# Patient Record
Sex: MALE | Race: WHITE | NOT HISPANIC OR LATINO | Employment: OTHER | URBAN - NONMETROPOLITAN AREA
[De-identification: names, ages, dates, MRNs, and addresses within clinical notes are randomized per-mention and may not be internally consistent; named-entity substitution may affect disease eponyms.]

---

## 2017-01-17 DIAGNOSIS — I10 BENIGN ESSENTIAL HYPERTENSION: Primary | ICD-10-CM

## 2017-01-17 DIAGNOSIS — E78.5 HYPERLIPIDEMIA LDL GOAL <100: ICD-10-CM

## 2017-01-18 PROBLEM — E11.9 TYPE 2 DIABETES MELLITUS WITHOUT COMPLICATION, WITHOUT LONG-TERM CURRENT USE OF INSULIN (H): Status: ACTIVE | Noted: 2017-01-18

## 2017-01-18 RX ORDER — ATORVASTATIN CALCIUM 20 MG/1
TABLET, FILM COATED ORAL
Qty: 30 TABLET | Refills: 2 | Status: SHIPPED | OUTPATIENT
Start: 2017-01-18 | End: 2017-04-18

## 2017-01-18 RX ORDER — LISINOPRIL 40 MG/1
TABLET ORAL
Qty: 30 TABLET | Refills: 8 | Status: SHIPPED | OUTPATIENT
Start: 2017-01-18 | End: 2017-11-01

## 2017-01-19 ENCOUNTER — OFFICE VISIT (OUTPATIENT)
Dept: FAMILY MEDICINE | Facility: OTHER | Age: 62
End: 2017-01-19
Attending: FAMILY MEDICINE
Payer: COMMERCIAL

## 2017-01-19 VITALS
BODY MASS INDEX: 34.07 KG/M2 | TEMPERATURE: 97.5 F | WEIGHT: 238 LBS | DIASTOLIC BLOOD PRESSURE: 82 MMHG | HEIGHT: 70 IN | SYSTOLIC BLOOD PRESSURE: 136 MMHG | HEART RATE: 71 BPM | OXYGEN SATURATION: 98 % | RESPIRATION RATE: 20 BRPM

## 2017-01-19 DIAGNOSIS — E66.09 OBESITY DUE TO EXCESS CALORIES, UNSPECIFIED OBESITY SEVERITY: ICD-10-CM

## 2017-01-19 DIAGNOSIS — I10 ESSENTIAL HYPERTENSION WITH GOAL BLOOD PRESSURE LESS THAN 140/90: ICD-10-CM

## 2017-01-19 DIAGNOSIS — E11.9 TYPE 2 DIABETES MELLITUS WITHOUT COMPLICATION, WITHOUT LONG-TERM CURRENT USE OF INSULIN (H): Primary | ICD-10-CM

## 2017-01-19 DIAGNOSIS — E78.5 HYPERLIPIDEMIA LDL GOAL <100: ICD-10-CM

## 2017-01-19 DIAGNOSIS — G47.09 OTHER INSOMNIA: ICD-10-CM

## 2017-01-19 DIAGNOSIS — C44.310 BCC (BASAL CELL CARCINOMA), FACE: ICD-10-CM

## 2017-01-19 LAB
EST. AVERAGE GLUCOSE BLD GHB EST-MCNC: 171 MG/DL
HBA1C MFR BLD: 7.6 % (ref 4.3–6)

## 2017-01-19 PROCEDURE — 99214 OFFICE O/P EST MOD 30 MIN: CPT | Performed by: FAMILY MEDICINE

## 2017-01-19 PROCEDURE — 36415 COLL VENOUS BLD VENIPUNCTURE: CPT | Performed by: FAMILY MEDICINE

## 2017-01-19 PROCEDURE — 83036 HEMOGLOBIN GLYCOSYLATED A1C: CPT | Performed by: FAMILY MEDICINE

## 2017-01-19 ASSESSMENT — PAIN SCALES - GENERAL: PAINLEVEL: NO PAIN (0)

## 2017-01-19 NOTE — PROGRESS NOTES
[facility] Website verified for patient immunization history.      SUBJECTIVE:                                                    Edward Hannah is a 61 year old male who presents to clinic today for the following health issues:        Diabetes Follow-up      Patient is checking blood sugars: 4 times a week    Diabetic concerns: None     Symptoms of hypoglycemia (low blood sugar): none     Paresthesias (numbness or burning in feet) or sores: No     Date of last diabetic eye exam: 2-2016     Hyperlipidemia Follow-Up      Rate your low fat/cholesterol diet?: fair    Taking statin?  Yes, no muscle aches from statin    Other lipid medications/supplements?:  none     Hypertension Follow-up      Outpatient blood pressures are not being checked.    Low Salt Diet: low salt         Amount of exercise or physical activity: 6-7 days/week for an average of 45-60 minutes    Problems taking medications regularly: No    Medication side effects: none    Diet: regular (no restrictions)    Problem list and histories reviewed & adjusted, as indicated.  Additional history: as documented    Current Outpatient Prescriptions   Medication Sig Dispense Refill     lisinopril (PRINIVIL/ZESTRIL) 40 MG tablet TAKE 1 TABLET BY MOUTH DAILY 30 tablet 8     atorvastatin (LIPITOR) 20 MG tablet TAKE 1 TABLET BY MOUTH DAILY 30 tablet 2     glipiZIDE (GLUCOTROL XL) 10 MG 24 hr tablet TAKE 1 TABLET BY MOUTH TWICE DAILY 60 tablet 4     metFORMIN (GLUCOPHAGE) 1000 MG tablet TAKE 1 TABLET BY MOUTH 2 TIMES A DAY WITH MEALS 60 tablet 4     acetylcysteine (N-ACETYL CYSTEINE) 500 MG CAPS capsule Take 1 capsule (500 mg) by mouth daily       BYETTA 5 MCG PEN 5 MCG/0.02ML SOPN INJECT 5MCG SUBCUTANEOUS 2 TIMES DAILY 1.2 mL 3     B-D U/F 31G X 8 MM insulin pen needle USE 2 DAILY OR AS DIRECTED 100 each 3     amLODIPine (NORVASC) 2.5 MG tablet TAKE 1 TABLET BY MOUTH DAILY 30 tablet 9     blood glucose monitoring (NO BRAND SPECIFIED) test strip 1 strip by In Vitro route  "daily - Test three-four times per week as directed 100 each 3     Omega-3 Fatty Acids (OMEGA-3 FISH OIL PO) Take 1 g by mouth daily       Multiple Vitamins-Minerals (MULTIVITAMIN PO) Take 1 tablet by mouth daily       ASPIRIN 81 MG OR TABS 1 tab po QD (Once per day) 100 3     Problem list, Medication list, Allergies, and Medical/Social/Surgical histories reviewed in [facility] and updated as appropriate.    ROS:  Constitutional, HEENT, cardiovascular, pulmonary, gi and gu systems are negative, except as otherwise noted.    OBJECTIVE:                                                    /82 mmHg  Pulse 71  Temp(Src) 97.5  F (36.4  C) (Tympanic)  Resp 20  Ht 5' 10\" (1.778 m)  Wt 238 lb (107.956 kg)  BMI 34.15 kg/m2  SpO2 98%  Body mass index is 34.15 kg/(m^2).  GENERAL APPEARANCE: healthy, alert, no distress and over weight  RESP: lungs clear to auscultation - no rales, rhonchi or wheezes  CV: regular rates and rhythm, normal S1 S2, no S3 or S4 and no murmur, click or rub  ABDOMEN: soft, nontender, without hepatosplenomegaly or masses, bowel sounds normal and obese  PSYCH: mentation appears normal and affect normal/bright       ASSESSMENT/PLAN:                                                    1. Type 2 diabetes mellitus without complication, without long-term current use of insulin (H)  F/u 3 mos  Start NAC BID  - Hemoglobin A1c; Future  - Hemoglobin A1c  - Lipid Profile (Chol, Trig, HDL, LDL calc); Future  - Comprehensive metabolic panel; Future  - Hemoglobin A1c; Future  - Albumin Random Urine Quantitative; Future  - Estimated Average Glucose    2. Essential hypertension with goal blood pressure less than 140/90  stable    3. Other insomnia  Take melatonin nightly    4. Obesity due to excess calories, unspecified obesity severity  Work on reducing carbs    5. BCC (basal cell carcinoma), face  Needs annual check  - DERMATOLOGY REFERRAL    6. Hyperlipidemia LDL goal <100  Labs next time    Patient was agreeable " to this plan and had no further questions.  See Patient Instructions    Kelly Yarbrough MD  [facility]

## 2017-01-19 NOTE — NURSING NOTE
"Chief Complaint   Patient presents with     Diabetes     3 month fu       Initial /82 mmHg  Pulse 71  Temp(Src) 97.5  F (36.4  C) (Tympanic)  Resp 20  Ht 5' 10\" (1.778 m)  Wt 238 lb (107.956 kg)  BMI 34.15 kg/m2  SpO2 98% Estimated body mass index is 34.15 kg/(m^2) as calculated from the following:    Height as of this encounter: 5' 10\" (1.778 m).    Weight as of this encounter: 238 lb (107.956 kg).  BP completed using cuff size: X-large  Mikaela Llanos  Due for labs, had eye exam scheduled for February 2017.  Mikaela Llanos      "

## 2017-02-17 ENCOUNTER — TELEPHONE (OUTPATIENT)
Dept: FAMILY MEDICINE | Facility: OTHER | Age: 62
End: 2017-02-17

## 2017-02-17 DIAGNOSIS — E11.9 TYPE 2 DIABETES MELLITUS WITHOUT COMPLICATION, WITHOUT LONG-TERM CURRENT USE OF INSULIN (H): Primary | ICD-10-CM

## 2017-02-20 ENCOUNTER — TELEPHONE (OUTPATIENT)
Dept: FAMILY MEDICINE | Facility: OTHER | Age: 62
End: 2017-02-20

## 2017-02-20 NOTE — TELEPHONE ENCOUNTER
Nahomi pen injector was denied by prior auth.. Victoza was suggested. Do you want to switch this to victoza? Pt notified by phone message that Byetta was denied

## 2017-03-01 ENCOUNTER — HOSPITAL ENCOUNTER (OUTPATIENT)
Dept: EDUCATION SERVICES | Facility: HOSPITAL | Age: 62
Discharge: HOME | End: 2017-03-01
Attending: FAMILY MEDICINE | Admitting: FAMILY MEDICINE
Payer: COMMERCIAL

## 2017-03-01 VITALS
OXYGEN SATURATION: 92 % | SYSTOLIC BLOOD PRESSURE: 128 MMHG | HEART RATE: 92 BPM | HEIGHT: 70 IN | DIASTOLIC BLOOD PRESSURE: 74 MMHG | BODY MASS INDEX: 35.3 KG/M2 | WEIGHT: 246.6 LBS

## 2017-03-01 DIAGNOSIS — E11.65 TYPE 2 DIABETES MELLITUS WITH HYPERGLYCEMIA, WITHOUT LONG-TERM CURRENT USE OF INSULIN (H): Primary | ICD-10-CM

## 2017-03-01 PROCEDURE — G0108 DIAB MANAGE TRN  PER INDIV: HCPCS | Performed by: DIETITIAN, REGISTERED

## 2017-03-01 ASSESSMENT — PAIN SCALES - GENERAL: PAINLEVEL: NO PAIN (0)

## 2017-03-01 NOTE — PROGRESS NOTES
"Pt is here today for possible medication change.  He was dx with diabetes around 8929-8374.  He had some education at that time.      /74 (BP Location: Right arm, Patient Position: Chair, Cuff Size: Adult Large)  Pulse 92  Ht 1.778 m (5' 10\")  Wt 111.9 kg (246 lb 9.6 oz)  SpO2 92%  BMI 35.38 kg/m2  No recent weight change reported.      Current diabetes medications:  Glucotrol XL 10 mg bid, Metformin 1000 mg bid.  Pt quit Byetta about 1 week ago as insurance would no longer pay for it.  He had been on it for about 1 year.      Current glucose levels:  Pt did not bring meter today.  Reports he tests 3-4x/week as has not felt information from meter has been helpful in the past.  Fastings usual 135-145 but higher recently as has been ill.  After supper around 200.  Pt states meter is 7-8 years old.     Lab Results   Component Value Date    A1C 7.6 01/19/2017    A1C 7.4 10/19/2016    A1C 7.3 07/21/2016    A1C 7.3 04/20/2016    A1C 8.2 01/20/2016     Pt did bring some food logs today.  Feels appetite has been somewhat decreased since being ill.  He eats 3 meals/day with some snacks hs.  Drinks alcohol 3-4x/week.  Discussion regarding carbohydrates notes pt does seem to know which foods contain them and he knows those foods will elevate glucose levels.  He does not exercise but states he is always \"busy\" outside - chops wood in the winter.      Pt had foot exam 10/2016.  He has an eye appointment at the end of March.  Pt does not smoke.      Provided him with a new [employer] Contour Next EZ meter and ordered supplies.  Reviewed use of meter and proper testing procedure along with sharps disposal.      PLAN:  Pt needs to test more often and bring meter for download before medication change can be made.  He agrees to test 2-3x/day - fasting, before largest meal and/or 2 hours post meal.  Continue to make efforts to limit carbohydrates.      Follow up in 2 weeks.      Total time spent with pt was 40 minutes.    "

## 2017-03-01 NOTE — IP AVS SNAPSHOT
[facility]    [address] [place] [address]    Phone:  [phone]    Fax:  [phone]                                       After Visit Summary   3/1/2017    Edward Hannah    MRN: [id number]           After Visit Summary Signature Page     I have received my discharge instructions, and my questions have been answered. I have discussed any challenges I see with this plan with the nurse or doctor.    ..........................................................................................................................................  Patient/Patient Representative Signature      ..........................................................................................................................................  Patient Representative Print Name and Relationship to Patient    ..................................................               ................................................  Date                                            Time    ..........................................................................................................................................  Reviewed by Signature/Title    ...................................................              ..............................................  Date                                                            Time

## 2017-03-01 NOTE — DISCHARGE INSTRUCTIONS
-Try to limit carbohydrates in your diet.    -Try to get some exercise on most days of the week - healthy goal is 30 minutes most days of the week.   -Test glucose 2-3x/day -fasting, before largest meal and 2 hours after largest meal.  -Target levels are fasting and before meals , 2 hours after meals - less than 180.   -Keep taking your Metformin and Glucotrol XL at same doses.   -Follow up in 2 weeks - bring your meter.   -Call with any questions - MARIZA Nichols, -[phone]

## 2017-03-01 NOTE — IP AVS SNAPSHOT
MRN:[id number]                      After Visit Summary   3/1/2017    Edward Hannah    MRN: [id number]           Thank you!     Thank you for choosing [facility] for your care. Our goal is always to provide you with excellent care. Hearing back from our patients is one way we can continue to improve our services. Please take a few minutes to complete the written survey that you may receive in the mail after you visit with us. Thank you!        Patient Information     Date Of Birth          1955        About your hospital stay     You were admitted on:  March 1, 2017 You last received care in the:  [facility]    You were discharged on:  March 1, 2017       Who to Call     For medical emergencies, please call 911.  For non-urgent questions about your medical care, please call your primary care provider or clinic, [phone]          Attending Provider     Provider Specialty    Kelly Yarbrough MD Family Practice       Primary Care Provider Office Phone # Fax #    Kelly Yarbrough [phone]       [facility] [place] [address]  [facility] [place] [address]        Your next 10 appointments already scheduled     Apr 19, 2017  8:45 AM CDT   (Arrive by 8:30 AM)   SHORT with Kelly Yarbrough MD   [facility] [place] ([facility] [place] [facility])    [address]  [place] [address]   [phone]            Jun 12, 2017  8:45 AM CDT   (Arrive by 8:30 AM)   New Visit with TRACY Dominguez MD   [facility] [place] ([facility])    [address]  [place] [id number] [phone]              Further instructions from your care team       -Try to limit carbohydrates in your diet.    -Try to get some exercise on most days of the week - healthy goal is 30 minutes most days of the week.   -Test glucose 2-3x/day -fasting, before largest meal and 2 hours after largest meal.  -Target levels are fasting and before meals , 2 hours after meals - less  "than 180.   -Keep taking your Metformin and Glucotrol XL at same doses.   -Follow up in 2 weeks - bring your meter.   -Call with any questions - MARIZA Nichols, -[phone]    Pending Results     No orders found from 2/27/2017 to 3/2/2017.            Admission Information     Date & Time Provider Department Dept. Phone    3/1/2017 Kelly Yarbrough MD [facility] [phone]      Your Vitals Were     Blood Pressure Pulse Height Weight Pulse Oximetry BMI (Body Mass Index)    128/74 (BP Location: Right arm, Patient Position: Chair, Cuff Size: Adult Large) 92 1.778 m (5' 10\") 111.9 kg (246 lb 9.6 oz) 92% 35.38 kg/m2      [employer] [facility] Information     [employer] gives you secure access to your electronic health record. If you see a primary care provider, you can also send messages to your care team and make appointments. If you have questions, please call your primary care clinic.  If you do not have a primary care provider, please call [phone] and they will assist you.        Care EveryWhere ID     This is your Care EveryWhere ID. This could be used by other organizations to access your [facility] medical records  [id number]           Review of your medicines      UNREVIEWED medicines. Ask your doctor about these medicines        Dose / Directions    acetylcysteine 500 MG Caps capsule   Commonly known as:  N-ACETYL CYSTEINE   Used for:  Type 2 diabetes mellitus without complication, without long-term current use of insulin (H)        Dose:  500 mg   Take 1 capsule (500 mg) by mouth daily   Refills:  0       amLODIPine 2.5 MG tablet   Commonly known as:  NORVASC   Used for:  Hypertension goal BP (blood pressure) < 140/90        TAKE 1 TABLET BY MOUTH DAILY   Quantity:  30 tablet   Refills:  9       aspirin 81 MG tablet   Used for:  Type II or unspecified type diabetes mellitus without mention of complication, not stated as uncontrolled        1 tab po QD (Once per day)   Quantity:  100   Refills:  3 "       atorvastatin 20 MG tablet   Commonly known as:  LIPITOR   Used for:  Hyperlipidemia LDL goal <100        TAKE 1 TABLET BY MOUTH DAILY   Quantity:  30 tablet   Refills:  2       glipiZIDE 10 MG 24 hr tablet   Commonly known as:  GLUCOTROL XL   Used for:  Type 2 diabetes mellitus without complication (H)        TAKE 1 TABLET BY MOUTH TWICE DAILY   Quantity:  60 tablet   Refills:  4       lisinopril 40 MG tablet   Commonly known as:  PRINIVIL/ZESTRIL   Used for:  Benign essential hypertension        TAKE 1 TABLET BY MOUTH DAILY   Quantity:  30 tablet   Refills:  8       MELATONIN PO        Takes 5 mg at bedtime daily   Refills:  0       metFORMIN 1000 MG tablet   Commonly known as:  GLUCOPHAGE   Used for:  Type 2 diabetes mellitus without complication (H)        TAKE 1 TABLET BY MOUTH 2 TIMES A DAY WITH MEALS   Quantity:  60 tablet   Refills:  4       MULTIVITAMIN PO        Dose:  1 tablet   Take 1 tablet by mouth daily   Refills:  0       OMEGA-3 FISH OIL PO        Dose:  1 g   Take 1 g by mouth daily   Refills:  0       VITAMIN D (CHOLECALCIFEROL) PO        Dose:  1000 Units   Take 1,000 Units by mouth daily   Refills:  0         CONTINUE these medicines which have NOT CHANGED        Dose / Directions    B-D U/F 31G X 8 MM   Used for:  Type 2 diabetes mellitus without complication (H)   Generic drug:  insulin pen needle        USE 2 DAILY OR AS DIRECTED   Quantity:  100 each   Refills:  3       blood glucose monitoring test strip   Commonly known as:  no brand specified   Used for:  Type 2 diabetes mellitus without complication (H)        Dose:  1 strip   1 strip by In Vitro route daily - Test three-four times per week as directed   Quantity:  100 each   Refills:  3                Protect others around you: Learn how to safely use, store and throw away your medicines at www.disposemymeds.org.             Medication List: This is a list of all your medications and when to take them. Check marks below indicate  your daily home schedule. Keep this list as a reference.      Medications           Morning Afternoon Evening Bedtime As Needed    acetylcysteine 500 MG Caps capsule   Commonly known as:  N-ACETYL CYSTEINE   Take 1 capsule (500 mg) by mouth daily                                amLODIPine 2.5 MG tablet   Commonly known as:  NORVASC   TAKE 1 TABLET BY MOUTH DAILY                                aspirin 81 MG tablet   1 tab po QD (Once per day)                                atorvastatin 20 MG tablet   Commonly known as:  LIPITOR   TAKE 1 TABLET BY MOUTH DAILY                                B-D U/F 31G X 8 MM   USE 2 DAILY OR AS DIRECTED   Generic drug:  insulin pen needle                                blood glucose monitoring test strip   Commonly known as:  no brand specified   1 strip by In Vitro route daily - Test three-four times per week as directed                                glipiZIDE 10 MG 24 hr tablet   Commonly known as:  GLUCOTROL XL   TAKE 1 TABLET BY MOUTH TWICE DAILY                                lisinopril 40 MG tablet   Commonly known as:  PRINIVIL/ZESTRIL   TAKE 1 TABLET BY MOUTH DAILY                                MELATONIN PO   Takes 5 mg at bedtime daily                                metFORMIN 1000 MG tablet   Commonly known as:  GLUCOPHAGE   TAKE 1 TABLET BY MOUTH 2 TIMES A DAY WITH MEALS                                MULTIVITAMIN PO   Take 1 tablet by mouth daily                                OMEGA-3 FISH OIL PO   Take 1 g by mouth daily                                VITAMIN D (CHOLECALCIFEROL) PO   Take 1,000 Units by mouth daily

## 2017-03-15 ENCOUNTER — HOSPITAL ENCOUNTER (OUTPATIENT)
Dept: EDUCATION SERVICES | Facility: HOSPITAL | Age: 62
Discharge: HOME | End: 2017-03-15
Attending: FAMILY MEDICINE | Admitting: FAMILY MEDICINE
Payer: COMMERCIAL

## 2017-03-15 ENCOUNTER — TELEPHONE (OUTPATIENT)
Dept: EDUCATION SERVICES | Facility: HOSPITAL | Age: 62
End: 2017-03-15

## 2017-03-15 VITALS
HEART RATE: 83 BPM | TEMPERATURE: 97.2 F | SYSTOLIC BLOOD PRESSURE: 135 MMHG | OXYGEN SATURATION: 91 % | DIASTOLIC BLOOD PRESSURE: 79 MMHG | HEIGHT: 70 IN | WEIGHT: 246.8 LBS | RESPIRATION RATE: 16 BRPM | BODY MASS INDEX: 35.33 KG/M2

## 2017-03-15 DIAGNOSIS — E11.65 TYPE 2 DIABETES MELLITUS WITH HYPERGLYCEMIA, WITHOUT LONG-TERM CURRENT USE OF INSULIN (H): Primary | ICD-10-CM

## 2017-03-15 PROCEDURE — G0108 DIAB MANAGE TRN  PER INDIV: HCPCS | Performed by: DIETITIAN, REGISTERED

## 2017-03-15 RX ORDER — LIRAGLUTIDE 6 MG/ML
0.6 INJECTION SUBCUTANEOUS DAILY
Qty: 6 ML | Refills: 3 | Status: SHIPPED
Start: 2017-03-15 | End: 2017-06-12

## 2017-03-15 ASSESSMENT — PAIN SCALES - GENERAL: PAINLEVEL: NO PAIN (0)

## 2017-03-15 NOTE — PROGRESS NOTES
"Pt is here today for diabetes follow up - medication adjustment.      /79  Pulse 83  Temp 97.2  F (36.2  C) (Tympanic)  Resp 16  Ht 1.778 m (5' 10\")  Wt 111.9 kg (246 lb 12.8 oz)  SpO2 (!) 91%  BMI 35.41 kg/m2  Weight notes no change from initial visit.      Current diabetes medications:  Glucotrol XL 10 mg bid, Metformin 1000 mg bid.  Pt forgets evening pills about 1x every 2 weeks.      Current glucose levels:   [id number]  Before meal-  Post [id number]  Overall average is 188    Pt expresses frustration as even when he eats a low carbohydrate meal his glucose level often still increases.  Discussed need for additional medication to allow for glucose control.  Pt has been on Byetta in the past but stopped taking as it was no longer covered and was very expensive.  It appears Victoza is covered.  Request sent to provider to begin 0.6 mg Victoza daily and titrate dose as needed to get glucose levels to target.      We reviewed action of Victoza, possible side effects, basics of injection technique (pt will have no problem since he has given Byetta in the past), proper sharps disposal.      PLAN:  Continue current meal planning efforts.  Continue current glucose monitoring schedule.  Continue Glucotrol XL 10 mg bid, Metformin 1000 mg bid.  I will contact pt if okay to begin Victoza.    Follow up: via phone in 2 weeks.      Total time spent with pt was 30 minutes.    "

## 2017-03-15 NOTE — IP AVS SNAPSHOT
[facility]    [address] [place] [address]    Phone:  [phone]    Fax:  [phone]                                       After Visit Summary   3/15/2017    Edward Hannah    MRN: [id number]           After Visit Summary Signature Page     I have received my discharge instructions, and my questions have been answered. I have discussed any challenges I see with this plan with the nurse or doctor.    ..........................................................................................................................................  Patient/Patient Representative Signature      ..........................................................................................................................................  Patient Representative Print Name and Relationship to Patient    ..................................................               ................................................  Date                                            Time    ..........................................................................................................................................  Reviewed by Signature/Title    ...................................................              ..............................................  Date                                                            Time

## 2017-03-15 NOTE — TELEPHONE ENCOUNTER
Pt was here today for diabetes follow up.  Current diabetes medications:  Glucotrol Xl 10 mg bid, Metformin 1000 mg bid.  Current glucose levels:  [id number] - one at 132  Before meal-  Post [id number]  Overall average is 188  Okay to add Victoza 0.6 mg/day and titrate dose to 1.8 mg/day as needed to get glucose levels to target?  Pt has been on Byetta in the past but no longer covered.  It appears Victoza is covered.

## 2017-03-15 NOTE — IP AVS SNAPSHOT
MRN:[id number]                      After Visit Summary   3/15/2017    Edward Hannah    MRN: [id number]           Thank you!     Thank you for choosing [facility] for your care. Our goal is always to provide you with excellent care. Hearing back from our patients is one way we can continue to improve our services. Please take a few minutes to complete the written survey that you may receive in the mail after you visit with us. Thank you!        Patient Information     Date Of Birth          1955        About your hospital stay     You were admitted on:  March 15, 2017 You last received care in the:  [facility]    You were discharged on:  March 15, 2017       Who to Call     For medical emergencies, please call 911.  For non-urgent questions about your medical care, please call your primary care provider or clinic, [phone]          Attending Provider     Provider Specialty    Kelly Yarbrough MD Family Practice       Primary Care Provider Office Phone # Fax #    Kelly Yarbrough [phone]       [facility] [place] [address]  [place] [address]        Your next 10 appointments already scheduled     Apr 19, 2017  8:45 AM CDT   (Arrive by 8:30 AM)   SHORT with Kelly Yarbrough MD   [facility] [place] ([facility]    [address]  [place] [address]   [phone]            Jun 12, 2017  8:45 AM CDT   (Arrive by 8:30 AM)   New Visit with TRACY Dominguez MD   [facility] [place] ([facility]    [address]  [place] [id number] [phone]              Further instructions from your care team       -Keep trying to limit foods high in carbohydrates in your diet.    -Be as active as you can.    -Keep your current testing schedule.   -Continue your Glucotrol XL 10 mg bid and Metformin 1000 mg bid.   -I will let you know when Dr. Yarbrough gives okay for Victoza.    -Begin 0.6 gm Victoza x 1 week and then  "increase to 1.2 mg after first week.   -I will call you in approximately 2 weeks to check on your glucose levels.   -Call me with any concerns - MARIZA Nichols, [phone]    Pending Results     No orders found from 3/13/2017 to 3/16/2017.            Admission Information     Date & Time Provider Department Dept. Phone    3/15/2017 Kelly Yarbrough MD [facility] [phone]      Your Vitals Were     Blood Pressure Pulse Temperature Respirations Height Weight    135/79 83 97.2  F (36.2  C) (Tympanic) 16 1.778 m (5' 10\") 111.9 kg (246 lb 12.8 oz)    Pulse Oximetry BMI (Body Mass Index)                91% 35.41 kg/m2          [employer] Information     [employer] gives you secure access to your electronic health record. If you see a primary care provider, you can also send messages to your care team and make appointments. If you have questions, please call your primary care clinic.  If you do not have a primary care provider, please call [phone] and they will assist you.        Care EveryWhere ID     This is your Care EveryWhere ID. This could be used by other organizations to access your [facility] medical records  [id number]           Review of your medicines      UNREVIEWED medicines. Ask your doctor about these medicines        Dose / Directions    acetylcysteine 500 MG Caps capsule   Commonly known as:  N-ACETYL CYSTEINE   Used for:  Type 2 diabetes mellitus without complication, without long-term current use of insulin (H)        Dose:  500 mg   Take 1 capsule (500 mg) by mouth daily   Refills:  0       amLODIPine 2.5 MG tablet   Commonly known as:  NORVASC   Used for:  Hypertension goal BP (blood pressure) < 140/90        TAKE 1 TABLET BY MOUTH DAILY   Quantity:  30 tablet   Refills:  9       aspirin 81 MG tablet   Used for:  Type II or unspecified type diabetes mellitus without mention of complication, not stated as uncontrolled        1 tab po QD (Once per day)   Quantity:  100 "   Refills:  3       atorvastatin 20 MG tablet   Commonly known as:  LIPITOR   Used for:  Hyperlipidemia LDL goal <100        TAKE 1 TABLET BY MOUTH DAILY   Quantity:  30 tablet   Refills:  2       glipiZIDE 10 MG 24 hr tablet   Commonly known as:  GLUCOTROL XL   Used for:  Type 2 diabetes mellitus without complication (H)        TAKE 1 TABLET BY MOUTH TWICE DAILY   Quantity:  60 tablet   Refills:  4       lisinopril 40 MG tablet   Commonly known as:  PRINIVIL/ZESTRIL   Used for:  Benign essential hypertension        TAKE 1 TABLET BY MOUTH DAILY   Quantity:  30 tablet   Refills:  8       MELATONIN PO        Takes 5 mg at bedtime daily   Refills:  0       metFORMIN 1000 MG tablet   Commonly known as:  GLUCOPHAGE   Used for:  Type 2 diabetes mellitus without complication (H)        TAKE 1 TABLET BY MOUTH 2 TIMES A DAY WITH MEALS   Quantity:  60 tablet   Refills:  4       MULTIVITAMIN PO        Dose:  1 tablet   Take 1 tablet by mouth daily   Refills:  0       OMEGA-3 FISH OIL PO        Dose:  1 g   Take 1 g by mouth daily   Refills:  0       VITAMIN D (CHOLECALCIFEROL) PO        Dose:  1000 Units   Take 1,000 Units by mouth daily   Refills:  0         CONTINUE these medicines which have NOT CHANGED        Dose / Directions    B-D U/F 31G X 8 MM   Used for:  Type 2 diabetes mellitus without complication (H)   Generic drug:  insulin pen needle        USE 2 DAILY OR AS DIRECTED   Quantity:  100 each   Refills:  3       blood glucose monitoring lancets   Used for:  Type 2 diabetes mellitus with hyperglycemia, without long-term current use of insulin (H)        Use to test blood sugar 3 times daily.   Quantity:  1 Box   Refills:  11       blood glucose monitoring test strip   Commonly known as:  KELSEY CONTOUR NEXT   Used for:  Type 2 diabetes mellitus with hyperglycemia, without long-term current use of insulin (H)        Use to test blood sugar 3 times daily.   Quantity:  100 each   Refills:  11                Protect  others around you: Learn how to safely use, store and throw away your medicines at www.disposemymeds.org.             Medication List: This is a list of all your medications and when to take them. Check marks below indicate your daily home schedule. Keep this list as a reference.      Medications           Morning Afternoon Evening Bedtime As Needed    acetylcysteine 500 MG Caps capsule   Commonly known as:  N-ACETYL CYSTEINE   Take 1 capsule (500 mg) by mouth daily                                amLODIPine 2.5 MG tablet   Commonly known as:  NORVASC   TAKE 1 TABLET BY MOUTH DAILY                                aspirin 81 MG tablet   1 tab po QD (Once per day)                                atorvastatin 20 MG tablet   Commonly known as:  LIPITOR   TAKE 1 TABLET BY MOUTH DAILY                                B-D U/F 31G X 8 MM   USE 2 DAILY OR AS DIRECTED   Generic drug:  insulin pen needle                                blood glucose monitoring lancets   Use to test blood sugar 3 times daily.                                blood glucose monitoring test strip   Commonly known as:  [employer] CONTOUR NEXT   Use to test blood sugar 3 times daily.                                glipiZIDE 10 MG 24 hr tablet   Commonly known as:  GLUCOTROL XL   TAKE 1 TABLET BY MOUTH TWICE DAILY                                lisinopril 40 MG tablet   Commonly known as:  PRINIVIL/ZESTRIL   TAKE 1 TABLET BY MOUTH DAILY                                MELATONIN PO   Takes 5 mg at bedtime daily                                metFORMIN 1000 MG tablet   Commonly known as:  GLUCOPHAGE   TAKE 1 TABLET BY MOUTH 2 TIMES A DAY WITH MEALS                                MULTIVITAMIN PO   Take 1 tablet by mouth daily                                OMEGA-3 FISH OIL PO   Take 1 g by mouth daily                                VITAMIN D (CHOLECALCIFEROL) PO   Take 1,000 Units by mouth daily

## 2017-03-29 ENCOUNTER — TELEPHONE (OUTPATIENT)
Dept: EDUCATION SERVICES | Facility: HOSPITAL | Age: 62
End: 2017-03-29

## 2017-03-29 NOTE — TELEPHONE ENCOUNTER
Called pt today regarding glucose levels.  Current diabetes medications:  Glucotrol XL 10 mg bid, Metformin 1000 mg bid, Victoza 1.2 mg daily (pt will have been at this dose for 1 week tomorrow).  Current glucose levels:  Fasting-168, 155, 145, 135, 146, 158, 159, 141  Before supper-138, 100, 138, 86  2 hour after supper-130, 192, 139, 186, 181  Levels have trended down since starting Victoza.  Will continue at current dose for now.  Pt expressed that it was very expensive and he's not sure he can afford it.  I mailed him a savings card and he will see if this will offer him and significant discount.  Will speak with him again in 2 weeks.

## 2017-04-17 NOTE — TELEPHONE ENCOUNTER
Called pt today regarding glucose levels.  Current diabetes medications:  Glucotrol XL 10 mg bid, Metformin 1000 mg bid, Victoza 1.2 mg daily.  Current glucose levels:  Fasting-134, 105, 156, 137, 112, 120, 160, 122  Before supper-124, 79, 95, 155, 81, 93, 95, 103  2 hours post supper-151, 127, 151, 160, 120  Glucose numbers have much improved.  Pt is pleased.  He did receive savings card I sent him for Victoza but has not tried to use it yet.  He will see provider next week for A1c.  Encourage him to call with any concerns.  Will see again in 6 months.

## 2017-04-18 DIAGNOSIS — E78.5 HYPERLIPIDEMIA LDL GOAL <100: ICD-10-CM

## 2017-04-18 DIAGNOSIS — E11.9 TYPE 2 DIABETES MELLITUS WITHOUT COMPLICATION (H): ICD-10-CM

## 2017-04-19 ENCOUNTER — OFFICE VISIT (OUTPATIENT)
Dept: FAMILY MEDICINE | Facility: OTHER | Age: 62
End: 2017-04-19
Attending: FAMILY MEDICINE
Payer: COMMERCIAL

## 2017-04-19 VITALS
HEART RATE: 72 BPM | SYSTOLIC BLOOD PRESSURE: 118 MMHG | HEIGHT: 70 IN | TEMPERATURE: 96.7 F | WEIGHT: 235 LBS | BODY MASS INDEX: 33.64 KG/M2 | DIASTOLIC BLOOD PRESSURE: 80 MMHG

## 2017-04-19 DIAGNOSIS — E11.9 TYPE 2 DIABETES MELLITUS WITHOUT COMPLICATION, WITHOUT LONG-TERM CURRENT USE OF INSULIN (H): ICD-10-CM

## 2017-04-19 DIAGNOSIS — I10 ESSENTIAL HYPERTENSION WITH GOAL BLOOD PRESSURE LESS THAN 140/90: ICD-10-CM

## 2017-04-19 DIAGNOSIS — E78.5 HYPERLIPIDEMIA LDL GOAL <100: Primary | ICD-10-CM

## 2017-04-19 LAB
ALBUMIN SERPL-MCNC: 3.8 G/DL (ref 3.4–5)
ALP SERPL-CCNC: 74 U/L (ref 40–150)
ALT SERPL W P-5'-P-CCNC: 32 U/L (ref 0–70)
ANION GAP SERPL CALCULATED.3IONS-SCNC: 8 MMOL/L (ref 3–14)
AST SERPL W P-5'-P-CCNC: 13 U/L (ref 0–45)
BILIRUB SERPL-MCNC: 0.5 MG/DL (ref 0.2–1.3)
BUN SERPL-MCNC: 16 MG/DL (ref 7–30)
CALCIUM SERPL-MCNC: 9.5 MG/DL (ref 8.5–10.1)
CHLORIDE SERPL-SCNC: 104 MMOL/L (ref 94–109)
CHOLEST SERPL-MCNC: 137 MG/DL
CO2 SERPL-SCNC: 27 MMOL/L (ref 20–32)
CREAT SERPL-MCNC: 0.89 MG/DL (ref 0.66–1.25)
EST. AVERAGE GLUCOSE BLD GHB EST-MCNC: 169 MG/DL
GFR SERPL CREATININE-BSD FRML MDRD: 87 ML/MIN/1.7M2
GLUCOSE SERPL-MCNC: 134 MG/DL (ref 70–99)
HBA1C MFR BLD: 7.5 % (ref 4.3–6)
HDLC SERPL-MCNC: 53 MG/DL
LDLC SERPL CALC-MCNC: 55 MG/DL
NONHDLC SERPL-MCNC: 84 MG/DL
POTASSIUM SERPL-SCNC: 4.3 MMOL/L (ref 3.4–5.3)
PROT SERPL-MCNC: 7.8 G/DL (ref 6.8–8.8)
SODIUM SERPL-SCNC: 139 MMOL/L (ref 133–144)
TRIGL SERPL-MCNC: 147 MG/DL

## 2017-04-19 PROCEDURE — 80061 LIPID PANEL: CPT | Performed by: FAMILY MEDICINE

## 2017-04-19 PROCEDURE — 99214 OFFICE O/P EST MOD 30 MIN: CPT | Performed by: FAMILY MEDICINE

## 2017-04-19 PROCEDURE — 36415 COLL VENOUS BLD VENIPUNCTURE: CPT | Performed by: FAMILY MEDICINE

## 2017-04-19 PROCEDURE — 80053 COMPREHEN METABOLIC PANEL: CPT | Performed by: FAMILY MEDICINE

## 2017-04-19 PROCEDURE — 83036 HEMOGLOBIN GLYCOSYLATED A1C: CPT | Performed by: FAMILY MEDICINE

## 2017-04-19 ASSESSMENT — PAIN SCALES - GENERAL: PAINLEVEL: NO PAIN (0)

## 2017-04-19 ASSESSMENT — ANXIETY QUESTIONNAIRES
GAD7 TOTAL SCORE: 0
7. FEELING AFRAID AS IF SOMETHING AWFUL MIGHT HAPPEN: NOT AT ALL
5. BEING SO RESTLESS THAT IT IS HARD TO SIT STILL: NOT AT ALL
6. BECOMING EASILY ANNOYED OR IRRITABLE: NOT AT ALL
3. WORRYING TOO MUCH ABOUT DIFFERENT THINGS: NOT AT ALL
IF YOU CHECKED OFF ANY PROBLEMS ON THIS QUESTIONNAIRE, HOW DIFFICULT HAVE THESE PROBLEMS MADE IT FOR YOU TO DO YOUR WORK, TAKE CARE OF THINGS AT HOME, OR GET ALONG WITH OTHER PEOPLE: NOT DIFFICULT AT ALL
1. FEELING NERVOUS, ANXIOUS, OR ON EDGE: NOT AT ALL
2. NOT BEING ABLE TO STOP OR CONTROL WORRYING: NOT AT ALL

## 2017-04-19 ASSESSMENT — PATIENT HEALTH QUESTIONNAIRE - PHQ9: 5. POOR APPETITE OR OVEREATING: NOT AT ALL

## 2017-04-19 NOTE — PROGRESS NOTES
SUBJECTIVE:                                                    Edward Hannah is a 61 year old male who presents to clinic today for the following health issues:      Diabetes Follow-up    Patient is checking blood sugars: three times daily.   Blood sugar testing frequency justification: Adjustment of medication(s)  Results are as follows:         am - 110-120         Pm- 140-150         After [id number]    Diabetic concerns: None     Symptoms of hypoglycemia (low blood sugar): none     Paresthesias (numbness or burning in feet) or sores: No     Date of last diabetic eye exam: Appt in June     Hyperlipidemia Follow-Up      Rate your low fat/cholesterol diet?: good    Taking statin?  Yes, no muscle aches from statin    Other lipid medications/supplements?:  Fish oil/Omega 3, dose 1000 mg without side effects     Hypertension Follow-up      Outpatient blood pressures are not being checked.    Low Salt Diet: low salt         Amount of exercise or physical activity: 6-7 days/week for an average of 45-60 minutes    Problems taking medications regularly: No    Medication side effects: none    Diet: regular (no restrictions)    Doing well with new rx, victoza    Problem list and histories reviewed & adjusted, as indicated.  Additional history: as documented    Current Outpatient Prescriptions   Medication Sig Dispense Refill     liraglutide (VICTOZA PEN) 18 MG/3ML soln Inject 0.6 mg Subcutaneous daily 6 mL 3     insulin pen needle 32G X 4 MM Use 1 pen needles daily. 100 each 3     MELATONIN PO Takes 5 mg at bedtime daily       VITAMIN D, CHOLECALCIFEROL, PO Take 1,000 Units by mouth daily       blood glucose monitoring (KELSEY CONTOUR NEXT) test strip Use to test blood sugar 3 times daily. 100 each 11     blood glucose monitoring (KELSEY MICROLET) lancets Use to test blood sugar 3 times daily. 1 Box 11     lisinopril (PRINIVIL/ZESTRIL) 40 MG tablet TAKE 1 TABLET BY MOUTH DAILY 30 tablet 8     atorvastatin (LIPITOR) 20 MG  "tablet TAKE 1 TABLET BY MOUTH DAILY 30 tablet 2     metFORMIN (GLUCOPHAGE) 1000 MG tablet TAKE 1 TABLET BY MOUTH 2 TIMES A DAY WITH MEALS 60 tablet 4     acetylcysteine (N-ACETYL CYSTEINE) 500 MG CAPS capsule Take 1 capsule (500 mg) by mouth daily       B-D U/F 31G X 8 MM insulin pen needle USE 2 DAILY OR AS DIRECTED 100 each 3     amLODIPine (NORVASC) 2.5 MG tablet TAKE 1 TABLET BY MOUTH DAILY 30 tablet 9     Omega-3 Fatty Acids (OMEGA-3 FISH OIL PO) Take 1 g by mouth daily       Multiple Vitamins-Minerals (MULTIVITAMIN PO) Take 1 tablet by mouth daily       ASPIRIN 81 MG OR TABS 1 tab po QD (Once per day) 100 3     Labs reviewed in EPIC    ROS:  Constitutional, HEENT, cardiovascular, pulmonary, gi and gu systems are negative, except as otherwise noted.    OBJECTIVE:                                                    /80  Pulse 72  Temp 96.7  F (35.9  C)  Ht 5' 10\" (1.778 m)  Wt 235 lb (106.6 kg)  BMI 33.72 kg/m2  Body mass index is 33.72 kg/(m^2).  GENERAL APPEARANCE: healthy, alert and no distress  RESP: lungs clear to auscultation - no rales, rhonchi or wheezes  CV: regular rates and rhythm, normal S1 S2, no S3 or S4 and no murmur, click or rub  ABDOMEN: soft, nontender, without hepatosplenomegaly or masses and bowel sounds normal  PSYCH: mentation appears normal and affect normal/bright       ASSESSMENT/PLAN:                                                    1. Type 2 diabetes mellitus without complication, without long-term current use of insulin (H)  F/u 3 mos  - Lipid Profile (Chol, Trig, HDL, LDL calc)  - Comprehensive metabolic panel  - Hemoglobin A1c  - Albumin Random Urine Quantitative  - Estimated Average Glucose    2. Hyperlipidemia LDL goal <100  Better but triglycerides higher, this should improve with better sugar control    3. Essential hypertension with goal blood pressure less than 140/90  Stable, doing well    Patient was agreeable to this plan and had no further questions.  See " Patient Instructions    Kelly Yarbrough MD  [facility]

## 2017-04-19 NOTE — MR AVS SNAPSHOT
After Visit Summary   4/19/2017    Edward Hannah    MRN: [id number]           Patient Information     Date Of Birth          1955        Visit Information        Provider Department      4/19/2017 8:45 AM Kelly Yarbrough MD [facility]        Today's Diagnoses     Type 2 diabetes mellitus without complication, without long-term current use of insulin (H)           Follow-ups after your visit        Your next 10 appointments already scheduled     Jun 06, 2017  8:45 AM CDT   (Arrive by 8:30 AM)   New Visit with H Mo Dominguez MD   [facility] [place] [facility]    [address]  [place] [address]   [phone]            Jul 20, 2017  8:30 AM CDT   (Arrive by 8:15 AM)   SHORT with Kelly Yarbrough MD   [facility] [place] [facility]    [address]  [place] [address]   [phone]              Who to contact     If you have questions or need follow up information about today's clinic visit or your schedule please contact [facility] directly at [phone].  Normal or non-critical lab and imaging results will be communicated to you by MyChart, letter or phone within 4 business days after the clinic has received the results. If you do not hear from us within 7 days, please contact the clinic through [employer] or phone. If you have a critical or abnormal lab result, we will notify you by phone as soon as possible.  Submit refill requests through [employer] or call your pharmacy and they will forward the refill request to us. Please allow 3 business days for your refill to be completed.          Additional Information About Your Visit        MyChart Information     [employer] gives you secure access to your electronic health record. If you see a primary care provider, you can also send messages to your care team and make appointments. If you have questions, please call your primary care clinic.  If you do not have a primary care  "provider, please call [phone] and they will assist you.        Care EveryWhere ID     This is your Care EveryWhere ID. This could be used by other organizations to access your [facility] medical records  [id number]        Your Vitals Were     Pulse Temperature Height BMI (Body Mass Index)          72 96.7  F (35.9  C) 5' 10\" (1.778 m) 33.72 kg/m2         Blood Pressure from Last 3 Encounters:   04/19/17 118/80   03/15/17 135/79   03/01/17 128/74    Weight from Last 3 Encounters:   04/19/17 235 lb (106.6 kg)   03/15/17 246 lb 12.8 oz (111.9 kg)   03/01/17 246 lb 9.6 oz (111.9 kg)              We Performed the Following     Albumin Random Urine Quantitative     Comprehensive metabolic panel     Estimated Average Glucose     Hemoglobin A1c     Lipid Profile (Chol, Trig, HDL, LDL calc)          Today's Medication Changes          These changes are accurate as of: 4/19/17  9:13 AM.  If you have any questions, ask your nurse or doctor.               Stop taking these medicines if you haven't already. Please contact your care team if you have questions.     glipiZIDE 10 MG 24 hr tablet   Commonly known as:  GLUCOTROL XL                    Primary Care Provider Office Phone # Fax #    Kelly Yarbrough [phone]       [facility] [place] [address] [place] [id number]        Thank you!     [facility]  [facility]. Our goal is always to provide you with excellent care. Hearing back from our patients is one way we can continue to improve our services. Please take a few minutes to complete the written survey that you may receive in the mail after your visit with us. Thank you!             Your Updated Medication List - Protect others around you: Learn how to safely use, store and throw away your medicines at www.disposemymeds.org.          This list is accurate as of: 4/19/17  9:13 AM.  Always use your most recent med list.                   Brand Name " Dispense Instructions for use    acetylcysteine 500 MG Caps capsule    N-ACETYL CYSTEINE     Take 1 capsule (500 mg) by mouth daily       amLODIPine 2.5 MG tablet    NORVASC    30 tablet    TAKE 1 TABLET BY MOUTH DAILY       aspirin 81 MG tablet     100    1 tab po QD (Once per day)       atorvastatin 20 MG tablet    LIPITOR    30 tablet    TAKE 1 TABLET BY MOUTH DAILY       * B-D U/F 31G X 8 MM   Generic drug:  insulin pen needle     100 each    USE 2 DAILY OR AS DIRECTED       * insulin pen needle 32G X 4 MM     100 each    Use 1 pen needles daily.       blood glucose monitoring lancets     1 Box    Use to test blood sugar 3 times daily.       blood glucose monitoring test strip    KELSEY CONTOUR NEXT    100 each    Use to test blood sugar 3 times daily.       liraglutide 18 MG/3ML soln    VICTOZA PEN    6 mL    Inject 0.6 mg Subcutaneous daily       lisinopril 40 MG tablet    PRINIVIL/ZESTRIL    30 tablet    TAKE 1 TABLET BY MOUTH DAILY       MELATONIN PO      Takes 5 mg at bedtime daily       metFORMIN 1000 MG tablet    GLUCOPHAGE    60 tablet    TAKE 1 TABLET BY MOUTH 2 TIMES A DAY WITH MEALS       MULTIVITAMIN PO      Take 1 tablet by mouth daily       OMEGA-3 FISH OIL PO      Take 1 g by mouth daily       VITAMIN D (CHOLECALCIFEROL) PO      Take 1,000 Units by mouth daily       * Notice:  This list has 2 medication(s) that are the same as other medications prescribed for you. Read the directions carefully, and ask your doctor or other care provider to review them with you.

## 2017-04-19 NOTE — TELEPHONE ENCOUNTER
Atorvastatin         Last Written Prescription Date: 1/18/17  Last Fill Quantity: 30, # refills: 2    Last Office Visit with [facility], [facility] or [facility] prescribing provider:  4/19/17   Future Office Visit:      BP Readings from Last 3 Encounters:   04/19/17 118/80   03/15/17 135/79   03/01/17 128/74     Lab Results   Component Value Date    ALT 32 04/19/2017     Lab Results   Component Value Date    CHOL 137 04/19/2017     Lab Results   Component Value Date    HDL 53 04/19/2017     Lab Results   Component Value Date    LDL 55 04/19/2017     Lab Results   Component Value Date    TRIG 147 04/19/2017     Lab Results   Component Value Date    CHOLHDLRATIO 3.3 06/30/2015     Glipizide         Last Written Prescription Date: 11/16/16  Last Fill Quantity: 60, # refills: 4  Last Office Visit with [facility], [facility] or [facility] prescribing provider:  4/19/17        BP Readings from Last 3 Encounters:   04/19/17 118/80   03/15/17 135/79   03/01/17 128/74     Lab Results   Component Value Date    MICROL 21 04/20/2016     Lab Results   Component Value Date    UMALCR 8.31 04/20/2016     Creatinine   Date Value Ref Range Status   04/19/2017 0.89 0.66 - 1.25 mg/dL Final   ]  GFR Estimate   Date Value Ref Range Status   04/19/2017 87 >60 mL/min/1.7m2 Final     Comment:     Non  GFR Calc   04/20/2016 83 >60 mL/min/1.7m2 Final     Comment:     Non  GFR Calc   06/30/2015 76 >60 mL/min/1.7m2 Final     Comment:     Non  GFR Calc     GFR Estimate If Black   Date Value Ref Range Status   04/19/2017 >90   GFR Calc   >60 mL/min/1.7m2 Final   04/20/2016 >90   GFR Calc   >60 mL/min/1.7m2 Final   06/30/2015 >90   GFR Calc   >60 mL/min/1.7m2 Final     Lab Results   Component Value Date    CHOL 137 04/19/2017     Lab Results   Component Value Date    HDL 53 04/19/2017     Lab Results   Component Value Date    LDL 55 04/19/2017     Lab Results   Component Value Date     TRIG 147 04/19/2017     Lab Results   Component Value Date    CHOLHDLRATIO 3.3 06/30/2015     Lab Results   Component Value Date    AST 13 04/19/2017     Lab Results   Component Value Date    ALT 32 04/19/2017     Lab Results   Component Value Date    A1C 7.5 04/19/2017    A1C 7.6 01/19/2017    A1C 7.4 10/19/2016    A1C 7.3 07/21/2016    A1C 7.3 04/20/2016     Potassium   Date Value Ref Range Status   04/19/2017 4.3 3.4 - 5.3 mmol/L Final     Metformin         Last Written Prescription Date: 11/16/16  Last Fill Quantity: 60, # refills: 4  Last Office Visit with [facility], [facility] or [facility] prescribing provider:  4/19/17        BP Readings from Last 3 Encounters:   04/19/17 118/80   03/15/17 135/79   03/01/17 128/74     Lab Results   Component Value Date    MICROL 21 04/20/2016     Lab Results   Component Value Date    UMALCR 8.31 04/20/2016     Creatinine   Date Value Ref Range Status   04/19/2017 0.89 0.66 - 1.25 mg/dL Final   ]  GFR Estimate   Date Value Ref Range Status   04/19/2017 87 >60 mL/min/1.7m2 Final     Comment:     Non  GFR Calc   04/20/2016 83 >60 mL/min/1.7m2 Final     Comment:     Non  GFR Calc   06/30/2015 76 >60 mL/min/1.7m2 Final     Comment:     Non  GFR Calc     GFR Estimate If Black   Date Value Ref Range Status   04/19/2017 >90   GFR Calc   >60 mL/min/1.7m2 Final   04/20/2016 >90   GFR Calc   >60 mL/min/1.7m2 Final   06/30/2015 >90   GFR Calc   >60 mL/min/1.7m2 Final     Lab Results   Component Value Date    CHOL 137 04/19/2017     Lab Results   Component Value Date    HDL 53 04/19/2017     Lab Results   Component Value Date    LDL 55 04/19/2017     Lab Results   Component Value Date    TRIG 147 04/19/2017     Lab Results   Component Value Date    CHOLHDLRATIO 3.3 06/30/2015     Lab Results   Component Value Date    AST 13 04/19/2017     Lab Results   Component Value Date    ALT 32 04/19/2017     Lab  Results   Component Value Date    A1C 7.5 04/19/2017    A1C 7.6 01/19/2017    A1C 7.4 10/19/2016    A1C 7.3 07/21/2016    A1C 7.3 04/20/2016     Potassium   Date Value Ref Range Status   04/19/2017 4.3 3.4 - 5.3 mmol/L Final

## 2017-04-19 NOTE — TELEPHONE ENCOUNTER
Lipitor     Last Written Prescription Date: 1/18/17  Last Fill Quantity: 30, # refills: 2  Last Office Visit with [facility] [place] or  Health prescribing provider: 4/19/17       Lab Results   Component Value Date    CHOL 137 04/19/2017     Lab Results   Component Value Date    HDL 53 04/19/2017     Lab Results   Component Value Date    LDL 55 04/19/2017     Lab Results   Component Value Date    TRIG 147 04/19/2017     Lab Results   Component Value Date    CHOLHDLRATIO 3.3 06/30/2015     Metformin         Last Written Prescription Date: 11/16/16  Last Fill Quantity: 60, # refills: 4  Last Office Visit with [facility] [place], [facility] or  Health prescribing provider:  4/19/17        BP Readings from Last 3 Encounters:   04/19/17 118/80   03/15/17 135/79   03/01/17 128/74     Lab Results   Component Value Date    MICROL 21 04/20/2016     Lab Results   Component Value Date    UMALCR 8.31 04/20/2016     Creatinine   Date Value Ref Range Status   04/19/2017 0.89 0.66 - 1.25 mg/dL Final   ]  GFR Estimate   Date Value Ref Range Status   04/19/2017 87 >60 mL/min/1.7m2 Final     Comment:     Non  GFR Calc   04/20/2016 83 >60 mL/min/1.7m2 Final     Comment:     Non  GFR Calc   06/30/2015 76 >60 mL/min/1.7m2 Final     Comment:     Non  GFR Calc     GFR Estimate If Black   Date Value Ref Range Status   04/19/2017 >90   GFR Calc   >60 mL/min/1.7m2 Final   04/20/2016 >90   GFR Calc   >60 mL/min/1.7m2 Final   06/30/2015 >90   GFR Calc   >60 mL/min/1.7m2 Final     Lab Results   Component Value Date    CHOL 137 04/19/2017     Lab Results   Component Value Date    HDL 53 04/19/2017     Lab Results   Component Value Date    LDL 55 04/19/2017     Lab Results   Component Value Date    TRIG 147 04/19/2017     Lab Results   Component Value Date    CHOLHDLRATIO 3.3 06/30/2015     Lab Results   Component Value Date    AST 13 04/19/2017     Lab Results   Component Value  Date    ALT 32 04/19/2017     Lab Results   Component Value Date    A1C 7.5 04/19/2017    A1C 7.6 01/19/2017    A1C 7.4 10/19/2016    A1C 7.3 07/21/2016    A1C 7.3 04/20/2016     Potassium   Date Value Ref Range Status   04/19/2017 4.3 3.4 - 5.3 mmol/L Final     Glipizide         Last Written Prescription Date: 11/16/16  Last Fill Quantity: 60, # refills: 4  [place] [facility] [place] or [facility] prescribing provider:  4/19/17        BP Readings from Last 3 Encounters:   04/19/17 118/80   03/15/17 135/79   03/01/17 128/74     Lab Results   Component Value Date    MICROL 21 04/20/2016     Lab Results   Component Value Date    UMALCR 8.31 04/20/2016     Creatinine   Date Value Ref Range Status   04/19/2017 0.89 0.66 - 1.25 mg/dL Final   ]  GFR Estimate   Date Value Ref Range Status   04/19/2017 87 >60 mL/min/1.7m2 Final     Comment:     Non  GFR Calc   04/20/2016 83 >60 mL/min/1.7m2 Final     Comment:     Non  GFR Calc   06/30/2015 76 >60 mL/min/1.7m2 Final     Comment:     Non  GFR Calc     GFR Estimate If Black   Date Value Ref Range Status   04/19/2017 >90   GFR Calc   >60 mL/min/1.7m2 Final   04/20/2016 >90   GFR Calc   >60 mL/min/1.7m2 Final   06/30/2015 >90   GFR Calc   >60 mL/min/1.7m2 Final     Lab Results   Component Value Date    CHOL 137 04/19/2017     Lab Results   Component Value Date    HDL 53 04/19/2017     Lab Results   Component Value Date    LDL 55 04/19/2017     Lab Results   Component Value Date    TRIG 147 04/19/2017     Lab Results   Component Value Date    CHOLHDLRATIO 3.3 06/30/2015     Lab Results   Component Value Date    AST 13 04/19/2017     Lab Results   Component Value Date    ALT 32 04/19/2017     Lab Results   Component Value Date    A1C 7.5 04/19/2017    A1C 7.6 01/19/2017    A1C 7.4 10/19/2016    A1C 7.3 07/21/2016    A1C 7.3 04/20/2016     Potassium   Date Value Ref Range Status    04/19/2017 4.3 3.4 - 5.3 mmol/L Final

## 2017-04-19 NOTE — NURSING NOTE
"Chief Complaint   Patient presents with     Diabetes       Initial /80  Pulse 72  Temp 96.7  F (35.9  C)  Ht 5' 10\" (1.778 m)  Wt 235 lb (106.6 kg)  BMI 33.72 kg/m2 Estimated body mass index is 33.72 kg/(m^2) as calculated from the following:    Height as of this encounter: 5' 10\" (1.778 m).    Weight as of this encounter: 235 lb (106.6 kg).  Medication Reconciliation: complete   Jadyn Singh    "

## 2017-04-20 RX ORDER — GLIPIZIDE 10 MG/1
TABLET, FILM COATED, EXTENDED RELEASE ORAL
Qty: 60 TABLET | Refills: 5 | Status: SHIPPED | OUTPATIENT
Start: 2017-04-20 | End: 2018-05-03

## 2017-04-20 RX ORDER — ATORVASTATIN CALCIUM 20 MG/1
TABLET, FILM COATED ORAL
Qty: 30 TABLET | Refills: 11 | Status: SHIPPED | OUTPATIENT
Start: 2017-04-20 | End: 2018-05-03

## 2017-04-20 ASSESSMENT — PATIENT HEALTH QUESTIONNAIRE - PHQ9: SUM OF ALL RESPONSES TO PHQ QUESTIONS 1-9: 0

## 2017-04-20 ASSESSMENT — ANXIETY QUESTIONNAIRES: GAD7 TOTAL SCORE: 0

## 2017-05-24 DIAGNOSIS — I10 HYPERTENSION GOAL BP (BLOOD PRESSURE) < 140/90: ICD-10-CM

## 2017-05-25 RX ORDER — AMLODIPINE BESYLATE 2.5 MG/1
TABLET ORAL
Qty: 30 TABLET | Refills: 7 | Status: SHIPPED | OUTPATIENT
Start: 2017-05-25 | End: 2018-03-05

## 2017-06-06 ENCOUNTER — OFFICE VISIT (OUTPATIENT)
Dept: DERMATOLOGY | Facility: OTHER | Age: 62
End: 2017-06-06
Attending: FAMILY MEDICINE
Payer: COMMERCIAL

## 2017-06-06 VITALS
TEMPERATURE: 97.8 F | HEART RATE: 83 BPM | RESPIRATION RATE: 16 BRPM | SYSTOLIC BLOOD PRESSURE: 132 MMHG | BODY MASS INDEX: 34.5 KG/M2 | OXYGEN SATURATION: 93 % | HEIGHT: 70 IN | DIASTOLIC BLOOD PRESSURE: 72 MMHG | WEIGHT: 241 LBS

## 2017-06-06 DIAGNOSIS — L98.9 SKIN LESION: Primary | ICD-10-CM

## 2017-06-06 DIAGNOSIS — C44.519 BCC (BASAL CELL CARCINOMA), TRUNK: ICD-10-CM

## 2017-06-06 PROCEDURE — 17260 DSTRJ MAL LES T/A/L 0.5 CM/<: CPT | Performed by: DERMATOLOGY

## 2017-06-06 PROCEDURE — 88305 TISSUE EXAM BY PATHOLOGIST: CPT | Mod: TC | Performed by: DERMATOLOGY

## 2017-06-06 PROCEDURE — 99202 OFFICE O/P NEW SF 15 MIN: CPT | Mod: 25 | Performed by: DERMATOLOGY

## 2017-06-06 ASSESSMENT — PAIN SCALES - GENERAL: PAINLEVEL: NO PAIN (0)

## 2017-06-06 NOTE — NURSING NOTE
"Chief Complaint   Patient presents with     Derm Problem     New patient. Consult history of BCC on face, skin check.       Initial /72  Pulse 83  Temp 97.8  F (36.6  C) (Tympanic)  Resp 16  Ht 1.778 m (5' 10\")  Wt 109.3 kg (241 lb)  SpO2 93%  BMI 34.58 kg/m2 Estimated body mass index is 34.58 kg/(m^2) as calculated from the following:    Height as of this encounter: 1.778 m (5' 10\").    Weight as of this encounter: 109.3 kg (241 lb).  Medication Reconciliation: complete   Josefina Campuzano      "

## 2017-06-06 NOTE — MR AVS SNAPSHOT
After Visit Summary   6/6/2017    Edward Hannah    MRN: [id number]           Patient Information     Date Of Birth          1955        Visit Information        Provider Department      6/6/2017 8:45 AM [facility]        Today's Diagnoses     Skin lesion    -  1    BCC (basal cell carcinoma), trunk           Follow-ups after your visit        Your next 10 appointments already scheduled     Jul 20, 2017  8:30 AM CDT   (Arrive by 8:15 AM)   SHORT with Kelly Yarbrough MD   [facility]    [address] [place] [address]   [phone]              Who to contact     If you have questions or need follow up information about today's clinic visit or your schedule please contact [facility] directly at [phone].  Normal or non-critical lab and imaging results will be communicated to you by MyChart, letter or phone within 4 business days after the clinic has received the results. If you do not hear from us within 7 days, please contact the clinic through MyChart or phone. If you have a critical or abnormal lab result, we will notify you by phone as soon as possible.  Submit refill requests through [employer] or call your pharmacy and they will forward the refill request to us. Please allow 3 business days for your refill to be completed.          Additional Information About Your Visit        MyChart Information     [employer] gives you secure access to your electronic health record. If you see a primary care provider, you can also send messages to your care team and make appointments. If you have questions, please call your primary care clinic.  If you do not have a primary care provider, please call [phone] and they will assist you.        Care EveryWhere ID     This is your Care EveryWhere ID. This could be used by other organizations to access your [place] medical records  [id number]        Your Vitals Were  "    Pulse Temperature Respirations Height Pulse Oximetry BMI (Body Mass Index)    83 97.8  F (36.6  C) (Tympanic) 16 1.778 m (5' 10\") 93% 34.58 kg/m2       Blood Pressure from Last 3 Encounters:   06/06/17 132/72   04/19/17 118/80   03/15/17 135/79    Weight from Last 3 Encounters:   06/06/17 109.3 kg (241 lb)   04/19/17 106.6 kg (235 lb)   03/15/17 111.9 kg (246 lb 12.8 oz)              We Performed the Following     DESTR MALIG LESION TRUNK/ARM/LEG <=0.5 CM     Surgical pathology exam        Primary Care Provider Office Phone # Fax #    Kelly Yarbrough [phone]       [facility] [place] [address] [address]        Thank you!     [facility] you [facility]  [facility] your [facility]. Our goal is always to provide you with excellent care. Hearing back from our patients is one way we can continue to improve our services. Please take a few minutes to complete the written survey that you may receive in the mail after your visit with us. Thank you!             Your Updated Medication List - Protect others around you: Learn how to safely use, store and throw away your medicines at www.disposemymeds.org.          This list is accurate as of: 6/6/17 11:59 PM.  Always use your most recent med list.                   Brand Name Dispense Instructions for use    acetylcysteine 500 MG Caps capsule    N-ACETYL CYSTEINE     Take 1 capsule (500 mg) by mouth daily       amLODIPine 2.5 MG tablet    NORVASC    30 tablet    TAKE 1 TABLET BY MOUTH DAILY       aspirin 81 MG tablet     100    1 tab po QD (Once per day)       atorvastatin 20 MG tablet    LIPITOR    30 tablet    TAKE 1 TABLET BY MOUTH DAILY       * B-D U/F 31G X 8 MM   Generic drug:  insulin pen needle     100 each    USE 2 DAILY OR AS DIRECTED       * insulin pen needle 32G X 4 MM     100 each    Use 1 pen needles daily.       blood glucose monitoring lancets     1 Box    Use to test blood sugar 3 times daily.       blood " glucose monitoring test strip    KELSEY CONTOUR NEXT    100 each    Use to test blood sugar 3 times daily.       glipiZIDE 10 MG 24 hr tablet    GLUCOTROL XL    60 tablet    TAKE 1 TABLET BY MOUTH TWICE DAILY       liraglutide 18 MG/3ML soln    VICTOZA PEN    6 mL    Inject 0.6 mg Subcutaneous daily       lisinopril 40 MG tablet    PRINIVIL/ZESTRIL    30 tablet    TAKE 1 TABLET BY MOUTH DAILY       MELATONIN PO      Takes 5 mg at bedtime daily       metFORMIN 1000 MG tablet    GLUCOPHAGE    60 tablet    TAKE 1 TABLET BY MOUTH 2 TIMES A DAY WITH MEALS       MULTIVITAMIN PO      Take 1 tablet by mouth daily       OMEGA-3 FISH OIL PO      Take 1 g by mouth daily       VITAMIN D (CHOLECALCIFEROL) PO      Take 1,000 Units by mouth daily       * Notice:  This list has 2 medication(s) that are the same as other medications prescribed for you. Read the directions carefully, and ask your doctor or other care provider to review them with you.

## 2017-06-07 NOTE — CONSULTS
SUBJECTIVE:  Lucas Hannah is a gentleman who had a basal cell on his face some years back.  It was treated at the [facility] .   Since then he has noted no concerning lesions and comes in today mainly for a skin exam.      OBJECTIVE:  Shows a healthy gentleman in no distress.  His face, neck, chest, back, arms and hands:  There were a few scattered nevi and no significant actinic keratoses on his shoulder.  Right upper shoulder, however, was a pink shiny lesion that suggested an early basal cell carcinoma of the superficial type.  It measured roughly 3 x 4 mm.      ASSESSMENT:  Possible basal cell on the back of superficial type not very concerning, but appropriate to biopsy and treat.  Otherwise, very minor sun changes and no worrisome basal cells on the high risk regions of the face.        PLAN:  The lesion was anesthetized, shave removed, sent and will call with report.  have him return probably in 1 year.  Meds and allergies reviewed.         TRACY OLMSTEAD MD             D: 2017 22:34   T: 2017 23:26   MT:       Name:     LUCAS HANNAH   MRN:      [id number]        Account:       [id number]   :      1955           Consult Date:  2017      Document: [id number]       cc: Kelly Yarbrough MD

## 2017-06-08 ENCOUNTER — TRANSFERRED RECORDS (OUTPATIENT)
Dept: HEALTH INFORMATION MANAGEMENT | Facility: HOSPITAL | Age: 62
End: 2017-06-08

## 2017-06-08 LAB — COPATH REPORT: NORMAL

## 2017-06-12 ENCOUNTER — TELEPHONE (OUTPATIENT)
Dept: EDUCATION SERVICES | Facility: HOSPITAL | Age: 62
End: 2017-06-12

## 2017-06-12 DIAGNOSIS — E11.65 TYPE 2 DIABETES MELLITUS WITH HYPERGLYCEMIA, WITHOUT LONG-TERM CURRENT USE OF INSULIN (H): ICD-10-CM

## 2017-06-12 RX ORDER — LIRAGLUTIDE 6 MG/ML
1.2 INJECTION SUBCUTANEOUS DAILY
Qty: 6 ML | Refills: 3 | Status: SHIPPED | OUTPATIENT
Start: 2017-06-12 | End: 2017-10-03

## 2017-06-15 ENCOUNTER — TELEPHONE (OUTPATIENT)
Dept: FAMILY MEDICINE | Facility: OTHER | Age: 62
End: 2017-06-15

## 2017-07-20 ENCOUNTER — OFFICE VISIT (OUTPATIENT)
Dept: FAMILY MEDICINE | Facility: OTHER | Age: 62
End: 2017-07-20
Attending: FAMILY MEDICINE
Payer: COMMERCIAL

## 2017-07-20 VITALS
TEMPERATURE: 98.2 F | HEART RATE: 80 BPM | OXYGEN SATURATION: 94 % | DIASTOLIC BLOOD PRESSURE: 78 MMHG | BODY MASS INDEX: 34.35 KG/M2 | WEIGHT: 239.4 LBS | SYSTOLIC BLOOD PRESSURE: 132 MMHG

## 2017-07-20 DIAGNOSIS — E11.9 TYPE 2 DIABETES MELLITUS WITHOUT COMPLICATION, WITHOUT LONG-TERM CURRENT USE OF INSULIN (H): Primary | ICD-10-CM

## 2017-07-20 DIAGNOSIS — G47.09 OTHER INSOMNIA: ICD-10-CM

## 2017-07-20 DIAGNOSIS — E78.5 HYPERLIPIDEMIA LDL GOAL <100: ICD-10-CM

## 2017-07-20 DIAGNOSIS — I10 ESSENTIAL HYPERTENSION WITH GOAL BLOOD PRESSURE LESS THAN 140/90: ICD-10-CM

## 2017-07-20 LAB
EST. AVERAGE GLUCOSE BLD GHB EST-MCNC: 154 MG/DL
HBA1C MFR BLD: 7 % (ref 4.3–6)

## 2017-07-20 PROCEDURE — 36415 COLL VENOUS BLD VENIPUNCTURE: CPT | Performed by: FAMILY MEDICINE

## 2017-07-20 PROCEDURE — 83036 HEMOGLOBIN GLYCOSYLATED A1C: CPT | Performed by: FAMILY MEDICINE

## 2017-07-20 PROCEDURE — 99214 OFFICE O/P EST MOD 30 MIN: CPT | Performed by: FAMILY MEDICINE

## 2017-07-20 ASSESSMENT — ANXIETY QUESTIONNAIRES
1. FEELING NERVOUS, ANXIOUS, OR ON EDGE: NOT AT ALL
5. BEING SO RESTLESS THAT IT IS HARD TO SIT STILL: SEVERAL DAYS
GAD7 TOTAL SCORE: 1
4. TROUBLE RELAXING: NOT AT ALL
2. NOT BEING ABLE TO STOP OR CONTROL WORRYING: NOT AT ALL
7. FEELING AFRAID AS IF SOMETHING AWFUL MIGHT HAPPEN: NOT AT ALL
IF YOU CHECKED OFF ANY PROBLEMS ON THIS QUESTIONNAIRE, HOW DIFFICULT HAVE THESE PROBLEMS MADE IT FOR YOU TO DO YOUR WORK, TAKE CARE OF THINGS AT HOME, OR GET ALONG WITH OTHER PEOPLE: NOT DIFFICULT AT ALL
3. WORRYING TOO MUCH ABOUT DIFFERENT THINGS: NOT AT ALL
6. BECOMING EASILY ANNOYED OR IRRITABLE: NOT AT ALL

## 2017-07-20 ASSESSMENT — PAIN SCALES - GENERAL: PAINLEVEL: NO PAIN (0)

## 2017-07-20 NOTE — MR AVS SNAPSHOT
After Visit Summary   7/20/2017    Edward Hannah    MRN: [id number]           Patient Information     Date Of Birth          1955        Visit Information        Provider Department      7/20/2017 8:30 AM Kelly Yarbrough MD [facility] [place]        Today's Diagnoses     Type 2 diabetes mellitus without complication, without long-term current use of insulin (H)    -  1    Essential hypertension with goal blood pressure less than 140/90        Hyperlipidemia LDL goal <100        Other insomnia           Follow-ups after your visit        Your next 10 appointments already scheduled     Oct 23, 2017  9:15 AM CDT   (Arrive by 9:00 AM)   SHORT with Kelly Yarbrough MD   [facility]    [address]  [place] [address]   [phone]              Who to contact     If you have questions or need follow up information about today's clinic visit or your schedule please contact [facility] directly at [phone].  Normal or non-critical lab and imaging results will be communicated to you by [employer], letter or phone within 4 business days after the clinic has received the results. If you do not hear from us within 7 days, please contact the clinic through [employer] or phone. If you have a critical or abnormal lab result, we will notify you by phone as soon as possible.  Submit refill requests through [employer] or call your pharmacy and they will forward the refill request to us. Please allow 3 business days for your refill to be completed.          Additional Information About Your Visit        MyChart Information     [employer] gives you secure access to your electronic health record. If you see a primary care provider, you can also send messages to your care team and make appointments. If you have questions, please call your primary care clinic.  If you do not have a primary care provider, please call [phone] and they will assist  you.        Care EveryWhere ID     This is your Care EveryWhere ID. This could be used by other organizations to access your [place] medical records  [id number]        Your Vitals Were     Pulse Temperature Pulse Oximetry BMI (Body Mass Index)          80 98.2  F (36.8  C) (Tympanic) 94% 34.35 kg/m2         Blood Pressure from Last 3 Encounters:   07/20/17 132/78   06/06/17 132/72   04/19/17 118/80    Weight from Last 3 Encounters:   07/20/17 239 lb 6.4 oz (108.6 kg)   06/06/17 241 lb (109.3 kg)   04/19/17 235 lb (106.6 kg)              We Performed the Following     Estimated Average Glucose     Hemoglobin A1c        Primary Care Provider Office Phone # Fax #    Kelly Yarbrough [phone]       [facility] [place] [address]  [place] [address]        Equal Access to Services     [facility] : Hadii aad ku hadasho Soomaali, waaxda luqadaha, qaybta kaalmada adeegyada, waxay juan ramonin hayvirin vikram mcguire . So [facility] [phone].    ATENCIÓN: Si habla español, tiene a haas disposición servicios gratuitos de asistencia lingüística. Llame al [phone].    We comply with applicable federal civil rights laws and [place] laws. We do not discriminate on the basis of race, color, national origin, age, disability sex, sexual orientation or gender identity.            Thank you!     Thank you for choosing [facility] [place]  for your care. Our goal is always to provide you with excellent care. Hearing back from our patients is one way we can continue to improve our services. Please take a few minutes to complete the written survey that you may receive in the mail after your visit with us. Thank you!             Your Updated Medication List - Protect others around you: Learn how to safely use, store and throw away your medicines at www.disposemymeds.org.          This list is accurate as of: 7/20/17  8:44 AM.  Always use your most recent med list.                   Brand Name  Dispense Instructions for use Diagnosis    acetylcysteine 500 MG Caps capsule    N-ACETYL CYSTEINE     Take 1 capsule (500 mg) by mouth daily    Type 2 diabetes mellitus without complication, without long-term current use of insulin (H)       amLODIPine 2.5 MG tablet    NORVASC    30 tablet    TAKE 1 TABLET BY MOUTH DAILY    Hypertension goal BP (blood pressure) < 140/90       aspirin 81 MG tablet     100    1 tab po QD (Once per day)    Type II or unspecified type diabetes mellitus without mention of complication, not stated as uncontrolled       atorvastatin 20 MG tablet    LIPITOR    30 tablet    TAKE 1 TABLET BY MOUTH DAILY    Hyperlipidemia LDL goal <100       * B-D U/F 31G X 8 MM   Generic drug:  insulin pen needle     100 each    USE 2 DAILY OR AS DIRECTED    Type 2 diabetes mellitus without complication (H)       * insulin pen needle 32G X 4 MM     100 each    Use 1 pen needles daily.    Type 2 diabetes mellitus with hyperglycemia, without long-term current use of insulin (H)       blood glucose monitoring lancets     1 Box    Use to test blood sugar 3 times daily.    Type 2 diabetes mellitus with hyperglycemia, without long-term current use of insulin (H)       blood glucose monitoring test strip    KELSEY CONTOUR NEXT    100 each    Use to test blood sugar 3 times daily.    Type 2 diabetes mellitus with hyperglycemia, without long-term current use of insulin (H)       glipiZIDE 10 MG 24 hr tablet    GLUCOTROL XL    60 tablet    TAKE 1 TABLET BY MOUTH TWICE DAILY    Type 2 diabetes mellitus without complication (H)       liraglutide 18 MG/3ML soln    VICTOZA PEN    6 mL    Inject 1.2 mg Subcutaneous daily    Type 2 diabetes mellitus with hyperglycemia, without long-term current use of insulin (H)       lisinopril 40 MG tablet    PRINIVIL/ZESTRIL    30 tablet    TAKE 1 TABLET BY MOUTH DAILY    Benign essential hypertension       MELATONIN PO      Takes 5 mg at bedtime daily        metFORMIN 1000 MG tablet     GLUCOPHAGE    60 tablet    TAKE 1 TABLET BY MOUTH 2 TIMES A DAY WITH MEALS    Type 2 diabetes mellitus without complication (H)       MULTIVITAMIN PO      Take 1 tablet by mouth daily        OMEGA-3 FISH OIL PO      Take 1 g by mouth daily        VITAMIN D (CHOLECALCIFEROL) PO      Take 1,000 Units by mouth daily        * Notice:  This list has 2 medication(s) that are the same as other medications prescribed for you. Read the directions carefully, and ask your doctor or other care provider to review them with you.

## 2017-07-20 NOTE — PROGRESS NOTES
SUBJECTIVE:                                                    Edward Hannah is a 61 year old male who presents to clinic today for the following health issues:      Diabetes Follow-up  Had some troubles with victoza pen and then with sig, he has only been doing 0.6mg daily  Patient is checking blood sugars: twice daily.     Results are as follows:       am - 120-140       evening -     Diabetic concerns: None     Symptoms of hypoglycemia (low blood sugar): none     Paresthesias (numbness or burning in feet) or sores: No     Date of last diabetic eye exam: 6/8/17    Hyperlipidemia Follow-Up      Rate your low fat/cholesterol diet?: fair    Taking statin?  Yes, no muscle aches from statin    Other lipid medications/supplements?:  Fish oil/Omega 3,  without side effects        Hypertension Follow-up      Outpatient blood pressures are not being checked.  Low Salt Diet: not monitoring salt      Amount of exercise or physical activity: 6-7 days/week for an average of greater than 60 minutes    Problems taking medications regularly: No    Medication side effects: none  Diet: regular (no restrictions), is monitoring    sleepign better    Problem list and histories reviewed & adjusted, as indicated.  Additional history: as documented    Current Outpatient Prescriptions   Medication Sig Dispense Refill     liraglutide (VICTOZA PEN) 18 MG/3ML soln Inject 1.2 mg Subcutaneous daily 6 mL 3     amLODIPine (NORVASC) 2.5 MG tablet TAKE 1 TABLET BY MOUTH DAILY 30 tablet 7     atorvastatin (LIPITOR) 20 MG tablet TAKE 1 TABLET BY MOUTH DAILY 30 tablet 11     glipiZIDE (GLUCOTROL XL) 10 MG 24 hr tablet TAKE 1 TABLET BY MOUTH TWICE DAILY 60 tablet 5     metFORMIN (GLUCOPHAGE) 1000 MG tablet TAKE 1 TABLET BY MOUTH 2 TIMES A DAY WITH MEALS 60 tablet 5     insulin pen needle 32G X 4 MM Use 1 pen needles daily. 100 each 3     MELATONIN PO Takes 5 mg at bedtime daily       VITAMIN D, CHOLECALCIFEROL, PO Take 1,000 Units by mouth  daily       blood glucose monitoring (KELSEY CONTOUR NEXT) test strip Use to test blood sugar 3 times daily. 100 each 11     blood glucose monitoring (KELSEY MICROLET) lancets Use to test blood sugar 3 times daily. 1 Box 11     lisinopril (PRINIVIL/ZESTRIL) 40 MG tablet TAKE 1 TABLET BY MOUTH DAILY 30 tablet 8     acetylcysteine (N-ACETYL CYSTEINE) 500 MG CAPS capsule Take 1 capsule (500 mg) by mouth daily       B-D U/F 31G X 8 MM insulin pen needle USE 2 DAILY OR AS DIRECTED 100 each 3     Omega-3 Fatty Acids (OMEGA-3 FISH OIL PO) Take 1 g by mouth daily       Multiple Vitamins-Minerals (MULTIVITAMIN PO) Take 1 tablet by mouth daily       ASPIRIN 81 MG OR TABS 1 tab po QD (Once per day) 100 3     Labs reviewed in EPIC    Reviewed and updated as needed this visit by clinical staffTobacco  Allergies  Meds  Med Hx  Surg Hx  Fam Hx  Soc Hx      Reviewed and updated as needed this visit by Provider         ROS:  Constitutional, HEENT, cardiovascular, pulmonary, gi and gu systems are negative, except as otherwise noted.      OBJECTIVE:                                                    /78 (BP Location: Right arm, Patient Position: Chair, Cuff Size: Adult Regular)  Pulse 80  Temp 98.2  F (36.8  C) (Tympanic)  Wt 239 lb 6.4 oz (108.6 kg)  SpO2 94%  BMI 34.35 kg/m2  Body mass index is 34.35 kg/(m^2).  GENERAL APPEARANCE: healthy, alert and no distress  RESP: lungs clear to auscultation - no rales, rhonchi or wheezes  CV: regular rates and rhythm, normal S1 S2, no S3 or S4 and no murmur, click or rub  ABDOMEN: soft, nontender, without hepatosplenomegaly or masses, bowel sounds normal and obese  PSYCH: mentation appears normal and affect normal/bright       ASSESSMENT/PLAN:                                                    1. Type 2 diabetes mellitus without complication, without long-term current use of insulin (H)  F/u 3 mos  Needs to come in for albumin  - Hemoglobin A1c; Future  - Hemoglobin A1c  -  Estimated Average Glucose    2. Essential hypertension with goal blood pressure less than 140/90  stable    3. Hyperlipidemia LDL goal <100  Fasting next visit    4. Other insomnia  Doing well    Patient was agreeable to this plan and had no further questions.  See Patient Instructions    Kelly Yarbrough MD  [facility]

## 2017-07-21 ASSESSMENT — PATIENT HEALTH QUESTIONNAIRE - PHQ9: SUM OF ALL RESPONSES TO PHQ QUESTIONS 1-9: 1

## 2017-07-21 ASSESSMENT — ANXIETY QUESTIONNAIRES: GAD7 TOTAL SCORE: 1

## 2017-10-03 DIAGNOSIS — E11.65 TYPE 2 DIABETES MELLITUS WITH HYPERGLYCEMIA, WITHOUT LONG-TERM CURRENT USE OF INSULIN (H): ICD-10-CM

## 2017-10-03 RX ORDER — LIRAGLUTIDE 6 MG/ML
INJECTION SUBCUTANEOUS
Qty: 6 ML | Refills: 1 | Status: SHIPPED | OUTPATIENT
Start: 2017-10-03 | End: 2017-12-01

## 2017-10-03 NOTE — TELEPHONE ENCOUNTER
[facility]         Last Written Prescription Date: 6/12/17  Last Fill Quantity: 6 mL, # refills: 3  Last Office Visit with [facility], [facility] or  Health prescribing provider:  7/20/17   Next 5 appointments (look out 90 days)     Oct 23, 2017  9:15 AM CDT   (Arrive by 9:00 AM)   SHORT with Kelly Yarbrough MD   [facility] [place] ([facility] [place] [facility]    [address]  [place] [address]   [phone]                   BP Readings from Last 3 Encounters:   07/20/17 132/78   06/06/17 132/72   04/19/17 118/80     Lab Results   Component Value Date    MICROL 21 04/20/2016     Lab Results   Component Value Date    UMALCR 8.31 04/20/2016     Creatinine   Date Value Ref Range Status   04/19/2017 0.89 0.66 - 1.25 mg/dL Final   ]  GFR Estimate   Date Value Ref Range Status   04/19/2017 87 >60 mL/min/1.7m2 Final     Comment:     Non  GFR Calc   04/20/2016 83 >60 mL/min/1.7m2 Final     Comment:     Non  GFR Calc   06/30/2015 76 >60 mL/min/1.7m2 Final     Comment:     Non  GFR Calc     GFR Estimate If Black   Date Value Ref Range Status   04/19/2017 >90   GFR Calc   >60 mL/min/1.7m2 Final   04/20/2016 >90   GFR Calc   >60 mL/min/1.7m2 Final   06/30/2015 >90   GFR Calc   >60 mL/min/1.7m2 Final     Lab Results   Component Value Date    CHOL 137 04/19/2017     Lab Results   Component Value Date    HDL 53 04/19/2017     Lab Results   Component Value Date    LDL 55 04/19/2017     Lab Results   Component Value Date    TRIG 147 04/19/2017     Lab Results   Component Value Date    CHOLHDLRATIO 3.3 06/30/2015     Lab Results   Component Value Date    AST 13 04/19/2017     Lab Results   Component Value Date    ALT 32 04/19/2017     Lab Results   Component Value Date    A1C 7.0 07/20/2017    A1C 7.5 04/19/2017    A1C 7.6 01/19/2017    A1C 7.4 10/19/2016    A1C 7.3 07/21/2016     Potassium   Date Value Ref Range Status   04/19/2017  4.3 3.4 - 5.3 mmol/L Final

## 2017-10-17 ENCOUNTER — HOSPITAL ENCOUNTER (OUTPATIENT)
Dept: EDUCATION SERVICES | Facility: HOSPITAL | Age: 62
Discharge: HOME | End: 2017-10-17
Attending: FAMILY MEDICINE | Admitting: FAMILY MEDICINE
Payer: COMMERCIAL

## 2017-10-17 VITALS
HEART RATE: 76 BPM | WEIGHT: 245 LBS | DIASTOLIC BLOOD PRESSURE: 79 MMHG | OXYGEN SATURATION: 94 % | SYSTOLIC BLOOD PRESSURE: 144 MMHG | HEIGHT: 70 IN | BODY MASS INDEX: 35.07 KG/M2

## 2017-10-17 DIAGNOSIS — E11.9 TYPE 2 DIABETES MELLITUS WITHOUT COMPLICATION, WITHOUT LONG-TERM CURRENT USE OF INSULIN (H): Primary | ICD-10-CM

## 2017-10-17 LAB — HBA1C MFR BLD: 6.9 % (ref 0–5.7)

## 2017-10-17 PROCEDURE — 36416 COLLJ CAPILLARY BLOOD SPEC: CPT | Performed by: DIETITIAN, REGISTERED

## 2017-10-17 PROCEDURE — 97803 MED NUTRITION INDIV SUBSEQ: CPT | Performed by: DIETITIAN, REGISTERED

## 2017-10-17 PROCEDURE — 83036 HEMOGLOBIN GLYCOSYLATED A1C: CPT | Performed by: DIETITIAN, REGISTERED

## 2017-10-17 ASSESSMENT — PAIN SCALES - GENERAL: PAINLEVEL: NO PAIN (0)

## 2017-10-17 NOTE — IP AVS SNAPSHOT
[facility]    [address] [place] [address]    Phone:  [phone]    Fax:  [phone]                                       After Visit Summary   10/17/2017    Edward Hannah    MRN: [id number]           After Visit Summary Signature Page     I have received my discharge instructions, and my questions have been answered. I have discussed any challenges I see with this plan with the nurse or doctor.    ..........................................................................................................................................  Patient/Patient Representative Signature      ..........................................................................................................................................  Patient Representative Print Name and Relationship to Patient    ..................................................               ................................................  Date                                            Time    ..........................................................................................................................................  Reviewed by Signature/Title    ...................................................              ..............................................  Date                                                            Time

## 2017-10-17 NOTE — IP AVS SNAPSHOT
MRN:[id number]                      After Visit Summary   10/17/2017    Edward Hannah    MRN: [id number]           Thank you!     Thank you for choosing [facility] for your care. Our goal is always to provide you with excellent care. Hearing back from our patients is one way we can continue to improve our services. Please take a few minutes to complete the written survey that you may receive in the mail after you visit with us. Thank you!        Patient Information     Date Of Birth          1955        About your hospital stay     You were admitted on:  October 17, 2017 You last received care in the:  [facility]    You were discharged on:  October 17, 2017       Who to Call     For medical emergencies, please call 911.  For non-urgent questions about your medical care, please call your primary care provider or clinic, [phone]          Attending Provider     Provider Specialty    Kelly Yarbrough MD Family Practice       Primary Care Provider Office Phone # Fax #    Kelly Yarbrough [phone]      Your next 10 appointments already scheduled     Nov 06, 2017  9:45 AM CST   (Arrive by 9:30 AM)   SHORT with Kelly Yarbrough MD   [facility] ([facility] )    [address]  [place] [address]   [phone]              Further instructions from your care team       -Continue to limit carbohydrates in your diet - 60 grams/meal, 15-30 grams/snack.   -Try to get some exercise most days of the week - 30 minutes/day is goal.   -Test your glucose 2x/day - fasting and either before supper or two hours after supper.   -Target levels are fasting and before supper , 2 hours after supper less than 180.   -A1c today was 6.9% which is down from 7.0% in July - great job!  Goal is to keep is less than 7.0%.   -Follow up annually or sooner if needed.   -Call with any questions - MARIZA Nichols, -[phone].     Pending  "Results     No orders found from 10/15/2017 to 10/18/2017.            Admission Information     Date & Time Provider Department Dept. Phone    10/17/2017 Kelly Yarbrough MD [facility] [phone]      Your Vitals Were     Blood Pressure Pulse Height Weight Pulse Oximetry BMI (Body Mass Index)    144/79 76 1.778 m (5' 10\") 111.1 kg (245 lb) 94% 35.15 kg/m2      MyChart Information     [employer] gives you secure access to your electronic health record. If you see a primary care provider, you can also send messages to your care team and make appointments. If you have questions, please call your primary care clinic.  If you do not have a primary care provider, please call [phone] and they will assist you.        Care EveryWhere ID     This is your Care EveryWhere ID. This could be used by other organizations to access your [facility] medical records  [id number]        Equal Access to Services     ERI PATTON : Gia Duarte, abel sandoval, riaz jc, idania mcguire . So [facility] [phone].    ATENCIÓN: Si habla español, tiene a haas disposición servicios gratuitos de asistencia lingüística. Llame al [phone].    We comply with applicable federal civil rights laws and [place] laws. We do not discriminate on the basis of race, color, national origin, age, disability, sex, sexual orientation, or gender identity.               Review of your medicines      UNREVIEWED medicines. Ask your doctor about these medicines        Dose / Directions    acetylcysteine 500 MG Caps capsule   Commonly known as:  N-ACETYL CYSTEINE   Used for:  Type 2 diabetes mellitus without complication, without long-term current use of insulin (H)        Dose:  500 mg   Take 1 capsule (500 mg) by mouth daily   Refills:  0       amLODIPine 2.5 MG tablet   Commonly known as:  NORVASC   Used for:  Hypertension goal BP (blood pressure) < 140/90        TAKE 1 TABLET BY MOUTH " DAILY   Quantity:  30 tablet   Refills:  7       aspirin 81 MG tablet   Used for:  Type II or unspecified type diabetes mellitus without mention of complication, not stated as uncontrolled        1 tab po QD (Once per day)   Quantity:  100   Refills:  3       atorvastatin 20 MG tablet   Commonly known as:  LIPITOR   Used for:  Hyperlipidemia LDL goal <100        TAKE 1 TABLET BY MOUTH DAILY   Quantity:  30 tablet   Refills:  11       glipiZIDE 10 MG 24 hr tablet   Commonly known as:  GLUCOTROL XL   Used for:  Type 2 diabetes mellitus without complication (H)        TAKE 1 TABLET BY MOUTH TWICE DAILY   Quantity:  60 tablet   Refills:  5       lisinopril 40 MG tablet   Commonly known as:  PRINIVIL/ZESTRIL   Used for:  Benign essential hypertension        TAKE 1 TABLET BY MOUTH DAILY   Quantity:  30 tablet   Refills:  8       MELATONIN PO        Takes 5 mg at bedtime daily   Refills:  0       metFORMIN 1000 MG tablet   Commonly known as:  GLUCOPHAGE   Used for:  Type 2 diabetes mellitus without complication (H)        TAKE 1 TABLET BY MOUTH 2 TIMES A DAY WITH MEALS   Quantity:  60 tablet   Refills:  5       MULTIVITAMIN PO        Dose:  1 tablet   Take 1 tablet by mouth daily   Refills:  0       OMEGA-3 FISH OIL PO        Dose:  1 g   Take 1 g by mouth daily   Refills:  0       VICTOZA PEN 18 MG/3ML soln   Used for:  Type 2 diabetes mellitus with hyperglycemia, without long-term current use of insulin (H)   Generic drug:  liraglutide        INJECT 1.2MG SUBCUTANEOUSLY DAILY   Quantity:  6 mL   Refills:  1       VITAMIN D (CHOLECALCIFEROL) PO        Dose:  1000 Units   Take 1,000 Units by mouth daily   Refills:  0         CONTINUE these medicines which have NOT CHANGED        Dose / Directions    * B-D U/F 31G X 8 MM   Used for:  Type 2 diabetes mellitus without complication (H)   Generic drug:  insulin pen needle        USE 2 DAILY OR AS DIRECTED   Quantity:  100 each   Refills:  3       * insulin pen needle 32G X 4 MM    Used for:  Type 2 diabetes mellitus with hyperglycemia, without long-term current use of insulin (H)        Use 1 pen needles daily.   Quantity:  100 each   Refills:  3       blood glucose monitoring lancets   Used for:  Type 2 diabetes mellitus with hyperglycemia, without long-term current use of insulin (H)        Use to test blood sugar 3 times daily.   Quantity:  1 Box   Refills:  11       blood glucose monitoring test strip   Commonly known as:  KELSEY CONTOUR NEXT   Used for:  Type 2 diabetes mellitus with hyperglycemia, without long-term current use of insulin (H)        Use to test blood sugar 3 times daily.   Quantity:  100 each   Refills:  11       * Notice:  This list has 2 medication(s) that are the same as other medications prescribed for you. Read the directions carefully, and ask your doctor or other care provider to review them with you.             Protect others around you: Learn how to safely use, store and throw away your medicines at www.disposemymeds.org.             Medication List: This is a list of all your medications and when to take them. Check marks below indicate your daily home schedule. Keep this list as a reference.      Medications           Morning Afternoon Evening Bedtime As Needed    acetylcysteine 500 MG Caps capsule   Commonly known as:  N-ACETYL CYSTEINE   Take 1 capsule (500 mg) by mouth daily                                amLODIPine 2.5 MG tablet   Commonly known as:  NORVASC   TAKE 1 TABLET BY MOUTH DAILY                                aspirin 81 MG tablet   1 tab po QD (Once per day)                                atorvastatin 20 MG tablet   Commonly known as:  LIPITOR   TAKE 1 TABLET BY MOUTH DAILY                                * B-D U/F 31G X 8 MM   USE 2 DAILY OR AS DIRECTED   Generic drug:  insulin pen needle                                * insulin pen needle 32G X 4 MM   Use 1 pen needles daily.                                blood glucose monitoring lancets    Use to test blood sugar 3 times daily.                                blood glucose monitoring test strip   Commonly known as:  KELSEY CONTOUR NEXT   Use to test blood sugar 3 times daily.                                glipiZIDE 10 MG 24 hr tablet   Commonly known as:  GLUCOTROL XL   TAKE 1 TABLET BY MOUTH TWICE DAILY                                lisinopril 40 MG tablet   Commonly known as:  PRINIVIL/ZESTRIL   TAKE 1 TABLET BY MOUTH DAILY                                MELATONIN PO   Takes 5 mg at bedtime daily                                metFORMIN 1000 MG tablet   Commonly known as:  GLUCOPHAGE   TAKE 1 TABLET BY MOUTH 2 TIMES A DAY WITH MEALS                                MULTIVITAMIN PO   Take 1 tablet by mouth daily                                OMEGA-3 FISH OIL PO   Take 1 g by mouth daily                                VICTOZA PEN 18 MG/3ML soln   INJECT 1.2MG SUBCUTANEOUSLY DAILY   Generic drug:  liraglutide                                VITAMIN D (CHOLECALCIFEROL) PO   Take 1,000 Units by mouth daily                                * Notice:  This list has 2 medication(s) that are the same as other medications prescribed for you. Read the directions carefully, and ask your doctor or other care provider to review them with you.

## 2017-10-17 NOTE — DISCHARGE INSTRUCTIONS
-Continue to limit carbohydrates in your diet - 60 grams/meal, 15-30 grams/snack.   -Try to get some exercise most days of the week - 30 minutes/day is goal.   -Test your glucose 2x/day - fasting and either before supper or two hours after supper.   -Target levels are fasting and before supper , 2 hours after supper less than 180.   -A1c today was 6.9% which is down from 7.0% in July - great job!  Goal is to keep is less than 7.0%.   -Follow up annually or sooner if needed.   -Call with any questions - MARIZA Nichols, -[phone].

## 2017-10-17 NOTE — PROGRESS NOTES
"Pt is here today for diabetes follow up - glucose review.      /79  Pulse 76  Ht 1.778 m (5' 10\")  Wt 111.1 kg (245 lb)  SpO2 94%  BMI 35.15 kg/m2  No significant weight change since last visit in March 2017.     Current diabetes medications:  Metformin 1000 mg bid, Glucotrol XL 10 mg bid, Victoza 1.2 mg am.  Pt states he forgets pm medications about 1x every 2 weeks    Current glucose levels:    [id number].  Before ppsmdx-   Overall average is 130.      Lab Results   Component Value Date    A1C 6.9 10/17/2017    A1C 7.0 07/20/2017    A1C 7.5 04/19/2017    A1C 7.6 01/19/2017    A1C 7.4 10/19/2016     Pt is somewhat vague about food intake.  He does not count carbohydrates.  States sometimes he skips meals if he is busy.  No excess intake of drinks containing sugar or sweets reported.  He does no scheduled exercise but is active with outdoor chores.     Pt is pleased with A1c results.  We discussed possibly increasing Victoza in effort to lower glucose levels further and allow for weight loss but pt declined at this time which is acceptable since A1c is wnl.      PLAN:  Try to limit carbohydrates in diet.  Exercise goal is 30 minutes most days of the week.  Test glucose 1x/day - alternate times.      Follow up: annually or sooner if indicated.     Total time spent with pt was 40 minutes.    "

## 2017-10-17 NOTE — NURSING NOTE
"Chief Complaint   Patient presents with     Diabetes     glucose review       Initial /79  Pulse 76  Ht 1.778 m (5' 10\")  Wt 111.1 kg (245 lb)  SpO2 94%  BMI 35.15 kg/m2 Estimated body mass index is 35.15 kg/(m^2) as calculated from the following:    Height as of this encounter: 1.778 m (5' 10\").    Weight as of this encounter: 111.1 kg (245 lb).  Medication Reconciliation: complete   Mignon Kennedy      "

## 2017-11-01 DIAGNOSIS — I10 BENIGN ESSENTIAL HYPERTENSION: ICD-10-CM

## 2017-11-01 DIAGNOSIS — E11.9 TYPE 2 DIABETES MELLITUS WITHOUT COMPLICATION (H): ICD-10-CM

## 2017-11-01 NOTE — TELEPHONE ENCOUNTER
Lisinopril      Last Written Prescription Date: 1/18/2017  Last Fill Quantity: 30,  # refills: 8    Last Office Visit with [facility], [facility] or  Health prescribing provider: 7/20/2017             Metformin       Last Written Prescription Date: 4/20/2017  Last Fill Quantity: 60,  # refills: 5   Last Office Visit with [facility], [facility] or  Health prescribing provider: 7/20/2017                               Next 5 appointments (look out 90 days)     Nov 06, 2017  9:45 AM CST   (Arrive by 9:30 AM)   SHORT with Kelly Yarbrough MD   [facility] ([facility] )    [address] [place] [address]   [phone]

## 2017-11-06 ENCOUNTER — OFFICE VISIT (OUTPATIENT)
Dept: FAMILY MEDICINE | Facility: OTHER | Age: 62
End: 2017-11-06
Attending: FAMILY MEDICINE
Payer: COMMERCIAL

## 2017-11-06 VITALS
WEIGHT: 244 LBS | SYSTOLIC BLOOD PRESSURE: 130 MMHG | HEIGHT: 70 IN | OXYGEN SATURATION: 94 % | BODY MASS INDEX: 34.93 KG/M2 | TEMPERATURE: 97 F | HEART RATE: 81 BPM | DIASTOLIC BLOOD PRESSURE: 72 MMHG

## 2017-11-06 DIAGNOSIS — M65.30 TRIGGER FINGER, ACQUIRED: ICD-10-CM

## 2017-11-06 DIAGNOSIS — B02.9 HERPES ZOSTER WITHOUT COMPLICATION: ICD-10-CM

## 2017-11-06 DIAGNOSIS — N52.9 ERECTILE DYSFUNCTION, UNSPECIFIED ERECTILE DYSFUNCTION TYPE: ICD-10-CM

## 2017-11-06 DIAGNOSIS — E11.9 TYPE 2 DIABETES MELLITUS WITHOUT COMPLICATION, WITHOUT LONG-TERM CURRENT USE OF INSULIN (H): ICD-10-CM

## 2017-11-06 DIAGNOSIS — E78.5 HYPERLIPIDEMIA LDL GOAL <100: ICD-10-CM

## 2017-11-06 DIAGNOSIS — Z23 NEED FOR PROPHYLACTIC VACCINATION AND INOCULATION AGAINST INFLUENZA: Primary | ICD-10-CM

## 2017-11-06 DIAGNOSIS — I10 ESSENTIAL HYPERTENSION WITH GOAL BLOOD PRESSURE LESS THAN 140/90: ICD-10-CM

## 2017-11-06 PROCEDURE — 90686 IIV4 VACC NO PRSV 0.5 ML IM: CPT | Performed by: FAMILY MEDICINE

## 2017-11-06 PROCEDURE — 90471 IMMUNIZATION ADMIN: CPT | Performed by: FAMILY MEDICINE

## 2017-11-06 PROCEDURE — 99214 OFFICE O/P EST MOD 30 MIN: CPT | Mod: 25 | Performed by: FAMILY MEDICINE

## 2017-11-06 RX ORDER — LISINOPRIL 40 MG/1
TABLET ORAL
Qty: 30 TABLET | Refills: 5 | Status: SHIPPED | OUTPATIENT
Start: 2017-11-06 | End: 2018-06-01

## 2017-11-06 RX ORDER — VALACYCLOVIR HYDROCHLORIDE 1 G/1
1000 TABLET, FILM COATED ORAL 3 TIMES DAILY
Qty: 21 TABLET | Refills: 0 | Status: SHIPPED | OUTPATIENT
Start: 2017-11-06 | End: 2018-02-22

## 2017-11-06 RX ORDER — GABAPENTIN 100 MG/1
100 CAPSULE ORAL 3 TIMES DAILY
Qty: 90 CAPSULE | Refills: 0 | Status: SHIPPED | OUTPATIENT
Start: 2017-11-06 | End: 2018-02-22

## 2017-11-06 ASSESSMENT — ANXIETY QUESTIONNAIRES
5. BEING SO RESTLESS THAT IT IS HARD TO SIT STILL: NOT AT ALL
7. FEELING AFRAID AS IF SOMETHING AWFUL MIGHT HAPPEN: NOT AT ALL
GAD7 TOTAL SCORE: 0
1. FEELING NERVOUS, ANXIOUS, OR ON EDGE: NOT AT ALL
4. TROUBLE RELAXING: NOT AT ALL
6. BECOMING EASILY ANNOYED OR IRRITABLE: NOT AT ALL
3. WORRYING TOO MUCH ABOUT DIFFERENT THINGS: NOT AT ALL
2. NOT BEING ABLE TO STOP OR CONTROL WORRYING: NOT AT ALL

## 2017-11-06 ASSESSMENT — PATIENT HEALTH QUESTIONNAIRE - PHQ9: SUM OF ALL RESPONSES TO PHQ QUESTIONS 1-9: 0

## 2017-11-06 ASSESSMENT — PAIN SCALES - GENERAL: PAINLEVEL: NO PAIN (0)

## 2017-11-06 NOTE — MR AVS SNAPSHOT
After Visit Summary   11/6/2017    Edward Hannah    MRN: [id number]           Patient Information     Date Of Birth          1955        Visit Information        Provider Department      11/6/2017 9:45 AM Kelly Yarbrough MD [place] [facility]        Today's Diagnoses     Need for prophylactic vaccination and inoculation against influenza    -  1    Type 2 diabetes mellitus without complication, without long-term current use of insulin (H)        Herpes zoster without complication        Trigger finger, acquired        Erectile dysfunction, unspecified erectile dysfunction type           Follow-ups after your visit        Additional Services     UROLOGY ADULT REFERRAL       Your provider has referred you to: [facility]    Please be aware that coverage of these services is subject to the terms and limitations of your health insurance plan.  Call member services at your health plan with any benefit or coverage questions.      Please bring the following with you to your appointment:    (1) Any X-Rays, CTs or MRIs which have been performed.  Contact the facility where they were done to arrange for  prior to your scheduled appointment.    (2) List of current medications  (3) This referral request   (4) Any documents/labs given to you for this referral                  Your next 10 appointments already scheduled     Feb 08, 2018  8:45 AM CST   (Arrive by 8:30 AM)   SHORT with Kelly Yarbrough MD   [facility] ([facility] )    [address] [place] [address] [id number] [phone]            Oct 18, 2018  8:30 AM CDT   (Arrive by 8:15 AM)   Return Visit with Karrie Coleman RD   [facility] ([facility] )    [address] [place] [id number] [phone]              Who to contact     If you have questions or need follow up information about today's clinic visit or your schedule please contact [facility] directly at  "[phone].  Normal or non-critical lab and imaging results will be communicated to you by [address] [facility], letter or phone within 4 business days after the clinic has received the results. If you do not hear from us within 7 days, please contact the clinic through [employer] or phone. If you have a critical or abnormal lab result, we will notify you by phone as soon as possible.  Submit refill requests through [employer] or call your pharmacy and they will forward the refill request to us. Please allow 3 business days for your refill to be completed.          Additional Information About Your Visit        [address] [facility] Information     [employer] gives you secure access to your electronic health record. If you see a primary care provider, you can also send messages to your care team and make appointments. If you have questions, please call your primary care clinic.  If you do not have a primary care provider, please call [phone] and they will assist you.        Care EveryWhere ID     This is your Care EveryWhere ID. This could be used by other organizations to access your [facility] medical records  [id number]        Your Vitals Were     Pulse Temperature Height Pulse Oximetry BMI (Body Mass Index)       81 97  F (36.1  C) (Tympanic) 5' 10\" (1.778 m) 94% 35.01 kg/m2        Blood Pressure from Last 3 Encounters:   11/06/17 130/72   10/17/17 144/79   07/20/17 132/78    Weight from Last 3 Encounters:   11/06/17 244 lb (110.7 kg)   10/17/17 245 lb (111.1 kg)   07/20/17 239 lb 6.4 oz (108.6 kg)              We Performed the Following     Albumin Random Urine Quantitative     HC FLU VAC PRESRV FREE QUAD SPLIT VIR 3+YRS IM     UROLOGY ADULT REFERRAL     Vaccine Administration, Initial [72222]          Today's Medication Changes          These changes are accurate as of: 11/6/17 10:28 AM.  If you have any questions, ask your nurse or doctor.               Start taking these medicines.        Dose/Directions    gabapentin 100 MG capsule "   Commonly known as:  NEURONTIN   Used for:  Herpes zoster without complication   Started by:  Kelly Yarbrough MD        Dose:  100 mg   Take 1 capsule (100 mg) by mouth 3 times daily May titrate up to 300mg TID prn pain   Quantity:  90 capsule   Refills:  0       valACYclovir 1000 mg tablet   Commonly known as:  VALTREX   Used for:  Herpes zoster without complication   Started by:  Kelly Yarbrough MD        Dose:  1000 mg   Take 1 tablet (1,000 mg) by mouth 3 times daily   Quantity:  21 tablet   Refills:  0            Where to get your medicines      These medications were sent to [facility] [place] - [address]  [address] [place] [address]     Phone:  [phone]     gabapentin 100 MG capsule    valACYclovir 1000 mg tablet                Primary Care Provider Office Phone # Fax #    Kelly Yarbrough [phone]       [facility] [place] [address]  [place] [address]        Equal Access to Services     [facility]: Hadii radha ku hadasho Soomaali, waaxda luqadaha, qaybta kaalmada adeegyada, waxay juan ramonin hayyahir mcguire . So [facility] [phone].    ATENCIÓN: Si habla español, tiene a haas disposición servicios gratuitos de asistencia lingüística. Ll[facility] [phone].    We comply with applicable federal civil rights laws and [place] laws. We do not discriminate on the basis of race, color, national origin, age, disability, sex, sexual orientation, or gender identity.            Thank you!     Thank you for choosing [facility]  for your care. Our goal is always to provide you with excellent care. Hearing back from our patients is one way we can continue to improve our services. Please take a few minutes to complete the written survey that you may receive in the mail after your visit with us. Thank you!             Your Updated Medication List - Protect others around you: Learn how to safely use, store and throw away your  medicines at www.disposemymeds.org.          This list is accurate as of: 11/6/17 10:28 AM.  Always use your most recent med list.                   Brand Name Dispense Instructions for use Diagnosis    acetylcysteine 500 MG Caps capsule    N-ACETYL CYSTEINE     Take 1 capsule (500 mg) by mouth daily    Type 2 diabetes mellitus without complication, without long-term current use of insulin (H)       amLODIPine 2.5 MG tablet    NORVASC    30 tablet    TAKE 1 TABLET BY MOUTH DAILY    Hypertension goal BP (blood pressure) < 140/90       aspirin 81 MG tablet     100    1 tab po QD (Once per day)    Type II or unspecified type diabetes mellitus without mention of complication, not stated as uncontrolled       atorvastatin 20 MG tablet    LIPITOR    30 tablet    TAKE 1 TABLET BY MOUTH DAILY    Hyperlipidemia LDL goal <100       * B-D U/F 31G X 8 MM   Generic drug:  insulin pen needle     100 each    USE 2 DAILY OR AS DIRECTED    Type 2 diabetes mellitus without complication (H)       * insulin pen needle 32G X 4 MM     100 each    Use 1 pen needles daily.    Type 2 diabetes mellitus with hyperglycemia, without long-term current use of insulin (H)       blood glucose monitoring lancets     1 Box    Use to test blood sugar 3 times daily.    Type 2 diabetes mellitus with hyperglycemia, without long-term current use of insulin (H)       blood glucose monitoring test strip    KELSEY CONTOUR NEXT    100 each    Use to test blood sugar 3 times daily.    Type 2 diabetes mellitus with hyperglycemia, without long-term current use of insulin (H)       gabapentin 100 MG capsule    NEURONTIN    90 capsule    Take 1 capsule (100 mg) by mouth 3 times daily May titrate up to 300mg TID prn pain    Herpes zoster without complication       glipiZIDE 10 MG 24 hr tablet    GLUCOTROL XL    60 tablet    TAKE 1 TABLET BY MOUTH TWICE DAILY    Type 2 diabetes mellitus without complication (H)       lisinopril 40 MG tablet    PRINIVIL/ZESTRIL    30  tablet    TAKE 1 TABLET BY MOUTH DAILY    Benign essential hypertension       MELATONIN PO      Takes 5 mg at bedtime daily        metFORMIN 1000 MG tablet    GLUCOPHAGE    60 tablet    TAKE 1 TABLET BY MOUTH 2 TIMES A DAY WITH MEALS    Type 2 diabetes mellitus without complication (H)       MULTIVITAMIN PO      Take 1 tablet by mouth daily        OMEGA-3 FISH OIL PO      Take 1 g by mouth daily        valACYclovir 1000 mg tablet    VALTREX    21 tablet    Take 1 tablet (1,000 mg) by mouth 3 times daily    Herpes zoster without complication       VICTOZA PEN 18 MG/3ML soln   Generic drug:  liraglutide     6 mL    INJECT 1.2MG SUBCUTANEOUSLY DAILY    Type 2 diabetes mellitus with hyperglycemia, without long-term current use of insulin (H)       VITAMIN D (CHOLECALCIFEROL) PO      Take 1,000 Units by mouth daily        * Notice:  This list has 2 medication(s) that are the same as other medications prescribed for you. Read the directions carefully, and ask your doctor or other care provider to review them with you.

## 2017-11-06 NOTE — NURSING NOTE
"Chief Complaint   Patient presents with     Diabetes       Initial /72 (BP Location: Right arm, Patient Position: Chair, Cuff Size: Adult Large)  Pulse 81  Temp 97  F (36.1  C) (Tympanic)  Ht 5' 10\" (1.778 m)  Wt 244 lb (110.7 kg)  SpO2 94%  BMI 35.01 kg/m2 Estimated body mass index is 35.01 kg/(m^2) as calculated from the following:    Height as of this encounter: 5' 10\" (1.778 m).    Weight as of this encounter: 244 lb (110.7 kg).  Medication Reconciliation: complete     Martita Marie     "

## 2017-11-06 NOTE — PROGRESS NOTES
SUBJECTIVE:   Edward Hannah is a 62 year old male who presents to clinic today for the following health issues:      Diabetes Follow-up    Patient is checking blood sugars: twice daily.    Blood sugar testing frequency justification: Uncontrolled diabetes  Results are as follows:       am -        Evening - , after dinner 140-160        Diabetic concerns: None     Symptoms of hypoglycemia (low blood sugar): none     Paresthesias (numbness or burning in feet) or sores: No     Date of last diabetic eye exam: 6/08/2017    Hyperlipidemia Follow-Up      Rate your low fat/cholesterol diet?: not monitoring fat    Taking statin?  Yes, no muscle aches from statin    Other lipid medications/supplements?:  Fish oil/Omega 3,  without side effects    Hypertension Follow-up      Outpatient blood pressures are not being checked.  Low Salt Diet: no added salt      Amount of exercise or physical activity: 6-7 days/week for an average of greater than 60 minutes    Problems taking medications regularly: No    Medication side effects: none    Diet: regular (no restrictions)    ED and finger problem    Problem list and histories reviewed & adjusted, as indicated.  Additional history: as documented    Current Outpatient Prescriptions   Medication Sig Dispense Refill     lisinopril (PRINIVIL/ZESTRIL) 40 MG tablet TAKE 1 TABLET BY MOUTH DAILY 30 tablet 5     valACYclovir (VALTREX) 1000 mg tablet Take 1 tablet (1,000 mg) by mouth 3 times daily 21 tablet 0     gabapentin (NEURONTIN) 100 MG capsule Take 1 capsule (100 mg) by mouth 3 times daily May titrate up to 300mg TID prn pain 90 capsule 0     VICTOZA PEN 18 MG/3ML soln INJECT 1.2MG SUBCUTANEOUSLY DAILY 6 mL 1     amLODIPine (NORVASC) 2.5 MG tablet TAKE 1 TABLET BY MOUTH DAILY 30 tablet 7     atorvastatin (LIPITOR) 20 MG tablet TAKE 1 TABLET BY MOUTH DAILY 30 tablet 11     glipiZIDE (GLUCOTROL XL) 10 MG 24 hr tablet TAKE 1 TABLET BY MOUTH TWICE DAILY 60 tablet 5     metFORMIN  "(GLUCOPHAGE) 1000 MG tablet TAKE 1 TABLET BY MOUTH 2 TIMES A DAY WITH MEALS 60 tablet 5     insulin pen needle 32G X 4 MM Use 1 pen needles daily. 100 each 3     MELATONIN PO Takes 5 mg at bedtime daily       VITAMIN D, CHOLECALCIFEROL, PO Take 1,000 Units by mouth daily       blood glucose monitoring (KELSEY CONTOUR NEXT) test strip Use to test blood sugar 3 times daily. 100 each 11     blood glucose monitoring (KELSEY MICROLET) lancets Use to test blood sugar 3 times daily. 1 Box 11     acetylcysteine (N-ACETYL CYSTEINE) 500 MG CAPS capsule Take 1 capsule (500 mg) by mouth daily       B-D U/F 31G X 8 MM insulin pen needle USE 2 DAILY OR AS DIRECTED 100 each 3     Omega-3 Fatty Acids (OMEGA-3 FISH OIL PO) Take 1 g by mouth daily       Multiple Vitamins-Minerals (MULTIVITAMIN PO) Take 1 tablet by mouth daily       ASPIRIN 81 MG OR TABS 1 tab po QD (Once per day) 100 3     Labs reviewed in EPIC    Reviewed and updated as needed this [facility]  Meds  Problems       Reviewed and updated as needed this visit by Provider         ROS:  Constitutional, HEENT, cardiovascular, pulmonary, gi and gu systems are negative, except as otherwise noted.      OBJECTIVE:                                                    /72 (BP Location: Right arm, Patient Position: Chair, Cuff Size: Adult Large)  Pulse 81  Temp 97  F (36.1  C) (Tympanic)  Ht 5' 10\" (1.778 m)  Wt 244 lb (110.7 kg)  SpO2 94%  BMI 35.01 kg/m2  Body mass index is 35.01 kg/(m^2).  GENERAL APPEARANCE: healthy, alert and no distress  RESP: lungs clear to auscultation - no rales, rhonchi or wheezes  CV: regular rates and rhythm, normal S1 S2, no S3 or S4 and no murmur, click or rub  ABDOMEN: soft, nontender, without hepatosplenomegaly or masses, bowel sounds normal and obese  MS: extremities normal- no gross deformities noted  SKIN: erythema left chest anterior and posterior left back, vesicular changes over erythematous base  DIABETIC " FOOT EXAM: normal DP and PT pulses, no trophic changes or ulcerative lesions, normal sensory exam and normal monofilament exam  PSYCH: mentation appears normal and affect normal/bright       ASSESSMENT/PLAN:                                                    1. Type 2 diabetes mellitus without complication, without long-term current use of insulin (H)  F/u 3 mos, keep up the good work  - Albumin Random Urine Quantitative with Creat Ratio; Future    2. Need for prophylactic vaccination and inoculation against influenza    - HC FLU VAC PRESRV FREE QUAD SPLIT VIR 3+YRS IM  - Vaccine Administration, Initial [12326]    3. Herpes zoster without complication    - valACYclovir (VALTREX) 1000 mg tablet; Take 1 tablet (1,000 mg) by mouth 3 times daily  Dispense: 21 tablet; Refill: 0  - gabapentin (NEURONTIN) 100 MG capsule; Take 1 capsule (100 mg) by mouth 3 times daily May titrate up to 300mg TID prn pain  Dispense: 90 capsule; Refill: 0    4. Trigger finger, acquired  He wants to wait on this    5. Erectile dysfunction, unspecified erectile dysfunction type    - UROLOGY ADULT REFERRAL    6.  (E78.5) Hyperlipidemia LDL goal <100  Comment:   Plan: fastnting next visit    7.  (I10) Essential hypertension with goal blood pressure less than 140/90  Comment:   Plan: stable      Patient was agreeable to this plan and had no further questions.  See Patient Instructions    Kelly Yarbrough MD  [facility] [place]  Injectable Influenza Immunization Documentation    1.  Is the person to be vaccinated sick today?   No    2. Does the person to be vaccinated have an allergy to a component   of the vaccine?   No  Egg Allergy Algorithm Link    3. Has the person to be vaccinated ever had a serious reaction   to influenza vaccine in the past?   No    4. Has the person to be vaccinated ever had Guillain-Barré syndrome?   No    Form completed by Martita Marie

## 2017-11-06 NOTE — TELEPHONE ENCOUNTER
Metformin  Last office visit: 10/17/17  Last refill: 04/20/17 #60 with 5 refills  Patient due for Microalbumin. Please advise. Medication pended.    Thank you.

## 2017-11-07 ASSESSMENT — ANXIETY QUESTIONNAIRES: GAD7 TOTAL SCORE: 0

## 2017-12-01 DIAGNOSIS — E11.65 TYPE 2 DIABETES MELLITUS WITH HYPERGLYCEMIA, WITHOUT LONG-TERM CURRENT USE OF INSULIN (H): ICD-10-CM

## 2017-12-01 NOTE — TELEPHONE ENCOUNTER
Victoza pen      Last Written Prescription Date: 10/3/17  Last Fill Quantity: 6,  # refills: 1   Last Office Visit with [facility], [facility] or [facility] prescribing provider: 11/6/17

## 2017-12-05 RX ORDER — LIRAGLUTIDE 6 MG/ML
INJECTION SUBCUTANEOUS
Qty: 6 ML | Refills: 2 | Status: SHIPPED | OUTPATIENT
Start: 2017-12-05 | End: 2018-04-05

## 2017-12-05 NOTE — TELEPHONE ENCOUNTER
GLP-1 Agonists Protocol Failed  VICTOZA PEN 18 MG/3ML soln [Pharmacy Med Name: VICTOZA 2-PACK 18 MG/3 ML PEN]       Rerun Protocol (12/1/2017 2:14 PM)        Microalbumin on file in past 12 months           Recent Labs   Lab Test  04/20/16   0848   MICROL  21   UMALCR  8.31                  Blood pressure less than 140/90 in past 6 months           BP Readings from Last 3 Encounters:   11/06/17 130/72   10/17/17 144/79   07/20/17 132/78                      LDL on file in past 12 months           Recent Labs   Lab Test  04/19/17   0825   LDL  55                  HgbA1C in past 3 or 6 months           Recent Labs   Lab Test 10/17/17   A1C  6.9                  Patient is age 18 or older                  A normal serum creatinine on file in past 12 months           Recent Labs   Lab Test  04/19/17   0825   CR  0.89                  Recent visit with authorizing provider's specialty in past 6 months            IV to PO - Antibiotics           None                            Victoza pen      Last Written Prescription Date: 10/3/17  Last Fill Quantity: 6,  # refills: 1   Last Office Visit with [facility], [facility] or [facility] prescribing provider: 11/6/17. Microalbumin due. Routed to PCP to advise. Thank you

## 2017-12-13 ENCOUNTER — OFFICE VISIT (OUTPATIENT)
Dept: CARE COORDINATION | Facility: OTHER | Age: 62
End: 2017-12-13
Attending: FAMILY MEDICINE
Payer: COMMERCIAL

## 2017-12-13 ENCOUNTER — AMBULATORY - GICH (OUTPATIENT)
Dept: SCHEDULING | Facility: OTHER | Age: 62
End: 2017-12-13

## 2017-12-13 VITALS
DIASTOLIC BLOOD PRESSURE: 60 MMHG | TEMPERATURE: 97.4 F | HEIGHT: 70 IN | BODY MASS INDEX: 33.79 KG/M2 | WEIGHT: 236 LBS | HEART RATE: 72 BPM | SYSTOLIC BLOOD PRESSURE: 130 MMHG

## 2017-12-13 DIAGNOSIS — N52.9 ERECTILE DYSFUNCTION, UNSPECIFIED ERECTILE DYSFUNCTION TYPE: ICD-10-CM

## 2017-12-13 PROCEDURE — 99213 OFFICE O/P EST LOW 20 MIN: CPT | Performed by: UROLOGY

## 2017-12-13 RX ORDER — SILDENAFIL CITRATE 20 MG/1
TABLET ORAL
Qty: 30 TABLET | Refills: 11 | Status: SHIPPED | OUTPATIENT
Start: 2017-12-13 | End: 2022-10-31

## 2017-12-13 ASSESSMENT — PAIN SCALES - GENERAL: PAINLEVEL: NO PAIN (0)

## 2017-12-13 NOTE — MR AVS SNAPSHOT
After Visit Summary   12/13/2017    Edward Hannah    MRN: [id number]           Patient Information     Date Of Birth          1955        Visit Information        Provider Department      12/13/2017 1:45 PM Yunior Llanes MD [facility]        Today's Diagnoses     Erectile dysfunction, unspecified erectile dysfunction type           Follow-ups after your visit        Your next 10 appointments already scheduled     Feb 08, 2018  8:45 AM CST   (Arrive by 8:30 AM)   SHORT with Kelly Yarbrough MD   [facility] ([facility] )    [address] [place] [id number] [phone]            Oct 18, 2018  8:30 AM CDT   (Arrive by 8:15 AM)   Return Visit with Karrie Coleman RD   [facility] ([facility] )    [address] [place] [id number] [phone]              Who to contact     If you have questions or need follow up information about today's clinic visit or your schedule please contact [facility] directly at [phone].  Normal or non-critical lab and imaging results will be communicated to you by Xormishart, letter or phone within 4 business days after the clinic has received the results. If you do not hear from us within 7 days, please contact the clinic through Xormishart or phone. If you have a critical or abnormal lab result, we will notify you by phone as soon as possible.  Submit refill requests through [facility] or call your pharmacy and they will forward the refill request to us. Please allow 3 business days for your refill to be completed.          Additional Information About Your Visit        MyChart Information     [facility] gives you secure access to your electronic health record. If you see a primary care provider, you can also send messages to your care team and make appointments. If you have questions, please call your primary care clinic.  If you do not have a primary care provider, please  "call [phone] and they will assist you.        Care EveryWhere ID     This is your Care EveryWhere ID. This could be used by other organizations to access your [facility] medical records  [id number]        Your Vitals Were     Pulse Temperature Height BMI (Body Mass Index)          72 97.4  F (36.3  C) (Tympanic) 5' 10\" (1.778 m) 33.86 kg/m2         Blood Pressure from Last 3 Encounters:   12/13/17 130/60   11/06/17 130/72   10/17/17 144/79    Weight from Last 3 Encounters:   12/13/17 236 lb (107 kg)   11/06/17 244 lb (110.7 kg)   10/17/17 245 lb (111.1 kg)              Today, you had the following     No orders found for display         Today's Medication Changes          These changes are accurate as of: 12/13/17  2:06 PM.  If you have any questions, ask your nurse or doctor.               Start taking these medicines.        Dose/Directions    sildenafil 20 MG tablet   Commonly known as:  REVATIO   Used for:  Erectile dysfunction, unspecified erectile dysfunction type   Started by:  Yunior Llanes MD        Take 3-5 (60-100mg) tablets by mouth 1 hour prior to sexual activity   Quantity:  30 tablet   Refills:  11            Where to get your medicines      These medications were sent to [facility]  [address]  [address] [address]     Phone:  [phone]     sildenafil 20 MG tablet                Primary Care Provider Office Phone # Fax #    Kelly Yarbrough [phone]       [facility] [address] [address]        Equal Access to Services     [facility]: Hadshiva Duarte, cherylda luherber, qaybta kaalidania blanc. So [facility] [phone].    ATENCIÓN: Si habla español, tiene a haas disposición servicios gratuitos de asistencia lingüística. Cara al [phone].    We comply with applicable federal civil rights laws and [place] laws. We do not discriminate on " the basis of race, color, national origin, age, disability, sex, sexual orientation, or gender identity.            Thank you!     [facility] you for [facility]  [facility]. Our goal is always to provide you with excellent care. Hearing back from our patients is one way we can continue to improve our services. Please take a few minutes to complete the written survey that you may receive in the mail after your visit with us. Thank you!             Your Updated Medication List - Protect others around you: Learn how to safely use, store and throw away your medicines at www.disposemymeds.org.          This list is accurate as of: 12/13/17  2:06 PM.  Always use your most recent med list.                   Brand Name Dispense Instructions for use Diagnosis    acetylcysteine 500 MG Caps capsule    N-ACETYL CYSTEINE     Take 1 capsule (500 mg) by mouth daily    Type 2 diabetes mellitus without complication, without long-term current use of insulin (H)       amLODIPine 2.5 MG tablet    NORVASC    30 tablet    TAKE 1 TABLET BY MOUTH DAILY    Hypertension goal BP (blood pressure) < 140/90       aspirin 81 MG tablet     100    1 tab po QD (Once per day)    Type II or unspecified type diabetes mellitus without mention of complication, not stated as uncontrolled       atorvastatin 20 MG tablet    LIPITOR    30 tablet    TAKE 1 TABLET BY MOUTH DAILY    Hyperlipidemia LDL goal <100       * B-D U/F 31G X 8 MM   Generic drug:  insulin pen needle     100 each    USE 2 DAILY OR AS DIRECTED    Type 2 diabetes mellitus without complication (H)       * insulin pen needle 32G X 4 MM     100 each    Use 1 pen needles daily.    Type 2 diabetes mellitus with hyperglycemia, without long-term current use of insulin (H)       blood glucose monitoring lancets     1 Box    Use to test blood sugar 3 times daily.    Type 2 diabetes mellitus with hyperglycemia, without long-term current use of insulin (H)       blood glucose  monitoring test strip    KELSEY CONTOUR NEXT    100 each    Use to test blood sugar 3 times daily.    Type 2 diabetes mellitus with hyperglycemia, without long-term current use of insulin (H)       gabapentin 100 MG capsule    NEURONTIN    90 capsule    Take 1 capsule (100 mg) by mouth 3 times daily May titrate up to 300mg TID prn pain    Herpes zoster without complication       glipiZIDE 10 MG 24 hr tablet    GLUCOTROL XL    60 tablet    TAKE 1 TABLET BY MOUTH TWICE DAILY    Type 2 diabetes mellitus without complication (H)       lisinopril 40 MG tablet    PRINIVIL/ZESTRIL    30 tablet    TAKE 1 TABLET BY MOUTH DAILY    Benign essential hypertension       MELATONIN PO      Takes 5 mg at bedtime daily        metFORMIN 1000 MG tablet    GLUCOPHAGE    60 tablet    TAKE 1 TABLET BY MOUTH 2 TIMES A DAY WITH MEALS    Type 2 diabetes mellitus without complication (H)       MULTIVITAMIN PO      Take 1 tablet by mouth daily        OMEGA-3 FISH OIL PO      Take 1 g by mouth daily        sildenafil 20 MG tablet    REVATIO    30 tablet    Take 3-5 (60-100mg) tablets by mouth 1 hour prior to sexual activity    Erectile dysfunction, unspecified erectile dysfunction type       valACYclovir 1000 mg tablet    VALTREX    21 tablet    Take 1 tablet (1,000 mg) by mouth 3 times daily    Herpes zoster without complication       VICTOZA PEN 18 MG/3ML soln   Generic drug:  liraglutide     6 mL    INJECT 1.2MG SUBCUTANEOUSLY DAILY    Type 2 diabetes mellitus with hyperglycemia, without long-term current use of insulin (H)       VITAMIN D (CHOLECALCIFEROL) PO      Take 1,000 Units by mouth daily        * Notice:  This list has 2 medication(s) that are the same as other medications prescribed for you. Read the directions carefully, and ask your doctor or other care provider to review them with you.

## 2017-12-13 NOTE — PROGRESS NOTES
I was asked to see this patient by Dr Yarbrough and provide my opinion about the following:  Erectile dysfunction    Type of Visit  Consult    Chief Complaint  Erectile dysfunction    HPI  Mr. Hannah is a 62 year old male who presents with erectile dysfunction.    His ED issues started about 4 years ago.  He has tried Viagra in the past.  Currently he is using nothing as he ran out of Viagra.  He rates his erectile rigidity as 9/10 with treatment.  Main issue is maintaining erection.    He reports taking the previous PDE5 inhibitors correctly  He does not take oral nitrates for chest pain.      Past Medical History  He  has a past medical history of Basal cell carcinoma; DIABETES MELLITUS TYPE II-UNCOMPL (1/19/2006); HYPERLIPIDEMIA NEC/NOS (10/18/2006); HYPERTENSION NOS (1/19/2006); Obesity; and Shingles (11/2017).  Patient Active Problem List   Diagnosis     Hyperlipidemia LDL goal <100     Advanced directives, counseling/discussion     BCC (basal cell carcinoma), face     Obesity due to excess calories, unspecified obesity severity     Other insomnia     Essential hypertension with goal blood pressure less than 140/90     ACP (advance care planning)     Type 2 diabetes mellitus without complication, without long-term current use of insulin (H)     Herpes zoster without complication     Trigger finger, acquired       Past Surgical History  He  has a past surgical history that includes colonoscopy (3-3-2014) and wisdom teeth extraction.    Medications  He has a current medication list which includes the following prescription(s): victoza pen, lisinopril, valacyclovir, gabapentin, amlodipine, atorvastatin, glipizide, metformin, insulin pen needle, melatonin, cholecalciferol, blood glucose monitoring, blood glucose monitoring, acetylcysteine, b-d u/f, omega-3 fatty acids, multiple vitamins-minerals, and aspirin.    Allergies  Allergies   Allergen Reactions     Nkda [No Known Drug Allergies]        Social History  He   "reports that he has never smoked. He has never used smokeless tobacco. He reports that he drinks alcohol. He reports that he does not use illicit drugs.  No drug abuse.    Family History  Family History   Problem Relation Age of Onset     Cardiovascular Father      aortic valve surgery     CANCER Father      lung cancer     GASTROINTESTINAL DISEASE Father      benign esophageal tumor removed     Hypertension Father      DIABETES Father 70     Other - See Comments Mother      infection     Family History Negative Sister        Review of Systems  I personally reviewed the ROS with the patient.    Nursing Notes:   Inga Coreas LPN  12/13/2017  1:44 PM  Signed  Chief Complaint   Patient presents with     Consult     Erectile dysfunction.       Initial /60 (BP Location: Left arm, Patient Position: Chair, Cuff Size: Adult Regular)  Pulse 72  Temp 97.4  F (36.3  C) (Tympanic)  Ht 5' 10\" (1.778 m)  Wt 236 lb (107 kg)  BMI 33.86 kg/m2 Estimated body mass index is 33.86 kg/(m^2) as calculated from the following:    Height as of this encounter: 5' 10\" (1.778 m).    Weight as of this encounter: 236 lb (107 kg).  Medication Reconciliation: complete   INGA COREAS    Review of Systems:    Weight loss:    Yes     Recent fever/chills:  No   Night sweats:   No  Current skin rash:  No   Recent hair loss:  No  Heat intolerance:  No   Cold intolerance:  No  Chest pain:   No   Palpitations:   No  Shortness of breath:  No   Wheezing:   No  Constipation:    No   Diarrhea:   No   Nausea:   No   Vomiting:   No   Kidney/side pain:  No   Back pain:   No  Frequent headaches:  No   Dizziness:     No  Leg swelling:   No   Calf pain:    No    Parents, brothers or sisters with history of kidney cancer:   No  Parents, brothers or sisters with history of bladder cancer: No  Father or brother with history of prostate cancer:  No  INGA COREAS            Physical Exam  Vitals:    12/13/17 1341   BP: 130/60   BP Location: Left arm " "  Patient Position: Chair   Cuff Size: Adult Regular   Pulse: 72   Temp: 97.4  F (36.3  C)   TempSrc: Tympanic   Weight: 236 lb (107 kg)   Height: 5' 10\" (1.778 m)     Constitutional: No acute distress.  Alert and cooperative   Head: NCAT  Eyes: Conjunctivae normal  Cardiovascular: Regular rate  Pulmonary/Chest: Respirations are even and non-labored bilaterally, no audible wheezing  Abdominal: Soft. No distension, tenderness, masses or guarding.   Neurological: A + O x 3.  Cranial Nerves II-XII grossly intact.  Extremities: JIMBO x 4, Warm. No clubbing.  No cyanosis.    Skin: Pink, warm and dry.  No visible rashes noted.  Psychiatric:  Normal mood and affect  Back:  No left CVA tenderness.  No right CVA tenderness.  Genitourinary:  Nonpalpable bladder  Normal male phallus without discharge or lesions, normal pubic hair distribution.  Testicles descended bilaterally.     Labs  Results for orders placed or performed during the hospital encounter of 10/17/17   Hemoglobin A1c   Result Value Ref Range    Hemoglobin A1C 6.9 4.3 - 6.0 %     Assessment  Mr. Hannah is a 62 year old male with erectile dysfunction.    We discussed treatment options including oral PDE5- medication, Trimix injections, vacuum erection devices and implantable penile prostheses.    He was informed of the most common side effects of Viagra such as headache, flushing, nausea and visual changes (such as blurred vision).    Plan  Continue sildenafil 60-100mg by mouth 1 hour prior to sexual activity (empty stomach).  Follow up with me as needed    "

## 2017-12-13 NOTE — NURSING NOTE
"Chief Complaint   Patient presents with     Consult     Erectile dysfunction.       Initial /60 (BP Location: Left arm, Patient Position: Chair, Cuff Size: Adult Regular)  Pulse 72  Temp 97.4  F (36.3  C) (Tympanic)  Ht 5' 10\" (1.778 m)  Wt 236 lb (107 kg)  BMI 33.86 kg/m2 Estimated body mass index is 33.86 kg/(m^2) as calculated from the following:    Height as of this encounter: 5' 10\" (1.778 m).    Weight as of this encounter: 236 lb (107 kg).  Medication Reconciliation: complete   INGA COREAS    Review of Systems:    Weight loss:    Yes     Recent fever/chills:  No   Night sweats:   No  Current skin rash:  No   Recent hair loss:  No  Heat intolerance:  No   Cold intolerance:  No  Chest pain:   No   Palpitations:   No  Shortness of breath:  No   Wheezing:   No  Constipation:    No   Diarrhea:   No   Nausea:   No   Vomiting:   No   Kidney/side pain:  No   Back pain:   No  Frequent headaches:  No   Dizziness:     No  Leg swelling:   No   Calf pain:    No    Parents, brothers or sisters with history of kidney cancer:   No  Parents, brothers or sisters with history of bladder cancer: No  Father or brother with history of prostate cancer:  No  INGA COREAS          "

## 2017-12-27 DIAGNOSIS — E11.9 TYPE 2 DIABETES MELLITUS WITHOUT COMPLICATION (H): ICD-10-CM

## 2017-12-27 NOTE — TELEPHONE ENCOUNTER
Metformin      Last Written Prescription Date: 4/20/17  Last Fill Quantity: 60,  # refills: 5   Last Office Visit with G, P or [facility] prescribing provider: 11/6/17                                         Next 5 appointments (look out 90 days)     Feb 09, 2018  9:15 AM CST   (Arrive by 9:00 AM)   SHORT with Kelly Yarbrough MD   [facility] ([facility] )    [address] [place] [address]   [phone]

## 2018-02-19 NOTE — TELEPHONE ENCOUNTER
Perez, Francine, RN   You 36 minutes ago (3:07 PM)                 This medication was ordered by Dr Yarbrough in December 2017 (Routing comment)

## 2018-02-21 DIAGNOSIS — E11.9 DIABETES MELLITUS WITHOUT COMPLICATION (H): Primary | ICD-10-CM

## 2018-02-22 ENCOUNTER — OFFICE VISIT (OUTPATIENT)
Dept: FAMILY MEDICINE | Facility: OTHER | Age: 63
End: 2018-02-22
Attending: FAMILY MEDICINE
Payer: COMMERCIAL

## 2018-02-22 ENCOUNTER — TELEPHONE (OUTPATIENT)
Dept: UROLOGY | Facility: OTHER | Age: 63
End: 2018-02-22

## 2018-02-22 VITALS
HEART RATE: 81 BPM | SYSTOLIC BLOOD PRESSURE: 128 MMHG | OXYGEN SATURATION: 95 % | TEMPERATURE: 96.6 F | BODY MASS INDEX: 34.9 KG/M2 | WEIGHT: 243.8 LBS | RESPIRATION RATE: 18 BRPM | HEIGHT: 70 IN | DIASTOLIC BLOOD PRESSURE: 72 MMHG

## 2018-02-22 DIAGNOSIS — E78.5 HYPERLIPIDEMIA LDL GOAL <100: ICD-10-CM

## 2018-02-22 DIAGNOSIS — E11.9 TYPE 2 DIABETES MELLITUS WITHOUT COMPLICATION, WITHOUT LONG-TERM CURRENT USE OF INSULIN (H): ICD-10-CM

## 2018-02-22 DIAGNOSIS — I10 ESSENTIAL HYPERTENSION WITH GOAL BLOOD PRESSURE LESS THAN 140/90: Primary | ICD-10-CM

## 2018-02-22 DIAGNOSIS — E11.9 DIABETES MELLITUS WITHOUT COMPLICATION (H): ICD-10-CM

## 2018-02-22 LAB
ALBUMIN SERPL-MCNC: 4 G/DL (ref 3.4–5)
ALP SERPL-CCNC: 72 U/L (ref 40–150)
ALT SERPL W P-5'-P-CCNC: 38 U/L (ref 0–70)
ANION GAP SERPL CALCULATED.3IONS-SCNC: 7 MMOL/L (ref 3–14)
AST SERPL W P-5'-P-CCNC: 15 U/L (ref 0–45)
BILIRUB SERPL-MCNC: 0.6 MG/DL (ref 0.2–1.3)
BUN SERPL-MCNC: 14 MG/DL (ref 7–30)
CALCIUM SERPL-MCNC: 9 MG/DL (ref 8.5–10.1)
CHLORIDE SERPL-SCNC: 103 MMOL/L (ref 94–109)
CHOLEST SERPL-MCNC: 158 MG/DL
CO2 SERPL-SCNC: 26 MMOL/L (ref 20–32)
CREAT SERPL-MCNC: 0.89 MG/DL (ref 0.66–1.25)
CREAT UR-MCNC: 173 MG/DL
EST. AVERAGE GLUCOSE BLD GHB EST-MCNC: 151 MG/DL
GFR SERPL CREATININE-BSD FRML MDRD: 86 ML/MIN/1.7M2
GLUCOSE SERPL-MCNC: 164 MG/DL (ref 70–99)
HBA1C MFR BLD: 6.9 % (ref 4.3–6)
HDLC SERPL-MCNC: 54 MG/DL
LDLC SERPL CALC-MCNC: 76 MG/DL
MICROALBUMIN UR-MCNC: 11 MG/L
MICROALBUMIN/CREAT UR: 6.53 MG/G CR (ref 0–17)
NONHDLC SERPL-MCNC: 104 MG/DL
POTASSIUM SERPL-SCNC: 4.3 MMOL/L (ref 3.4–5.3)
PROT SERPL-MCNC: 7.9 G/DL (ref 6.8–8.8)
SODIUM SERPL-SCNC: 136 MMOL/L (ref 133–144)
TRIGL SERPL-MCNC: 140 MG/DL
TSH SERPL DL<=0.005 MIU/L-ACNC: 1.72 MU/L (ref 0.4–4)

## 2018-02-22 PROCEDURE — 84443 ASSAY THYROID STIM HORMONE: CPT | Performed by: FAMILY MEDICINE

## 2018-02-22 PROCEDURE — 83036 HEMOGLOBIN GLYCOSYLATED A1C: CPT | Performed by: FAMILY MEDICINE

## 2018-02-22 PROCEDURE — 80053 COMPREHEN METABOLIC PANEL: CPT | Performed by: FAMILY MEDICINE

## 2018-02-22 PROCEDURE — 36415 COLL VENOUS BLD VENIPUNCTURE: CPT | Performed by: FAMILY MEDICINE

## 2018-02-22 PROCEDURE — 99214 OFFICE O/P EST MOD 30 MIN: CPT | Performed by: FAMILY MEDICINE

## 2018-02-22 PROCEDURE — 82043 UR ALBUMIN QUANTITATIVE: CPT | Performed by: FAMILY MEDICINE

## 2018-02-22 PROCEDURE — 80061 LIPID PANEL: CPT | Performed by: FAMILY MEDICINE

## 2018-02-22 ASSESSMENT — ANXIETY QUESTIONNAIRES

## 2018-02-22 ASSESSMENT — PAIN SCALES - GENERAL: PAINLEVEL: NO PAIN (0)

## 2018-02-22 ASSESSMENT — PATIENT HEALTH QUESTIONNAIRE - PHQ9: 5. POOR APPETITE OR OVEREATING: NOT AT ALL

## 2018-02-22 NOTE — MR AVS SNAPSHOT
After Visit Summary   2/22/2018    Edward Hannah    MRN: [id number]           Patient Information     Date Of Birth          1955        Visit Information        Provider Department      2/22/2018 8:15 AM Kelly Yarbrough MD [facility]        Today's Diagnoses     Essential hypertension with goal blood pressure less than 140/90    -  1    Type 2 diabetes mellitus without complication, without long-term current use of insulin (H)        Hyperlipidemia LDL goal <100        Diabetes mellitus without complication (H)           Follow-ups after your visit        Your next 10 appointments already scheduled     Jun 25, 2018  9:15 AM CDT   (Arrive by 9:00 AM)   SHORT with Kelly Yarbrough MD   [facility] ([facility] )    [address] [place] [address] [id number] [phone]            Oct 18, 2018  8:30 AM CDT   (Arrive by 8:15 AM)   Return Visit with Karrie Coleman RD   [facility] ([facility] )    [address] [place] [id number] [phone]              Future tests that were ordered for you today     Open Standing Orders        Priority Remaining Interval Expires Ordered    Hemoglobin A1c Routine 3/4  2/21/2019 2/21/2018            Who to contact     If you have questions or need follow up information about today's clinic visit or your schedule please contact [facility] directly at [phone].  Normal or non-critical lab and imaging results will be communicated to you by MyChart, letter or phone within 4 business days after the clinic has received the results. If you do not hear from us within 7 days, please contact the clinic through MyChart or phone. If you have a critical or abnormal lab result, we will notify you by phone as soon as possible.  Submit refill requests through Rowl or call your pharmacy and they will forward the refill request to us. Please allow 3 business days for your refill  "to be completed.          Additional Information About Your Visit        Scaling[employer] Information     [employer] gives you secure access to your electronic health record. If you see a primary care provider, you can also send messages to your care team and make appointments. If you have questions, please call your primary care clinic.  If you do not have a primary care provider, please call [phone] and they will assist you.        Care EveryWhere ID     This is your Care EveryWhere ID. This could be used by other organizations to access your [place] medical records  [id number]        Your Vitals Were     Pulse Temperature Respirations Height Pulse Oximetry BMI (Body Mass Index)    81 96.6  F (35.9  C) (Tympanic) 18 5' 10\" (1.778 m) 95% 34.98 kg/m2       Blood Pressure from Last 3 Encounters:   02/22/18 128/72   12/13/17 130/60   11/06/17 130/72    Weight from Last 3 Encounters:   02/22/18 243 lb 12.8 oz (110.6 kg)   12/13/17 236 lb (107 kg)   11/06/17 244 lb (110.7 kg)              We Performed the Following     Albumin Random Urine Quantitative with Creat Ratio     Comprehensive metabolic panel     Hemoglobin A1c     Lipid Profile (Chol, Trig, HDL, LDL calc)     TSH with free T4 reflex        Primary Care Provider Office Phone # Fax #    Kelly Yarbrough [phone]       [facility] [place] [address]  [place] [address]        Equal Access to Services     [facility] Center[facility] AH: Hadii aad ku hadasho Soomaali, waaxda luqadaha, qaybta kaalmada adeegyada, idania frausto hayyahir fuentes la'yahir ah. So [facility] [phone].    ATENCIÓN: Si habla español, tiene a haas disposición servicios gratuitos de asistencia lingüística. Llame al [phone].    We comply with applicable federal civil rights laws and [place] laws. We do not discriminate on the basis of race, color, national origin, age, disability, sex, sexual orientation, or gender identity.            Thank you!     Thank you for choosing [facility]  for your care. Our goal is always to provide you with excellent care. Hearing back from our patients is one way we can continue to improve our services. Please take a few minutes to complete the written survey that you may receive in the mail after your visit with us. Thank you!             Your Updated Medication List - Protect others around you: Learn how to safely use, store and throw away your medicines at www.disposemymeds.org.          This list is accurate as of 2/22/18  8:45 AM.  Always use your most recent med list.                   Brand Name Dispense Instructions for use Diagnosis    acetylcysteine 500 MG Caps capsule    N-ACETYL CYSTEINE     Take 1 capsule (500 mg) by mouth daily    Type 2 diabetes mellitus without complication, without long-term current use of insulin (H)       amLODIPine 2.5 MG tablet    NORVASC    30 tablet    TAKE 1 TABLET BY MOUTH DAILY    Hypertension goal BP (blood pressure) < 140/90       aspirin 81 MG tablet     100    1 tab po QD (Once per day)    Type II or unspecified type diabetes mellitus without mention of complication, not stated as uncontrolled       atorvastatin 20 MG tablet    LIPITOR    30 tablet    TAKE 1 TABLET BY MOUTH DAILY    Hyperlipidemia LDL goal <100       * B-D U/F 31G X 8 MM   Generic drug:  insulin pen needle     100 each    USE 2 DAILY OR AS DIRECTED    Type 2 diabetes mellitus without complication (H)       * insulin pen needle 32G X 4 MM     100 each    Use 1 pen needles daily.    Type 2 diabetes mellitus with hyperglycemia, without long-term current use of insulin (H)       blood glucose monitoring lancets     1 Box    Use to test blood sugar 3 times daily.    Type 2 diabetes mellitus with hyperglycemia, without long-term current use of insulin (H)       blood glucose monitoring test strip    KELSEY CONTOUR NEXT    100 each    Use to test blood sugar 3 times daily.    Type 2 diabetes mellitus with hyperglycemia, without long-term  current use of insulin (H)       glipiZIDE 10 MG 24 hr tablet    GLUCOTROL XL    60 tablet    TAKE 1 TABLET BY MOUTH TWICE DAILY    Type 2 diabetes mellitus without complication (H)       lisinopril 40 MG tablet    PRINIVIL/ZESTRIL    30 tablet    TAKE 1 TABLET BY MOUTH DAILY    Benign essential hypertension       MELATONIN PO      Takes 5 mg at bedtime daily        metFORMIN 1000 MG tablet    GLUCOPHAGE    60 tablet    TAKE 1 TABLET BY MOUTH 2 TIMES A DAY WITH MEALS    Type 2 diabetes mellitus without complication (H)       MULTIVITAMIN PO      Take 1 tablet by mouth daily        OMEGA-3 FISH OIL PO      Take 1 g by mouth daily        sildenafil 20 MG tablet    REVATIO    30 tablet    Take 3-5 (60-100mg) tablets by mouth 1 hour prior to sexual activity    Erectile dysfunction, unspecified erectile dysfunction type       VICTOZA PEN 18 MG/3ML soln   Generic drug:  liraglutide     6 mL    INJECT 1.2MG SUBCUTANEOUSLY DAILY    Type 2 diabetes mellitus with hyperglycemia, without long-term current use of insulin (H)       VITAMIN D (CHOLECALCIFEROL) PO      Take 1,000 Units by mouth daily        * Notice:  This list has 2 medication(s) that are the same as other medications prescribed for you. Read the directions carefully, and ask your doctor or other care provider to review them with you.

## 2018-02-22 NOTE — NURSING NOTE
"Chief Complaint   Patient presents with     Diabetes       Initial /72 (BP Location: Right arm, Patient Position: Sitting, Cuff Size: Adult Regular)  Pulse 81  Temp 96.6  F (35.9  C) (Tympanic)  Resp 18  Ht 5' 10\" (1.778 m)  Wt 243 lb 12.8 oz (110.6 kg)  SpO2 95%  BMI 34.98 kg/m2 Estimated body mass index is 34.98 kg/(m^2) as calculated from the following:    Height as of this encounter: 5' 10\" (1.778 m).    Weight as of this encounter: 243 lb 12.8 oz (110.6 kg).  Medication Reconciliation: complete   Lynsey Mcgregor MA  "

## 2018-02-22 NOTE — LETTER
February 22, 2018      Edward Hannah  [address]    [facility] [address]        To Whom It May Concern:    Edward Hannah was seen in our clinic. He may return to working out at the gym facility with no restrictions.      Sincerely,        Kelly Yarbrough MD

## 2018-02-22 NOTE — PROGRESS NOTES
SUBJECTIVE:                                                    Edward Hannah is a 62 year old male who presents to clinic today for the following health issues:      Diabetes Follow-up    Patient is checking blood sugars: twice daily.    Blood sugar testing frequency justification: Uncontrolled diabetes  Results are as follows:         am - 150-170         suppertime -     Diabetic concerns: None and other - high blood sugars in the morning    Doesn't usually eat after 8 pm     Symptoms of hypoglycemia (low blood sugar): none     Paresthesias (numbness or burning in feet) or sores: No     Date of last diabetic eye exam: 06/17    Hyperlipidemia Follow-Up      Rate your low fat/cholesterol diet?: fair    Taking statin?  Yes, no muscle aches from statin    Other lipid medications/supplements?:  Fish oil/Omega 3, dose 1G without side effects    Hypertension Follow-up      Outpatient blood pressures are not being checked.    Low Salt Diet: low salt    BP Readings from Last 2 Encounters:   12/13/17 130/60   11/06/17 130/72     Hemoglobin A1C (%)   Date Value   10/17/2017 6.9   07/20/2017 7.0 (H)     LDL Cholesterol Calculated (mg/dL)   Date Value   04/19/2017 55   04/20/2016 65       Amount of exercise or physical activity: 2-3 days/week for an average of greater than 60 minutes    Problems taking medications regularly: No    Medication side effects: none    Diet: low salt, low fat/cholesterol and diabetic    Has started working out lately and finding sugars slight higher in the morning    Problem list and histories reviewed & adjusted, as indicated.  Additional history: as documented    Patient Active Problem List   Diagnosis     Hyperlipidemia LDL goal <100     Advanced directives, counseling/discussion     BCC (basal cell carcinoma), face     Obesity due to excess calories, unspecified obesity severity     Other insomnia     Essential hypertension with goal blood pressure less than 140/90     ACP (advance care  planning)     Type 2 diabetes mellitus without complication, without long-term current use of insulin (H)     Herpes zoster without complication     Trigger finger, acquired     Past Surgical History:   Procedure Laterality Date     COLONOSCOPY  3-3-2014    due 2019  4 polyps benign     wisdom teeth extraction         Social History   Substance Use Topics     Smoking status: Never Smoker     Smokeless tobacco: Never Used      Comment: remote cigar history     Alcohol use 0.0 oz/week     0 Standard drinks or equivalent per week      Comment: 1-2/day     Family History   Problem Relation Age of Onset     Cardiovascular Father      aortic valve surgery     CANCER Father      lung cancer     GASTROINTESTINAL DISEASE Father      benign esophageal tumor removed     Hypertension Father      DIABETES Father 70     Other - See Comments Mother      infection     Family History Negative Sister          Current Outpatient Prescriptions   Medication Sig Dispense Refill     metFORMIN (GLUCOPHAGE) 1000 MG tablet TAKE 1 TABLET BY MOUTH 2 TIMES A DAY WITH MEALS 60 tablet 3     sildenafil (REVATIO) 20 MG tablet Take 3-5 (60-100mg) tablets by mouth 1 hour prior to sexual activity 30 tablet 11     VICTOZA PEN 18 MG/3ML soln INJECT 1.2MG SUBCUTANEOUSLY DAILY 6 mL 2     lisinopril (PRINIVIL/ZESTRIL) 40 MG tablet TAKE 1 TABLET BY MOUTH DAILY 30 tablet 5     amLODIPine (NORVASC) 2.5 MG tablet TAKE 1 TABLET BY MOUTH DAILY 30 tablet 7     atorvastatin (LIPITOR) 20 MG tablet TAKE 1 TABLET BY MOUTH DAILY 30 tablet 11     glipiZIDE (GLUCOTROL XL) 10 MG 24 hr tablet TAKE 1 TABLET BY MOUTH TWICE DAILY 60 tablet 5     insulin pen needle 32G X 4 MM Use 1 pen needles daily. 100 each 3     MELATONIN PO Takes 5 mg at bedtime daily       VITAMIN D, CHOLECALCIFEROL, PO Take 1,000 Units by mouth daily       blood glucose monitoring (KELSEY CONTOUR NEXT) test strip Use to test blood sugar 3 times daily. 100 each 11     blood glucose monitoring (KELSEY  "MICROLET) lancets Use to test blood sugar 3 times daily. 1 Box 11     acetylcysteine (N-ACETYL CYSTEINE) 500 MG CAPS capsule Take 1 capsule (500 mg) by mouth daily       B-D U/F 31G X 8 MM insulin pen needle USE 2 DAILY OR AS DIRECTED 100 each 3     Omega-3 Fatty Acids (OMEGA-3 FISH OIL PO) Take 1 g by mouth daily       Multiple Vitamins-Minerals (MULTIVITAMIN PO) Take 1 tablet by mouth daily       ASPIRIN 81 MG OR TABS 1 tab po QD (Once per day) 100 3     Allergies   Allergen Reactions     Nkda [No Known Drug Allergies]      Labs reviewed in EPIC    ROS:  Constitutional, HEENT, cardiovascular, pulmonary, gi and gu systems are negative, except as otherwise noted.    OBJECTIVE:                                                    /72 (BP Location: Right arm, Patient Position: Sitting, Cuff Size: Adult Regular)  Pulse 81  Temp 96.6  F (35.9  C) (Tympanic)  Resp 18  Ht 5' 10\" (1.778 m)  Wt 243 lb 12.8 oz (110.6 kg)  SpO2 95%  BMI 34.98 kg/m2  Body mass index is 34.98 kg/(m^2).  GENERAL APPEARANCE: healthy, alert, no distress and over weight  RESP: lungs clear to auscultation - no rales, rhonchi or wheezes  CV: regular rates and rhythm, normal S1 S2, no S3 or S4 and no murmur, click or rub  ABDOMEN: soft, nontender, without hepatosplenomegaly or masses, bowel sounds normal and obese  PSYCH: mentation appears normal and affect normal/bright       ASSESSMENT/PLAN:                                                    1. Essential hypertension with goal blood pressure less than 140/90  stable  - Estimated Average Glucose    2. Type 2 diabetes mellitus without complication, without long-term current use of insulin (H)  F/u 4 mos, non-fasting  dsicussed stress and sleep in keeping good control of glucose  - Hemoglobin A1c; Standing  - TSH with free T4 reflex; Future  - Hemoglobin A1c  - TSH with free T4 reflex  - Albumin Random Urine Quantitative with Creat Ratio    3. Hyperlipidemia LDL goal <100      4. Diabetes " mellitus without complication (H)    - Lipid Profile (Chol, Trig, HDL, LDL calc)  - Comprehensive metabolic panel    Patient was agreeable to this plan and had no further questions.  See Patient Instructions    Kelly Yarbrough MD  [facility]

## 2018-02-22 NOTE — TELEPHONE ENCOUNTER
May from the [facility] called and said she got a PA request for patient's sildenafil 20mg tablet.  I called the patient and notified him that per Yunior Llanes MD, PharmD he would need to pay cash for this medication and he should check different pharmacies for prices.  He knows that he can have his script transferred if needed.  Tory notified that PA can be cancelled and that patient would pay cash if he gets the medication.  May notified that no further action needed.  Sonam Doty RN......February 22, 2018...9:04 AM

## 2018-02-23 ASSESSMENT — PATIENT HEALTH QUESTIONNAIRE - PHQ9: SUM OF ALL RESPONSES TO PHQ QUESTIONS 1-9: 0

## 2018-02-23 ASSESSMENT — ANXIETY QUESTIONNAIRES: GAD7 TOTAL SCORE: 0

## 2018-03-05 DIAGNOSIS — I10 HYPERTENSION GOAL BP (BLOOD PRESSURE) < 140/90: ICD-10-CM

## 2018-03-05 NOTE — TELEPHONE ENCOUNTER
Norvasc       Last Written Prescription Date:  5/25/2017  Last Fill Quantity: 30,   # refills: 7  Last Office Visit: 2/22/2018  Future Office visit:

## 2018-03-06 RX ORDER — AMLODIPINE BESYLATE 2.5 MG/1
TABLET ORAL
Qty: 30 TABLET | Refills: 11 | Status: SHIPPED | OUTPATIENT
Start: 2018-03-06 | End: 2019-02-25

## 2018-04-05 DIAGNOSIS — E11.65 TYPE 2 DIABETES MELLITUS WITH HYPERGLYCEMIA, WITHOUT LONG-TERM CURRENT USE OF INSULIN (H): ICD-10-CM

## 2018-04-05 RX ORDER — LIRAGLUTIDE 6 MG/ML
INJECTION SUBCUTANEOUS
Qty: 6 ML | Refills: 1 | Status: SHIPPED | OUTPATIENT
Start: 2018-04-05 | End: 2018-06-01

## 2018-06-25 ENCOUNTER — OFFICE VISIT (OUTPATIENT)
Dept: FAMILY MEDICINE | Facility: OTHER | Age: 63
End: 2018-06-25
Attending: FAMILY MEDICINE
Payer: COMMERCIAL

## 2018-06-25 VITALS
SYSTOLIC BLOOD PRESSURE: 124 MMHG | HEART RATE: 70 BPM | HEIGHT: 70 IN | RESPIRATION RATE: 18 BRPM | WEIGHT: 241.4 LBS | DIASTOLIC BLOOD PRESSURE: 70 MMHG | OXYGEN SATURATION: 96 % | TEMPERATURE: 96.6 F | BODY MASS INDEX: 34.56 KG/M2

## 2018-06-25 DIAGNOSIS — E78.5 HYPERLIPIDEMIA LDL GOAL <100: ICD-10-CM

## 2018-06-25 DIAGNOSIS — E11.9 TYPE 2 DIABETES MELLITUS WITHOUT COMPLICATION, WITHOUT LONG-TERM CURRENT USE OF INSULIN (H): ICD-10-CM

## 2018-06-25 DIAGNOSIS — Z11.59 ENCOUNTER FOR SCREENING FOR OTHER VIRAL DISEASES (CODE): Primary | ICD-10-CM

## 2018-06-25 DIAGNOSIS — I10 ESSENTIAL HYPERTENSION WITH GOAL BLOOD PRESSURE LESS THAN 140/90: ICD-10-CM

## 2018-06-25 LAB
EST. AVERAGE GLUCOSE BLD GHB EST-MCNC: 151 MG/DL
HBA1C MFR BLD: 6.9 % (ref 0–5.6)

## 2018-06-25 PROCEDURE — 99214 OFFICE O/P EST MOD 30 MIN: CPT | Performed by: FAMILY MEDICINE

## 2018-06-25 PROCEDURE — 83036 HEMOGLOBIN GLYCOSYLATED A1C: CPT | Performed by: FAMILY MEDICINE

## 2018-06-25 PROCEDURE — 36415 COLL VENOUS BLD VENIPUNCTURE: CPT | Performed by: FAMILY MEDICINE

## 2018-06-25 ASSESSMENT — PATIENT HEALTH QUESTIONNAIRE - PHQ9: 5. POOR APPETITE OR OVEREATING: NOT AT ALL

## 2018-06-25 ASSESSMENT — ANXIETY QUESTIONNAIRES
7. FEELING AFRAID AS IF SOMETHING AWFUL MIGHT HAPPEN: NOT AT ALL
5. BEING SO RESTLESS THAT IT IS HARD TO SIT STILL: NOT AT ALL
GAD7 TOTAL SCORE: 0
3. WORRYING TOO MUCH ABOUT DIFFERENT THINGS: NOT AT ALL
1. FEELING NERVOUS, ANXIOUS, OR ON EDGE: NOT AT ALL
2. NOT BEING ABLE TO STOP OR CONTROL WORRYING: NOT AT ALL
IF YOU CHECKED OFF ANY PROBLEMS ON THIS QUESTIONNAIRE, HOW DIFFICULT HAVE THESE PROBLEMS MADE IT FOR YOU TO DO YOUR WORK, TAKE CARE OF THINGS AT HOME, OR GET ALONG WITH OTHER PEOPLE: NOT DIFFICULT AT ALL
6. BECOMING EASILY ANNOYED OR IRRITABLE: NOT AT ALL

## 2018-06-25 ASSESSMENT — PAIN SCALES - GENERAL: PAINLEVEL: NO PAIN (0)

## 2018-06-25 NOTE — NURSING NOTE
"Chief Complaint   Patient presents with     Diabetes       Initial /70 (BP Location: Right arm, Patient Position: Sitting, Cuff Size: Adult Large)  Pulse 70  Temp 96.6  F (35.9  C) (Tympanic)  Resp 18  Ht 5' 10\" (1.778 m)  Wt 241 lb 6.4 oz (109.5 kg)  SpO2 96%  BMI 34.64 kg/m2 Estimated body mass index is 34.64 kg/(m^2) as calculated from the following:    Height as of this encounter: 5' 10\" (1.778 m).    Weight as of this encounter: 241 lb 6.4 oz (109.5 kg).  Medication Reconciliation: complete    Lynsey Mcgregor MA  "

## 2018-06-25 NOTE — MR AVS SNAPSHOT
After Visit Summary   6/25/2018    Edward Hannah    MRN: [id number]           Patient Information     Date Of Birth          1955        Visit Information        Provider Department      6/25/2018 9:15 AM Kelly Yarbrough MD [facility]        Today's Diagnoses     Encounter for screening for other viral diseases (CODE)    -  1    Type 2 diabetes mellitus without complication, without long-term current use of insulin (H)           Follow-ups after your visit        Your next 10 appointments already scheduled     Oct 18, 2018  8:30 AM CDT   (Arrive by 8:15 AM)   Return Visit with Karrie Coleman RD   [facility] ([facility] )    [address] [facility] [id number] [phone]            Oct 25, 2018  8:45 AM CDT   (Arrive by 8:30 AM)   SHORT with Kelly Yarbrough MD   [facility] ([facility] )    [address] [place] [id number] [phone]              Future tests that were ordered for you today     Open Future Orders        Priority Expected Expires Ordered    HIV Antigen Antibody Combo Routine 9/3/2018 6/25/2019 6/25/2018            Who to contact     If you have questions or need follow up information about today's clinic visit or your schedule please contact [facility] directly at [phone].  Normal or non-critical lab and imaging results will be communicated to you by MyChart, letter or phone within 4 business days after the clinic has received the results. If you do not hear from us within 7 days, please contact the clinic through MyChart or phone. If you have a critical or abnormal lab result, we will notify you by phone as soon as possible.  Submit refill requests through [employer] or call your pharmacy and they will forward the refill request to us. Please allow 3 business days for your refill to be completed.          Additional Information About Your Visit        MyChart Information  "    [employer] gives you secure access to your electronic health record. If you see a primary care provider, you can also send messages to your care team and make appointments. If you have questions, please call your primary care clinic.  If you do not have a primary care provider, please call [phone] and they will assist you.        Care EveryWhere ID     This is your Care EveryWhere ID. This could be used by other organizations to access your [place] medical records  [id number]        Your Vitals Were     Pulse Temperature Respirations Height Pulse Oximetry BMI (Body Mass Index)    70 96.6  F (35.9  C) (Tympanic) 18 5' 10\" (1.778 m) 96% 34.64 kg/m2       Blood Pressure from Last 3 Encounters:   06/25/18 124/70   02/22/18 128/72   12/13/17 130/60    Weight from Last 3 Encounters:   06/25/18 241 lb 6.4 oz (109.5 kg)   02/22/18 243 lb 12.8 oz (110.6 kg)   12/13/17 236 lb (107 kg)              We Performed the Following     Estimated Average Glucose     Hemoglobin A1c        Primary Care Provider Office Phone # Fax #    Kelly Yarbrough [phone]       [facility] [place] [address]  [place] [address]        Equal Access to Services     ERI PATTON AH: Hadii aad ku hadasho Soomaali, waaxda luqadaha, qaybta kaalmada adeegyada, waxay idiin hayaan adesheila kharahaley lajudson . So [facility] [phone].    ATENCIÓN: Si habla español, tiene a haas disposición servicios gratuitos de asistencia lingüística. Llame al [phone].    We comply with applicable federal civil rights laws and [place] laws. We do not discriminate on the basis of race, color, national origin, age, disability, sex, sexual orientation, or gender identity.            Thank you!     [facility] you [facility]. Our goal is always to provide you with excellent care. Hearing back from our patients is one way we can continue to improve our services. Please take a few minutes to complete the written " survey that you may receive in the mail after your visit with us. Thank you!             Your Updated Medication List - Protect others around you: Learn how to safely use, store and throw away your medicines at www.disposemymeds.org.          This list is accurate as of 6/25/18  9:34 AM.  Always use your most recent med list.                   Brand Name Dispense Instructions for use Diagnosis    acetylcysteine 500 MG Caps capsule    N-ACETYL CYSTEINE     Take 1 capsule (500 mg) by mouth daily    Type 2 diabetes mellitus without complication, without long-term current use of insulin (H)       amLODIPine 2.5 MG tablet    NORVASC    30 tablet    TAKE 1 TABLET BY MOUTH EVERY DAY    Hypertension goal BP (blood pressure) < 140/90       aspirin 81 MG tablet     100    1 tab po QD (Once per day)    Type II or unspecified type diabetes mellitus without mention of complication, not stated as uncontrolled       atorvastatin 20 MG tablet    LIPITOR    120 tablet    TAKE 1 TABLET BY MOUTH ONCE DAILY    Hyperlipidemia LDL goal <100       * B-D U/F 31G X 8 MM   Generic drug:  insulin pen needle     100 each    USE 2 DAILY OR AS DIRECTED    Type 2 diabetes mellitus without complication (H)       * insulin pen needle 32G X 4 MM     100 each    Use 1 pen needles daily.    Type 2 diabetes mellitus with hyperglycemia, without long-term current use of insulin (H)       blood glucose monitoring lancets     1 Box    Use to test blood sugar 3 times daily.    Type 2 diabetes mellitus with hyperglycemia, without long-term current use of insulin (H)       CONTOUR NEXT TEST test strip   Generic drug:  blood glucose monitoring     100 strip    USE TO TEST BLOOD SUGAR THREE TIMES DAILY    Type 2 diabetes mellitus with hyperglycemia, without long-term current use of insulin (H)       glipiZIDE 10 MG 24 hr tablet    GLUCOTROL XL    60 tablet    TAKE 1 TABLET BY MOUTH TWICE DAILY    Type 2 diabetes mellitus without complication (H)        lisinopril 40 MG tablet    PRINIVIL/ZESTRIL    30 tablet    TAKE 1 TABLET BY MOUTH EVERY DAY    Benign essential hypertension       MELATONIN PO      Takes 5 mg at bedtime daily        metFORMIN 1000 MG tablet    GLUCOPHAGE    60 tablet    TAKE 1 TABLET BY MOUTH TWICE DAILY WITH MEALS    Type 2 diabetes mellitus without complication (H)       MULTIVITAMIN PO      Take 1 tablet by mouth daily        OMEGA-3 FISH OIL PO      Take 1 g by mouth daily        sildenafil 20 MG tablet    REVATIO    30 tablet    Take 3-5 (60-100mg) tablets by mouth 1 hour prior to sexual activity    Erectile dysfunction, unspecified erectile dysfunction type       VICTOZA PEN 18 MG/3ML soln   Generic drug:  liraglutide     6 mL    INJECT 1.2 MG UNDER THE SKIN EVERY DAY    Type 2 diabetes mellitus with hyperglycemia, without long-term current use of insulin (H)       VITAMIN D (CHOLECALCIFEROL) PO      Take 1,000 Units by mouth daily        * Notice:  This list has 2 medication(s) that are the same as other medications prescribed for you. Read the directions carefully, and ask your doctor or other care provider to review them with you.

## 2018-06-25 NOTE — PROGRESS NOTES
SUBJECTIVE:                                                    Edward Hannah is a 62 year old male who presents to clinic today for the following health issues:      Diabetes Follow-up    Patient is checking blood sugars: twice daily.    Blood sugar testing frequency justification: Uncontrolled diabetes  Results are as follows:         am -          suppertime -     Diabetic concerns: None     Symptoms of hypoglycemia (low blood sugar): none     Paresthesias (numbness or burning in feet) or sores: No     Date of last diabetic eye exam: 06/17    Hyperlipidemia Follow-Up      Rate your low fat/cholesterol diet?: good    Taking statin?  Yes, no muscle aches from statin    Other lipid medications/supplements?:  Fish oil/Omega 3, dose 1g without side effects    Hypertension Follow-up      Outpatient blood pressures are not being checked.    Low Salt Diet: low salt    BP Readings from Last 2 Encounters:   02/22/18 128/72   12/13/17 130/60     Hemoglobin A1C (%)   Date Value   02/22/2018 6.9 (H)   10/17/2017 6.9     LDL Cholesterol Calculated (mg/dL)   Date Value   02/22/2018 76   04/19/2017 55       Amount of exercise or physical activity: 4-5 days/week for an average of greater than 60 minutes    Problems taking medications regularly: No    Medication side effects: none    Diet: low salt, low fat/cholesterol and diabetic      He has been working out daily now and more active in his garden    Problem list and histories reviewed & adjusted, as indicated.  Additional history: as documented    Patient Active Problem List   Diagnosis     Hyperlipidemia LDL goal <100     Advanced directives, counseling/discussion     BCC (basal cell carcinoma), face     Obesity due to excess calories, unspecified obesity severity     Other insomnia     Essential hypertension with goal blood pressure less than 140/90     ACP (advance care planning)     Type 2 diabetes mellitus without complication, without long-term current use of  insulin (H)     Herpes zoster without complication     Trigger finger, acquired     Past Surgical History:   Procedure Laterality Date     COLONOSCOPY  3-3-2014    due 2019  4 polyps benign     wisdom teeth extraction         Social History   Substance Use Topics     Smoking status: Never Smoker     Smokeless tobacco: Never Used      Comment: remote cigar history     Alcohol use 0.0 oz/week     0 Standard drinks or equivalent per week      Comment: 1-2/day     Family History   Problem Relation Age of Onset     Cardiovascular Father      aortic valve surgery     Cancer Father      lung cancer     GASTROINTESTINAL DISEASE Father      benign esophageal tumor removed     Hypertension Father      Diabetes Father 70     Other - See Comments Mother      infection     Family History Negative Sister          Current Outpatient Prescriptions   Medication Sig Dispense Refill     acetylcysteine (N-ACETYL CYSTEINE) 500 MG CAPS capsule Take 1 capsule (500 mg) by mouth daily       amLODIPine (NORVASC) 2.5 MG tablet TAKE 1 TABLET BY MOUTH EVERY DAY 30 tablet 11     ASPIRIN 81 MG OR TABS 1 tab po QD (Once per day) 100 3     atorvastatin (LIPITOR) 20 MG tablet TAKE 1 TABLET BY MOUTH ONCE DAILY 120 tablet 1     B-D U/F 31G X 8 MM insulin pen needle USE 2 DAILY OR AS DIRECTED 100 each 3     blood glucose monitoring (DOZ MICROLET) lancets Use to test blood sugar 3 times daily. 1 Box 11     CONTOUR NEXT TEST test strip USE TO TEST BLOOD SUGAR THREE TIMES DAILY 100 strip 11     glipiZIDE (GLUCOTROL XL) 10 MG 24 hr tablet TAKE 1 TABLET BY MOUTH TWICE DAILY 60 tablet 3     insulin pen needle 32G X 4 MM Use 1 pen needles daily. 100 each 3     lisinopril (PRINIVIL/ZESTRIL) 40 MG tablet TAKE 1 TABLET BY MOUTH EVERY DAY 30 tablet 6     MELATONIN PO Takes 5 mg at bedtime daily       metFORMIN (GLUCOPHAGE) 1000 MG tablet TAKE 1 TABLET BY MOUTH TWICE DAILY WITH MEALS 60 tablet 1     Multiple Vitamins-Minerals (MULTIVITAMIN PO) Take 1 tablet by  "mouth daily       Omega-3 Fatty Acids (OMEGA-3 FISH OIL PO) Take 1 g by mouth daily       sildenafil (REVATIO) 20 MG tablet Take 3-5 (60-100mg) tablets by mouth 1 hour prior to sexual activity 30 tablet 11     VICTOZA PEN 18 MG/3ML soln INJECT 1.2 MG UNDER THE SKIN EVERY DAY 6 mL 0     VITAMIN D, CHOLECALCIFEROL, PO Take 1,000 Units by mouth daily       Allergies   Allergen Reactions     Nkda [No Known Drug Allergies]      Labs reviewed in EPIC    ROS:  Constitutional, HEENT, cardiovascular, pulmonary, gi and gu systems are negative, except as otherwise noted.    OBJECTIVE:                                                    /70 (BP Location: Right arm, Patient Position: Sitting, Cuff Size: Adult Large)  Pulse 70  Temp 96.6  F (35.9  C) (Tympanic)  Resp 18  Ht 5' 10\" (1.778 m)  Wt 241 lb 6.4 oz (109.5 kg)  SpO2 96%  BMI 34.64 kg/m2  Body mass index is 34.64 kg/(m^2).  GENERAL APPEARANCE: healthy, alert and no distress  RESP: lungs clear to auscultation - no rales, rhonchi or wheezes  CV: regular rates and rhythm, normal S1 S2, no S3 or S4 and no murmur, click or rub  ABDOMEN: soft, nontender, without hepatosplenomegaly or masses and bowel sounds normal  PSYCH: mentation appears normal and affect normal/bright       ASSESSMENT/PLAN:                                                    1. Type 2 diabetes mellitus without complication, without long-term current use of insulin (H)  F/u 4 mos  - Hemoglobin A1c  - Estimated Average Glucose    2. Encounter for screening for other viral diseases (CODE)  Next visit  - HIV Antigen Antibody Combo; Future    3. Hyperlipidemia LDL goal <100      4. Essential hypertension with goal blood pressure less than 140/90  stable    Patient was agreeable to this plan and had no further questions.  See Patient Instructions    Kelly Yarbrough MD  [facility] [place]      "

## 2018-06-26 ASSESSMENT — PATIENT HEALTH QUESTIONNAIRE - PHQ9: SUM OF ALL RESPONSES TO PHQ QUESTIONS 1-9: 0

## 2018-06-26 ASSESSMENT — ANXIETY QUESTIONNAIRES: GAD7 TOTAL SCORE: 0

## 2018-06-28 DIAGNOSIS — E11.65 TYPE 2 DIABETES MELLITUS WITH HYPERGLYCEMIA, WITHOUT LONG-TERM CURRENT USE OF INSULIN (H): ICD-10-CM

## 2018-06-28 DIAGNOSIS — E11.9 TYPE 2 DIABETES MELLITUS WITHOUT COMPLICATION (H): ICD-10-CM

## 2018-06-29 RX ORDER — PEN NEEDLE, DIABETIC 32GX 5/32"
NEEDLE, DISPOSABLE MISCELLANEOUS
Qty: 100 EACH | Refills: 0 | Status: SHIPPED | OUTPATIENT
Start: 2018-06-29 | End: 2018-10-28

## 2018-06-29 RX ORDER — LIRAGLUTIDE 6 MG/ML
INJECTION SUBCUTANEOUS
Qty: 6 ML | Refills: 1 | Status: SHIPPED | OUTPATIENT
Start: 2018-06-29 | End: 2018-09-23

## 2018-06-29 NOTE — TELEPHONE ENCOUNTER
Metformin  Last lab: 06/25/18  Last visit: 06/25/18  Last refill: 05/04/18 # 60 refills 1    Insulin pen needle  Last lab: 06/25/18  Last visit: 06/25/18  Last refill: 03/15/17 # 100 refills 3    Victoza  Last lab: 06/25/18  Last visit: 06/25/18  Last refill: 06/04/18 # 6ml refills 0

## 2018-07-19 ENCOUNTER — TRANSFERRED RECORDS (OUTPATIENT)
Dept: HEALTH INFORMATION MANAGEMENT | Facility: CLINIC | Age: 63
End: 2018-07-19

## 2018-07-19 LAB — RETINOPATHY: NEGATIVE

## 2018-07-24 ENCOUNTER — TELEPHONE (OUTPATIENT)
Dept: FAMILY MEDICINE | Facility: OTHER | Age: 63
End: 2018-07-24

## 2018-08-21 DIAGNOSIS — E11.9 TYPE 2 DIABETES MELLITUS WITHOUT COMPLICATION (H): ICD-10-CM

## 2018-08-22 RX ORDER — GLIPIZIDE 10 MG/1
TABLET, FILM COATED, EXTENDED RELEASE ORAL
Qty: 60 TABLET | Refills: 0 | Status: SHIPPED | OUTPATIENT
Start: 2018-08-22 | End: 2018-09-23

## 2018-10-16 ENCOUNTER — TELEPHONE (OUTPATIENT)
Dept: FAMILY MEDICINE | Facility: OTHER | Age: 63
End: 2018-10-16

## 2018-10-16 DIAGNOSIS — E11.9 TYPE 2 DIABETES MELLITUS WITHOUT COMPLICATION, WITHOUT LONG-TERM CURRENT USE OF INSULIN (H): Primary | ICD-10-CM

## 2018-10-17 ENCOUNTER — HOSPITAL ENCOUNTER (OUTPATIENT)
Dept: EDUCATION SERVICES | Facility: HOSPITAL | Age: 63
Discharge: HOME | End: 2018-10-17
Attending: FAMILY MEDICINE | Admitting: FAMILY MEDICINE
Payer: COMMERCIAL

## 2018-10-17 VITALS
HEART RATE: 77 BPM | DIASTOLIC BLOOD PRESSURE: 80 MMHG | RESPIRATION RATE: 16 BRPM | SYSTOLIC BLOOD PRESSURE: 140 MMHG | BODY MASS INDEX: 34.6 KG/M2 | HEIGHT: 70 IN | WEIGHT: 241.7 LBS | OXYGEN SATURATION: 95 %

## 2018-10-17 DIAGNOSIS — E11.9 TYPE 2 DIABETES MELLITUS WITHOUT COMPLICATION, WITHOUT LONG-TERM CURRENT USE OF INSULIN (H): Primary | ICD-10-CM

## 2018-10-17 LAB — HBA1C MFR BLD: 6.5 % (ref 0–5.7)

## 2018-10-17 PROCEDURE — G0108 DIAB MANAGE TRN  PER INDIV: HCPCS | Performed by: DIETITIAN, REGISTERED

## 2018-10-17 PROCEDURE — 36416 COLLJ CAPILLARY BLOOD SPEC: CPT | Performed by: DIETITIAN, REGISTERED

## 2018-10-17 PROCEDURE — 83036 HEMOGLOBIN GLYCOSYLATED A1C: CPT | Performed by: DIETITIAN, REGISTERED

## 2018-10-17 ASSESSMENT — PAIN SCALES - GENERAL: PAINLEVEL: NO PAIN (0)

## 2018-10-17 NOTE — IP AVS SNAPSHOT
MRN:[id number]                      After Visit Summary   10/17/2018    Edward Hannah    MRN: [id number]           Thank you!     Thank you for choosing [facility] for your care. Our goal is always to provide you with excellent care. Hearing back from our patients is one way we can continue to improve our services. Please take a few minutes to complete the written survey that you may receive in the mail after you visit with us. Thank you!        Patient Information     Date Of Birth          1955        About your hospital stay     You were admitted on:  October 17, 2018 You last received care in the:  [facility]    You were discharged on:  October 17, 2018       Who to Call     For medical emergencies, please call 911.  For non-urgent questions about your medical care, please call your primary care provider or clinic, [phone]          Attending Provider     Provider Specialty    Kelly Yarbrough MD Family Practice       Primary Care Provider Office Phone # Fax #    Kelly Yarbrough [phone]      Your next 10 appointments already scheduled     Oct 31, 2018  9:45 AM CDT   (Arrive by 9:30 AM)   SHORT with Kelly Yarbrough MD   [facility] [place] ([facility] [place] )    [address]  [place] [address]   [phone]              Follow-up notes from your care team     Return in about 1 year (around 10/17/2019).      Care Instructions    -Keep limiting foods high in carbohydrates in your diet.  -Begin exercising again - goal is 30 minutes most days of the week.    -Test 1-2x/day - good times are fasting, before supper, 2 hours after supper.   -Target levels are fasting and before supper , 2 hours after supper less than 180.   -A1c today was 6.5% which is down from 6.9% in June - Great job!  Goal is to keep it less than 7.0%.   -Follow up annually or sooner if needed.   -Call with any concerns - MARIZA Nichols,  "[facility] [phone].          Pending Results     No orders found from 10/15/2018 to 10/18/2018.            Admission Information     Date & Time Provider Department Dept. Phone    10/17/2018 Kelly Yarbrough MD [facility] [phone]      Your Vitals Were     Blood Pressure Pulse Respirations Height Weight Pulse Oximetry    147/82 77 16 1.784 m (5' 10.25\") 109.6 kg (241 lb 11.2 oz) 95%    BMI (Body Mass Index)                   34.43 kg/m2           MyChart Information     [facility] gives you secure access to your electronic health record. If you see a primary care provider, you can also send messages to your care team and make appointments. If you have questions, please call your primary care clinic.  If you do not have a primary care provider, please call [phone] and they will assist you.        Care EveryWhere ID     This is your Care EveryWhere ID. This could be used by other organizations to access your [facility] medical records  [id number]        Equal Access to Services     ERI PATTON : Hadii aad ku hadasho Soomaali, waaxda luqadaha, qaybta kaalmada adeegyamarc, idania weinstein. So [facility] [phone].    ATENCIÓN: Si habla español, tiene a haas disposición servicios gratuitos de asistencia lingüística. Llame al [phone].    We comply with applicable federal civil rights laws and [place] laws. We do not discriminate on the basis of race, color, national origin, age, disability, sex, sexual orientation, or gender identity.               Review of your medicines      UNREVIEWED medicines. Ask your doctor about these medicines        Dose / Directions    acetylcysteine 500 MG Caps capsule   Commonly known as:  N-ACETYL CYSTEINE   Used for:  Type 2 diabetes mellitus without complication, without long-term current use of insulin (H)        Dose:  500 mg   Take 1 capsule (500 mg) by mouth daily   Refills:  0       amLODIPine 2.5 MG tablet   Commonly known as:  NORVASC "   Used for:  Hypertension goal BP (blood pressure) < 140/90        TAKE 1 TABLET BY MOUTH EVERY DAY   Quantity:  30 tablet   Refills:  11       aspirin 81 MG tablet   Used for:  Type II or unspecified type diabetes mellitus without mention of complication, not stated as uncontrolled        1 tab po QD (Once per day)   Quantity:  100   Refills:  3       atorvastatin 20 MG tablet   Commonly known as:  LIPITOR   Used for:  Hyperlipidemia LDL goal <100        TAKE 1 TABLET BY MOUTH ONCE DAILY   Quantity:  120 tablet   Refills:  1       glipiZIDE 10 MG 24 hr tablet   Commonly known as:  GLUCOTROL XL   Used for:  Type 2 diabetes mellitus without complication (H)        TAKE 1 TABLET BY MOUTH TWICE DAILY   Quantity:  60 tablet   Refills:  2       lisinopril 40 MG tablet   Commonly known as:  PRINIVIL/ZESTRIL   Used for:  Benign essential hypertension        TAKE 1 TABLET BY MOUTH EVERY DAY   Quantity:  30 tablet   Refills:  6       MELATONIN PO        Takes 5 mg at bedtime daily   Refills:  0       metFORMIN 1000 MG tablet   Commonly known as:  GLUCOPHAGE   Used for:  Type 2 diabetes mellitus without complication (H)        TAKE 1 TABLET BY MOUTH TWICE DAILY WITH MEALS   Quantity:  60 tablet   Refills:  3       MULTIVITAMIN PO        Dose:  1 tablet   Take 1 tablet by mouth daily   Refills:  0       OMEGA-3 FISH OIL PO        Dose:  1 g   Take 1 g by mouth daily   Refills:  0       sildenafil 20 MG tablet   Commonly known as:  REVATIO   Used for:  Erectile dysfunction, unspecified erectile dysfunction type        Take 3-5 (60-100mg) tablets by mouth 1 hour prior to sexual activity   Quantity:  30 tablet   Refills:  11       VICTOZA PEN 18 MG/3ML soln   Used for:  Type 2 diabetes mellitus with hyperglycemia, without long-term current use of insulin (H)   Generic drug:  liraglutide        INJECT 1.2 MG UNDER THE SKIN EVERY DAY   Quantity:  6 mL   Refills:  2       VITAMIN D (CHOLECALCIFEROL) PO        Dose:  1000 Units    Take 1,000 Units by mouth daily   Refills:  0         CONTINUE these medicines which have NOT CHANGED        Dose / Directions    * B-D U/F 31G X 8 MM   Used for:  Type 2 diabetes mellitus without complication (H)   Generic drug:  insulin pen needle        USE 2 DAILY OR AS DIRECTED   Quantity:  100 each   Refills:  3       * BD TYRONE U/F 32G X 4 MM   Used for:  Type 2 diabetes mellitus with hyperglycemia, without long-term current use of insulin (H)   Generic drug:  insulin pen needle        USE ONE NEEDLE DAILY AS DIRECTED   Quantity:  100 each   Refills:  0       blood glucose monitoring lancets   Used for:  Type 2 diabetes mellitus with hyperglycemia, without long-term current use of insulin (H)        Use to test blood sugar 3 times daily.   Quantity:  1 Box   Refills:  11       CONTOUR NEXT TEST test strip   Used for:  Type 2 diabetes mellitus with hyperglycemia, without long-term current use of insulin (H)   Generic drug:  blood glucose monitoring        USE TO TEST BLOOD SUGAR THREE TIMES DAILY   Quantity:  100 strip   Refills:  11       * Notice:  This list has 2 medication(s) that are the same as other medications prescribed for you. Read the directions carefully, and ask your doctor or other care provider to review them with you.             Protect others around you: Learn how to safely use, store and throw away your medicines at www.disposemymeds.org.             Medication List: This is a list of all your medications and when to take them. Check marks below indicate your daily home schedule. Keep this list as a reference.      Medications           Morning Afternoon Evening Bedtime As Needed    acetylcysteine 500 MG Caps capsule   Commonly known as:  N-ACETYL CYSTEINE   Take 1 capsule (500 mg) by mouth daily                                amLODIPine 2.5 MG tablet   Commonly known as:  NORVASC   TAKE 1 TABLET BY MOUTH EVERY DAY                                aspirin 81 MG tablet   1 tab po QD (Once per  day)                                atorvastatin 20 MG tablet   Commonly known as:  LIPITOR   TAKE 1 TABLET BY MOUTH ONCE DAILY                                * B-D U/F 31G X 8 MM   USE 2 DAILY OR AS DIRECTED   Generic drug:  insulin pen needle                                * BD TYRONE U/F 32G X 4 MM   USE ONE NEEDLE DAILY AS DIRECTED   Generic drug:  insulin pen needle                                blood glucose monitoring lancets   Use to test blood sugar 3 times daily.                                CONTOUR NEXT TEST test strip   USE TO TEST BLOOD SUGAR THREE TIMES DAILY   Generic drug:  blood glucose monitoring                                glipiZIDE 10 MG 24 hr tablet   Commonly known as:  GLUCOTROL XL   TAKE 1 TABLET BY MOUTH TWICE DAILY                                lisinopril 40 MG tablet   Commonly known as:  PRINIVIL/ZESTRIL   TAKE 1 TABLET BY MOUTH EVERY DAY                                MELATONIN PO   Takes 5 mg at bedtime daily                                metFORMIN 1000 MG tablet   Commonly known as:  GLUCOPHAGE   TAKE 1 TABLET BY MOUTH TWICE DAILY WITH MEALS                                MULTIVITAMIN PO   Take 1 tablet by mouth daily                                OMEGA-3 FISH OIL PO   Take 1 g by mouth daily                                sildenafil 20 MG tablet   Commonly known as:  REVATIO   Take 3-5 (60-100mg) tablets by mouth 1 hour prior to sexual activity                                VICTOZA PEN 18 MG/3ML soln   INJECT 1.2 MG UNDER THE SKIN EVERY DAY   Generic drug:  liraglutide                                VITAMIN D (CHOLECALCIFEROL) PO   Take 1,000 Units by mouth daily                                * Notice:  This list has 2 medication(s) that are the same as other medications prescribed for you. Read the directions carefully, and ask your doctor or other care provider to review them with you.

## 2018-10-17 NOTE — IP AVS SNAPSHOT
[facility]    [address] [place] [id number]    Phone:  [phone]    Fax:  [phone]                                       After Visit Summary   10/17/2018    Edward Hannah    MRN: [id number]           After Visit Summary Signature Page     I have received my discharge instructions, and my questions have been answered. I have discussed any challenges I see with this plan with the nurse or doctor.    ..........................................................................................................................................  Patient/Patient Representative Signature      ..........................................................................................................................................  Patient Representative Print Name and Relationship to Patient    ..................................................               ................................................  Date                                   Time    ..........................................................................................................................................  Reviewed by Signature/Title    ...................................................              ..............................................  Date                                               Time          22EPIC Rev 08/18

## 2018-10-17 NOTE — PROGRESS NOTES
"Diabetes Self-Management Education & Support    Diabetes Education Self Management & Training    SUBJECTIVE/OBJECTIVE:  Presents for: Follow-up  Accompanied by: Self  Diabetes education in the past 24mo: Yes  Focus of Visit: Monitoring  Diabetes type: Type 2  Date of diagnosis: 2005  Disease course: Stable  How confident are you filling out medical forms by yourself:: Extremely  Diabetes management related comments/concerns: none  Transportation concerns: No  Other concerns:: None  Cultural Influences/Ethnic Background:  American    Diabetes Symptoms & Complications  Blurred vision: No  Fatigue: No  Neuropathy: No  Foot ulcerations: No  Polydipsia: No  Polyphagia: No  Polyuria: No  Visual change: No  Weakness: No  Weight loss: No  Slow healing wounds: No  Recent Infection(s): Yes (issue with tooth - needs to be pulled)  Symptom course: Stable  Weight trend: Stable  Autonomic neuropathy: No  CVA: No  Heart disease: No  Nephropathy: No  Peripheral neuropathy: No  Peripheral Vascular Disease: No  Retinopathy: No    Patient Problem List and Family Medical History reviewed for relevant medical history, current medical status, and diabetes risk factors.    Vitals:  /80  Pulse 77  Resp 16  Ht 1.784 m (5' 10.25\")  Wt 109.6 kg (241 lb 11.2 oz)  SpO2 95%  BMI 34.43 kg/m2  Estimated body mass index is 34.43 kg/(m^2) as calculated from the following:    Height as of this encounter: 1.784 m (5' 10.25\").    Weight as of this encounter: 109.6 kg (241 lb 11.2 oz).   Weight is down 3.3# over the past year.   Last 3 BP:   BP Readings from Last 3 Encounters:   10/17/18 140/80   06/25/18 124/70   02/22/18 128/72       History   Smoking Status     Never Smoker   Smokeless Tobacco     Never Used     Comment: remote cigar history       Labs:  Lab Results   Component Value Date    A1C 6.5 10/17/2018     Lab Results   Component Value Date     02/22/2018     Lab Results   Component Value Date    LDL 76 02/22/2018     HDL " Cholesterol   Date Value Ref Range Status   2018 54 >39 mg/dL Final   ]  GFR Estimate   Date Value Ref Range Status   2018 86 >60 mL/min/1.7m2 Final     Comment:     Non  GFR Calc     GFR Estimate If Black   Date Value Ref Range Status   2018 >90 >60 mL/min/1.7m2 Final     Comment:      GFR Calc     Lab Results   Component Value Date    CR 0.89 2018     No results found for: MICROALBUMIN    Healthy Eating  Healthy Eating Assessed Today: Yes  Cultural/Yazidi diet restrictions?: No  Patient on a regular basis:  (limits carbohydrates)  Meal planning: Avoiding sweets  Meals include: Breakfast  Breakfast: Smoothie or oatmeal  Lunch: leftovers or salad  Dinner: salads or steak with salad/vegis - occasional starch   Snacks: dark chocolate or fruit or popcorn or nuts  Beverages: Water, Milk, Tea, Diet soda  Has patient met with a dietitian in the past?: Yes    Being Active  Being Active Assessed Today: Yes  Exercise:: Currently not exercising  Barrier to exercise: Physical limitation (shoulder problem currently)    Monitoring  Monitoring Assessed Today: Yes  Did patient bring glucose meter to appointment? : Yes  Blood Glucose Meter: [employer]  Home Glucose (Sugar) Monitorin-2 times per day  Blood glucose trend:  (fasting levels increassing over the past two weeks)  Bpfwdqp-  Before sgwlpv-  Post supper 162  Two week average is 128.     Taking Medications  Diabetes Medication(s)     Biguanides Sig    metFORMIN (GLUCOPHAGE) 1000 MG tablet TAKE 1 TABLET BY MOUTH TWICE DAILY WITH MEALS    Sulfonylureas Sig    glipiZIDE (GLUCOTROL XL) 10 MG 24 hr tablet TAKE 1 TABLET BY MOUTH TWICE DAILY    Incretin Mimetic Agents (GLP-1 Receptor Agonists) Sig    VICTOZA PEN 18 MG/3ML soln INJECT 1.2 MG UNDER THE SKIN EVERY DAY        Taking Medication Assessed Today: Yes  Current Treatments: Non-insulin Injectables, Oral Agent (dual therapy) (Glucotrol XL 10 mg bid,  Metformin 1000 mg bid, Victoza 1.2 mg daily, NAC)  Problems taking diabetes medications regularly?: No  Diabetes medication side effects?: No  Treatment Compliance: All of the time    Problem Solving  Problem Solving Assessed Today: Yes  Hypoglycemia Frequency: Rarely  Hypoglycemia Treatment: Candy (Candy and then a snack or meal)  Patient carries a carbohydrate source: Yes  Medical alert: No  Severe weather/disaster plan for diabetes management?: No  DKA prevention plan?: No    Hypoglycemia symptoms  Hunger: Yes  Tremors: Yes     Reducing Risks  Reducing Risks Assessed Today: Yes  Diabetes Risks: Age over 45 years, Family History (Aunts)  CAD Risks: Diabetes Mellitus, Male sex  Has dilated eye exam at least once a year?: Yes  Sees dentist every 6 months?: Yes  Sees podiatrist (foot doctor)?: No    Healthy Coping  Healthy Coping Assessed Today: Yes  Emotional response to diabetes: Ready to learn, Confidence diabetes can be controlled  Stage of change: ACTION (Actively working towards change)  Difficulty affording diabetes management supplies?: No (Victoza expensive)  Patient Activation Measure Survey Score:  EZ Score (Last Two) 6/28/2011   EZ Raw Score 46   Activation Score 75.3   EZ Level 4       ASSESSMENT:  Pt is doing well with glucose control as evidenced by A1c of 6.5%.  He was exercising up until August but then went on a trip and his shoulder is now giving him lavon issues so he has not been exercising - encouraged him to resume and/or see provider regarding shoulder.  He continues to make efforts to eat less carbohydrates.     Patient's most recent   Lab Results   Component Value Date    A1C 6.5 10/17/2018    is meeting goal of <7.0    INTERVENTION:   Diabetes knowledge and skills assessment:     Patient is knowledgeable in diabetes management concepts related to: Healthy Eating, Being Active, Monitoring, Taking Medication, Problem Solving, Reducing Risks and Healthy Coping    Patient needs further  education on the following diabetes management concepts: reinforcement given today at annual visit.     Based on learning assessment above, most appropriate setting for further diabetes education would be: Individual setting.    Education provided today on:  AADE Self-Care Behaviors:  Being Active: relationship to blood glucose and benefits in promoting weight loss.   Monitoring: individual blood glucose targets, frequency of monitoring and use of glucose control solution.  Meter checked for accuracy.  No concerns noted.   Problem Solving: low blood glucose - causes, signs/symptoms, treatment and prevention and carrying a carbohydrate source at all times  Reducing Risks: major complications of diabetes, appropriate dental care, annual eye exam, A1C - goals, relating to blood glucose levels, how often to check and blood pressure and goals    Opportunities for ongoing education and support in diabetes-self management were discussed.    Pt verbalized understanding of concepts discussed and recommendations provided today.       Education Materials Provided:  None    PLAN  Continue efforts to limit foods high in carbohydrates in diet.   Resume exercising with goal of 30 minutes most days of the week.   Test glucose 1-2 x/day - alternate times.   Goal is to keep A1c in target.   AVS printed and provided to patient today.  Follow up annually or sooner if indicated.    Time Spent: 45 minutes  Encounter Type: Individual    Any diabetes medication dose changes were made via the CDE Protocol and Collaborative Practice Agreement with the patient's referring provider. A copy of this encounter was shared with the provider.

## 2018-10-17 NOTE — LETTER
"    10/17/2018        RE: Edward Hannah  [address]   [facility], [place] [address]        Diabetes Self-Management Education & Support    Diabetes Education Self Management & Training    SUBJECTIVE/OBJECTIVE:  Presents for: Follow-up  Accompanied by: Self  Diabetes education in the past 24mo: Yes  Focus of Visit: Monitoring  Diabetes type: Type 2  Date of diagnosis: 2005  Disease course: Stable  How confident are you filling out medical forms by yourself:: Extremely  Diabetes management related comments/concerns: none  Transportation concerns: No  Other concerns:: None  Cultural Influences/Ethnic Background:  American    Diabetes Symptoms & Complications  Blurred vision: No  Fatigue: No  Neuropathy: No  Foot ulcerations: No  Polydipsia: No  Polyphagia: No  Polyuria: No  Visual change: No  Weakness: No  Weight loss: No  Slow healing wounds: No  Recent Infection(s): Yes (issue with tooth - needs to be pulled)  Symptom course: Stable  Weight trend: Stable  Autonomic neuropathy: No  CVA: No  Heart disease: No  Nephropathy: No  Peripheral neuropathy: No  Peripheral Vascular Disease: No  Retinopathy: No    Patient Problem List and Family Medical History reviewed for relevant medical history, current medical status, and diabetes risk factors.    Vitals:  /80  Pulse 77  Resp 16  Ht 1.784 m (5' 10.25\")  Wt 109.6 kg (241 lb 11.2 oz)  SpO2 95%  BMI 34.43 kg/m2  Estimated body mass index is 34.43 kg/(m^2) as calculated from the following:    Height as of this encounter: 1.784 m (5' 10.25\").    Weight as of this encounter: 109.6 kg (241 lb 11.2 oz).   Weight is down 3.3# over the past year.   Last 3 BP:   BP Readings from Last 3 Encounters:   10/17/18 140/80   06/25/18 124/70   02/22/18 128/72       History   Smoking Status     Never Smoker   Smokeless Tobacco     Never Used     Comment: remote cigar history       Labs:  Lab Results   Component Value Date    A1C 6.5 10/17/2018     Lab Results   Component Value Date    GLC " 164 2018     Lab Results   Component Value Date    LDL 76 2018     HDL Cholesterol   Date Value Ref Range Status   2018 54 >39 mg/dL Final   ]  GFR Estimate   Date Value Ref Range Status   2018 86 >60 mL/min/1.7m2 Final     Comment:     Non  GFR Calc     GFR Estimate If Black   Date Value Ref Range Status   2018 >90 >60 mL/min/1.7m2 Final     Comment:      GFR Calc     Lab Results   Component Value Date    CR 0.89 2018     No results found for: MICROALBUMIN    Healthy Eating  Healthy Eating Assessed Today: Yes  Cultural/Confucianist diet restrictions?: No  Patient on a regular basis:  (limits carbohydrates)  Meal planning: Avoiding sweets  Meals include: Breakfast  Breakfast: Smoothie or oatmeal  Lunch: leftovers or salad  Dinner: salads or steak with salad/vegis - occasional starch   Snacks: dark chocolate or fruit or popcorn or nuts  Beverages: Water, Milk, Tea, Diet soda  Has patient met with a dietitian in the past?: Yes    Being Active  Being Active Assessed Today: Yes  Exercise:: Currently not exercising  Barrier to exercise: Physical limitation (shoulder problem currently)    Monitoring  Monitoring Assessed Today: Yes  Did patient bring glucose meter to appointment? : Yes  Blood Glucose Meter: [employer] [place]  Home Glucose (Sugar) Monitorin-2 times per day  Blood glucose trend:  (fasting levels increassing over the past two weeks)  [employer]-  Before vdtwpt-  Post supper 162  Two week average is 128.     Taking Medications  Diabetes Medication(s)     Biguanides Sig    metFORMIN (GLUCOPHAGE) 1000 MG tablet TAKE 1 TABLET BY MOUTH TWICE DAILY WITH MEALS    Sulfonylureas Sig    glipiZIDE (GLUCOTROL XL) 10 MG 24 hr tablet TAKE 1 TABLET BY MOUTH TWICE DAILY    Incretin Mimetic Agents (GLP-1 Receptor Agonists) Sig    VICTOZA PEN 18 MG/3ML soln INJECT 1.2 MG UNDER THE SKIN EVERY DAY        Taking Medication Assessed Today: Yes  Current  Treatments: Non-insulin Injectables, Oral Agent (dual therapy) (Glucotrol XL 10 mg bid, Metformin 1000 mg bid, Victoza 1.2 mg daily, NAC)  Problems taking diabetes medications regularly?: No  Diabetes medication side effects?: No  Treatment Compliance: All of the time    Problem Solving  Problem Solving Assessed Today: Yes  Hypoglycemia Frequency: Rarely  Hypoglycemia Treatment: Candy (Candy and then a snack or meal)  Patient carries a carbohydrate source: Yes  Medical alert: No  Severe weather/disaster plan for diabetes management?: No  DKA prevention plan?: No    Hypoglycemia symptoms  Hunger: Yes  Tremors: Yes     Reducing Risks  Reducing Risks Assessed Today: Yes  Diabetes Risks: Age over 45 years, Family History (Aunts)  CAD Risks: Diabetes Mellitus, Male sex  Has dilated eye exam at least once a year?: Yes  Sees dentist every 6 months?: Yes  Sees podiatrist (foot doctor)?: No    Healthy Coping  Healthy Coping Assessed Today: Yes  Emotional response to diabetes: Ready to learn, Confidence diabetes can be controlled  Stage of change: ACTION (Actively working towards change)  Difficulty affording diabetes management supplies?: No (Victoza expensive)  Patient Activation Measure Survey Score:  EZ Score (Last Two) 6/28/2011   EZ Raw Score 46   Activation Score 75.3   EZ Level 4       ASSESSMENT:  Pt is doing well with glucose control as evidenced by A1c of 6.5%.  He was exercising up until August but then went on a trip and his shoulder is now giving him lavon issues so he has not been exercising - encouraged him to resume and/or see provider regarding shoulder.  He continues to make efforts to eat less carbohydrates.     Patient's most recent   Lab Results   Component Value Date    A1C 6.5 10/17/2018    is meeting goal of <7.0    INTERVENTION:   Diabetes knowledge and skills assessment:     Patient is knowledgeable in diabetes management concepts related to: Healthy Eating, Being Active, Monitoring, Taking  Medication, Problem Solving, Reducing Risks and Healthy Coping    Patient needs further education on the following diabetes management concepts: reinforcement given today at annual visit.     Based on learning assessment above, most appropriate setting for further diabetes education would be: Individual setting.    Education provided today on:  AADE Self-Care Behaviors:  Being Active: relationship to blood glucose and benefits in promoting weight loss.   Monitoring: individual blood glucose targets, frequency of monitoring and use of glucose control solution.  Meter checked for accuracy.  No concerns noted.   Problem Solving: low blood glucose - causes, signs/symptoms, treatment and prevention and carrying a carbohydrate source at all times  Reducing Risks: major complications of diabetes, appropriate dental care, annual eye exam, A1C - goals, relating to blood glucose levels, how often to check and blood pressure and goals    Opportunities for ongoing education and support in diabetes-self management were discussed.    Pt verbalized understanding of concepts discussed and recommendations provided today.       Education Materials Provided:  None    PLAN  Continue efforts to limit foods high in carbohydrates in diet.   Resume exercising with goal of 30 minutes most days of the week.   Test glucose 1-2 x/day - alternate times.   Goal is to keep A1c in target.   AVS printed and provided to patient today.  Follow up annually or sooner if indicated.    Time Spent: 45 minutes  Encounter Type: Individual    Any diabetes medication dose changes were made via the CDE Protocol and Collaborative Practice Agreement with the patient's referring provider. A copy of this encounter was shared with the provider.        Sincerely,        Karrie Coleman RD

## 2018-10-25 NOTE — PROGRESS NOTES
SUBJECTIVE:   Edward Hannah is a 63 year old male who presents to clinic today for the following health issues:    Diabetes Follow-up    Patient is checking blood sugars: twice daily.    Results are as follows:         am - 118-150         bedtime - 160-180    Diabetic concerns: None     Symptoms of hypoglycemia (low blood sugar): shaky     Paresthesias (numbness or burning in feet) or sores: No     Date of last diabetic eye exam: June 2018    Diabetes Management Resources    Hyperlipidemia Follow-Up      Rate your low fat/cholesterol diet?: fair    Taking statin?  Yes, no muscle aches from statin    Other lipid medications/supplements?:  Fish oil/Omega 3, dose 1 g without side effects    Hypertension Follow-up      Outpatient blood pressures are not being checked.    Low Salt Diet: low salt    BP Readings from Last 2 Encounters:   10/17/18 140/80   06/25/18 124/70     Hemoglobin A1C (%)   Date Value   10/17/2018 6.5 (A)   06/25/2018 6.9 (H)     LDL Cholesterol Calculated (mg/dL)   Date Value   02/22/2018 76   04/19/2017 55       Amount of exercise or physical activity: 3-4 days/week for an average of 45-60 minutes    Problems taking medications regularly: No    Medication side effects: none    Diet: regular (no restrictions) and low salt    Congratulated on his efforts for weight loss, controlling his diet and exercise      Problem list and histories reviewed & adjusted, as indicated.  Additional history: as documented    Patient Active Problem List   Diagnosis     Hyperlipidemia LDL goal <100     Advanced directives, counseling/discussion     BCC (basal cell carcinoma), face     Obesity due to excess calories, unspecified obesity severity     Other insomnia     Essential hypertension with goal blood pressure less than 140/90     ACP (advance care planning)     Type 2 diabetes mellitus without complication, without long-term current use of insulin (H)     Herpes zoster without complication     Trigger finger,  acquired     Past Surgical History:   Procedure Laterality Date     COLONOSCOPY  3-3-2014    due 2019  4 polyps benign     wisdom teeth extraction         Social History   Substance Use Topics     Smoking status: Never Smoker     Smokeless tobacco: Never Used      Comment: remote cigar history     Alcohol use 0.0 oz/week     0 Standard drinks or equivalent per week      Comment: 1-2/day     Family History   Problem Relation Age of Onset     Cardiovascular Father      aortic valve surgery     Cancer Father      lung cancer     GASTROINTESTINAL DISEASE Father      benign esophageal tumor removed     Hypertension Father      Diabetes Father 70     Other - See Comments Mother      infection     Family History Negative Sister          Current Outpatient Prescriptions   Medication Sig Dispense Refill     acetylcysteine (N-ACETYL CYSTEINE) 500 MG CAPS capsule Take 1 capsule (500 mg) by mouth daily       amLODIPine (NORVASC) 2.5 MG tablet TAKE 1 TABLET BY MOUTH EVERY DAY 30 tablet 11     ASPIRIN 81 MG OR TABS 1 tab po QD (Once per day) 100 3     atorvastatin (LIPITOR) 20 MG tablet TAKE 1 TABLET BY MOUTH ONCE DAILY 120 tablet 1     B-D U/F 31G X 8 MM insulin pen needle USE 2 DAILY OR AS DIRECTED 100 each 3     BD TYRONE U/F 32G X 4 MM insulin pen needle USE ONE NEEDLE DAILY AS DIRECTED 100 each 0     blood glucose monitoring (KELSEY MICROLET) lancets Use to test blood sugar 3 times daily. 1 Box 11     CONTOUR NEXT TEST test strip USE TO TEST BLOOD SUGAR THREE TIMES DAILY 100 strip 11     glipiZIDE (GLUCOTROL XL) 10 MG 24 hr tablet TAKE 1 TABLET BY MOUTH TWICE DAILY 60 tablet 2     lisinopril (PRINIVIL/ZESTRIL) 40 MG tablet TAKE 1 TABLET BY MOUTH EVERY DAY 30 tablet 6     MELATONIN PO Takes 5 mg at bedtime daily       metFORMIN (GLUCOPHAGE) 1000 MG tablet TAKE 1 TABLET BY MOUTH TWICE DAILY WITH MEALS 60 tablet 0     Multiple Vitamins-Minerals (MULTIVITAMIN PO) Take 1 tablet by mouth daily       Omega-3 Fatty Acids (OMEGA-3 FISH  OIL PO) Take 1 g by mouth daily       sildenafil (REVATIO) 20 MG tablet Take 3-5 (60-100mg) tablets by mouth 1 hour prior to sexual activity 30 tablet 11     VICTOZA PEN 18 MG/3ML soln INJECT 1.2 MG UNDER THE SKIN EVERY DAY 6 mL 2     VITAMIN D, CHOLECALCIFEROL, PO Take 1,000 Units by mouth daily       Allergies   Allergen Reactions     Nkda [No Known Drug Allergies]      Recent Labs   Lab Test 10/17/18  06/25/18   0858  02/22/18   0826   04/19/17   0825   04/20/16   0846   A1C  6.5*  6.9*  6.9*   < >  7.5*   < >  7.3*   LDL   --    --   76   --   55   --   65   HDL   --    --   54   --   53   --   45   TRIG   --    --   140   --   147   --   124   ALT   --    --   38   --   32   --   43   CR   --    --   0.89   --   0.89   --   0.92   GFRESTIMATED   --    --   86   --   87   --   83   GFRESTBLACK   --    --   >90   --   >90   GFR Calc     --   >90   GFR Calc     POTASSIUM   --    --   4.3   --   4.3   --   4.5   TSH   --    --   1.72   --    --    --   1.50    < > = values in this interval not displayed.      BP Readings from Last 3 Encounters:   10/31/18 118/76   10/17/18 140/80   06/25/18 124/70    Wt Readings from Last 3 Encounters:   10/31/18 238 lb 9.6 oz (108.2 kg)   10/17/18 241 lb 11.2 oz (109.6 kg)   06/25/18 241 lb 6.4 oz (109.5 kg)                  Labs reviewed in EPIC    Reviewed and updated as needed this visit by clinical staff       Reviewed and updated as needed this visit by Provider         ROS:  Constitutional, HEENT, cardiovascular, pulmonary, gi and gu systems are negative, except as otherwise noted.    OBJECTIVE:                                                    /76 (BP Location: Left arm, Patient Position: Chair, Cuff Size: Adult Large)  Pulse 75  Temp 97  F (36.1  C) (Tympanic)  Wt 238 lb 9.6 oz (108.2 kg)  SpO2 95%  BMI 33.99 kg/m2  Body mass index is 33.99 kg/(m^2).  GENERAL APPEARANCE: healthy, alert, no distress and over weight  RESP: lungs  clear to auscultation - no rales, rhonchi or wheezes  CV: regular rates and rhythm, normal S1 S2, no S3 or S4 and no murmur, click or rub  ABDOMEN: soft, nontender, without hepatosplenomegaly or masses, bowel sounds normal and obese  SKIN: no suspicious lesions or rashes except wart on lateral aspect of plantar surface left foot  NEURO: Normal strength and tone, mentation intact and speech normal  DIABETIC FOOT EXAM: normal DP and PT pulses, no trophic changes or ulcerative lesions, normal sensory exam and normal monofilament exam  PSYCH: mentation appears normal and affect normal/bright       ASSESSMENT/PLAN:                                                    1. Need for prophylactic vaccination and inoculation against influenza    - C RIV4 (FLUBLOK) VACCINE RECOMBINANT DNA PRSRV ANTIBIO FREE, IM  - Vaccine Administration, Initial [11293]    2. Type 2 diabetes mellitus without complication, without long-term current use of insulin (H)  Follow-up 4 mos, come fasting  Congratulated on his efforts of watching his diet, exercise    3. Hyperlipidemia LDL goal <100  Will need fasting labs next visit    4. Essential hypertension with goal blood pressure less than 140/90  stable    Patient was agreeable to this plan and had no further questions.  See Patient Instructions    Kelly Yarbrough MD  [facility] [place]    Injectable Influenza Immunization Documentation    1.  Is the person to be vaccinated sick today?   No    2. Does the person to be vaccinated have an allergy to a component   of the vaccine?   No  Egg Allergy Algorithm Link    3. Has the person to be vaccinated ever had a serious reaction   to influenza vaccine in the past?   No    4. Has the person to be vaccinated ever had Guillain-Barré syndrome?   No    Form completed by Heydi Chiu LPN    Prior to injection verified patient identity using patient's name and date of birth.    Heydi Chiu LPN

## 2018-10-28 DIAGNOSIS — E11.65 TYPE 2 DIABETES MELLITUS WITH HYPERGLYCEMIA, WITHOUT LONG-TERM CURRENT USE OF INSULIN (H): ICD-10-CM

## 2018-10-28 DIAGNOSIS — E11.9 TYPE 2 DIABETES MELLITUS WITHOUT COMPLICATION (H): ICD-10-CM

## 2018-10-29 DIAGNOSIS — E11.65 TYPE 2 DIABETES MELLITUS WITH HYPERGLYCEMIA, WITHOUT LONG-TERM CURRENT USE OF INSULIN (H): ICD-10-CM

## 2018-10-29 RX ORDER — PEN NEEDLE, DIABETIC 32GX 5/32"
NEEDLE, DISPOSABLE MISCELLANEOUS
Qty: 100 EACH | Refills: 0 | OUTPATIENT
Start: 2018-10-29

## 2018-10-29 RX ORDER — PEN NEEDLE, DIABETIC 32GX 5/32"
NEEDLE, DISPOSABLE MISCELLANEOUS
Qty: 100 EACH | Refills: 0 | Status: SHIPPED | OUTPATIENT
Start: 2018-10-29 | End: 2019-01-27

## 2018-10-29 NOTE — TELEPHONE ENCOUNTER
BD TYRONE U/F 32G X 4 MM insulin pen needle      Last Written Prescription Date:  10/29/18  Last Fill Quantity: 100,   # refills: 0  Last Office Visit: 6/25/18  Future Office visit:    Next 5 appointments (look out 90 days)     Oct 31, 2018  9:45 AM CDT   (Arrive by 9:30 AM)   SHORT with Kelly Yarbrough MD   [facility] [place] [facility]    [address] [place] [address]   [phone]

## 2018-10-31 ENCOUNTER — OFFICE VISIT (OUTPATIENT)
Dept: FAMILY MEDICINE | Facility: OTHER | Age: 63
End: 2018-10-31
Attending: FAMILY MEDICINE
Payer: COMMERCIAL

## 2018-10-31 VITALS
SYSTOLIC BLOOD PRESSURE: 118 MMHG | BODY MASS INDEX: 33.99 KG/M2 | TEMPERATURE: 97 F | DIASTOLIC BLOOD PRESSURE: 76 MMHG | WEIGHT: 238.6 LBS | OXYGEN SATURATION: 95 % | HEART RATE: 75 BPM

## 2018-10-31 DIAGNOSIS — I10 ESSENTIAL HYPERTENSION WITH GOAL BLOOD PRESSURE LESS THAN 140/90: ICD-10-CM

## 2018-10-31 DIAGNOSIS — E11.9 TYPE 2 DIABETES MELLITUS WITHOUT COMPLICATION, WITHOUT LONG-TERM CURRENT USE OF INSULIN (H): ICD-10-CM

## 2018-10-31 DIAGNOSIS — Z23 NEED FOR PROPHYLACTIC VACCINATION AND INOCULATION AGAINST INFLUENZA: Primary | ICD-10-CM

## 2018-10-31 DIAGNOSIS — E78.5 HYPERLIPIDEMIA LDL GOAL <100: ICD-10-CM

## 2018-10-31 PROCEDURE — 90682 RIV4 VACC RECOMBINANT DNA IM: CPT | Performed by: FAMILY MEDICINE

## 2018-10-31 PROCEDURE — 90471 IMMUNIZATION ADMIN: CPT | Performed by: FAMILY MEDICINE

## 2018-10-31 PROCEDURE — 99214 OFFICE O/P EST MOD 30 MIN: CPT | Mod: 25 | Performed by: FAMILY MEDICINE

## 2018-10-31 ASSESSMENT — ANXIETY QUESTIONNAIRES
5. BEING SO RESTLESS THAT IT IS HARD TO SIT STILL: NOT AT ALL
GAD7 TOTAL SCORE: 0
6. BECOMING EASILY ANNOYED OR IRRITABLE: NOT AT ALL
4. TROUBLE RELAXING: NOT AT ALL
2. NOT BEING ABLE TO STOP OR CONTROL WORRYING: NOT AT ALL
3. WORRYING TOO MUCH ABOUT DIFFERENT THINGS: NOT AT ALL
7. FEELING AFRAID AS IF SOMETHING AWFUL MIGHT HAPPEN: NOT AT ALL
1. FEELING NERVOUS, ANXIOUS, OR ON EDGE: NOT AT ALL

## 2018-10-31 ASSESSMENT — PAIN SCALES - GENERAL: PAINLEVEL: NO PAIN (0)

## 2018-10-31 ASSESSMENT — PATIENT HEALTH QUESTIONNAIRE - PHQ9: SUM OF ALL RESPONSES TO PHQ QUESTIONS 1-9: 0

## 2018-10-31 NOTE — MR AVS SNAPSHOT
After Visit Summary   10/31/2018    Edward Hannah    MRN: [id number]           Patient Information     Date Of Birth          1955        Visit Information        Provider Department      10/31/2018 9:45 AM Kelly Yarbrough MD [facility]        Today's Diagnoses     Need for prophylactic vaccination and inoculation against influenza    -  1    Type 2 diabetes mellitus without complication, without long-term current use of insulin (H)        Hyperlipidemia LDL goal <100        Essential hypertension with goal blood pressure less than 140/90          Care Instructions    Turmeric or curcumin -- 1-2 capsule 2x/day          Follow-ups after your visit        Your next 10 appointments already scheduled     Feb 28, 2019  8:30 AM CST   (Arrive by 8:15 AM)   SHORT with Kelly Yarbrough MD   [facility]    [address] [place] [id number] [phone]            Oct 16, 2019  8:30 AM CDT   (Arrive by 8:15 AM)   Diabetes Education with Karrie Coleman RD   [facility] ([facility] )    [address] [place] [id number] [phone]              Who to contact     If you have questions or need follow up information about today's clinic visit or your schedule please contact [facility] directly at [phone].  Normal or non-critical lab and imaging results will be communicated to you by MyChart, letter or phone within 4 business days after the clinic has received the results. If you do not hear from us within 7 days, please contact the clinic through MyChart or phone. If you have a critical or abnormal lab result, we will notify you by phone as soon as possible.  Submit refill requests through [employer] or call your pharmacy and they will forward the refill request to us. Please allow 3 business days for your refill to be completed.          Additional Information  About Your Visit        [employer] Information     [employer] gives you secure access to your electronic health record. If you see a primary care provider, you can also send messages to your care team and make appointments. If you have questions, please call your primary care clinic.  If you do not have a primary care provider, please call [phone] and they will assist you.        Care EveryWhere ID     This is your Care EveryWhere ID. This could be used by other organizations to access your [place] medical records  [id number]        Your Vitals Were     Pulse Temperature Pulse Oximetry BMI (Body Mass Index)          75 97  F (36.1  C) (Tympanic) 95% 33.99 kg/m2         Blood Pressure from Last 3 Encounters:   10/31/18 118/76   10/17/18 140/80   06/25/18 124/70    Weight from Last 3 Encounters:   10/31/18 238 lb 9.6 oz (108.2 kg)   10/17/18 241 lb 11.2 oz (109.6 kg)   06/25/18 241 lb 6.4 oz (109.5 kg)              We Performed the Following     C FOOT EXAM  NO CHARGE     C RIV4 (FLUBLOK) VACCINE RECOMBINANT DNA PRSRV ANTIBIO FREE, IM     Vaccine Administration, Initial [[id number]]        Primary Care Provider Office Phone # Fax #    Kelly THORNTON MD Linnea [phone]       [facility] [place] [address]  [place] [address]        Goals        General    Problem Solving (pt-stated)     Notes - Note created  10/17/2018 10:42 AM by Karrie Coleman RD    My Goal: I will keep A1c less than 7.0%.     I plan to meet my goal by this date: next visit.           Equal Access to Services     [facility] : Hadii aad ku hadashester Sosherri, waaxda luqadaha, qaybta kaalidania blanc. So [facility] [phone].    ATENCIÓN: Si habla español, tiene a haas disposición servicios gratuitos de asistencia lingüística. Llame al [phone].    We comply with applicable federal civil rights laws and [place] laws. We do not discriminate on the basis of race, color, national origin, age,  disability, sex, sexual orientation, or gender identity.            Thank you!     Thank you for choosing [facility]  for your care. Our goal is always to provide you with excellent care. Hearing back from our patients is one way we can continue to improve our services. Please take a few minutes to complete the written survey that you may receive in the mail after your visit with us. Thank you!             Your Updated Medication List - Protect others around you: Learn how to safely use, store and throw away your medicines at www.disposemymeds.org.          This list is accurate as of 10/31/18 10:27 AM.  Always use your most recent med list.                   Brand Name Dispense Instructions for use Diagnosis    acetylcysteine 500 MG Caps capsule    N-ACETYL CYSTEINE     Take 1 capsule (500 mg) by mouth daily    Type 2 diabetes mellitus without complication, without long-term current use of insulin (H)       amLODIPine 2.5 MG tablet    NORVASC    30 tablet    TAKE 1 TABLET BY MOUTH EVERY DAY    Hypertension goal BP (blood pressure) < 140/90       aspirin 81 MG tablet     100    1 tab po QD (Once per day)    Type II or unspecified type diabetes mellitus without mention of complication, not stated as uncontrolled       atorvastatin 20 MG tablet    LIPITOR    120 tablet    TAKE 1 TABLET BY MOUTH ONCE DAILY    Hyperlipidemia LDL goal <100       * B-D U/F 31G X 8 MM   Generic drug:  insulin pen needle     100 each    USE 2 DAILY OR AS DIRECTED    Type 2 diabetes mellitus without complication (H)       * BD TYRONE U/F 32G X 4 MM   Generic drug:  insulin pen needle     100 each    USE ONE NEEDLE DAILY AS DIRECTED    Type 2 diabetes mellitus with hyperglycemia, without long-term current use of insulin (H)       blood glucose monitoring lancets     1 Box    Use to test blood sugar 3 times daily.    Type 2 diabetes mellitus with hyperglycemia, without long-term current use of insulin (H)       CONTOUR  NEXT TEST test strip   Generic drug:  blood glucose monitoring     100 strip    USE TO TEST BLOOD SUGAR THREE TIMES DAILY    Type 2 diabetes mellitus with hyperglycemia, without long-term current use of insulin (H)       glipiZIDE 10 MG 24 hr tablet    GLUCOTROL XL    60 tablet    TAKE 1 TABLET BY MOUTH TWICE DAILY    Type 2 diabetes mellitus without complication (H)       lisinopril 40 MG tablet    PRINIVIL/ZESTRIL    30 tablet    TAKE 1 TABLET BY MOUTH EVERY DAY    Benign essential hypertension       MELATONIN PO      Takes 5 mg at bedtime daily        metFORMIN 1000 MG tablet    GLUCOPHAGE    60 tablet    TAKE 1 TABLET BY MOUTH TWICE DAILY WITH MEALS    Type 2 diabetes mellitus without complication (H)       MULTIVITAMIN PO      Take 1 tablet by mouth daily        OMEGA-3 FISH OIL PO      Take 1 g by mouth daily        sildenafil 20 MG tablet    REVATIO    30 tablet    Take 3-5 (60-100mg) tablets by mouth 1 hour prior to sexual activity    Erectile dysfunction, unspecified erectile dysfunction type       VICTOZA PEN 18 MG/3ML soln   Generic drug:  liraglutide     6 mL    INJECT 1.2 MG UNDER THE SKIN EVERY DAY    Type 2 diabetes mellitus with hyperglycemia, without long-term current use of insulin (H)       VITAMIN D (CHOLECALCIFEROL) PO      Take 1,000 Units by mouth daily        * Notice:  This list has 2 medication(s) that are the same as other medications prescribed for you. Read the directions carefully, and ask your doctor or other care provider to review them with you.

## 2018-10-31 NOTE — NURSING NOTE
"Chief Complaint   Patient presents with     Diabetes     Lipids     Hypertension       Initial /76 (BP Location: Left arm, Patient Position: Chair, Cuff Size: Adult Large)  Pulse 75  Temp 97  F (36.1  C) (Tympanic)  Wt 238 lb 9.6 oz (108.2 kg)  SpO2 95%  BMI 33.99 kg/m2 Estimated body mass index is 33.99 kg/(m^2) as calculated from the following:    Height as of 10/17/18: 5' 10.25\" (1.784 m).    Weight as of this encounter: 238 lb 9.6 oz (108.2 kg).  Medication Reconciliation: complete    Heydi Chiu LPN    "

## 2018-11-01 ASSESSMENT — ANXIETY QUESTIONNAIRES: GAD7 TOTAL SCORE: 0

## 2019-02-19 NOTE — PROGRESS NOTES
SUBJECTIVE:   Edward Hannah is a 63 year old male who presents to clinic today for the following health issues:    Diabetes Follow-up    Patient is checking blood sugars: twice daily.   Blood sugar testing frequency justification: Uncontrolled diabetes  Results are as follows:         am - 120/150         suppertime - 100-130    Diabetic concerns: None     Symptoms of hypoglycemia (low blood sugar): none     Paresthesias (numbness or burning in feet) or sores: No     Date of last diabetic eye exam: June 2018    Diabetes Management Resources    Hyperlipidemia Follow-Up      Rate your low fat/cholesterol diet?: good    Taking statin?  Yes, no muscle aches from statin    Other lipid medications/supplements?:  Fish oil/Omega 3, without side effects    Hypertension Follow-up      Outpatient blood pressures are not being checked.    Low Salt Diet: added salt with cooking, not at the table    BP Readings from Last 2 Encounters:   10/31/18 118/76   10/17/18 140/80     Hemoglobin A1C (%)   Date Value   10/17/2018 6.5 (A)   06/25/2018 6.9 (H)     LDL Cholesterol Calculated (mg/dL)   Date Value   02/22/2018 76   04/19/2017 55       Amount of exercise or physical activity: 6-7 days/week for an average of 15-30 minutes    Problems taking medications regularly: No    Medication side effects: none    Diet: regular (no restrictions)      He reports he has been more tired this winter with not as much energy, sugars have been about the same but less motivation     Problem list and histories reviewed & adjusted, as indicated.  Additional history: as documented    Patient Active Problem List   Diagnosis     Hyperlipidemia LDL goal <100     Advanced directives, counseling/discussion     BCC (basal cell carcinoma), face     Obesity due to excess calories, unspecified obesity severity     Other insomnia     Essential hypertension with goal blood pressure less than 140/90     ACP (advance care planning)     Type 2 diabetes mellitus  without complication, without long-term current use of insulin (H)     Herpes zoster without complication     Trigger finger, acquired     Past Surgical History:   Procedure Laterality Date     COLONOSCOPY  3-3-2014    due 2019  4 polyps benign     wisdom teeth extraction         Social History     Tobacco Use     Smoking status: Never Smoker     Smokeless tobacco: Never Used     Tobacco comment: remote cigar history   Substance Use Topics     Alcohol use: Yes     Alcohol/week: 0.0 oz     Comment: 1-2/day     Family History   Problem Relation Age of Onset     Cardiovascular Father         aortic valve surgery     Cancer Father         lung cancer     Gastrointestinal Disease Father         benign esophageal tumor removed     Hypertension Father      Diabetes Father 70     Other - See Comments Mother         infection     Family History Negative Sister          Current Outpatient Medications   Medication Sig Dispense Refill     acetylcysteine (N-ACETYL CYSTEINE) 500 MG CAPS capsule Take 1 capsule (500 mg) by mouth daily       amLODIPine (NORVASC) 2.5 MG tablet TAKE 1 TABLET BY MOUTH EVERY DAY 30 tablet 0     ASPIRIN 81 MG OR TABS 1 tab po QD (Once per day) 100 3     atorvastatin (LIPITOR) 20 MG tablet TAKE 1 TABLET BY MOUTH ONCE DAILY 120 tablet 0     B-D U/F 31G X 8 MM insulin pen needle USE 2 DAILY OR AS DIRECTED 100 each 3     BD TYRONE U/F 32G X 4 MM insulin pen needle USE ONE NEEDLE DAILY AS DIRECTED 100 each 1     blood glucose monitoring (KELSEY MICROLET) lancets Use to test blood sugar 3 times daily. 1 Box 11     CONTOUR NEXT TEST test strip USE TO TEST BLOOD SUGAR THREE TIMES DAILY 100 strip 11     glipiZIDE (GLUCOTROL XL) 10 MG 24 hr tablet TAKE 1 TABLET BY MOUTH TWICE DAILY 60 tablet 0     lisinopril (PRINIVIL/ZESTRIL) 40 MG tablet TAKE 1 TABLET BY MOUTH EVERY DAY 30 tablet 0     MELATONIN PO Takes 5 mg at bedtime daily       metFORMIN (GLUCOPHAGE) 1000 MG tablet TAKE 1 TABLET BY MOUTH TWICE DAILY WITH MEALS  180 tablet 0     Multiple Vitamins-Minerals (MULTIVITAMIN PO) Take 1 tablet by mouth daily       Omega-3 Fatty Acids (OMEGA-3 FISH OIL PO) Take 1 g by mouth daily       Semaglutide 0.25 or 0.5 MG/DOSE SOPN 0.25mg subcutaneous weekly x 4 weeks and then increase to 0.5mg weekly x 4 weeks 1.5 mL 3     sildenafil (REVATIO) 20 MG tablet Take 3-5 (60-100mg) tablets by mouth 1 hour prior to sexual activity 30 tablet 11     VICTOZA PEN 18 MG/3ML soln INJECT 1.2 MG UNDER THE SKIN EVERY DAY 6 mL 0     VITAMIN D, CHOLECALCIFEROL, PO Take 1,000 Units by mouth daily       Allergies   Allergen Reactions     Nkda [No Known Drug Allergies]      Recent Labs   Lab Test 02/28/19  0821 10/17/18 06/25/18  0858 02/22/18  0826  04/19/17  0825   A1C 7.3* 6.5* 6.9* 6.9*   < > 7.5*   LDL 77  --   --  76  --  55   HDL 50  --   --  54  --  53   TRIG 171*  --   --  140  --  147   ALT 37  --   --  38  --  32   CR 0.92  --   --  0.89  --  0.89   GFRESTIMATED 88  --   --  86  --  87   GFRESTBLACK >90  --   --  >90  --  >90  African American GFR Calc     POTASSIUM 4.1  --   --  4.3  --  4.3   TSH 1.28  --   --  1.72  --   --     < > = values in this interval not displayed.      BP Readings from Last 3 Encounters:   02/28/19 128/80   10/31/18 118/76   10/17/18 140/80    Wt Readings from Last 3 Encounters:   02/28/19 108 kg (238 lb)   10/31/18 108.2 kg (238 lb 9.6 oz)   10/17/18 109.6 kg (241 lb 11.2 oz)                  Labs reviewed in EPIC    Reviewed and updated as needed this visit by clinical staff  Tobacco  Allergies  Meds  Med Hx  Surg Hx  Fam Hx  Soc Hx      Reviewed and updated as needed this visit by Provider         ROS:  Constitutional, HEENT, cardiovascular, pulmonary, gi and gu systems are negative, except as otherwise noted.    OBJECTIVE:                                                    /80 (BP Location: Right arm, Patient Position: Sitting, Cuff Size: Adult Regular)   Pulse 74   Temp 97.1  F (36.2  C) (Tympanic)    "Resp 20   Ht 1.778 m (5' 10\")   Wt 108 kg (238 lb)   SpO2 96%   BMI 34.15 kg/m     Body mass index is 34.15 kg/m .  GENERAL APPEARANCE: healthy, alert, no distress and over weight  HENT: ear canals and TM's normal and nose and mouth without ulcers or lesions  NECK: no adenopathy, no asymmetry, masses, or scars and thyroid normal to palpation  RESP: lungs clear to auscultation - no rales, rhonchi or wheezes  CV: regular rates and rhythm, normal S1 S2, no S3 or S4 and no murmur, click or rub  ABDOMEN: soft, nontender, without hepatosplenomegaly or masses, bowel sounds normal and obese  PSYCH: mentation appears normal and affect normal/bright       ASSESSMENT/PLAN:                                                    1. Type 2 diabetes mellitus without complication, without long-term current use of insulin (H)  Follow-up 3 mos  byetta wasn't covered by insurance, will try ozempic first    - Albumin Random Urine Quantitative with Creat Ratio; Future  - Lipid Profile (Chol, Trig, HDL, LDL calc); Future  - Hemoglobin A1c; Standing  - Comprehensive metabolic panel; Future  - Comprehensive metabolic panel  - Hemoglobin A1c  - Lipid Profile (Chol, Trig, HDL, LDL calc)  - Albumin Random Urine Quantitative with Creat Ratio  - Estimated Average Glucose  - Estimated Average Glucose  - Semaglutide 0.25 or 0.5 MG/DOSE SOPN; 0.25mg subcutaneous weekly x 4 weeks and then increase to 0.5mg weekly x 4 weeks  Dispense: 1.5 mL; Refill: 3    2. Hyperlipidemia LDL goal <100  Fasting next visit    - Lipid Profile (Chol, Trig, HDL, LDL calc); Future  - Lipid Profile (Chol, Trig, HDL, LDL calc)    3. Essential hypertension with goal blood pressure less than 140/90  stable  - Comprehensive metabolic panel; Future  - Comprehensive metabolic panel    4. BCC (basal cell carcinoma), face  stable    5. Fatigue, unspecified type  Labs pending  Check depression next visit?  - TSH with free T4 reflex  - Vitamin B12    Patient was agreeable to this " plan and had no further questions.  See Patient Instructions    Kelly Yarbrough MD  [facility] [place]

## 2019-02-28 ENCOUNTER — TELEPHONE (OUTPATIENT)
Dept: FAMILY MEDICINE | Facility: OTHER | Age: 64
End: 2019-02-28

## 2019-02-28 ENCOUNTER — OFFICE VISIT (OUTPATIENT)
Dept: FAMILY MEDICINE | Facility: OTHER | Age: 64
End: 2019-02-28
Attending: FAMILY MEDICINE
Payer: COMMERCIAL

## 2019-02-28 VITALS
SYSTOLIC BLOOD PRESSURE: 128 MMHG | TEMPERATURE: 97.1 F | RESPIRATION RATE: 20 BRPM | DIASTOLIC BLOOD PRESSURE: 80 MMHG | HEIGHT: 70 IN | HEART RATE: 74 BPM | WEIGHT: 238 LBS | BODY MASS INDEX: 34.07 KG/M2 | OXYGEN SATURATION: 96 %

## 2019-02-28 DIAGNOSIS — C44.310 BCC (BASAL CELL CARCINOMA), FACE: ICD-10-CM

## 2019-02-28 DIAGNOSIS — R53.83 FATIGUE, UNSPECIFIED TYPE: ICD-10-CM

## 2019-02-28 DIAGNOSIS — E78.5 HYPERLIPIDEMIA LDL GOAL <100: ICD-10-CM

## 2019-02-28 DIAGNOSIS — E11.9 TYPE 2 DIABETES MELLITUS (H): Primary | ICD-10-CM

## 2019-02-28 DIAGNOSIS — I10 ESSENTIAL HYPERTENSION WITH GOAL BLOOD PRESSURE LESS THAN 140/90: ICD-10-CM

## 2019-02-28 DIAGNOSIS — E11.9 TYPE 2 DIABETES MELLITUS WITHOUT COMPLICATION, WITHOUT LONG-TERM CURRENT USE OF INSULIN (H): Primary | ICD-10-CM

## 2019-02-28 LAB
ALBUMIN SERPL-MCNC: 4.2 G/DL (ref 3.4–5)
ALP SERPL-CCNC: 77 U/L (ref 40–150)
ALT SERPL W P-5'-P-CCNC: 37 U/L (ref 0–70)
ANION GAP SERPL CALCULATED.3IONS-SCNC: 6 MMOL/L (ref 3–14)
AST SERPL W P-5'-P-CCNC: 14 U/L (ref 0–45)
BILIRUB SERPL-MCNC: 0.5 MG/DL (ref 0.2–1.3)
BUN SERPL-MCNC: 18 MG/DL (ref 7–30)
CALCIUM SERPL-MCNC: 9.2 MG/DL (ref 8.5–10.1)
CHLORIDE SERPL-SCNC: 106 MMOL/L (ref 94–109)
CHOLEST SERPL-MCNC: 161 MG/DL
CO2 SERPL-SCNC: 28 MMOL/L (ref 20–32)
CREAT SERPL-MCNC: 0.92 MG/DL (ref 0.66–1.25)
CREAT UR-MCNC: NORMAL MG/DL
EST. AVERAGE GLUCOSE BLD GHB EST-MCNC: 163 MG/DL
GFR SERPL CREATININE-BSD FRML MDRD: 88 ML/MIN/{1.73_M2}
GLUCOSE SERPL-MCNC: 136 MG/DL (ref 70–99)
HBA1C MFR BLD: 7.3 % (ref 0–5.6)
HDLC SERPL-MCNC: 50 MG/DL
LDLC SERPL CALC-MCNC: 77 MG/DL
MICROALBUMIN UR-MCNC: NORMAL MG/L
MICROALBUMIN/CREAT UR: NORMAL MG/G CR (ref 0–17)
NONHDLC SERPL-MCNC: 111 MG/DL
POTASSIUM SERPL-SCNC: 4.1 MMOL/L (ref 3.4–5.3)
PROT SERPL-MCNC: 7.8 G/DL (ref 6.8–8.8)
SODIUM SERPL-SCNC: 140 MMOL/L (ref 133–144)
TRIGL SERPL-MCNC: 171 MG/DL
TSH SERPL DL<=0.005 MIU/L-ACNC: 1.28 MU/L (ref 0.4–4)
VIT B12 SERPL-MCNC: 448 PG/ML (ref 193–986)

## 2019-02-28 PROCEDURE — 36415 COLL VENOUS BLD VENIPUNCTURE: CPT | Performed by: FAMILY MEDICINE

## 2019-02-28 PROCEDURE — 83036 HEMOGLOBIN GLYCOSYLATED A1C: CPT | Performed by: FAMILY MEDICINE

## 2019-02-28 PROCEDURE — 84443 ASSAY THYROID STIM HORMONE: CPT | Performed by: FAMILY MEDICINE

## 2019-02-28 PROCEDURE — 80061 LIPID PANEL: CPT | Performed by: FAMILY MEDICINE

## 2019-02-28 PROCEDURE — 80053 COMPREHEN METABOLIC PANEL: CPT | Performed by: FAMILY MEDICINE

## 2019-02-28 PROCEDURE — 82607 VITAMIN B-12: CPT | Mod: 90 | Performed by: FAMILY MEDICINE

## 2019-02-28 PROCEDURE — 99000 SPECIMEN HANDLING OFFICE-LAB: CPT | Performed by: FAMILY MEDICINE

## 2019-02-28 PROCEDURE — 99214 OFFICE O/P EST MOD 30 MIN: CPT | Performed by: FAMILY MEDICINE

## 2019-02-28 ASSESSMENT — PAIN SCALES - GENERAL: PAINLEVEL: NO PAIN (0)

## 2019-02-28 ASSESSMENT — MIFFLIN-ST. JEOR: SCORE: 1880.81

## 2019-02-28 NOTE — NURSING NOTE
"Chief Complaint   Patient presents with     Diabetes       Initial /80 (BP Location: Right arm, Patient Position: Sitting, Cuff Size: Adult Regular)   Pulse 74   Temp 97.1  F (36.2  C) (Tympanic)   Resp 20   Ht 1.778 m (5' 10\")   Wt 108 kg (238 lb)   SpO2 96%   BMI 34.15 kg/m   Estimated body mass index is 34.15 kg/m  as calculated from the following:    Height as of this encounter: 1.778 m (5' 10\").    Weight as of this encounter: 108 kg (238 lb).  Medication Reconciliation: complete    Ines López LPN  "

## 2019-02-28 NOTE — TELEPHONE ENCOUNTER
2/28/19 Received PA request from [facility] for Byetta. Submitted as urgent request through CM. Waiting for response.

## 2019-02-28 NOTE — TELEPHONE ENCOUNTER
"DENIAL 2/28/19 Received denial from [facility] for Byetta. Denial reason: \"Patient has not tried and failed two additional formulary options for the treatment of their condition. Some other formulary alternatives are: Trulicity and Ozempic.\" Pharmacy advised. Forms scanned to [employer].  "

## 2019-03-18 ENCOUNTER — MYC MEDICAL ADVICE (OUTPATIENT)
Dept: FAMILY MEDICINE | Facility: OTHER | Age: 64
End: 2019-03-18

## 2019-03-18 DIAGNOSIS — E11.65 TYPE 2 DIABETES MELLITUS WITH HYPERGLYCEMIA, WITHOUT LONG-TERM CURRENT USE OF INSULIN (H): ICD-10-CM

## 2019-03-18 RX ORDER — LIRAGLUTIDE 6 MG/ML
INJECTION SUBCUTANEOUS
Qty: 6 ML | Refills: 0 | Status: SHIPPED | OUTPATIENT
Start: 2019-03-18 | End: 2019-04-30

## 2019-03-27 DIAGNOSIS — E11.65 TYPE 2 DIABETES MELLITUS WITH HYPERGLYCEMIA, WITHOUT LONG-TERM CURRENT USE OF INSULIN (H): ICD-10-CM

## 2019-03-27 DIAGNOSIS — I10 HYPERTENSION GOAL BP (BLOOD PRESSURE) < 140/90: ICD-10-CM

## 2019-03-27 DIAGNOSIS — I10 BENIGN ESSENTIAL HYPERTENSION: ICD-10-CM

## 2019-03-27 RX ORDER — GLIPIZIDE 10 MG/1
TABLET, FILM COATED, EXTENDED RELEASE ORAL
Qty: 60 TABLET | Refills: 3 | Status: SHIPPED | OUTPATIENT
Start: 2019-03-27 | End: 2019-07-27

## 2019-03-27 RX ORDER — AMLODIPINE BESYLATE 2.5 MG/1
TABLET ORAL
Qty: 90 TABLET | Refills: 1 | Status: SHIPPED | OUTPATIENT
Start: 2019-03-27 | End: 2019-08-28

## 2019-03-27 RX ORDER — LISINOPRIL 40 MG/1
TABLET ORAL
Qty: 90 TABLET | Refills: 1 | Status: SHIPPED | OUTPATIENT
Start: 2019-03-27 | End: 2019-08-28

## 2019-04-30 DIAGNOSIS — E11.65 TYPE 2 DIABETES MELLITUS WITH HYPERGLYCEMIA, WITHOUT LONG-TERM CURRENT USE OF INSULIN (H): ICD-10-CM

## 2019-04-30 RX ORDER — LIRAGLUTIDE 6 MG/ML
INJECTION SUBCUTANEOUS
Qty: 6 ML | Refills: 0 | Status: SHIPPED | OUTPATIENT
Start: 2019-04-30 | End: 2019-06-27

## 2019-05-06 NOTE — PROGRESS NOTES
SUBJECTIVE:   Edward Hannah is a 63 year old male who presents to clinic today for the following   health issues:      Diabetes Follow-up    Patient is checking blood sugars: once daily and then a few times a week at bedtime  Results are as follows:         am -          bedtime - 160-180    Diabetic concerns: None     Symptoms of hypoglycemia (low blood sugar): shaky, sweaty     Paresthesias (numbness or burning in feet) or sores: No     Date of last diabetic eye exam: last demarcus    Diabetes Management Resources    Hyperlipidemia Follow-Up      Rate your low fat/cholesterol diet?: good    Taking statin?  Yes, possible muscle aches from statin    Other lipid medications/supplements?:  Fish oil/Omega 3, dose 1 g without side effects    Hypertension Follow-up      Outpatient blood pressures are not being checked.    Low Salt Diet: low salt    BP Readings from Last 2 Encounters:   05/31/19 120/64   02/28/19 128/80     Hemoglobin A1C (%)   Date Value   02/28/2019 7.3 (H)   10/17/2018 6.5 (A)     LDL Cholesterol Calculated (mg/dL)   Date Value   02/28/2019 77   02/22/2018 76       Amount of exercise or physical activity: 3-4 times per week for about 1 hour    Problems taking medications regularly: No    Medication side effects: none    Diet: regular (no restrictions)    Additional history: as documented    Reviewed  and updated as needed this visit by clinical staff  Tobacco  Allergies  Meds  Med Hx  Surg Hx  Fam Hx  Soc Hx        Reviewed and updated as needed this visit by Provider         Patient Active Problem List   Diagnosis     Hyperlipidemia LDL goal <100     Advanced directives, counseling/discussion     BCC (basal cell carcinoma), face     Obesity due to excess calories, unspecified obesity severity     Other insomnia     Essential hypertension with goal blood pressure less than 140/90     ACP (advance care planning)     Type 2 diabetes mellitus without complication, without long-term current use of  insulin (H)     Herpes zoster without complication     Trigger finger, acquired     Past Surgical History:   Procedure Laterality Date     COLONOSCOPY  3-3-2014    due 2019  4 polyps benign     wisdom teeth extraction         Social History     Tobacco Use     Smoking status: Never Smoker     Smokeless tobacco: Never Used     Tobacco comment: remote cigar history   Substance Use Topics     Alcohol use: Yes     Alcohol/week: 0.0 oz     Comment: 1-2/day     Family History   Problem Relation Age of Onset     Cardiovascular Father         aortic valve surgery     Cancer Father         lung cancer     Gastrointestinal Disease Father         benign esophageal tumor removed     Hypertension Father      Diabetes Father 70     Other - See Comments Mother         infection     Family History Negative Sister          Current Outpatient Medications   Medication Sig Dispense Refill     acetylcysteine (N-ACETYL CYSTEINE) 500 MG CAPS capsule Take 1 capsule (500 mg) by mouth daily       amLODIPine (NORVASC) 2.5 MG tablet TAKE 1 TABLET BY MOUTH EVERY DAY 90 tablet 1     ASPIRIN 81 MG OR TABS 1 tab po QD (Once per day) 100 3     atorvastatin (LIPITOR) 20 MG tablet TAKE 1 TABLET BY MOUTH ONCE DAILY 120 tablet 0     B-D U/F 31G X 8 MM insulin pen needle USE 2 DAILY OR AS DIRECTED 100 each 3     blood glucose monitoring (Sand[employer] MICROLET) lancets Use to test blood sugar 3 times daily. 1 Box 11     CONTOUR NEXT TEST test strip USE TO TEST BLOOD SUGAR THREE TIMES DAILY 100 strip 11     glipiZIDE (GLUCOTROL XL) 10 MG 24 hr tablet TAKE 1 TABLET BY MOUTH TWICE DAILY 60 tablet 3     liraglutide (VICTOZA PEN) 18 MG/3ML solution ADMINISTER 1.2 MG UNDER THE SKIN EVERY DAY 6 mL 0     lisinopril (PRINIVIL/ZESTRIL) 40 MG tablet TAKE 1 TABLET BY MOUTH EVERY DAY 90 tablet 1     MELATONIN PO Takes 5 mg at bedtime daily       metFORMIN (GLUCOPHAGE) 1000 MG tablet TAKE 1 TABLET BY MOUTH TWICE DAILY WITH MEALS 180 tablet 0     Multiple Vitamins-Minerals  (MULTIVITAMIN PO) Take 1 tablet by mouth daily       Omega-3 Fatty Acids (OMEGA-3 FISH OIL PO) Take 1 g by mouth daily       sildenafil (REVATIO) 20 MG tablet Take 3-5 (60-100mg) tablets by mouth 1 hour prior to sexual activity 30 tablet 11     VITAMIN D, CHOLECALCIFEROL, PO Take 1,000 Units by mouth daily       Allergies   Allergen Reactions     Nkda [No Known Drug Allergies]      BP Readings from Last 3 Encounters:   05/31/19 120/64   02/28/19 128/80   10/31/18 118/76    Wt Readings from Last 3 Encounters:   05/31/19 110.7 kg (244 lb)   02/28/19 108 kg (238 lb)   10/31/18 108.2 kg (238 lb 9.6 oz)                    ROS:  Constitutional, HEENT, cardiovascular, pulmonary, gi and gu systems are negative, except as otherwise noted.    OBJECTIVE:                                                    /64 (BP Location: Left arm, Patient Position: Chair, Cuff Size: Adult Large)   Pulse 84   Temp 97.7  F (36.5  C) (Tympanic)   Resp 20   Wt 110.7 kg (244 lb)   PF 94 L/min   BMI 35.01 kg/m    Body mass index is 35.01 kg/m .  GENERAL APPEARANCE: healthy, alert and no distress  RESP: lungs clear to auscultation - no rales, rhonchi or wheezes  CV: regular rates and rhythm, normal S1 S2, no S3 or S4 and no murmur, click or rub  ABDOMEN: soft, nontender, without hepatosplenomegaly or masses and bowel sounds normal  PSYCH: mentation appears normal and affect normal/bright       ASSESSMENT/PLAN:                                                    1. Type 2 diabetes mellitus without complication, without long-term current use of insulin (H)  Follow-up 3 mos  He notes he might have fasting of 150-165 for a few days and then back to  -- he's not finding a trend with why that is    2. Hyperlipidemia LDL goal <100  Fasting labs good    3. Essential hypertension with goal blood pressure less than 140/90  stable    4. Other insomnia  Can't sleep more than 6-6.5 hrs even with melatonin just felt sleepy when he awoke    Check  PSA next visit  Also need to get microalbumin next visit    Patient was agreeable to this plan and had no further questions.  See Patient Instructions    Kelly Yarbrough MD  [facility] [place]

## 2019-05-28 DIAGNOSIS — E11.65 TYPE 2 DIABETES MELLITUS WITH HYPERGLYCEMIA, WITHOUT LONG-TERM CURRENT USE OF INSULIN (H): ICD-10-CM

## 2019-05-29 NOTE — TELEPHONE ENCOUNTER
metFORMIN  Last Written Prescription Date: 2/27/19  Last Fill Quantity: 180 # of Refills: 0  Last Office Visit: 2/28/19

## 2019-05-31 ENCOUNTER — OFFICE VISIT (OUTPATIENT)
Dept: FAMILY MEDICINE | Facility: OTHER | Age: 64
End: 2019-05-31
Attending: FAMILY MEDICINE
Payer: COMMERCIAL

## 2019-05-31 VITALS
DIASTOLIC BLOOD PRESSURE: 64 MMHG | BODY MASS INDEX: 35.01 KG/M2 | TEMPERATURE: 97.7 F | SYSTOLIC BLOOD PRESSURE: 120 MMHG | RESPIRATION RATE: 20 BRPM | HEART RATE: 84 BPM | WEIGHT: 244 LBS

## 2019-05-31 DIAGNOSIS — E11.9 TYPE 2 DIABETES MELLITUS WITHOUT COMPLICATION, WITHOUT LONG-TERM CURRENT USE OF INSULIN (H): Primary | ICD-10-CM

## 2019-05-31 DIAGNOSIS — E11.9 TYPE 2 DIABETES MELLITUS WITHOUT COMPLICATION, WITHOUT LONG-TERM CURRENT USE OF INSULIN (H): ICD-10-CM

## 2019-05-31 DIAGNOSIS — E78.5 HYPERLIPIDEMIA LDL GOAL <100: ICD-10-CM

## 2019-05-31 DIAGNOSIS — Z12.5 SCREENING FOR PROSTATE CANCER: ICD-10-CM

## 2019-05-31 DIAGNOSIS — I10 ESSENTIAL HYPERTENSION WITH GOAL BLOOD PRESSURE LESS THAN 140/90: ICD-10-CM

## 2019-05-31 DIAGNOSIS — G47.09 OTHER INSOMNIA: ICD-10-CM

## 2019-05-31 DIAGNOSIS — E11.9 TYPE 2 DIABETES MELLITUS (H): ICD-10-CM

## 2019-05-31 DIAGNOSIS — Z11.59 ENCOUNTER FOR SCREENING FOR OTHER VIRAL DISEASES: ICD-10-CM

## 2019-05-31 LAB
EST. AVERAGE GLUCOSE BLD GHB EST-MCNC: 143 MG/DL
HBA1C MFR BLD: 6.6 % (ref 0–5.6)

## 2019-05-31 PROCEDURE — 99000 SPECIMEN HANDLING OFFICE-LAB: CPT | Performed by: FAMILY MEDICINE

## 2019-05-31 PROCEDURE — 83036 HEMOGLOBIN GLYCOSYLATED A1C: CPT | Performed by: FAMILY MEDICINE

## 2019-05-31 PROCEDURE — 87389 HIV-1 AG W/HIV-1&-2 AB AG IA: CPT | Mod: 90 | Performed by: FAMILY MEDICINE

## 2019-05-31 PROCEDURE — 99214 OFFICE O/P EST MOD 30 MIN: CPT | Performed by: FAMILY MEDICINE

## 2019-05-31 PROCEDURE — 36415 COLL VENOUS BLD VENIPUNCTURE: CPT | Performed by: FAMILY MEDICINE

## 2019-05-31 ASSESSMENT — ANXIETY QUESTIONNAIRES
5. BEING SO RESTLESS THAT IT IS HARD TO SIT STILL: NOT AT ALL
GAD7 TOTAL SCORE: 0
2. NOT BEING ABLE TO STOP OR CONTROL WORRYING: NOT AT ALL
1. FEELING NERVOUS, ANXIOUS, OR ON EDGE: NOT AT ALL
6. BECOMING EASILY ANNOYED OR IRRITABLE: NOT AT ALL
3. WORRYING TOO MUCH ABOUT DIFFERENT THINGS: NOT AT ALL
7. FEELING AFRAID AS IF SOMETHING AWFUL MIGHT HAPPEN: NOT AT ALL

## 2019-05-31 ASSESSMENT — PAIN SCALES - GENERAL: PAINLEVEL: NO PAIN (0)

## 2019-05-31 ASSESSMENT — PATIENT HEALTH QUESTIONNAIRE - PHQ9
SUM OF ALL RESPONSES TO PHQ QUESTIONS 1-9: 0
5. POOR APPETITE OR OVEREATING: NOT AT ALL

## 2019-05-31 NOTE — NURSING NOTE
"Chief Complaint   Patient presents with     Diabetes       Initial /64 (BP Location: Left arm, Patient Position: Chair, Cuff Size: Adult Large)   Pulse 84   Temp 97.7  F (36.5  C) (Tympanic)   Resp 20   Wt 110.7 kg (244 lb)   PF 94 L/min   BMI 35.01 kg/m   Estimated body mass index is 35.01 kg/m  as calculated from the following:    Height as of 2/28/19: 1.778 m (5' 10\").    Weight as of this encounter: 110.7 kg (244 lb).  Medication Reconciliation: complete    Miriam Diop LPN    "

## 2019-06-01 ASSESSMENT — ANXIETY QUESTIONNAIRES: GAD7 TOTAL SCORE: 0

## 2019-06-03 LAB — HIV 1+2 AB+HIV1 P24 AG SERPL QL IA: NONREACTIVE

## 2019-06-27 DIAGNOSIS — E11.65 TYPE 2 DIABETES MELLITUS WITH HYPERGLYCEMIA, WITHOUT LONG-TERM CURRENT USE OF INSULIN (H): ICD-10-CM

## 2019-06-28 RX ORDER — LIRAGLUTIDE 6 MG/ML
INJECTION SUBCUTANEOUS
Qty: 6 ML | Refills: 2 | Status: SHIPPED | OUTPATIENT
Start: 2019-06-28 | End: 2019-09-28

## 2019-07-27 DIAGNOSIS — E11.65 TYPE 2 DIABETES MELLITUS WITH HYPERGLYCEMIA, WITHOUT LONG-TERM CURRENT USE OF INSULIN (H): ICD-10-CM

## 2019-07-29 RX ORDER — GLIPIZIDE 10 MG/1
TABLET, FILM COATED, EXTENDED RELEASE ORAL
Qty: 60 TABLET | Refills: 1 | Status: SHIPPED | OUTPATIENT
Start: 2019-07-29 | End: 2019-10-05

## 2019-08-15 NOTE — PROGRESS NOTES
Subjective     Edward Hannah is a 63 year old male who presents to clinic today for the following health issues:    HPI   Diabetes Follow-up      How often are you checking your blood sugar? One time daily    What time of day are you checking your blood sugars (select all that apply)?  Before meals    Have you had any blood sugars above 200?  No    Have you had any blood sugars below 70?  No    What symptoms do you notice when your blood sugar is low?  Shaky    What concerns do you have today about your diabetes? None     Do you have any of these symptoms? (Select all that apply)  No numbness or tingling in feet.  No redness, sores or blisters on feet.  No complaints of excessive thirst.  No reports of blurry vision.  No significant changes to weight.     Have you had a diabetic eye exam in the last 12 months? No    Diabetes Management Resources    Hyperlipidemia Follow-Up      Are you having any of the following symptoms? (Select all that apply)  No complaints of shortness of breath, chest pain or pressure.  No increased sweating or nausea with activity.  No left-sided neck or arm pain.  No complaints of pain in calves when walking 1-2 blocks.    Are you regularly taking any medication or supplement to lower your cholesterol?   Yes- Lipitor    Are you having muscle aches or other side effects that you think could be caused by your cholesterol lowering medication?  Yes- possibly    Hypertension Follow-up      Do you check your blood pressure regularly outside of the clinic? No     Are you following a low salt diet? Yes    Are your blood pressures ever more than 140 on the top number (systolic) OR more   than 90 on the bottom number (diastolic), for example 140/90? No    BP Readings from Last 2 Encounters:   08/28/19 130/76   05/31/19 120/64     Hemoglobin A1C (%)   Date Value   05/31/2019 6.6 (H)   02/28/2019 7.3 (H)     LDL Cholesterol Calculated (mg/dL)   Date Value   02/28/2019 77   02/22/2018 76         How many  servings of fruits and vegetables do you eat daily?  2-3    On average, how many sweetened beverages do you drink each day (soda, juice, sweet tea, etc)?   1  How many days per week do you miss taking your medication? 1    What makes it hard for you to take your medications?  remembering to take- at night          Patient Active Problem List   Diagnosis     Hyperlipidemia LDL goal <100     Advanced directives, counseling/discussion     BCC (basal cell carcinoma), face     Obesity due to excess calories, unspecified obesity severity     Other insomnia     Essential hypertension with goal blood pressure less than 140/90     ACP (advance care planning)     Type 2 diabetes mellitus without complication, without long-term current use of insulin (H)     Herpes zoster without complication     Trigger finger, acquired     Hypovitaminosis D     Elevated prostate specific antigen (PSA)     Past Surgical History:   Procedure Laterality Date     COLONOSCOPY  3-3-2014    due 2019  4 polyps benign     wisdom teeth extraction         Social History     Tobacco Use     Smoking status: Never Smoker     Smokeless tobacco: Never Used     Tobacco comment: remote cigar history   Substance Use Topics     Alcohol use: Yes     Alcohol/week: 0.0 oz     Comment: 1-2/day     Family History   Problem Relation Age of Onset     Cardiovascular Father         aortic valve surgery     Cancer Father         lung cancer     Gastrointestinal Disease Father         benign esophageal tumor removed     Hypertension Father      Diabetes Father 70     Other - See Comments Mother         infection     Family History Negative Sister          Current Outpatient Medications   Medication Sig Dispense Refill     acetylcysteine (N-ACETYL CYSTEINE) 500 MG CAPS capsule Take 1 capsule (500 mg) by mouth daily       amLODIPine (NORVASC) 2.5 MG tablet TAKE 1 TABLET BY MOUTH EVERY DAY 90 tablet 1     ASPIRIN 81 MG OR TABS 1 tab po QD (Once per day) 100 3      atorvastatin (LIPITOR) 20 MG tablet TAKE 1 TABLET BY MOUTH ONCE DAILY 120 tablet 0     B-D U/F 31G X 8 MM insulin pen needle USE 2 DAILY OR AS DIRECTED 100 each 3     blood glucose monitoring (BrandContET) lancets Use to test blood sugar 3 times daily. 1 Box 11     CONTOUR NEXT TEST test strip USE TO TEST BLOOD SUGAR THREE TIMES DAILY 100 strip 3     glipiZIDE (GLUCOTROL XL) 10 MG 24 hr tablet TAKE 1 TABLET BY MOUTH TWICE DAILY 60 tablet 1     liraglutide (VICTOZA PEN) 18 MG/3ML solution ADMINISTER 1.2 MG UNDER THE SKIN EVERY DAY 6 mL 2     lisinopril (PRINIVIL/ZESTRIL) 40 MG tablet TAKE 1 TABLET BY MOUTH EVERY DAY 90 tablet 1     MELATONIN PO Takes 5 mg at bedtime daily       metFORMIN (GLUCOPHAGE) 1000 MG tablet TAKE 1 TABLET BY MOUTH TWICE DAILY WITH MEALS 180 tablet 0     Multiple Vitamins-Minerals (MULTIVITAMIN PO) Take 1 tablet by mouth daily       Omega-3 Fatty Acids (OMEGA-3 FISH OIL PO) Take 1 g by mouth daily       sildenafil (REVATIO) 20 MG tablet Take 3-5 (60-100mg) tablets by mouth 1 hour prior to sexual activity 30 tablet 11     Turmeric 500 MG TABS Take 1 tablet by mouth 2 times daily       vitamin B complex with vitamin C (VITAMIN  B COMPLEX) tablet Take 1 tablet by mouth daily       VITAMIN D, CHOLECALCIFEROL, PO Take 1,000 Units by mouth daily       Allergies   Allergen Reactions     Nkda [No Known Drug Allergies]      BP Readings from Last 3 Encounters:   08/28/19 130/76   05/31/19 120/64   02/28/19 128/80    Wt Readings from Last 3 Encounters:   08/28/19 109.3 kg (241 lb)   05/31/19 110.7 kg (244 lb)   02/28/19 108 kg (238 lb)        Reviewed and updated as needed this visit by Provider         Review of Systems   ROS COMP: Constitutional, HEENT, cardiovascular, pulmonary, gi and gu systems are negative, except as otherwise noted.      Objective    /76 (BP Location: Right arm, Patient Position: Chair, Cuff Size: Adult Large)   Pulse 80   Temp 97.3  F (36.3  C) (Tympanic)   Resp 22    "Wt 109.3 kg (241 lb)   SpO2 95%   BMI 34.58 kg/m    Body mass index is 34.58 kg/m .  Physical Exam   GENERAL: healthy, alert and no distress  NECK: no adenopathy, no asymmetry, masses, or scars and thyroid normal to palpation  RESP: lungs clear to auscultation - no rales, rhonchi or wheezes  CV: regular rate and rhythm, normal S1 S2, no S3 or S4, no murmur, click or rub, no peripheral edema and peripheral pulses strong  ABDOMEN: soft, nontender, no hepatosplenomegaly, no masses and bowel sounds normal  MS: no gross musculoskeletal defects noted, no edema  PSYCH: mentation appears normal, affect normal/bright    Diagnostic Test Results:  Labs reviewed in Epic  Results for orders placed or performed in visit on 05/31/19   Hemoglobin A1c   Result Value Ref Range    Hemoglobin A1C 6.6 (H) 0 - 5.6 %   HIV Antigen Antibody Combo   Result Value Ref Range    HIV Antigen Antibody Combo Nonreactive NR^Nonreactive       Estimated Average Glucose   Result Value Ref Range    Estimated Average Glucose 143 mg/dL           Assessment & Plan     (R97.20) Elevated prostate specific antigen (PSA)  (primary encounter diagnosis)  Comment:   Plan: UROLOGY ADULT REFERRAL        May have some BPH -- has incomplete emptying, nocturia at times    (E11.9) Type 2 diabetes mellitus without complication, without long-term current use of insulin (H)  Comment:   Plan: doing well    (E78.5) Hyperlipidemia LDL goal <100  Comment:   Plan: fasting next visit    (I10) Essential hypertension with goal blood pressure less than 140/90  Comment:   Plan: stable    (C44.310) BCC (basal cell carcinoma), face  Comment:   Plan: stable    (E55.9) Hypovitaminosis D  Comment:   Plan: Vitamin D Deficiency             BMI:   Estimated body mass index is 34.58 kg/m  as calculated from the following:    Height as of 2/28/19: 1.778 m (5' 10\").    Weight as of this encounter: 109.3 kg (241 lb).   Weight management plan: Discussed healthy diet and exercise " guidelines      Patient was agreeable to this plan and had no further questions.  There are no Patient Instructions on file for this visit.    No follow-ups on file.    Kelly Yarbrough MD  [facility]

## 2019-08-28 ENCOUNTER — OFFICE VISIT (OUTPATIENT)
Dept: FAMILY MEDICINE | Facility: OTHER | Age: 64
End: 2019-08-28
Attending: FAMILY MEDICINE
Payer: COMMERCIAL

## 2019-08-28 VITALS
SYSTOLIC BLOOD PRESSURE: 130 MMHG | BODY MASS INDEX: 34.58 KG/M2 | RESPIRATION RATE: 22 BRPM | DIASTOLIC BLOOD PRESSURE: 76 MMHG | TEMPERATURE: 97.3 F | HEART RATE: 80 BPM | WEIGHT: 241 LBS | OXYGEN SATURATION: 95 %

## 2019-08-28 DIAGNOSIS — R97.20 ELEVATED PROSTATE SPECIFIC ANTIGEN (PSA): Primary | ICD-10-CM

## 2019-08-28 DIAGNOSIS — I10 ESSENTIAL HYPERTENSION WITH GOAL BLOOD PRESSURE LESS THAN 140/90: ICD-10-CM

## 2019-08-28 DIAGNOSIS — E55.9 HYPOVITAMINOSIS D: ICD-10-CM

## 2019-08-28 DIAGNOSIS — E11.9 TYPE 2 DIABETES MELLITUS WITHOUT COMPLICATION, WITHOUT LONG-TERM CURRENT USE OF INSULIN (H): ICD-10-CM

## 2019-08-28 DIAGNOSIS — E78.5 HYPERLIPIDEMIA LDL GOAL <100: ICD-10-CM

## 2019-08-28 DIAGNOSIS — Z12.5 SCREENING FOR PROSTATE CANCER: ICD-10-CM

## 2019-08-28 DIAGNOSIS — C44.310 BCC (BASAL CELL CARCINOMA), FACE: ICD-10-CM

## 2019-08-28 DIAGNOSIS — E11.9 TYPE 2 DIABETES MELLITUS (H): ICD-10-CM

## 2019-08-28 LAB
EST. AVERAGE GLUCOSE BLD GHB EST-MCNC: 151 MG/DL
HBA1C MFR BLD: 6.9 % (ref 0–5.6)
PSA SERPL-ACNC: 5.04 UG/L (ref 0–4)

## 2019-08-28 PROCEDURE — 40000788 ZZHCL STATISTIC ESTIMATED AVERAGE GLUCOSE: Performed by: FAMILY MEDICINE

## 2019-08-28 PROCEDURE — 83036 HEMOGLOBIN GLYCOSYLATED A1C: CPT | Performed by: FAMILY MEDICINE

## 2019-08-28 PROCEDURE — 36415 COLL VENOUS BLD VENIPUNCTURE: CPT | Performed by: FAMILY MEDICINE

## 2019-08-28 PROCEDURE — G0103 PSA SCREENING: HCPCS | Performed by: FAMILY MEDICINE

## 2019-08-28 PROCEDURE — 99214 OFFICE O/P EST MOD 30 MIN: CPT | Performed by: FAMILY MEDICINE

## 2019-08-28 RX ORDER — TURMERIC ROOT EXTRACT 500 MG
1 TABLET ORAL 2 TIMES DAILY
COMMUNITY

## 2019-08-28 ASSESSMENT — PAIN SCALES - GENERAL: PAINLEVEL: NO PAIN (0)

## 2019-08-28 NOTE — NURSING NOTE
"Chief Complaint   Patient presents with     Diabetes       Initial /76 (BP Location: Right arm, Patient Position: Chair, Cuff Size: Adult Large)   Pulse 80   Temp 97.3  F (36.3  C) (Tympanic)   Resp 22   Wt 109.3 kg (241 lb)   SpO2 95%   BMI 34.58 kg/m   Estimated body mass index is 34.58 kg/m  as calculated from the following:    Height as of 2/28/19: 1.778 m (5' 10\").    Weight as of this encounter: 109.3 kg (241 lb).  Medication Reconciliation: complete   Miriam Diop LPN      "

## 2019-09-11 ENCOUNTER — OFFICE VISIT (OUTPATIENT)
Dept: UROLOGY | Facility: OTHER | Age: 64
End: 2019-09-11
Attending: UROLOGY
Payer: COMMERCIAL

## 2019-09-11 VITALS
HEART RATE: 79 BPM | HEIGHT: 70 IN | WEIGHT: 238 LBS | DIASTOLIC BLOOD PRESSURE: 70 MMHG | TEMPERATURE: 97.7 F | OXYGEN SATURATION: 94 % | SYSTOLIC BLOOD PRESSURE: 140 MMHG | BODY MASS INDEX: 34.07 KG/M2

## 2019-09-11 DIAGNOSIS — R97.20 ELEVATED PROSTATE SPECIFIC ANTIGEN (PSA): ICD-10-CM

## 2019-09-11 LAB — PSA SERPL-MCNC: 5.33 UG/L (ref 0–4)

## 2019-09-11 PROCEDURE — 99204 OFFICE O/P NEW MOD 45 MIN: CPT | Performed by: UROLOGY

## 2019-09-11 PROCEDURE — 36415 COLL VENOUS BLD VENIPUNCTURE: CPT | Performed by: UROLOGY

## 2019-09-11 PROCEDURE — 84153 ASSAY OF PSA TOTAL: CPT | Performed by: UROLOGY

## 2019-09-11 RX ORDER — CIPROFLOXACIN 500 MG/1
TABLET, FILM COATED ORAL
Qty: 6 TABLET | Refills: 0 | Status: SHIPPED | OUTPATIENT
Start: 2019-09-11 | End: 2019-10-16

## 2019-09-11 ASSESSMENT — MIFFLIN-ST. JEOR: SCORE: 1880.81

## 2019-09-11 ASSESSMENT — PAIN SCALES - GENERAL: PAINLEVEL: NO PAIN (0)

## 2019-09-11 NOTE — PROGRESS NOTES
Type of Visit  NPV    Chief Complaint  Elevated PSA    HPI  Mr. Hannah is a 63 year old male who presents with an elevated PSA.  He does not have a family history of prostate cancer.  The patient has not previously undergone prostate biopsy.  No associated worsening LUTS, dysuria or prostatitis at the time of the PSA.  The most recent PSA was collected 2 weeks ago.      Past Medical History  He  has a past medical history of Basal cell carcinoma, DIABETES MELLITUS TYPE II-UNCOMPL (1/19/2006), HYPERLIPIDEMIA NEC/NOS (10/18/2006), HYPERTENSION NOS (1/19/2006), Obesity, and Shingles (11/2017).  Patient Active Problem List   Diagnosis     Hyperlipidemia LDL goal <100     Advanced directives, counseling/discussion     BCC (basal cell carcinoma), face     Obesity due to excess calories, unspecified obesity severity     Other insomnia     Essential hypertension with goal blood pressure less than 140/90     ACP (advance care planning)     Type 2 diabetes mellitus without complication, without long-term current use of insulin (H)     Herpes zoster without complication     Trigger finger, acquired     Hypovitaminosis D     Elevated prostate specific antigen (PSA)       Past Surgical History  He  has a past surgical history that includes colonoscopy (3-3-2014) and wisdom teeth extraction.    Medications  He has a current medication list which includes the following prescription(s): acetylcysteine, amlodipine, aspirin, atorvastatin, b-d u/f, blood glucose monitoring, contour next test, glipizide, liraglutide, lisinopril, melatonin, metformin, multiple vitamins-minerals, omega-3 fatty acids, sildenafil, turmeric, vitamin b complex with vitamin c, and cholecalciferol.    Allergies  Allergies   Allergen Reactions     Nkda [No Known Drug Allergies]        Social History  He  reports that he has never smoked. He has never used smokeless tobacco. He reports that he drinks alcohol. He reports that he does not use drugs.  No drug  "abuse.    Family History  Family History   Problem Relation Age of Onset     Cardiovascular Father         aortic valve surgery     Cancer Father         lung cancer     Gastrointestinal Disease Father         benign esophageal tumor removed     Hypertension Father      Diabetes Father 70     Other - See Comments Mother         infection     Family History Negative Sister        Review of Systems  I personally reviewed the ROS with the patient.    Nursing Notes:   Inga Lindsay LPN  9/11/2019  9:59 AM  Signed  Chief Complaint   Patient presents with     Consult For     Elevated PSA, Dr. Yarbrough referring.       Initial BP (!) 140/70 (BP Location: Right arm, Patient Position: Chair, Cuff Size: Adult Regular)   Pulse 79   Temp 97.7  F (36.5  C) (Tympanic)   Ht 1.778 m (5' 10\")   Wt 108 kg (238 lb)   SpO2 94%   BMI 34.15 kg/m    Estimated body mass index is 34.15 kg/m  as calculated from the following:    Height as of this encounter: 1.778 m (5' 10\").    Weight as of this encounter: 108 kg (238 lb).  Medication Reconciliation: complete   INGA LINDSAY LPN    Review of Systems:    Weight loss:    No     Recent fever/chills:  No   Night sweats:   No  Current skin rash:  No   Recent hair loss:  No  Heat intolerance:  No   Cold intolerance:  No  Chest pain:   No   Palpitations:   No  Shortness of breath:  No   Wheezing:   No  Constipation:    No   Diarrhea:   No   Nausea:   No   Vomiting:   No   Kidney/side pain:  No   Back pain:   No  Frequent headaches:  No   Dizziness:     No  Leg swelling:   No   Calf pain:    No    Parents, brothers or sisters with history of kidney cancer:   No  Parents, brothers or sisters with history of bladder cancer: No  INGA LINDSAY LPN        Physical Exam  Vitals:    09/11/19 0938   BP: (!) 140/70   BP Location: Right arm   Patient Position: Chair   Cuff Size: Adult Regular   Pulse: 79   Temp: 97.7  F (36.5  C)   TempSrc: Tympanic   SpO2: 94%   Weight: 108 kg (238 lb)   Height: " "1.778 m (5' 10\")     Constitutional: No acute distress.  Alert and cooperative   Head: NCAT  Eyes: Conjunctivae normal  Cardiovascular: Regular rate.  Pulmonary/Chest: Respirations are even and non-labored bilaterally, no audible wheezing  Abdominal: Soft. No distension, tenderness, masses or guarding.   Neurological: A + O x 3.  Cranial Nerves II-XII grossly intact.  Extremities: JIMBO x 4, Warm. No clubbing.  No cyanosis.    Skin: Pink, warm and dry.  No visible rashes noted.  Psychiatric:  Normal mood and affect  Back:  No left CVA tenderness.  No right CVA tenderness.  Genitourinary:  Nonpalpable bladder    Labs  Results for LUCAS HANNAH (MRN [id number]) as of 9/11/2019 15:03   8/28/2019 08:48 9/11/2019 10:30   PSA 5.04 (H) 5.33 (H)     Results for LUCAS HANNAH (MRN [id number]) as of 9/11/2019 09:59   2/2/2007 08:37   PSA 1.10     Assessment  Mr. Hannah is a 63 year old male who presents with elevated PSA.    Discussed the risks of biopsy leading to overdiagnosis and overtreatment.    We discussed options regarding the elevated PSA including continuing to check serial levels, Select MDx urine testing to further stratify his personal risk level, MRI of the prostate, prostate ultrasound.    We discussed the risks of biopsy possibly leading to over diagnosis and over treatment.    I explained to the patient that an elevated PSA is a marker of risk of prostate cancer and a prostate biopsy would be the next step in diagnosis.  I explained that sampling error can occur with any biopsy and there is a risk of potentially missing a cancer that may be present.    I discussed the risks, benefits, and alternatives to prostate biopsy, including hematuria, hematochezia, and hematospermia.  I also discussed the risk of diagnosing a clinically-insignificant prostate cancer.  I discussed the risks of sepsis, which can be minimized by prophylactic antibiotics.     Plan  PSA today  Recommended TRUS biopsy of prostate in the OR " with MAC sedation   Gentamicin 120mg IM prior to the biopsy.   Ciprofloxacin 500mg po bid for 3 days to start the day prior to biopsy.   I informed him to hold aspirin for 10-14 days prior to biopsy.   1/2 tablet of glipizide when taking cipro

## 2019-09-11 NOTE — NURSING NOTE
"Chief Complaint   Patient presents with     Consult For     Elevated PSA, Dr. Yarbrough referring.       Initial BP (!) 140/70 (BP Location: Right arm, Patient Position: Chair, Cuff Size: Adult Regular)   Pulse 79   Temp 97.7  F (36.5  C) (Tympanic)   Ht 1.778 m (5' 10\")   Wt 108 kg (238 lb)   SpO2 94%   BMI 34.15 kg/m   Estimated body mass index is 34.15 kg/m  as calculated from the following:    Height as of this encounter: 1.778 m (5' 10\").    Weight as of this encounter: 108 kg (238 lb).  Medication Reconciliation: complete   INGA LINDSAY LPN    Review of Systems:    Weight loss:    No     Recent fever/chills:  No   Night sweats:   No  Current skin rash:  No   Recent hair loss:  No  Heat intolerance:  No   Cold intolerance:  No  Chest pain:   No   Palpitations:   No  Shortness of breath:  No   Wheezing:   No  Constipation:    No   Diarrhea:   No   Nausea:   No   Vomiting:   No   Kidney/side pain:  No   Back pain:   No  Frequent headaches:  No   Dizziness:     No  Leg swelling:   No   Calf pain:    No    Parents, brothers or sisters with history of kidney cancer:   No  Parents, brothers or sisters with history of bladder cancer: No  INGA LINDSAY LPN      "

## 2019-09-18 ENCOUNTER — TELEPHONE (OUTPATIENT)
Dept: UROLOGY | Facility: OTHER | Age: 64
End: 2019-09-18

## 2019-09-26 DIAGNOSIS — E11.65 TYPE 2 DIABETES MELLITUS WITH HYPERGLYCEMIA, WITHOUT LONG-TERM CURRENT USE OF INSULIN (H): ICD-10-CM

## 2019-09-28 DIAGNOSIS — E11.65 TYPE 2 DIABETES MELLITUS WITH HYPERGLYCEMIA, WITHOUT LONG-TERM CURRENT USE OF INSULIN (H): ICD-10-CM

## 2019-09-30 ENCOUNTER — TELEPHONE (OUTPATIENT)
Dept: EDUCATION SERVICES | Facility: HOSPITAL | Age: 64
End: 2019-09-30

## 2019-09-30 DIAGNOSIS — E11.9 TYPE 2 DIABETES MELLITUS WITHOUT COMPLICATION, WITHOUT LONG-TERM CURRENT USE OF INSULIN (H): Primary | ICD-10-CM

## 2019-09-30 RX ORDER — PEN NEEDLE, DIABETIC 32GX 5/32"
NEEDLE, DISPOSABLE MISCELLANEOUS
Qty: 100 EACH | Refills: 1 | Status: SHIPPED | OUTPATIENT
Start: 2019-09-30 | End: 2020-06-02

## 2019-10-02 RX ORDER — LIRAGLUTIDE 6 MG/ML
INJECTION SUBCUTANEOUS
Qty: 6 ML | Refills: 1 | Status: SHIPPED | OUTPATIENT
Start: 2019-10-02 | End: 2019-11-21

## 2019-10-05 DIAGNOSIS — E11.65 TYPE 2 DIABETES MELLITUS WITH HYPERGLYCEMIA, WITHOUT LONG-TERM CURRENT USE OF INSULIN (H): ICD-10-CM

## 2019-10-08 RX ORDER — GLIPIZIDE 10 MG/1
TABLET, FILM COATED, EXTENDED RELEASE ORAL
Qty: 60 TABLET | Refills: 2 | Status: SHIPPED | OUTPATIENT
Start: 2019-10-08 | End: 2020-01-29

## 2019-10-10 NOTE — PROGRESS NOTES
[facility] - [place]  [address]  [place] [address]  [phone]  Dept: [phone]    PRE-OP EVALUATION:  Today's date: 10/17/2019    Edward Hannah (: 1955) presents for pre-operative evaluation assessment as requested by Dr. DOLL.  He requires evaluation and anesthesia risk assessment prior to undergoing surgery/procedure for treatment of ELEVATED PSA .    Proposed Surgery/ Procedure: Transrectal Ultrasound guided biopsy of prostate  Date of Surgery/ Procedure: 10-  Time of Surgery/ Procedure: [facility]  Hospital/Surgical Facility: [facility]  Fax number for surgical facility: n/a  Primary Physician: Kelly Yarbrough  Type of Anesthesia Anticipated: to be determined    Patient has a Health Care Directive or Living Will:  YES     1. NO - Do you have a history of heart attack, stroke, stent, bypass or surgery on an artery in the head, neck, heart or legs?  2. NO - Do you ever have any pain or discomfort in your chest?  3. NO - Do you have a history of  Heart Failure?  4. NO - Are you troubled by shortness of breath when: walking on the level, up a slight hill or at night?  5. NO - Do you currently have a cold, bronchitis or other respiratory infection?  6. NO - Do you have a cough, shortness of breath or wheezing?  7. NO - Do you sometimes get pains in the calves of your legs when you walk?  8. NO - Do you or anyone in your family have previous history of blood clots?  9. NO - Do you or does anyone in your family have a serious bleeding problem such as prolonged bleeding following surgeries or cuts?  10. NO - Have you ever had problems with anemia or been told to take iron pills?  11. NO - Have you had any abnormal blood loss such as black, tarry or bloody stools, or abnormal vaginal bleeding?  12. NO - Have you ever had a blood transfusion?  13. NO - Have you or any of your relatives ever had problems with anesthesia?  14. NO - Do you have sleep apnea, excessive snoring or  daytime drowsiness?  15. NO - Do you have any prosthetic heart valves?  16. NO - Do you have prosthetic joints?  17. NO - Is there any chance that you may be pregnant?      HPI:     HPI related to upcoming procedure: elevated PSA      See problem list for active medical problems.  Problems all longstanding and stable, except as noted/documented.  See ROS for pertinent symptoms related to these conditions.      MEDICAL HISTORY:     Patient Active Problem List    Diagnosis Date Noted     Hypovitaminosis D 08/28/2019     Priority: Medium     Elevated prostate specific antigen (PSA) 08/28/2019     Priority: Medium     Herpes zoster without complication 11/06/2017     Priority: Medium     Trigger finger, acquired 11/06/2017     Priority: Medium     Type 2 diabetes mellitus without complication, without long-term current use of insulin (H) 01/18/2017     Priority: Medium     ACP (advance care planning) 07/21/2016     Priority: Medium     Advance Care Planning 7/21/2016: ACP Review of Chart / Resources Provided:  Reviewed chart for advance care plan.  Edward Hannah has no plan or code status on file however states presence of ACP document. Copy requested. Confirmed code status reflects current choices pending receipt of document/advance care plan review.  Confirmed/documented legally designated decision makers.  Added by Jadyn Singh             Essential hypertension with goal blood pressure less than 140/90 07/20/2016     Priority: Medium     Other insomnia 04/20/2016     Priority: Medium     Obesity due to excess calories, unspecified obesity severity 01/20/2016     Priority: Medium     BCC (basal cell carcinoma), face 09/07/2012     Priority: Medium     Advanced directives, counseling/discussion 06/28/2011     Priority: Medium     Advance Directive Problem List Overview:   Name Relationship Phone    Primary Health Care Agent            Alternative Health Care Agent          Discussed advance care planning with  patient; however, patient declined at this time. 6/28/2011          Hyperlipidemia LDL goal <100 05/09/2010     Priority: Medium      Past Medical History:   Diagnosis Date     Basal cell carcinoma      DIABETES MELLITUS TYPE II-UNCOMPL 1/19/2006     HYPERLIPIDEMIA NEC/NOS 10/18/2006     HYPERTENSION NOS 1/19/2006     Obesity      Shingles 11/2017     Past Surgical History:   Procedure Laterality Date     COLONOSCOPY  3-3-2014    due 2019  4 polyps benign     wisdom teeth extraction       Current Outpatient Medications   Medication Sig Dispense Refill     acetylcysteine (N-ACETYL CYSTEINE) 500 MG CAPS capsule Take 1 capsule (500 mg) by mouth daily       amLODIPine (NORVASC) 2.5 MG tablet TAKE 1 TABLET BY MOUTH EVERY DAY 90 tablet 0     ASPIRIN 81 MG OR TABS 1 tab po QD (Once per day) 100 3     atorvastatin (LIPITOR) 20 MG tablet TAKE 1 TABLET BY MOUTH ONCE DAILY 120 tablet 0     B-D U/F 31G X 8 MM insulin pen needle USE 2 DAILY OR AS DIRECTED 100 each 3     BD TYRONE U/F 32G X 4 MM insulin pen needle USE ONE NEEDLE DAILY AS DIRECTED 100 each 1     blood glucose monitoring ([employer] MICROLET) lancets Use to test blood sugar 3 times daily. 1 Box 11     ciprofloxacin (CIPRO) 500 MG tablet Take one tablet twice daily, start the day before the biopsy and continue x 3 days 6 tablet 0     CONTOUR NEXT TEST test strip USE TO TEST BLOOD SUGAR THREE TIMES DAILY 100 strip 3     glipiZIDE (GLUCOTROL XL) 10 MG 24 hr tablet TAKE 1 TABLET BY MOUTH TWICE DAILY 60 tablet 2     liraglutide (VICTOZA PEN) 18 MG/3ML solution ADMINISTER 1.2 MG UNDER THE SKIN EVERY DAY 6 mL 1     lisinopril (PRINIVIL/ZESTRIL) 40 MG tablet TAKE 1 TABLET BY MOUTH EVERY DAY 90 tablet 0     MELATONIN PO Takes 5 mg at bedtime daily       metFORMIN (GLUCOPHAGE) 1000 MG tablet TAKE 1 TABLET BY MOUTH TWICE DAILY WITH MEALS 180 tablet 0     Multiple Vitamins-Minerals (MULTIVITAMIN PO) Take 1 tablet by mouth daily       Omega-3 Fatty Acids (OMEGA-3 FISH OIL PO) Take 1 g  "by mouth daily       sildenafil (REVATIO) 20 MG tablet Take 3-5 (60-100mg) tablets by mouth 1 hour prior to sexual activity 30 tablet 11     Turmeric 500 MG TABS Take 1 tablet by mouth 2 times daily       vitamin B complex with vitamin C (VITAMIN  B COMPLEX) tablet Take 1 tablet by mouth daily       VITAMIN D, CHOLECALCIFEROL, PO Take 1,000 Units by mouth daily       OTC products: None, except as noted above    Allergies   Allergen Reactions     Nkda [No Known Drug Allergies]       Latex Allergy: NO    Social History     Tobacco Use     Smoking status: Never Smoker     Smokeless tobacco: Never Used     Tobacco comment: remote cigar history   Substance Use Topics     Alcohol use: Yes     Alcohol/week: 0.0 standard drinks     Comment: 1-2/day     History   Drug Use No     Comment: remote THC       REVIEW OF SYSTEMS:   CONSTITUTIONAL: NEGATIVE for fever, chills, change in weight  INTEGUMENTARY/SKIN: NEGATIVE for worrisome rashes, moles or lesions  EYES: NEGATIVE for vision changes or irritation  ENT/MOUTH: NEGATIVE for ear, mouth and throat problems  RESP: NEGATIVE for significant cough or SOB  BREAST: NEGATIVE for masses, tenderness or discharge  CV: NEGATIVE for chest pain, palpitations or peripheral edema  GI: NEGATIVE for nausea, abdominal pain, heartburn, or change in bowel habits  : NEGATIVE for frequency, dysuria, or hematuria  MUSCULOSKELETAL: NEGATIVE for significant arthralgias or myalgia  NEURO: NEGATIVE for weakness, dizziness or paresthesias  ENDOCRINE: NEGATIVE for temperature intolerance, skin/hair changes  HEME: NEGATIVE for bleeding problems  PSYCHIATRIC: NEGATIVE for changes in mood or affect    EXAM:   /72 (BP Location: Left arm, Patient Position: Chair, Cuff Size: Adult Large)   Pulse 102   Temp 98.3  F (36.8  C) (Tympanic)   Ht 1.778 m (5' 10\")   Wt 112 kg (247 lb)   SpO2 93%   BMI 35.44 kg/m      GENERAL APPEARANCE: healthy, alert and no distress     EYES: EOMI,  PERRL     HENT: ear " canals and TM's normal and nose and mouth without ulcers or lesions     NECK: no adenopathy, no asymmetry, masses, or scars and thyroid normal to palpation     RESP: lungs clear to auscultation - no rales, rhonchi or wheezes     CV: regular rates and rhythm, normal S1 S2, no S3 or S4 and no murmur, click or rub     ABDOMEN:  soft, nontender, no HSM or masses and bowel sounds normal     MS: extremities normal- no gross deformities noted, no evidence of inflammation in joints, FROM in all extremities.     SKIN: no suspicious lesions or rashes     NEURO: Normal strength and tone, sensory exam grossly normal, mentation intact and speech normal     PSYCH: mentation appears normal. and affect normal/bright     LYMPHATICS: No cervical adenopathy    DIAGNOSTICS:     EKG: appears normal, NSR, normal axis, normal intervals, no acute ST/T changes c/w ischemia, no LVH by voltage criteria, unchanged from previous tracings  Labs Drawn and in Process:   Unresulted Labs Ordered in the Past 30 Days of this Admission     Date and Time Order Name Status Description    10/17/2019 1407 ESTIMATED AVERAGE GLUCOSE In process     10/17/2019 1403 VITAMIN D DEFICIENCY SCREENING In process           Recent Labs   Lab Test 08/28/19  0848 05/31/19  0948 02/28/19  0821  02/22/18  0826   NA  --   --  140  --  136   POTASSIUM  --   --  4.1  --  4.3   CR  --   --  0.92  --  0.89   A1C 6.9* 6.6* 7.3*   < > 6.9*    < > = values in this interval not displayed.        IMPRESSION:   Reason for surgery/procedure: elevated PSA, r/o prostate ca  Diagnosis/reason for consult: cardiopulmonary clearance    The proposed surgical procedure is considered LOW risk.    REVISED CARDIAC RISK INDEX  The patient has the following serious cardiovascular risks for perioperative complications such as (MI, PE, VFib and 3  AV Block):  No serious cardiac risks  INTERPRETATION: 0 risks: Class I (very low risk - 0.4% complication rate)    The patient has the following  additional risks for perioperative complications:  No identified additional risks  The 10-year ASCVD risk score (Caitlinzoraida PATEL Jr., et al., 2013) is: 23.6%    Values used to calculate the score:      Age: 64 years      Sex: Male      Is Non- : No      Diabetic: Yes      Tobacco smoker: No      Systolic Blood Pressure: 136 mmHg      Is BP treated: Yes      HDL Cholesterol: 50 mg/dL      Total Cholesterol: 161 mg/dL    No diagnosis found.    RECOMMENDATIONS:     --Patient is to take all scheduled medications on the day of surgery EXCEPT for modifications listed below.    APPROVAL GIVEN to proceed with proposed procedure, without further diagnostic evaluation       Signed Electronically by: Kelly Yarbrough MD    Copy of this evaluation report is provided to requesting physician.    [place] Preop Guidelines    Revised Cardiac Risk Index

## 2019-10-10 NOTE — H&P (VIEW-ONLY)
[facility] [place]  [address]  [place] [address]  [phone]  Dept: [phone]    PRE-OP EVALUATION:  Today's date: 10/17/2019    Edward Hannah (: 1955) presents for pre-operative evaluation assessment as requested by Dr. DOLL.  He requires evaluation and anesthesia risk assessment prior to undergoing surgery/procedure for treatment of ELEVATED PSA .    Proposed Surgery/ Procedure: Transrectal Ultrasound guided biopsy of prostate  Date of Surgery/ Procedure: 10-  Time of Surgery/ Procedure: [facility]  Hospital/Surgical Facility: [facility]  Fax number for surgical facility: n/a  Primary Physician: Kelly Yarbrough  Type of Anesthesia Anticipated: to be determined    Patient has a Health Care Directive or Living Will:  YES     1. NO - Do you have a history of heart attack, stroke, stent, bypass or surgery on an artery in the head, neck, heart or legs?  2. NO - Do you ever have any pain or discomfort in your chest?  3. NO - Do you have a history of  Heart Failure?  4. NO - Are you troubled by shortness of breath when: walking on the level, up a slight hill or at night?  5. NO - Do you currently have a cold, bronchitis or other respiratory infection?  6. NO - Do you have a cough, shortness of breath or wheezing?  7. NO - Do you sometimes get pains in the calves of your legs when you walk?  8. NO - Do you or anyone in your family have previous history of blood clots?  9. NO - Do you or does anyone in your family have a serious bleeding problem such as prolonged bleeding following surgeries or cuts?  10. NO - Have you ever had problems with anemia or been told to take iron pills?  11. NO - Have you had any abnormal blood loss such as black, tarry or bloody stools, or abnormal vaginal bleeding?  12. NO - Have you ever had a blood transfusion?  13. NO - Have you or any of your relatives ever had problems with anesthesia?  14. NO - Do you have sleep apnea, excessive snoring or  daytime drowsiness?  15. NO - Do you have any prosthetic heart valves?  16. NO - Do you have prosthetic joints?  17. NO - Is there any chance that you may be pregnant?      HPI:     HPI related to upcoming procedure: elevated PSA      See problem list for active medical problems.  Problems all longstanding and stable, except as noted/documented.  See ROS for pertinent symptoms related to these conditions.      MEDICAL HISTORY:     Patient Active Problem List    Diagnosis Date Noted     Hypovitaminosis D 08/28/2019     Priority: Medium     Elevated prostate specific antigen (PSA) 08/28/2019     Priority: Medium     Herpes zoster without complication 11/06/2017     Priority: Medium     Trigger finger, acquired 11/06/2017     Priority: Medium     Type 2 diabetes mellitus without complication, without long-term current use of insulin (H) 01/18/2017     Priority: Medium     ACP (advance care planning) 07/21/2016     Priority: Medium     Advance Care Planning 7/21/2016: ACP Review of Chart / Resources Provided:  Reviewed chart for advance care plan.  Edward Hannah has no plan or code status on file however states presence of ACP document. Copy requested. Confirmed code status reflects current choices pending receipt of document/advance care plan review.  Confirmed/documented legally designated decision makers.  Added by Jadyn Singh             Essential hypertension with goal blood pressure less than 140/90 07/20/2016     Priority: Medium     Other insomnia 04/20/2016     Priority: Medium     Obesity due to excess calories, unspecified obesity severity 01/20/2016     Priority: Medium     BCC (basal cell carcinoma), face 09/07/2012     Priority: Medium     Advanced directives, counseling/discussion 06/28/2011     Priority: Medium     Advance Directive Problem List Overview:   Name Relationship Phone    Primary Health Care Agent            Alternative Health Care Agent          Discussed advance care planning with  patient; however, patient declined at this time. 6/28/2011          Hyperlipidemia LDL goal <100 05/09/2010     Priority: Medium      Past Medical History:   Diagnosis Date     Basal cell carcinoma      DIABETES MELLITUS TYPE II-UNCOMPL 1/19/2006     HYPERLIPIDEMIA NEC/NOS 10/18/2006     HYPERTENSION NOS 1/19/2006     Obesity      Shingles 11/2017     Past Surgical History:   Procedure Laterality Date     COLONOSCOPY  3-3-2014    due 2019  4 polyps benign     wisdom teeth extraction       Current Outpatient Medications   Medication Sig Dispense Refill     acetylcysteine (N-ACETYL CYSTEINE) 500 MG CAPS capsule Take 1 capsule (500 mg) by mouth daily       amLODIPine (NORVASC) 2.5 MG tablet TAKE 1 TABLET BY MOUTH EVERY DAY 90 tablet 0     ASPIRIN 81 MG OR TABS 1 tab po QD (Once per day) 100 3     atorvastatin (LIPITOR) 20 MG tablet TAKE 1 TABLET BY MOUTH ONCE DAILY 120 tablet 0     B-D U/F 31G X 8 MM insulin pen needle USE 2 DAILY OR AS DIRECTED 100 each 3     BD TYRONE U/F 32G X 4 MM insulin pen needle USE ONE NEEDLE DAILY AS DIRECTED 100 each 1     blood glucose monitoring ([employer] MICROLET) lancets Use to test blood sugar 3 times daily. 1 Box 11     ciprofloxacin (CIPRO) 500 MG tablet Take one tablet twice daily, start the day before the biopsy and continue x 3 days 6 tablet 0     CONTOUR NEXT TEST test strip USE TO TEST BLOOD SUGAR THREE TIMES DAILY 100 strip 3     glipiZIDE (GLUCOTROL XL) 10 MG 24 hr tablet TAKE 1 TABLET BY MOUTH TWICE DAILY 60 tablet 2     liraglutide (VICTOZA PEN) 18 MG/3ML solution ADMINISTER 1.2 MG UNDER THE SKIN EVERY DAY 6 mL 1     lisinopril (PRINIVIL/ZESTRIL) 40 MG tablet TAKE 1 TABLET BY MOUTH EVERY DAY 90 tablet 0     MELATONIN PO Takes 5 mg at bedtime daily       metFORMIN (GLUCOPHAGE) 1000 MG tablet TAKE 1 TABLET BY MOUTH TWICE DAILY WITH MEALS 180 tablet 0     Multiple Vitamins-Minerals (MULTIVITAMIN PO) Take 1 tablet by mouth daily       Omega-3 Fatty Acids (OMEGA-3 FISH OIL PO) Take 1 g  "by mouth daily       sildenafil (REVATIO) 20 MG tablet Take 3-5 (60-100mg) tablets by mouth 1 hour prior to sexual activity 30 tablet 11     Turmeric 500 MG TABS Take 1 tablet by mouth 2 times daily       vitamin B complex with vitamin C (VITAMIN  B COMPLEX) tablet Take 1 tablet by mouth daily       VITAMIN D, CHOLECALCIFEROL, PO Take 1,000 Units by mouth daily       OTC products: None, except as noted above    Allergies   Allergen Reactions     Nkda [No Known Drug Allergies]       Latex Allergy: NO    Social History     Tobacco Use     Smoking status: Never Smoker     Smokeless tobacco: Never Used     Tobacco comment: remote cigar history   Substance Use Topics     Alcohol use: Yes     Alcohol/week: 0.0 standard drinks     Comment: 1-2/day     History   Drug Use No     Comment: remote THC       REVIEW OF SYSTEMS:   CONSTITUTIONAL: NEGATIVE for fever, chills, change in weight  INTEGUMENTARY/SKIN: NEGATIVE for worrisome rashes, moles or lesions  EYES: NEGATIVE for vision changes or irritation  ENT/MOUTH: NEGATIVE for ear, mouth and throat problems  RESP: NEGATIVE for significant cough or SOB  BREAST: NEGATIVE for masses, tenderness or discharge  CV: NEGATIVE for chest pain, palpitations or peripheral edema  GI: NEGATIVE for nausea, abdominal pain, heartburn, or change in bowel habits  : NEGATIVE for frequency, dysuria, or hematuria  MUSCULOSKELETAL: NEGATIVE for significant arthralgias or myalgia  NEURO: NEGATIVE for weakness, dizziness or paresthesias  ENDOCRINE: NEGATIVE for temperature intolerance, skin/hair changes  HEME: NEGATIVE for bleeding problems  PSYCHIATRIC: NEGATIVE for changes in mood or affect    EXAM:   /72 (BP Location: Left arm, Patient Position: Chair, Cuff Size: Adult Large)   Pulse 102   Temp 98.3  F (36.8  C) (Tympanic)   Ht 1.778 m (5' 10\")   Wt 112 kg (247 lb)   SpO2 93%   BMI 35.44 kg/m      GENERAL APPEARANCE: healthy, alert and no distress     EYES: EOMI,  PERRL     HENT: ear " canals and TM's normal and nose and mouth without ulcers or lesions     NECK: no adenopathy, no asymmetry, masses, or scars and thyroid normal to palpation     RESP: lungs clear to auscultation - no rales, rhonchi or wheezes     CV: regular rates and rhythm, normal S1 S2, no S3 or S4 and no murmur, click or rub     ABDOMEN:  soft, nontender, no HSM or masses and bowel sounds normal     MS: extremities normal- no gross deformities noted, no evidence of inflammation in joints, FROM in all extremities.     SKIN: no suspicious lesions or rashes     NEURO: Normal strength and tone, sensory exam grossly normal, mentation intact and speech normal     PSYCH: mentation appears normal. and affect normal/bright     LYMPHATICS: No cervical adenopathy    DIAGNOSTICS:     EKG: appears normal, NSR, normal axis, normal intervals, no acute ST/T changes c/w ischemia, no LVH by voltage criteria, unchanged from previous tracings  Labs Drawn and in Process:   Unresulted Labs Ordered in the Past 30 Days of this Admission     Date and Time Order Name Status Description    10/17/2019 1407 ESTIMATED AVERAGE GLUCOSE In process     10/17/2019 1403 VITAMIN D DEFICIENCY SCREENING In process           Recent Labs   Lab Test 08/28/19  0848 05/31/19  0948 02/28/19  0821  02/22/18  0826   NA  --   --  140  --  136   POTASSIUM  --   --  4.1  --  4.3   CR  --   --  0.92  --  0.89   A1C 6.9* 6.6* 7.3*   < > 6.9*    < > = values in this interval not displayed.        IMPRESSION:   Reason for surgery/procedure: elevated PSA, r/o prostate ca  Diagnosis/reason for consult: cardiopulmonary clearance    The proposed surgical procedure is considered LOW risk.    REVISED CARDIAC RISK INDEX  The patient has the following serious cardiovascular risks for perioperative complications such as (MI, PE, VFib and 3  AV Block):  No serious cardiac risks  INTERPRETATION: 0 risks: Class I (very low risk - 0.4% complication rate)    The patient has the following  additional risks for perioperative complications:  No identified additional risks  The 10-year ASCVD risk score (Caitlinzoraida PATEL Jr., et al., 2013) is: 23.6%    Values used to calculate the score:      Age: 64 years      Sex: Male      Is Non- : No      Diabetic: Yes      Tobacco smoker: No      Systolic Blood Pressure: 136 mmHg      Is BP treated: Yes      HDL Cholesterol: 50 mg/dL      Total Cholesterol: 161 mg/dL    No diagnosis found.    RECOMMENDATIONS:     --Patient is to take all scheduled medications on the day of surgery EXCEPT for modifications listed below.    APPROVAL GIVEN to proceed with proposed procedure, without further diagnostic evaluation       Signed Electronically by: Kelly Yarbrough MD    Copy of this evaluation report is provided to requesting physician.    [facility] Preop Guidelines    Revised Cardiac Risk Index

## 2019-10-16 ENCOUNTER — HOSPITAL ENCOUNTER (OUTPATIENT)
Dept: EDUCATION SERVICES | Facility: HOSPITAL | Age: 64
Discharge: HOME | End: 2019-10-16
Attending: FAMILY MEDICINE | Admitting: FAMILY MEDICINE
Payer: COMMERCIAL

## 2019-10-16 VITALS
RESPIRATION RATE: 16 BRPM | DIASTOLIC BLOOD PRESSURE: 84 MMHG | HEART RATE: 83 BPM | BODY MASS INDEX: 34.22 KG/M2 | HEIGHT: 71 IN | WEIGHT: 244.4 LBS | OXYGEN SATURATION: 95 % | SYSTOLIC BLOOD PRESSURE: 130 MMHG

## 2019-10-16 DIAGNOSIS — E11.9 TYPE 2 DIABETES MELLITUS WITHOUT COMPLICATION, WITHOUT LONG-TERM CURRENT USE OF INSULIN (H): Primary | ICD-10-CM

## 2019-10-16 PROCEDURE — G0108 DIAB MANAGE TRN  PER INDIV: HCPCS | Performed by: DIETITIAN, REGISTERED

## 2019-10-16 ASSESSMENT — MIFFLIN-ST. JEOR: SCORE: 1916.75

## 2019-10-16 ASSESSMENT — PAIN SCALES - GENERAL: PAINLEVEL: NO PAIN (0)

## 2019-10-16 NOTE — PROGRESS NOTES
"Diabetes Self-Management Education & Support    Diabetes Education Self Management & Training    SUBJECTIVE/OBJECTIVE:  Presents for: Follow-up  Accompanied by: Self  Diabetes education in the past 24mo: Yes  Focus of Visit: Monitoring, Assistance w/ making life changes  Diabetes type: Type 2  Date of diagnosis: 2005  Disease course: Stable  How confident are you filling out medical forms by yourself:: Extremely  Diabetes management related comments/concerns: none  Transportation concerns: No  Other concerns:: None  Cultural Influences/Ethnic Background:  American    Diabetes Symptoms & Complications  Blurred vision: No  Fatigue: No  Neuropathy: No  Foot ulcerations: No  Polydipsia: No  Polyphagia: No  Polyuria: No  Visual change: No  Weakness: No  Weight loss: No  Slow healing wounds: No  Recent Infection(s): No  Symptom course: Stable  Weight trend: Stable  Autonomic neuropathy: No  CVA: No  Heart disease: No  Nephropathy: No  Peripheral neuropathy: No  Peripheral Vascular Disease: No  Retinopathy: No    Patient Problem List and Family Medical History reviewed for relevant medical history, current medical status, and diabetes risk factors.    Vitals:  /84   Pulse 83   Resp 16   Ht 1.797 m (5' 10.75\")   Wt 110.9 kg (244 lb 6.4 oz)   SpO2 95%   BMI 34.33 kg/m    Estimated body mass index is 34.33 kg/m  as calculated from the following:    Height as of this encounter: 1.797 m (5' 10.75\").    Weight as of this encounter: 110.9 kg (244 lb 6.4 oz).   Last 3 BP:   BP Readings from Last 3 Encounters:   10/16/19 130/84   09/11/19 (!) 140/70   08/28/19 130/76       History   Smoking Status     Never Smoker   Smokeless Tobacco     Never Used     Comment: remote cigar history       Labs:  Lab Results   Component Value Date    A1C 6.9 08/28/2019     Lab Results   Component Value Date     02/28/2019     Lab Results   Component Value Date    LDL 77 02/28/2019     HDL Cholesterol   Date Value Ref Range Status "   2019 50 >39 mg/dL Final   ]  GFR Estimate   Date Value Ref Range Status   2019 88 >60 mL/min/[1.73_m2] Final     Comment:     Non  GFR Calc  Starting 2018, serum creatinine based estimated GFR (eGFR) will be   calculated using the Chronic Kidney Disease Epidemiology Collaboration   (CKD-EPI) equation.       GFR Estimate If Black   Date Value Ref Range Status   2019 >90 >60 mL/min/[1.73_m2] Final     Comment:      GFR Calc  Starting 2018, serum creatinine based estimated GFR (eGFR) will be   calculated using the Chronic Kidney Disease Epidemiology Collaboration   (CKD-EPI) equation.       Lab Results   Component Value Date    CR 0.92 2019     No results found for: MICROALBUMIN    Healthy Eating  Healthy Eating Assessed Today: Yes  Cultural/[employer] diet restrictions?: No  Meal planning: Avoiding sweets  Meals include: Breakfast  Breakfast: oatmeal or leftovers or energy bar - water or milk or sometimes protein drink   Lunch: may skip and just eat snack   Dinner: salads or steak with salad/vegis or soup  - occasional starch   Snacks: fruit or sandwich or brat or salad or dark chocolate  Beverages: Water, Milk, Tea, Diet soda  Has patient met with a dietitian in the past?: Yes    Being Active  Being Active Assessed Today: Yes  Exercise:: Yes(cardio and weights at fitness center)  Days per week of moderate to strenuous exercise (like a brisk walk): 4  On average, minutes per day of exercise at this level: 60  How intense was your typical exercise? : Moderate (like brisk walking)  Exercise Minutes per Week: 240  Barrier to exercise: Physical limitation, None    Monitoring  Monitoring Assessed Today: Yes  Did patient bring glucose meter to appointment? : Yes  Blood Glucose Meter: [employer]  Home Glucose (Sugar) Monitorin-2 times per day    Taking Medications  Diabetes Medication(s)     Biguanides       metFORMIN (GLUCOPHAGE) 1000 MG tablet     TAKE 1 TABLET BY MOUTH TWICE DAILY WITH MEALS    Sulfonylureas       glipiZIDE (GLUCOTROL XL) 10 MG 24 hr tablet    TAKE 1 TABLET BY MOUTH TWICE DAILY    Incretin Mimetic Agents (GLP-1 Receptor Agonists)       liraglutide (VICTOZA PEN) 18 MG/3ML solution    ADMINISTER 1.2 MG UNDER THE SKIN EVERY DAY        Taking Medication Assessed Today: Yes  Current Treatments: Non-insulin Injectables, Oral Agent (dual therapy)(Glucotrol XL 10 mg bid, Metformin 1000 mg bid, Victoza 1.2 mg daily, NAC)  Problems taking diabetes medications regularly?: No  Diabetes medication side effects?: No  Treatment Compliance: Most of the time    Problem Solving  Problem Solving Assessed Today: Yes  Hypoglycemia Frequency: Rarely  Hypoglycemia Treatment: Candy(Candy and then a snack or meal)  Patient carries a carbohydrate source: Yes  Medical alert: No  Severe weather/disaster plan for diabetes management?: No  DKA prevention plan?: No    Hypoglycemia symptoms  Hunger: Yes  Tremors: Yes    Reducing Risks  Reducing Risks Assessed Today: No  Diabetes Risks: Age over 45 years, Family History(Aunts)  CAD Risks: Diabetes Mellitus, Male sex  Has dilated eye exam at least once a year?: Yes(Due )  Sees dentist every 6 months?: No  Sees podiatrist (foot doctor)?: No    Healthy Coping  Healthy Coping Assessed Today: Yes  Emotional response to diabetes: Ready to learn, Confidence diabetes can be controlled, Acceptance  Stage of change: ACTION (Actively working towards change)  Difficulty affording diabetes management supplies?: No(Victoza expensive)  Support resources: None  Patient Activation Measure Survey Score:  EZ Score (Last Two) 6/28/2011 10/31/2018   EZ Raw Score 46 38   Activation Score 75.3 83.7   EZ Level 4 4     ASSESSMENT:  Glucose levels remain control as evidenced by recent A1c of 6.9%.  Eye exam and foot exam are due - discussed with pt.  He continues to make efforts to follow a healthy diet and get some routine exercise.     Goals  Addressed as of 10/16/2019                 Today       Patient Stated      Problem Solving (pt-stated)   On track    Added 10/17/18 by Karrie Coleman RD     My Goal: I will keep A1c less than 7.0%.     I plan to meet my goal by this date: next visit.             Patient's most recent   Lab Results   Component Value Date    A1C 6.9 08/28/2019    is meeting goal of <7.0    INTERVENTION:   Diabetes knowledge and skills assessment:     Patient is knowledgeable in diabetes management concepts related to: Healthy Eating, Being Active, Monitoring, Taking Medication, Problem Solving, Reducing Risks and Healthy Coping    Patient needs further education on the following diabetes management concepts: No concerns - pt is doing well.     Based on learning assessment above, most appropriate setting for further diabetes education would be: Individual setting.    Education provided today on:  AADE Self-Care Behaviors:  Healthy Eating: consistency in amount, composition, and timing of food intake and portion control  Being Active: relationship to blood glucose  Monitoring: individual blood glucose targets, frequency of monitoring.  Meter checked for accuracy.  No concerns.   Reducing Risks: major complications of diabetes, annual eye exam, A1C - goals, relating to blood glucose levels, how often to check and annual foot exam.     Opportunities for ongoing education and support in diabetes-self management were discussed.    Pt verbalized understanding of concepts discussed and recommendations provided today.       Education Materials Provided:  No new materials today.     PLAN:  Continue efforts to eat a healthy, low carbohydrate diet.   Try to get some daily exercise.  Test glucose 1x/day - alternate times.  A1c every 3-6 months.   Schedule eye exam.  Foot exam at next provider visit.   See Patient Instructions for co-developed, patient-stated behavior change goals.  AVS printed and provided to patient today. .  Follow up annually  or sooner if needed.     Time Spent: 30 minutes  Encounter Type: Individual    Any diabetes medication dose changes were made via the CDE Protocol and Collaborative Practice Agreement with the patient's referring provider. A copy of this encounter was shared with the provider.

## 2019-10-16 NOTE — PATIENT INSTRUCTIONS
-Keep limiting foods high in carbohydrates in your diet.   -Try to get some exercise most days of the week.  Exercise helps lower glucose levels.  Goal is 30 minutes daily.  -Test glucose 1x/day - try to alternate times - fasting and 2 hours after a meal are good choices.   -Target levels are fasting  and 2 hours after a meal less than 180.  -Last A1c was 6.9% in August.  Goal is to keep A1c less than 7.0%.   -Schedule eye exam.  -Foot exam is also due.  -Follow up annually or sooner if indicated.  -Call with concerns - MARIZA Nichols, [phone].

## 2019-10-16 NOTE — LETTER
"    10/16/2019        RE: Edward Hannah  [address]   [facility] [address]        Diabetes Self-Management Education & Support    Diabetes Education Self Management & Training    SUBJECTIVE/OBJECTIVE:  Presents for: Follow-up  Accompanied by: Self  Diabetes education in the past 24mo: Yes  Focus of Visit: Monitoring, Assistance w/ making life changes  Diabetes type: Type 2  Date of diagnosis: 2005  Disease course: Stable  How confident are you filling out medical forms by yourself:: Extremely  Diabetes management related comments/concerns: none  Transportation concerns: No  Other concerns:: None  Cultural Influences/Ethnic Background:  American    Diabetes Symptoms & Complications  Blurred vision: No  Fatigue: No  Neuropathy: No  Foot ulcerations: No  Polydipsia: No  Polyphagia: No  Polyuria: No  Visual change: No  Weakness: No  Weight loss: No  Slow healing wounds: No  Recent Infection(s): No  Symptom course: Stable  Weight trend: Stable  Autonomic neuropathy: No  CVA: No  Heart disease: No  Nephropathy: No  Peripheral neuropathy: No  Peripheral Vascular Disease: No  Retinopathy: No    Patient Problem List and Family Medical History reviewed for relevant medical history, current medical status, and diabetes risk factors.    Vitals:  /84   Pulse 83   Resp 16   Ht 1.797 m (5' 10.75\")   Wt 110.9 kg (244 lb 6.4 oz)   SpO2 95%   BMI 34.33 kg/m     Estimated body mass index is 34.33 kg/m  as calculated from the following:    Height as of this encounter: 1.797 m (5' 10.75\").    Weight as of this encounter: 110.9 kg (244 lb 6.4 oz).   Last 3 BP:   BP Readings from Last 3 Encounters:   10/16/19 130/84   09/11/19 (!) 140/70   08/28/19 130/76       History   Smoking Status     Never Smoker   Smokeless Tobacco     Never Used     Comment: remote cigar history       Labs:  Lab Results   Component Value Date    A1C 6.9 08/28/2019     Lab Results   Component Value Date     02/28/2019     Lab Results   Component " Value Date    LDL 77 2019     HDL Cholesterol   Date Value Ref Range Status   2019 50 >39 mg/dL Final   ]  GFR Estimate   Date Value Ref Range Status   2019 88 >60 mL/min/[1.73_m2] Final     Comment:     Non  GFR Calc  Starting 2018, serum creatinine based estimated GFR (eGFR) will be   calculated using the Chronic Kidney Disease Epidemiology Collaboration   (CKD-EPI) equation.       GFR Estimate If Black   Date Value Ref Range Status   2019 >90 >60 mL/min/[1.73_m2] Final     Comment:      GFR Calc  Starting 2018, serum creatinine based estimated GFR (eGFR) will be   calculated using the Chronic Kidney Disease Epidemiology Collaboration   (CKD-EPI) equation.       Lab Results   Component Value Date    CR 0.92 2019     No results found for: MICROALBUMIN    Healthy Eating  Healthy Eating Assessed Today: Yes  Cultural/[employer] diet restrictions?: No  Meal planning: Avoiding sweets  Meals include: Breakfast  Breakfast: oatmeal or leftovers or energy bar - water or milk or sometimes protein drink   Lunch: may skip and just eat snack   Dinner: salads or steak with salad/vegis or soup  - occasional starch   Snacks: fruit or sandwich or brat or salad or dark chocolate  Beverages: Water, Milk, Tea, Diet soda  Has patient met with a dietitian in the past?: Yes    Being Active  Being Active Assessed Today: Yes  Exercise:: Yes(cardio and weights at fitness center)  Days per week of moderate to strenuous exercise (like a brisk walk): 4  On average, minutes per day of exercise at this level: 60  How intense was your typical exercise? : Moderate (like brisk walking)  Exercise Minutes per Week: 240  Barrier to exercise: Physical limitation, None    Monitoring  Monitoring Assessed Today: Yes  Did patient bring glucose meter to appointment? : Yes  Blood Glucose Meter: [employer]  Home Glucose (Sugar) Monitorin-2 times per day    Taking  Medications  Diabetes Medication(s)     Biguanides       metFORMIN (GLUCOPHAGE) 1000 MG tablet    TAKE 1 TABLET BY MOUTH TWICE DAILY WITH MEALS    Sulfonylureas       glipiZIDE (GLUCOTROL XL) 10 MG 24 hr tablet    TAKE 1 TABLET BY MOUTH TWICE DAILY    Incretin Mimetic Agents (GLP-1 Receptor Agonists)       liraglutide (VICTOZA PEN) 18 MG/3ML solution    ADMINISTER 1.2 MG UNDER THE SKIN EVERY DAY        Taking Medication Assessed Today: Yes  Current Treatments: Non-insulin Injectables, Oral Agent (dual therapy)(Glucotrol XL 10 mg bid, Metformin 1000 mg bid, Victoza 1.2 mg daily, NAC)  Problems taking diabetes medications regularly?: No  Diabetes medication side effects?: No  Treatment Compliance: Most of the time    Problem Solving  Problem Solving Assessed Today: Yes  Hypoglycemia Frequency: Rarely  Hypoglycemia Treatment: Candy(Candy and then a snack or meal)  Patient carries a carbohydrate source: Yes  Medical alert: No  Severe weather/disaster plan for diabetes management?: No  DKA prevention plan?: No    Hypoglycemia symptoms  Hunger: Yes  Tremors: Yes    Reducing Risks  Reducing Risks Assessed Today: No  Diabetes Risks: Age over 45 years, Family History(Aunts)  CAD Risks: Diabetes Mellitus, Male sex  Has dilated eye exam at least once a year?: Yes(Due )  Sees dentist every 6 months?: No  Sees podiatrist (foot doctor)?: No    Healthy Coping  Healthy Coping Assessed Today: Yes  Emotional response to diabetes: Ready to learn, Confidence diabetes can be controlled, Acceptance  Stage of change: ACTION (Actively working towards change)  Difficulty affording diabetes management supplies?: No(Victoza expensive)  Support resources: None  Patient Activation Measure Survey Score:  EZ Score (Last Two) 6/28/2011 10/31/2018   EZ Raw Score 46 38   Activation Score 75.3 83.7   EZ Level 4 4     ASSESSMENT:  Glucose levels remain control as evidenced by recent A1c of 6.9%.  Eye exam and foot exam are due - discussed with pt.   He continues to make efforts to follow a healthy diet and get some routine exercise.     Goals Addressed as of 10/16/2019                 Today       Patient Stated      Problem Solving (pt-stated)   On track    Added 10/17/18 by Karrie Coleman RD     My Goal: I will keep A1c less than 7.0%.     I plan to meet my goal by this date: next visit.             Patient's most recent   Lab Results   Component Value Date    A1C 6.9 08/28/2019    is meeting goal of <7.0    INTERVENTION:   Diabetes knowledge and skills assessment:     Patient is knowledgeable in diabetes management concepts related to: Healthy Eating, Being Active, Monitoring, Taking Medication, Problem Solving, Reducing Risks and Healthy Coping    Patient needs further education on the following diabetes management concepts: No concerns - pt is doing well.     Based on learning assessment above, most appropriate setting for further diabetes education would be: Individual setting.    Education provided today on:  AADE Self-Care Behaviors:  Healthy Eating: consistency in amount, composition, and timing of food intake and portion control  Being Active: relationship to blood glucose  Monitoring: individual blood glucose targets, frequency of monitoring.  Meter checked for accuracy.  No concerns.   Reducing Risks: major complications of diabetes, annual eye exam, A1C - goals, relating to blood glucose levels, how often to check and annual foot exam.     Opportunities for ongoing education and support in diabetes-self management were discussed.    Pt verbalized understanding of concepts discussed and recommendations provided today.       Education Materials Provided:  No new materials today.     PLAN:  Continue efforts to eat a healthy, low carbohydrate diet.   Try to get some daily exercise.  Test glucose 1x/day - alternate times.  A1c every 3-6 months.   Schedule eye exam.  Foot exam at next provider visit.   See Patient Instructions for co-developed,  patient-stated behavior change goals.  AVS printed and provided to patient today. .  Follow up annually or sooner if needed.     Time Spent: 30 minutes  Encounter Type: Individual    Any diabetes medication dose changes were made via the CDE Protocol and Collaborative Practice Agreement with the patient's referring provider. A copy of this encounter was shared with the provider.        Sincerely,        Karrie Coleman RD

## 2019-10-17 ENCOUNTER — OFFICE VISIT (OUTPATIENT)
Dept: FAMILY MEDICINE | Facility: OTHER | Age: 64
End: 2019-10-17
Attending: FAMILY MEDICINE
Payer: COMMERCIAL

## 2019-10-17 VITALS
TEMPERATURE: 98.3 F | BODY MASS INDEX: 35.36 KG/M2 | SYSTOLIC BLOOD PRESSURE: 136 MMHG | DIASTOLIC BLOOD PRESSURE: 72 MMHG | WEIGHT: 247 LBS | HEIGHT: 70 IN | HEART RATE: 102 BPM | OXYGEN SATURATION: 93 %

## 2019-10-17 DIAGNOSIS — E55.9 HYPOVITAMINOSIS D: ICD-10-CM

## 2019-10-17 DIAGNOSIS — Z01.818 PREOP GENERAL PHYSICAL EXAM: ICD-10-CM

## 2019-10-17 DIAGNOSIS — Z23 NEED FOR PROPHYLACTIC VACCINATION AND INOCULATION AGAINST INFLUENZA: ICD-10-CM

## 2019-10-17 DIAGNOSIS — Z01.818 PREOP GENERAL PHYSICAL EXAM: Primary | ICD-10-CM

## 2019-10-17 DIAGNOSIS — E11.9 TYPE 2 DIABETES MELLITUS WITHOUT COMPLICATION, WITHOUT LONG-TERM CURRENT USE OF INSULIN (H): ICD-10-CM

## 2019-10-17 DIAGNOSIS — Z12.11 SPECIAL SCREENING FOR MALIGNANT NEOPLASMS, COLON: ICD-10-CM

## 2019-10-17 LAB
ALBUMIN SERPL-MCNC: 4.1 G/DL (ref 3.4–5)
ALP SERPL-CCNC: 83 U/L (ref 40–150)
ALT SERPL W P-5'-P-CCNC: 38 U/L (ref 0–70)
ANION GAP SERPL CALCULATED.3IONS-SCNC: 4 MMOL/L (ref 3–14)
AST SERPL W P-5'-P-CCNC: 14 U/L (ref 0–45)
BASOPHILS # BLD AUTO: 0 10E9/L (ref 0–0.2)
BASOPHILS NFR BLD AUTO: 0.3 %
BILIRUB SERPL-MCNC: 0.4 MG/DL (ref 0.2–1.3)
BUN SERPL-MCNC: 17 MG/DL (ref 7–30)
CALCIUM SERPL-MCNC: 9.5 MG/DL (ref 8.5–10.1)
CHLORIDE SERPL-SCNC: 107 MMOL/L (ref 94–109)
CO2 SERPL-SCNC: 27 MMOL/L (ref 20–32)
CREAT SERPL-MCNC: 0.89 MG/DL (ref 0.66–1.25)
DIFFERENTIAL METHOD BLD: NORMAL
EOSINOPHIL # BLD AUTO: 0.5 10E9/L (ref 0–0.7)
EOSINOPHIL NFR BLD AUTO: 5.5 %
ERYTHROCYTE [DISTWIDTH] IN BLOOD BY AUTOMATED COUNT: 12 % (ref 10–15)
EST. AVERAGE GLUCOSE BLD GHB EST-MCNC: 151 MG/DL
GFR SERPL CREATININE-BSD FRML MDRD: >90 ML/MIN/{1.73_M2}
GLUCOSE SERPL-MCNC: 188 MG/DL (ref 70–99)
HBA1C MFR BLD: 6.9 % (ref 0–5.6)
HCT VFR BLD AUTO: 47.1 % (ref 40–53)
HGB BLD-MCNC: 16.3 G/DL (ref 13.3–17.7)
IMM GRANULOCYTES # BLD: 0 10E9/L (ref 0–0.4)
IMM GRANULOCYTES NFR BLD: 0.2 %
LYMPHOCYTES # BLD AUTO: 2.4 10E9/L (ref 0.8–5.3)
LYMPHOCYTES NFR BLD AUTO: 24 %
MCH RBC QN AUTO: 29.6 PG (ref 26.5–33)
MCHC RBC AUTO-ENTMCNC: 34.6 G/DL (ref 31.5–36.5)
MCV RBC AUTO: 86 FL (ref 78–100)
MONOCYTES # BLD AUTO: 0.8 10E9/L (ref 0–1.3)
MONOCYTES NFR BLD AUTO: 7.8 %
NEUTROPHILS # BLD AUTO: 6.1 10E9/L (ref 1.6–8.3)
NEUTROPHILS NFR BLD AUTO: 62.2 %
NRBC # BLD AUTO: 0 10*3/UL
NRBC BLD AUTO-RTO: 0 /100
PLATELET # BLD AUTO: 281 10E9/L (ref 150–450)
POTASSIUM SERPL-SCNC: 4.1 MMOL/L (ref 3.4–5.3)
PROT SERPL-MCNC: 7.6 G/DL (ref 6.8–8.8)
RBC # BLD AUTO: 5.5 10E12/L (ref 4.4–5.9)
SODIUM SERPL-SCNC: 138 MMOL/L (ref 133–144)
WBC # BLD AUTO: 9.8 10E9/L (ref 4–11)

## 2019-10-17 PROCEDURE — 36415 COLL VENOUS BLD VENIPUNCTURE: CPT | Performed by: FAMILY MEDICINE

## 2019-10-17 PROCEDURE — 80053 COMPREHEN METABOLIC PANEL: CPT | Performed by: FAMILY MEDICINE

## 2019-10-17 PROCEDURE — 40000788 ZZHCL STATISTIC ESTIMATED AVERAGE GLUCOSE: Performed by: FAMILY MEDICINE

## 2019-10-17 PROCEDURE — 90471 IMMUNIZATION ADMIN: CPT | Performed by: FAMILY MEDICINE

## 2019-10-17 PROCEDURE — 99214 OFFICE O/P EST MOD 30 MIN: CPT | Mod: 25 | Performed by: FAMILY MEDICINE

## 2019-10-17 PROCEDURE — 85025 COMPLETE CBC W/AUTO DIFF WBC: CPT | Performed by: FAMILY MEDICINE

## 2019-10-17 PROCEDURE — 83036 HEMOGLOBIN GLYCOSYLATED A1C: CPT | Performed by: FAMILY MEDICINE

## 2019-10-17 PROCEDURE — 93000 ELECTROCARDIOGRAM COMPLETE: CPT | Performed by: INTERNAL MEDICINE

## 2019-10-17 PROCEDURE — 90682 RIV4 VACC RECOMBINANT DNA IM: CPT | Performed by: FAMILY MEDICINE

## 2019-10-17 PROCEDURE — 82306 VITAMIN D 25 HYDROXY: CPT | Performed by: FAMILY MEDICINE

## 2019-10-17 SDOH — HEALTH STABILITY: PHYSICAL HEALTH: ON AVERAGE, HOW MANY MINUTES DO YOU ENGAGE IN EXERCISE AT THIS LEVEL?: 30 MIN

## 2019-10-17 SDOH — HEALTH STABILITY: MENTAL HEALTH: HOW OFTEN DO YOU HAVE A DRINK CONTAINING ALCOHOL?: 4 OR MORE TIMES A WEEK

## 2019-10-17 SDOH — SOCIAL STABILITY: SOCIAL INSECURITY: WITHIN THE LAST YEAR, HAVE YOU BEEN HUMILIATED OR EMOTIONALLY ABUSED IN OTHER WAYS BY YOUR PARTNER OR EX-PARTNER?: NO

## 2019-10-17 SDOH — SOCIAL STABILITY: SOCIAL INSECURITY
WITHIN THE LAST YEAR, HAVE TO BEEN RAPED OR FORCED TO HAVE ANY KIND OF SEXUAL ACTIVITY BY YOUR PARTNER OR EX-PARTNER?: NO

## 2019-10-17 SDOH — HEALTH STABILITY: PHYSICAL HEALTH: ON AVERAGE, HOW MANY DAYS PER WEEK DO YOU ENGAGE IN MODERATE TO STRENUOUS EXERCISE (LIKE A BRISK WALK)?: 3 DAYS

## 2019-10-17 SDOH — SOCIAL STABILITY: SOCIAL INSECURITY
WITHIN THE LAST YEAR, HAVE YOU BEEN KICKED, HIT, SLAPPED, OR OTHERWISE PHYSICALLY HURT BY YOUR PARTNER OR EX-PARTNER?: NO

## 2019-10-17 SDOH — HEALTH STABILITY: MENTAL HEALTH: HOW MANY STANDARD DRINKS CONTAINING ALCOHOL DO YOU HAVE ON A TYPICAL DAY?: 1 OR 2

## 2019-10-17 SDOH — SOCIAL STABILITY: SOCIAL INSECURITY: WITHIN THE LAST YEAR, HAVE YOU BEEN AFRAID OF YOUR PARTNER OR EX-PARTNER?: NO

## 2019-10-17 ASSESSMENT — MIFFLIN-ST. JEOR: SCORE: 1916.63

## 2019-10-17 ASSESSMENT — PAIN SCALES - GENERAL: PAINLEVEL: NO PAIN (0)

## 2019-10-17 NOTE — NURSING NOTE
"Chief Complaint   Patient presents with     Pre-Op Exam      , Transrectal ultrasound guided biopsy of the prostate        Initial /72 (BP Location: Left arm, Patient Position: Chair, Cuff Size: Adult Large)   Pulse 102   Temp 98.3  F (36.8  C) (Tympanic)   Ht 1.778 m (5' 10\")   Wt 112 kg (247 lb)   SpO2 93%   BMI 35.44 kg/m   Estimated body mass index is 35.44 kg/m  as calculated from the following:    Height as of this encounter: 1.778 m (5' 10\").    Weight as of this encounter: 112 kg (247 lb).  Medication Reconciliation: incomplete  Gris Lees LPN  "

## 2019-10-17 NOTE — NURSING NOTE
"Chief Complaint   Patient presents with     Pre-Op Exam      , Transrectal ultrasound guided biopsy of the prostate        Initial There were no vitals taken for this visit. Estimated body mass index is 34.33 kg/m  as calculated from the following:    Height as of 10/16/19: 1.797 m (5' 10.75\").    Weight as of 10/16/19: 110.9 kg (244 lb 6.4 oz).  Medication Reconciliation: complete  Gris Lees LPN  "

## 2019-10-21 ENCOUNTER — ANESTHESIA EVENT (OUTPATIENT)
Dept: SURGERY | Facility: OTHER | Age: 64
End: 2019-10-21
Payer: COMMERCIAL

## 2019-10-21 LAB — DEPRECATED CALCIDIOL+CALCIFEROL SERPL-MC: 38 UG/L (ref 20–75)

## 2019-10-22 ENCOUNTER — ANESTHESIA (OUTPATIENT)
Dept: SURGERY | Facility: OTHER | Age: 64
End: 2019-10-22
Payer: COMMERCIAL

## 2019-10-22 ENCOUNTER — HOSPITAL ENCOUNTER (OUTPATIENT)
Facility: OTHER | Age: 64
Discharge: HOME | End: 2019-10-22
Attending: UROLOGY | Admitting: UROLOGY
Payer: COMMERCIAL

## 2019-10-22 VITALS
DIASTOLIC BLOOD PRESSURE: 84 MMHG | SYSTOLIC BLOOD PRESSURE: 133 MMHG | BODY MASS INDEX: 35.44 KG/M2 | TEMPERATURE: 97.3 F | RESPIRATION RATE: 14 BRPM | WEIGHT: 247 LBS | HEART RATE: 63 BPM | OXYGEN SATURATION: 93 %

## 2019-10-22 PROCEDURE — 55700 AS BIOPSY PROSTATE NEEDLE/PUNCH: CPT | Performed by: NURSE ANESTHETIST, CERTIFIED REGISTERED

## 2019-10-22 PROCEDURE — 25000125 ZZHC RX 250: Performed by: NURSE ANESTHETIST, CERTIFIED REGISTERED

## 2019-10-22 PROCEDURE — 40000306 ZZH STATISTIC PRE PROC ASSESS II: Performed by: UROLOGY

## 2019-10-22 PROCEDURE — 25800030 ZZH RX IP 258 OP 636: Performed by: NURSE ANESTHETIST, CERTIFIED REGISTERED

## 2019-10-22 PROCEDURE — 88344 IMHCHEM/IMCYTCHM EA MLT ANTB: CPT

## 2019-10-22 PROCEDURE — 76499 UNLISTED DX RADIOGRAPHIC PX: CPT

## 2019-10-22 PROCEDURE — 71000027 ZZH RECOVERY PHASE 2 EACH 15 MINS: Performed by: UROLOGY

## 2019-10-22 PROCEDURE — 25000125 ZZHC RX 250: Performed by: UROLOGY

## 2019-10-22 PROCEDURE — 25000128 H RX IP 250 OP 636: Performed by: NURSE ANESTHETIST, CERTIFIED REGISTERED

## 2019-10-22 PROCEDURE — 25000128 H RX IP 250 OP 636: Performed by: UROLOGY

## 2019-10-22 PROCEDURE — 37000008 ZZH ANESTHESIA TECHNICAL FEE, 1ST 30 MIN: Performed by: UROLOGY

## 2019-10-22 PROCEDURE — 76942 ECHO GUIDE FOR BIOPSY: CPT | Mod: 26 | Performed by: UROLOGY

## 2019-10-22 PROCEDURE — 36000050 ZZH SURGERY LEVEL 2 1ST 30 MIN: Performed by: UROLOGY

## 2019-10-22 PROCEDURE — 76942 ECHO GUIDE FOR BIOPSY: CPT

## 2019-10-22 PROCEDURE — 55700 ZZHC BIOPSY PROSTATE NEEDLE/PUNCH: CPT | Performed by: UROLOGY

## 2019-10-22 PROCEDURE — 88305 TISSUE EXAM BY PATHOLOGIST: CPT

## 2019-10-22 PROCEDURE — 27210794 ZZH OR GENERAL SUPPLY STERILE: Performed by: UROLOGY

## 2019-10-22 RX ORDER — PROPOFOL 10 MG/ML
INJECTION, EMULSION INTRAVENOUS CONTINUOUS PRN
Status: DISCONTINUED | OUTPATIENT
Start: 2019-10-22 | End: 2019-10-22

## 2019-10-22 RX ORDER — SODIUM CHLORIDE 9 MG/ML
INJECTION, SOLUTION INTRAVENOUS CONTINUOUS
Status: DISCONTINUED | OUTPATIENT
Start: 2019-10-22 | End: 2019-10-22 | Stop reason: HOSPADM

## 2019-10-22 RX ORDER — LIDOCAINE HYDROCHLORIDE 20 MG/ML
INJECTION, SOLUTION INFILTRATION; PERINEURAL PRN
Status: DISCONTINUED | OUTPATIENT
Start: 2019-10-22 | End: 2019-10-22

## 2019-10-22 RX ORDER — LIDOCAINE 40 MG/G
CREAM TOPICAL
Status: DISCONTINUED | OUTPATIENT
Start: 2019-10-22 | End: 2019-10-22 | Stop reason: HOSPADM

## 2019-10-22 RX ORDER — FENTANYL CITRATE 50 UG/ML
25-50 INJECTION, SOLUTION INTRAMUSCULAR; INTRAVENOUS EVERY 5 MIN PRN
Status: DISCONTINUED | OUTPATIENT
Start: 2019-10-22 | End: 2019-10-22 | Stop reason: HOSPADM

## 2019-10-22 RX ORDER — GENTAMICIN SULFATE 60 MG/50ML
120 INJECTION, SOLUTION INTRAVENOUS
Status: COMPLETED | OUTPATIENT
Start: 2019-10-22 | End: 2019-10-22

## 2019-10-22 RX ORDER — MEPERIDINE HYDROCHLORIDE 50 MG/ML
12.5 INJECTION INTRAMUSCULAR; INTRAVENOUS; SUBCUTANEOUS
Status: DISCONTINUED | OUTPATIENT
Start: 2019-10-22 | End: 2019-10-22 | Stop reason: HOSPADM

## 2019-10-22 RX ORDER — LIDOCAINE HYDROCHLORIDE 20 MG/ML
JELLY TOPICAL PRN
Status: DISCONTINUED | OUTPATIENT
Start: 2019-10-22 | End: 2019-10-22 | Stop reason: HOSPADM

## 2019-10-22 RX ORDER — ONDANSETRON 2 MG/ML
4 INJECTION INTRAMUSCULAR; INTRAVENOUS EVERY 30 MIN PRN
Status: DISCONTINUED | OUTPATIENT
Start: 2019-10-22 | End: 2019-10-22 | Stop reason: HOSPADM

## 2019-10-22 RX ORDER — ONDANSETRON 4 MG/1
4 TABLET, ORALLY DISINTEGRATING ORAL EVERY 30 MIN PRN
Status: DISCONTINUED | OUTPATIENT
Start: 2019-10-22 | End: 2019-10-22 | Stop reason: HOSPADM

## 2019-10-22 RX ORDER — HYDROMORPHONE HYDROCHLORIDE 1 MG/ML
.3-.5 INJECTION, SOLUTION INTRAMUSCULAR; INTRAVENOUS; SUBCUTANEOUS EVERY 10 MIN PRN
Status: DISCONTINUED | OUTPATIENT
Start: 2019-10-22 | End: 2019-10-22 | Stop reason: HOSPADM

## 2019-10-22 RX ORDER — PROPOFOL 10 MG/ML
INJECTION, EMULSION INTRAVENOUS PRN
Status: DISCONTINUED | OUTPATIENT
Start: 2019-10-22 | End: 2019-10-22

## 2019-10-22 RX ORDER — NALOXONE HYDROCHLORIDE 0.4 MG/ML
.1-.4 INJECTION, SOLUTION INTRAMUSCULAR; INTRAVENOUS; SUBCUTANEOUS
Status: DISCONTINUED | OUTPATIENT
Start: 2019-10-22 | End: 2019-10-22 | Stop reason: HOSPADM

## 2019-10-22 RX ADMIN — PROPOFOL 50 MG: 10 INJECTION, EMULSION INTRAVENOUS at 10:05

## 2019-10-22 RX ADMIN — SODIUM CHLORIDE: 9 INJECTION, SOLUTION INTRAVENOUS at 08:51

## 2019-10-22 RX ADMIN — GENTAMICIN SULFATE 120 MG: 60 INJECTION, SOLUTION INTRAVENOUS at 10:00

## 2019-10-22 RX ADMIN — LIDOCAINE HYDROCHLORIDE 40 MG: 20 INJECTION, SOLUTION INFILTRATION; PERINEURAL at 10:02

## 2019-10-22 RX ADMIN — PROPOFOL 100 MG: 10 INJECTION, EMULSION INTRAVENOUS at 10:02

## 2019-10-22 RX ADMIN — PROPOFOL 140 MCG/KG/MIN: 10 INJECTION, EMULSION INTRAVENOUS at 10:02

## 2019-10-22 NOTE — ANESTHESIA POSTPROCEDURE EVALUATION
Patient: Edward Hannah    Procedure(s):  Transrectal Ultrasoun guided biopsy of prostate    Diagnosis:elevated PSA  Diagnosis Additional Information: No value filed.    Anesthesia Type:  MAC    Note:  Anesthesia Post Evaluation    Patient location during evaluation: PACU  Patient participation: Able to fully participate in evaluation  Level of consciousness: awake and alert  Pain management: adequate  Airway patency: patent  Cardiovascular status: acceptable  Respiratory status: acceptable  Hydration status: euvolemic  PONV: none             Last vitals:  Vitals:    10/22/19 1017 10/22/19 1030 10/22/19 1045   BP: 120/83 124/83 133/84   Pulse: 72 67 63   Resp: 14     Temp: 97.3  F (36.3  C)     SpO2: 91% 93% 93%         Electronically Signed By: David Kellerman, APRN CRNA  October 22, 2019  11:33 AM

## 2019-10-22 NOTE — OP NOTE
"Preoperative diagnosis  Elevated PSA    Postoperative diagnosis  Elevated PSA    Procedure  Prostate biopsy  Transrectal ultrasound of the prostate  Transrectal ultrasound guidance of needle biopsy    Surgeon  Yunior Llanes MD    Surgeon(s)/Proceduralist(s) and Assistants (if any)  Surgeon(s):  Yunior Llanes MD  Circulator: Hannah Yarbrough RN  Relief Circulator: Jennifer Narayan RN  Scrub Person: Gavi Jay  2nd Scrub: Fan Johnston    (EBL) Estimated blood loss (ml)  5    Anesthesia  MAC  2% lidocaine solution, periprostatic injection, 10mL    Complications  None    Specimen  Prostate biopsy x 12 cores    Indications  Mr. Hannah is a 64 year old year old male with an elevated PSA.  After discussing his options, the patient decided to proceed with prostate biopsy.  Informed consent was obtained. Possible complications were discussed with the patient during his last visit including, but not limited to, hematuria, hematochezia, prostatitis, urinary tract infection, sepsis, and urinary retention.    Exam  Prostate:   40 grams, symmetric, no nodules or induration    Procedure  The patient was positioned and prepped in a left lateral position with lower extremities flexed.  Lidocaine jelly, 2%, was injected per rectum and gentamicin 120mg was injected intramuscularly. GRETA was performed.  The rectal ultrasound probe was slowly introduced into the rectum.  A 22 gauge, 8\" needle was used to perform a periprostatic injection of lidocaine 1%, 10mL through the ultrasound probe.  The prostate and seminal vesicles were inspected systematically using cross and sagittal views with the ultrasound.  There were not  hypoechoic areas within the prostate.  The dimensions of the prostate were measured to be 45mm X 51mm X 40mm, for a calculated volume of 49g.  Using a true cut 14 Fr biopsy needle, 12 prostate cores were collected. The specific locations on the left were the following: lateral base, lateral mid, lateral apex, " medial base, medial mid, and medial apex.  The right side was sampled in a similar manner.  The ultrasound probe was removed.  The patient tolerated the procedure well.    Plan  Complete course of antibiotics and follow up in about 1 week for path report.

## 2019-10-22 NOTE — OR NURSING
Patient has been discharged to home at 1100 via ambulatory accompanied by friend    Written discharge instructions were provided to patient.  Prescriptions were None. Patient denies any pain, nausea, or dizziness upon discharge.      Patient and adult caring for them verbalize understanding of discharge instructions including no driving until tomorrow and no longer taking narcotic pain medications - no operating mechanical equipment and no making any important decisions.They understand reason for discharge, and necessary follow-up appointments.      Elly Castillo RN

## 2019-10-22 NOTE — DISCHARGE INSTRUCTIONS
[facility]  Adult Discharge Orders & Instructions      For 24 hours after surgery:  1. Get plenty of rest.  A responsible adult must stay with you for at least 24 hours after you leave the hospital.   2. You may feel lightheaded.  IF so, sit for a few minutes before standing.  Have someone help you get up.   3. You may have a slight fever. Call the doctor if your fever is over 101 F (38.3 C) (taken under the tongue) or lasts longer than 24 hours.  4. You may have a dry mouth, a sore throat, muscle aches or trouble sleeping.  These should go away after 24 hours.  5. Do not make important or legal decisions.  6.   Do not drive or use heavy equipment.  If you have weakness or tingling, don't drive or use heavy equipment until this feeling goes away.                                                                                                                                                                         To contact a doctor, call    [phone]______________

## 2019-10-22 NOTE — ANESTHESIA CARE TRANSFER NOTE
Patient: Edward Hannah    Procedure(s):  Transrectal Ultrasoun guided biopsy of prostate    Diagnosis: elevated PSA  Diagnosis Additional Information: No value filed.    Anesthesia Type:   MAC     Note:  Airway :Room Air  Patient transferred to:Phase II  Handoff Report: Identifed the Patient, Identified the Reponsible Provider, Reviewed the pertinent medical history, Discussed the surgical course, Reviewed Intra-OP anesthesia mangement and issues during anesthesia, Set expectations for post-procedure period and Allowed opportunity for questions and acknowledgement of understanding      Vitals: (Last set prior to Anesthesia Care Transfer)    CRNA VITALS  10/22/2019 0942 - 10/22/2019 1013      10/22/2019             Resp Rate (set):  10                Electronically Signed By: KYRA THORPE CRNA  October 22, 2019  10:13 AM

## 2019-10-22 NOTE — ANESTHESIA PREPROCEDURE EVALUATION
Anesthesia Pre-Procedure Evaluation    Patient: Edward Hannah   MRN: [id number] : 1955          Preoperative Diagnosis: elevated PSA    Procedure(s):  Transrectal Ultrasoun guided biopsy of prostate    Past Medical History:   Diagnosis Date     Basal cell carcinoma      DIABETES MELLITUS TYPE II-UNCOMPL 2006     Elevated PSA      HYPERLIPIDEMIA NEC/NOS 10/18/2006     HYPERTENSION NOS 2006     Obesity      Shingles 2017     Past Surgical History:   Procedure Laterality Date     COLONOSCOPY  3-3-    due 2019  4 polyps benign     wisdom teeth extraction         Anesthesia Evaluation     . Pt has had prior anesthetic.            ROS/MED HX    ENT/Pulmonary:  - neg pulmonary ROS     Neurologic:  - neg neurologic ROS     Cardiovascular:     (+) hypertension----. : . . . :. .       METS/Exercise Tolerance:  4 - Raking leaves, gardening   Hematologic:         Musculoskeletal:  - neg musculoskeletal ROS       GI/Hepatic:  - neg GI/hepatic ROS       Renal/Genitourinary:  - ROS Renal section negative       Endo:     (+) type I DM, Obesity, .      Psychiatric:  - neg psychiatric ROS       Infectious Disease:  - neg infectious disease ROS       Malignancy:   (+)   Basal cell carcinoma on the face         Other:    - neg other ROS                      Physical Exam  Normal systems: cardiovascular, pulmonary and dental    Airway   Mallampati: II  TM distance: >3 FB  Neck ROM: full    Dental   (+) caps    Cardiovascular   Rhythm and rate: regular and normal      Pulmonary    breath sounds clear to auscultation            Lab Results   Component Value Date    WBC 9.8 10/17/2019    HGB 16.3 10/17/2019    HCT 47.1 10/17/2019     10/17/2019     10/17/2019    POTASSIUM 4.1 10/17/2019    CHLORIDE 107 10/17/2019    CO2 27 10/17/2019    BUN 17 10/17/2019    CR 0.89 10/17/2019     (H) 10/17/2019    JEREMIE 9.5 10/17/2019    ALBUMIN 4.1 10/17/2019    PROTTOTAL 7.6 10/17/2019    ALT 38 10/17/2019     "AST 14 10/17/2019    ALKPHOS 83 10/17/2019    BILITOTAL 0.4 10/17/2019    TSH 1.28 02/28/2019       Preop Vitals  BP Readings from Last 3 Encounters:   10/17/19 136/72   10/16/19 130/84   09/11/19 (!) 140/70    Pulse Readings from Last 3 Encounters:   10/17/19 102   10/16/19 83   09/11/19 79      Resp Readings from Last 3 Encounters:   10/16/19 16   08/28/19 22   05/31/19 20    SpO2 Readings from Last 3 Encounters:   10/17/19 93%   10/16/19 95%   09/11/19 94%      Temp Readings from Last 1 Encounters:   10/17/19 98.3  F (36.8  C) (Tympanic)    Ht Readings from Last 1 Encounters:   10/17/19 1.778 m (5' 10\")      Wt Readings from Last 1 Encounters:   10/17/19 112 kg (247 lb)    Estimated body mass index is 35.44 kg/m  as calculated from the following:    Height as of 10/17/19: 1.778 m (5' 10\").    Weight as of 10/17/19: 112 kg (247 lb).       Anesthesia Plan      History & Physical Review      ASA Status:  2 .    NPO Status:  > 8 hours    Plan for MAC with Propofol and Intravenous induction. Maintenance will be Balanced.           Postoperative Care      Consents  Anesthetic plan, risks, benefits and alternatives discussed with:  Patient..                 David Kellerman, APRN CRNA  "

## 2019-11-05 ENCOUNTER — HEALTH MAINTENANCE LETTER (OUTPATIENT)
Age: 64
End: 2019-11-05

## 2019-11-21 DIAGNOSIS — E11.65 TYPE 2 DIABETES MELLITUS WITH HYPERGLYCEMIA, WITHOUT LONG-TERM CURRENT USE OF INSULIN (H): ICD-10-CM

## 2019-11-21 NOTE — TELEPHONE ENCOUNTER
liraglutide (VICTOZA PEN) 18 MG/3ML solution  Last visit date with prescribing provider: 10-  Last refill date: 10-2-2019  Quantity: 6 ml, Refills: 1    Arina Doty LPN      Next 5 appointments (look out 90 days)    Julio Cesar 10, 2020  9:30 AM CST  (Arrive by 9:15 AM)  SHORT with Kelly Yarbrough MD  [facility] [place] ([facility] [place] ) [address]  [place] [address]  [phone]

## 2019-11-22 RX ORDER — LIRAGLUTIDE 6 MG/ML
INJECTION SUBCUTANEOUS
Qty: 6 ML | Refills: 1 | Status: SHIPPED | OUTPATIENT
Start: 2019-11-22 | End: 2020-01-30

## 2020-01-03 NOTE — PROGRESS NOTES
Subjective     Edward Hannah is a 64 year old male who presents to clinic today for the following health issues:    HPI   Diabetes Follow-up    How often are you checking your blood sugar? One time daily  What time of day are you checking your blood sugars (select all that apply)?  Before meals  Have you had any blood sugars above 200?  No  Have you had any blood sugars below 70?  No    What symptoms do you notice when your blood sugar is low?  Shaky    What concerns do you have today about your diabetes? None     Do you have any of these symptoms? (Select all that apply)  No numbness or tingling in feet.  No redness, sores or blisters on feet.  No complaints of excessive thirst.  No reports of blurry vision.  No significant changes to weight.     Have you had a diabetic eye exam in the last 12 months? No    Diabetes Management Resources    Hyperlipidemia Follow-Up      Are you regularly taking any medication or supplement to lower your cholesterol?   Yes- Lipitor    Are you having muscle aches or other side effects that you think could be caused by your cholesterol lowering medication?  Yes- upper body, legs sometimes    Hypertension Follow-up      Do you check your blood pressure regularly outside of the clinic? No     Are you following a low salt diet? Yes    Are your blood pressures ever more than 140 on the top number (systolic) OR more   than 90 on the bottom number (diastolic), for example 140/90? No    BP Readings from Last 2 Encounters:   01/10/20 130/66   10/22/19 133/84     Hemoglobin A1C (%)   Date Value   10/17/2019 6.9 (H)   08/28/2019 6.9 (H)     LDL Cholesterol Calculated (mg/dL)   Date Value   02/28/2019 77   02/22/2018 76         How many servings of fruits and vegetables do you eat daily?  3-4    On average, how many sweetened beverages do you drink each day (Examples: soda, juice, sweet tea, etc.  Do NOT count diet or artificially sweetened beverages)?   0  How many days per week do you miss  taking your medication? Once every couple weeks forgets night med    What makes it hard for you to take your medications?  remembering to take    RESPIRATORY SYMPTOMS      Duration: 2.5 months    Description  cough, headache and fatigue/malaise    Accompanying signs and symptoms: None    History (predisposing factors):  none    Precipitating or alleviating factors: None    Therapies tried and outcome:  rest and fluids Nyquil. States it is getting better now but it comes and goes      Patient Active Problem List   Diagnosis     Hyperlipidemia LDL goal <100     Advanced directives, counseling/discussion     BCC (basal cell carcinoma), face     Obesity due to excess calories, unspecified obesity severity     Other insomnia     Essential hypertension with goal blood pressure less than 140/90     ACP (advance care planning)     Type 2 diabetes mellitus without complication, without long-term current use of insulin (H)     Herpes zoster without complication     Trigger finger, acquired     Hypovitaminosis D     Elevated prostate specific antigen (PSA)     Type 2 diabetes mellitus with hyperglycemia, without long-term current use of insulin (H)     Plantar warts     Past Surgical History:   Procedure Laterality Date     BIOPSY PROSTATE TRANSRECTAL N/A 10/22/2019    Procedure: Transrectal Ultrasoun guided biopsy of prostate;  Surgeon: Yunior Llanes MD;  Location:  OR     COLONOSCOPY  3-3-2014    due 2019  4 polyps benign     wisdom teeth extraction         Social History     Tobacco Use     Smoking status: Never Smoker     Smokeless tobacco: Never Used     Tobacco comment: remote cigar history   Substance Use Topics     Alcohol use: Yes     Alcohol/week: 0.0 standard drinks     Frequency: 4 or more times a week     Drinks per session: 1 or 2     Comment: 1-2/day     Family History   Problem Relation Age of Onset     Cardiovascular Father         aortic valve surgery     Cancer Father         lung cancer      Gastrointestinal Disease Father         benign esophageal tumor removed     Hypertension Father      Diabetes Father 70     Other - See Comments Mother         infection     Cerebrovascular Disease Maternal Grandmother      Other - See Comments Maternal Grandfather         hunting accident     Other - See Comments Paternal Grandmother         old age     Parkinsonism Paternal Grandfather      Cerebrovascular Disease Paternal Grandfather         tobacco negative     Family History Negative Sister          Current Outpatient Medications   Medication Sig Dispense Refill     acetylcysteine (N-ACETYL CYSTEINE) 500 MG CAPS capsule Take 1 capsule (500 mg) by mouth daily       amLODIPine (NORVASC) 2.5 MG tablet TAKE 1 TABLET BY MOUTH EVERY DAY 90 tablet 0     ASPIRIN 81 MG OR TABS 1 tab po QD (Once per day) 100 3     atorvastatin (LIPITOR) 20 MG tablet TAKE 1 TABLET BY MOUTH ONCE DAILY 120 tablet 0     BD TYRONE U/F 32G X 4 MM insulin pen needle USE ONE NEEDLE DAILY AS DIRECTED 100 each 1     blood glucose monitoring (KELSEY MICROLET) lancets Use to test blood sugar 3 times daily. 1 Box 11     CONTOUR NEXT TEST test strip USE TO TEST BLOOD SUGAR THREE TIMES DAILY 100 strip 3     glipiZIDE (GLUCOTROL XL) 10 MG 24 hr tablet TAKE 1 TABLET BY MOUTH TWICE DAILY 60 tablet 2     liraglutide (VICTOZA PEN) 18 MG/3ML solution ADMINISTER 1.2 MG UNDER THE SKIN EVERY DAY 6 mL 1     lisinopril (PRINIVIL/ZESTRIL) 40 MG tablet TAKE 1 TABLET BY MOUTH EVERY DAY 90 tablet 0     metFORMIN (GLUCOPHAGE) 1000 MG tablet TAKE 1 TABLET BY MOUTH TWICE DAILY WITH MEALS 180 tablet 0     Multiple Vitamins-Minerals (MULTIVITAMIN PO) Take 1 tablet by mouth daily       Omega-3 Fatty Acids (OMEGA-3 FISH OIL PO) Take 1 g by mouth daily       sildenafil (REVATIO) 20 MG tablet Take 3-5 (60-100mg) tablets by mouth 1 hour prior to sexual activity 30 tablet 11     Turmeric 500 MG TABS Take 1 tablet by mouth 2 times daily       vitamin B complex with vitamin C  (VITAMIN  B COMPLEX) tablet Take 1 tablet by mouth daily       VITAMIN D, CHOLECALCIFEROL, PO Take 1,000 Units by mouth daily       Allergies   Allergen Reactions     Nkda [No Known Drug Allergies]      BP Readings from Last 3 Encounters:   01/10/20 130/66   10/22/19 133/84   10/17/19 136/72    Wt Readings from Last 3 Encounters:   01/10/20 108.5 kg (239 lb 3.2 oz)   10/22/19 112 kg (247 lb)   10/17/19 112 kg (247 lb)        Reviewed and updated as needed this visit by Provider         Review of Systems   ROS COMP: Constitutional, HEENT, cardiovascular, pulmonary, gi and gu systems are negative, except as otherwise noted.      Objective    /66 (BP Location: Left arm, Patient Position: Chair, Cuff Size: Adult Large)   Pulse 76   Temp 97.2  F (36.2  C) (Tympanic)   Resp 20   Wt 108.5 kg (239 lb 3.2 oz)   SpO2 94%   BMI 34.32 kg/m    Body mass index is 34.32 kg/m .  Physical Exam   GENERAL: healthy, alert and no distress  NECK: no adenopathy, no asymmetry, masses, or scars and thyroid normal to palpation  RESP: lungs clear to auscultation - no rales, rhonchi or wheezes  CV: regular rate and rhythm, normal S1 S2, no S3 or S4, no murmur, click or rub, no peripheral edema and peripheral pulses strong  ABDOMEN: soft, nontender, no hepatosplenomegaly, no masses and bowel sounds normal  MS: no gross musculoskeletal defects noted, no edema  SKIN: warts  PSYCH: mentation appears normal, affect normal/bright    Diagnostic Test Results:  Labs reviewed in Epic  Results for orders placed or performed in visit on 01/10/20   Hemoglobin A1c     Status: Abnormal   Result Value Ref Range    Hemoglobin A1C 7.1 (H) 0 - 5.6 %   Lipid Profile (Chol, Trig, HDL, LDL calc)     Status: Abnormal   Result Value Ref Range    Cholesterol 158 <200 mg/dL    Triglycerides 164 (H) <150 mg/dL    HDL Cholesterol 46 >39 mg/dL    LDL Cholesterol Calculated 79 <100 mg/dL    Non HDL Cholesterol 112 <130 mg/dL   Comprehensive metabolic panel      Status: Abnormal   Result Value Ref Range    Sodium 137 133 - 144 mmol/L    Potassium 4.1 3.4 - 5.3 mmol/L    Chloride 105 94 - 109 mmol/L    Carbon Dioxide 28 20 - 32 mmol/L    Anion Gap 4 3 - 14 mmol/L    Glucose 158 (H) 70 - 99 mg/dL    Urea Nitrogen 20 7 - 30 mg/dL    Creatinine 0.89 0.66 - 1.25 mg/dL    GFR Estimate >90 >60 mL/min/[1.73_m2]    GFR Estimate If Black >90 >60 mL/min/[1.73_m2]    Calcium 9.1 8.5 - 10.1 mg/dL    Bilirubin Total 0.6 0.2 - 1.3 mg/dL    Albumin 4.1 3.4 - 5.0 g/dL    Protein Total 7.6 6.8 - 8.8 g/dL    Alkaline Phosphatase 82 40 - 150 U/L    ALT 34 0 - 70 U/L    AST 12 0 - 45 U/L           Assessment & Plan     (E78.5) Hyperlipidemia LDL goal <100  (primary encounter diagnosis)  Comment:   Plan: Comprehensive metabolic panel, Lipid Profile         (Chol, Trig, HDL, LDL calc)        Fasting next visit    (E11.65) Type 2 diabetes mellitus with hyperglycemia, without long-term current use of insulin (H)  Comment:   Plan: Hemoglobin A1c, C FOOT EXAM  NO CHARGE        Follow-up 3 mos, work on sleep consistantly at night    (I10) Essential hypertension with goal blood pressure less than 140/90  Comment:   Plan: Comprehensive metabolic panel          (R97.20) Elevated prostate specific antigen (PSA)  Comment:   Plan: has seen urology    (B07.0) Plantar warts  Comment:   Plan: Paring/Shaving of a Single Lesion [21920],         Treat Benign Wart or [place] Contagiosum or         Milia up to 14 lesions (No quantity required),         DESTRUC BENIGN/PREMAL,1ST LESION [49686]        See note     Patient was agreeable to this plan and had no further questions.  There are no Patient Instructions on file for this visit.    No follow-ups on file.    Kelly Yarbrough MD  [facility] [place]

## 2020-01-10 ENCOUNTER — OFFICE VISIT (OUTPATIENT)
Dept: FAMILY MEDICINE | Facility: OTHER | Age: 65
End: 2020-01-10
Attending: FAMILY MEDICINE
Payer: COMMERCIAL

## 2020-01-10 VITALS
WEIGHT: 239.2 LBS | HEART RATE: 76 BPM | BODY MASS INDEX: 34.32 KG/M2 | TEMPERATURE: 97.2 F | SYSTOLIC BLOOD PRESSURE: 130 MMHG | RESPIRATION RATE: 20 BRPM | DIASTOLIC BLOOD PRESSURE: 66 MMHG | OXYGEN SATURATION: 94 %

## 2020-01-10 DIAGNOSIS — E78.5 HYPERLIPIDEMIA LDL GOAL <100: ICD-10-CM

## 2020-01-10 DIAGNOSIS — R97.20 ELEVATED PROSTATE SPECIFIC ANTIGEN (PSA): ICD-10-CM

## 2020-01-10 DIAGNOSIS — I10 ESSENTIAL HYPERTENSION WITH GOAL BLOOD PRESSURE LESS THAN 140/90: ICD-10-CM

## 2020-01-10 DIAGNOSIS — E78.5 HYPERLIPIDEMIA LDL GOAL <100: Primary | ICD-10-CM

## 2020-01-10 DIAGNOSIS — E11.65 TYPE 2 DIABETES MELLITUS WITH HYPERGLYCEMIA, WITHOUT LONG-TERM CURRENT USE OF INSULIN (H): ICD-10-CM

## 2020-01-10 DIAGNOSIS — E11.9 TYPE 2 DIABETES MELLITUS WITHOUT COMPLICATION, WITHOUT LONG-TERM CURRENT USE OF INSULIN (H): ICD-10-CM

## 2020-01-10 DIAGNOSIS — B07.0 PLANTAR WARTS: ICD-10-CM

## 2020-01-10 LAB
ALBUMIN SERPL-MCNC: 4.1 G/DL (ref 3.4–5)
ALP SERPL-CCNC: 82 U/L (ref 40–150)
ALT SERPL W P-5'-P-CCNC: 34 U/L (ref 0–70)
ANION GAP SERPL CALCULATED.3IONS-SCNC: 4 MMOL/L (ref 3–14)
AST SERPL W P-5'-P-CCNC: 12 U/L (ref 0–45)
BILIRUB SERPL-MCNC: 0.6 MG/DL (ref 0.2–1.3)
BUN SERPL-MCNC: 20 MG/DL (ref 7–30)
CALCIUM SERPL-MCNC: 9.1 MG/DL (ref 8.5–10.1)
CHLORIDE SERPL-SCNC: 105 MMOL/L (ref 94–109)
CHOLEST SERPL-MCNC: 158 MG/DL
CO2 SERPL-SCNC: 28 MMOL/L (ref 20–32)
CREAT SERPL-MCNC: 0.89 MG/DL (ref 0.66–1.25)
EST. AVERAGE GLUCOSE BLD GHB EST-MCNC: 157 MG/DL
GFR SERPL CREATININE-BSD FRML MDRD: >90 ML/MIN/{1.73_M2}
GLUCOSE SERPL-MCNC: 158 MG/DL (ref 70–99)
HBA1C MFR BLD: 7.1 % (ref 0–5.6)
HDLC SERPL-MCNC: 46 MG/DL
LDLC SERPL CALC-MCNC: 79 MG/DL
NONHDLC SERPL-MCNC: 112 MG/DL
POTASSIUM SERPL-SCNC: 4.1 MMOL/L (ref 3.4–5.3)
PROT SERPL-MCNC: 7.6 G/DL (ref 6.8–8.8)
SODIUM SERPL-SCNC: 137 MMOL/L (ref 133–144)
TRIGL SERPL-MCNC: 164 MG/DL

## 2020-01-10 PROCEDURE — 17110 DESTRUCTION B9 LES UP TO 14: CPT | Performed by: FAMILY MEDICINE

## 2020-01-10 PROCEDURE — 83036 HEMOGLOBIN GLYCOSYLATED A1C: CPT | Performed by: FAMILY MEDICINE

## 2020-01-10 PROCEDURE — 80053 COMPREHEN METABOLIC PANEL: CPT | Performed by: FAMILY MEDICINE

## 2020-01-10 PROCEDURE — 99214 OFFICE O/P EST MOD 30 MIN: CPT | Mod: 25 | Performed by: FAMILY MEDICINE

## 2020-01-10 PROCEDURE — 40000788 ZZHCL STATISTIC ESTIMATED AVERAGE GLUCOSE: Performed by: FAMILY MEDICINE

## 2020-01-10 PROCEDURE — 36415 COLL VENOUS BLD VENIPUNCTURE: CPT | Performed by: FAMILY MEDICINE

## 2020-01-10 PROCEDURE — 80061 LIPID PANEL: CPT | Performed by: FAMILY MEDICINE

## 2020-01-10 ASSESSMENT — ANXIETY QUESTIONNAIRES
2. NOT BEING ABLE TO STOP OR CONTROL WORRYING: NOT AT ALL
6. BECOMING EASILY ANNOYED OR IRRITABLE: NOT AT ALL
1. FEELING NERVOUS, ANXIOUS, OR ON EDGE: NOT AT ALL
5. BEING SO RESTLESS THAT IT IS HARD TO SIT STILL: NOT AT ALL
GAD7 TOTAL SCORE: 0
3. WORRYING TOO MUCH ABOUT DIFFERENT THINGS: NOT AT ALL
7. FEELING AFRAID AS IF SOMETHING AWFUL MIGHT HAPPEN: NOT AT ALL

## 2020-01-10 ASSESSMENT — PAIN SCALES - GENERAL: PAINLEVEL: NO PAIN (0)

## 2020-01-10 ASSESSMENT — PATIENT HEALTH QUESTIONNAIRE - PHQ9
SUM OF ALL RESPONSES TO PHQ QUESTIONS 1-9: 0
5. POOR APPETITE OR OVEREATING: NOT AT ALL

## 2020-01-10 NOTE — NURSING NOTE
"Chief Complaint   Patient presents with     Diabetes       Initial /66 (BP Location: Left arm, Patient Position: Chair, Cuff Size: Adult Large)   Pulse 76   Temp 97.2  F (36.2  C) (Tympanic)   Resp 20   Wt 108.5 kg (239 lb 3.2 oz)   SpO2 94%   BMI 34.32 kg/m   Estimated body mass index is 34.32 kg/m  as calculated from the following:    Height as of 10/17/19: 1.778 m (5' 10\").    Weight as of this encounter: 108.5 kg (239 lb 3.2 oz).  Medication Reconciliation: complete  Miriam Diop LPN    "

## 2020-01-10 NOTE — PROGRESS NOTES
Procedure: shave excision and cryotherapy  Diagnosis: plantar wart  Location: left foot  Specimen number: 1  Lesion size: 1cm   Total size: 1cm  Discussion of treatment options, all of patient's questions answered. After informed consent, patient elects to proceed with procedure. Area prepped with EtOH. Shave of area removed with #10 blade. Silver nitrate for hemostasis. Cryotherapy with LN2 with 2 mm margins and 3 thaw cycles. Wound dressed with bacitracin ointment and bandage. Wound care instructions given. Follow up with any wound problems, poor healing, or signs of infection.

## 2020-01-11 ASSESSMENT — ANXIETY QUESTIONNAIRES: GAD7 TOTAL SCORE: 0

## 2020-01-24 DIAGNOSIS — E11.65 TYPE 2 DIABETES MELLITUS WITH HYPERGLYCEMIA, WITHOUT LONG-TERM CURRENT USE OF INSULIN (H): ICD-10-CM

## 2020-01-26 DIAGNOSIS — E11.65 TYPE 2 DIABETES MELLITUS WITH HYPERGLYCEMIA, WITHOUT LONG-TERM CURRENT USE OF INSULIN (H): ICD-10-CM

## 2020-01-28 NOTE — TELEPHONE ENCOUNTER
liraglutide (VICTOZA PEN) 18 MG/3ML solution  Last visit date with prescribing provider: 1-  Last refill date: 11-  Quantity: 6 ml, Refills: 1    Arina Doty LPN      Next 5 appointments (look out 90 days)    Apr 13, 2020  9:40 AM CDT  (Arrive by 9:25 AM)  SHORT with Kelly Yarbrough MD  [facility] [place] ([facility] [place] ) [address]  [place] [address]  [phone]

## 2020-01-29 RX ORDER — GLIPIZIDE 10 MG/1
TABLET, FILM COATED, EXTENDED RELEASE ORAL
Qty: 60 TABLET | Refills: 2 | Status: SHIPPED | OUTPATIENT
Start: 2020-01-29 | End: 2020-04-15

## 2020-01-30 RX ORDER — LIRAGLUTIDE 6 MG/ML
INJECTION SUBCUTANEOUS
Qty: 6 ML | Refills: 5 | Status: SHIPPED | OUTPATIENT
Start: 2020-01-30 | End: 2020-04-15

## 2020-02-23 DIAGNOSIS — E11.65 TYPE 2 DIABETES MELLITUS WITH HYPERGLYCEMIA, WITHOUT LONG-TERM CURRENT USE OF INSULIN (H): ICD-10-CM

## 2020-02-23 DIAGNOSIS — I10 HYPERTENSION GOAL BP (BLOOD PRESSURE) < 140/90: ICD-10-CM

## 2020-02-23 DIAGNOSIS — I10 BENIGN ESSENTIAL HYPERTENSION: ICD-10-CM

## 2020-02-25 RX ORDER — LISINOPRIL 40 MG/1
TABLET ORAL
Qty: 90 TABLET | Refills: 0 | Status: SHIPPED | OUTPATIENT
Start: 2020-02-25 | End: 2020-04-15

## 2020-02-25 RX ORDER — AMLODIPINE BESYLATE 2.5 MG/1
TABLET ORAL
Qty: 90 TABLET | Refills: 0 | Status: SHIPPED | OUTPATIENT
Start: 2020-02-25 | End: 2020-04-15

## 2020-02-25 NOTE — TELEPHONE ENCOUNTER
amLODIPine (NORVASC) 2.5 MG tablet  Last visit date with prescribing provider: 1-  Last refill date: 11-  Quantity: 90, Refills: 0    lisinopril (PRINIVIL/ZESTRIL) 40 MG tablet  Last visit date with prescribing provider: 1-  Last refill date: 11-  Quantity: 90, Refills: 0    metFORMIN (GLUCOPHAGE) 1000 MG tablet  Last visit date with prescribing provider: 1-  Last refill date: 11-  Quantity: 180, Refills: 0    Arina Doty LPN      Next 5 appointments (look out 90 days)    Apr 13, 2020  9:40 AM CDT  (Arrive by 9:25 AM)  SHORT with Kelly Yarbrough MD  [facility] [place] ([facility] [place] ) [address]  [place] [address]  [phone]            Next 5 appointments (look out 90 days)    Apr 13, 2020  9:40 AM CDT  (Arrive by 9:25 AM)  SHORT with Kelly Yarbrough MD  [facility] [place] ([facility] [place] ) [address]  [place] [address]  [phone]            Next 5 appointments (look out 90 days)    Apr 13, 2020  9:40 AM CDT  (Arrive by 9:25 AM)  SHORT with Kelly Yarbrough MD  [facility] [place] ([facility] [place] ) [address]  [place] [address]  [phone]

## 2020-04-15 ENCOUNTER — VIRTUAL VISIT (OUTPATIENT)
Dept: FAMILY MEDICINE | Facility: OTHER | Age: 65
End: 2020-04-15
Attending: FAMILY MEDICINE
Payer: COMMERCIAL

## 2020-04-15 DIAGNOSIS — E11.65 TYPE 2 DIABETES MELLITUS WITH HYPERGLYCEMIA, WITHOUT LONG-TERM CURRENT USE OF INSULIN (H): Primary | ICD-10-CM

## 2020-04-15 DIAGNOSIS — I10 BENIGN ESSENTIAL HYPERTENSION: ICD-10-CM

## 2020-04-15 DIAGNOSIS — E78.5 HYPERLIPIDEMIA LDL GOAL <100: ICD-10-CM

## 2020-04-15 PROCEDURE — 99213 OFFICE O/P EST LOW 20 MIN: CPT | Mod: 95 | Performed by: FAMILY MEDICINE

## 2020-04-15 RX ORDER — LIRAGLUTIDE 6 MG/ML
INJECTION SUBCUTANEOUS
Qty: 6 ML | Refills: 5 | Status: SHIPPED | OUTPATIENT
Start: 2020-04-15 | End: 2021-01-06

## 2020-04-15 RX ORDER — GLIPIZIDE 10 MG/1
10 TABLET, FILM COATED, EXTENDED RELEASE ORAL 2 TIMES DAILY
Qty: 180 TABLET | Refills: 1 | Status: SHIPPED | OUTPATIENT
Start: 2020-04-15 | End: 2020-10-09

## 2020-04-15 RX ORDER — ATORVASTATIN CALCIUM 20 MG/1
20 TABLET, FILM COATED ORAL DAILY
Qty: 90 TABLET | Refills: 1 | Status: SHIPPED | OUTPATIENT
Start: 2020-04-15 | End: 2020-10-09

## 2020-04-15 RX ORDER — AMLODIPINE BESYLATE 2.5 MG/1
2.5 TABLET ORAL DAILY
Qty: 90 TABLET | Refills: 1 | Status: SHIPPED | OUTPATIENT
Start: 2020-04-15 | End: 2020-10-09

## 2020-04-15 RX ORDER — LISINOPRIL 40 MG/1
40 TABLET ORAL DAILY
Qty: 90 TABLET | Refills: 1 | Status: SHIPPED | OUTPATIENT
Start: 2020-04-15 | End: 2020-10-09

## 2020-04-15 NOTE — PROGRESS NOTES
"Edward Hannah is a 64 year old male who is being evaluated via a billable telephone visit.      The patient has been notified of following:     \"This telephone visit will be conducted via a call between you and your physician/provider. We have found that certain health care needs can be provided without the need for a physical exam.  This service lets us provide the care you need with a short phone conversation.  If a prescription is necessary we can send it directly to your pharmacy.  If lab work is needed we can place an order for that and you can then stop by our lab to have the test done at a later time.    Telephone visits are billed at different rates depending on your insurance coverage. During this emergency period, for some insurers they may be billed the same as an in-person visit.  Please reach out to your insurance provider with any questions.    If during the course of the call the physician/provider feels a telephone visit is not appropriate, you will not be charged for this service.\"    Patient has given verbal consent for Telephone visit?  Yes    How would you like to obtain your AVS? Diego Kenyon     Edward Hannah is a 64 year old male who presents to clinic today for the following health issues:    Diabetes Follow-up    How often are you checking your blood sugar? A few times a week  What time of day are you checking your blood sugars (select all that apply)?  Before meals and At bedtime  Have you had any blood sugars above 200?  No  Have you had any blood sugars below 70?  No    What symptoms do you notice when your blood sugar is low?  Shaky and can feel it in his stomach    What concerns do you have today about your diabetes? None     Do you have any of these symptoms? (Select all that apply)  No numbness or tingling in feet.  No redness, sores or blisters on feet.  No complaints of excessive thirst.  No reports of blurry vision.  No significant changes to weight.    Have you had a " diabetic eye exam in the last 12 months? No        BP Readings from Last 2 Encounters:   01/10/20 130/66   10/22/19 133/84     Hemoglobin A1C (%)   Date Value   01/10/2020 7.1 (H)   10/17/2019 6.9 (H)     LDL Cholesterol Calculated (mg/dL)   Date Value   01/10/2020 79   02/28/2019 77       {Reference  Diabetes Management Resources :104881}           Hyperlipidemia Follow-Up      Are you regularly taking any medication or supplement to lower your cholesterol?   Yes- .    Are you having muscle aches or other side effects that you think could be caused by your cholesterol lowering medication?  No    Hypertension Follow-up      Do you check your blood pressure regularly outside of the clinic? No     Are you following a low salt diet? Yes    Are your blood pressures ever more than 140 on the top number (systolic) OR more   than 90 on the bottom number (diastolic), for example 140/90? No      Patient Active Problem List   Diagnosis     Hyperlipidemia LDL goal <100     Advanced directives, counseling/discussion     BCC (basal cell carcinoma), face     Obesity due to excess calories, unspecified obesity severity     Other insomnia     Benign essential hypertension     ACP (advance care planning)     Herpes zoster without complication     Trigger finger, acquired     Hypovitaminosis D     Elevated prostate specific antigen (PSA)     Type 2 diabetes mellitus with hyperglycemia, without long-term current use of insulin (H)     Plantar warts     Past Surgical History:   Procedure Laterality Date     BIOPSY PROSTATE TRANSRECTAL N/A 10/22/2019    Procedure: Transrectal Ultrasoun guided biopsy of prostate;  Surgeon: Yunior Llanes MD;  Location:  OR     COLONOSCOPY  3-3-2014    due 2019  4 polyps benign     wisdom teeth extraction         Social History     Tobacco Use     Smoking status: Never Smoker     Smokeless tobacco: Never Used     Tobacco comment: remote cigar history   Substance Use Topics     Alcohol use: Yes      Alcohol/week: 0.0 standard drinks     Frequency: 4 or more times a week     Drinks per session: 1 or 2     Comment: 1-2/day     Family History   Problem Relation Age of Onset     Cardiovascular Father         aortic valve surgery     Cancer Father         lung cancer     Gastrointestinal Disease Father         benign esophageal tumor removed     Hypertension Father      Diabetes Father 70     Other - See Comments Mother         infection     Cerebrovascular Disease Maternal Grandmother      Other - See Comments Maternal Grandfather         hunting accident     Other - See Comments Paternal Grandmother         old age     Parkinsonism Paternal Grandfather      Cerebrovascular Disease Paternal Grandfather         tobacco negative     Family History Negative Sister          Current Outpatient Medications   Medication Sig Dispense Refill     acetylcysteine (N-ACETYL CYSTEINE) 500 MG CAPS capsule Take 1 capsule (500 mg) by mouth daily       amLODIPine (NORVASC) 2.5 MG tablet Take 1 tablet (2.5 mg) by mouth daily 90 tablet 1     ASPIRIN 81 MG OR TABS 1 tab po QD (Once per day) 100 3     atorvastatin (LIPITOR) 20 MG tablet Take 1 tablet (20 mg) by mouth daily 90 tablet 1     BD TYRONE U/F 32G X 4 MM insulin pen needle USE ONE NEEDLE DAILY AS DIRECTED 100 each 1     blood glucose monitoring (AuctionPayET) lancets Use to test blood sugar 3 times daily. 1 Box 11     CONTOUR NEXT TEST test strip USE TO TEST BLOOD SUGAR THREE TIMES DAILY 100 strip 3     glipiZIDE (GLUCOTROL XL) 10 MG 24 hr tablet Take 1 tablet (10 mg) by mouth 2 times daily 180 tablet 1     liraglutide (VICTOZA PEN) 18 MG/3ML solution ADMINISTER 1.2 MG UNDER THE SKIN EVERY DAY 6 mL 5     lisinopril (ZESTRIL) 40 MG tablet Take 1 tablet (40 mg) by mouth daily 90 tablet 1     metFORMIN (GLUCOPHAGE) 1000 MG tablet TAKE 1 TABLET BY MOUTH TWICE DAILY WITH MEALS 180 tablet 1     Multiple Vitamins-Minerals (MULTIVITAMIN PO) Take 1 tablet by mouth daily       Omega-3  Fatty Acids (OMEGA-3 FISH OIL PO) Take 1 g by mouth daily       sildenafil (REVATIO) 20 MG tablet Take 3-5 (60-100mg) tablets by mouth 1 hour prior to sexual activity 30 tablet 11     Turmeric 500 MG TABS Take 1 tablet by mouth 2 times daily       vitamin B complex with vitamin C (VITAMIN  B COMPLEX) tablet Take 1 tablet by mouth daily       VITAMIN D, CHOLECALCIFEROL, PO Take 1,000 Units by mouth daily       Allergies   Allergen Reactions     Nkda [No Known Drug Allergies]      BP Readings from Last 3 Encounters:   01/10/20 130/66   10/22/19 133/84   10/17/19 136/72    Wt Readings from Last 3 Encounters:   01/10/20 108.5 kg (239 lb 3.2 oz)   10/22/19 112 kg (247 lb)   10/17/19 112 kg (247 lb)                    Reviewed and updated as needed this visit by Provider         Review of Systems   ROS COMP: Constitutional, HEENT, cardiovascular, pulmonary, gi and gu systems are negative, except as otherwise noted.       Objective   Reported vitals:  There were no vitals taken for this visit.     Diagnostic Test Results:  Labs reviewed in Epic  none         Assessment/Plan:  1. Type 2 diabetes mellitus with hyperglycemia, without long-term current use of insulin (H)  Refills given follow-up 3 mos fasting  - metFORMIN (GLUCOPHAGE) 1000 MG tablet; TAKE 1 TABLET BY MOUTH TWICE DAILY WITH MEALS  Dispense: 180 tablet; Refill: 1  - liraglutide (VICTOZA PEN) 18 MG/3ML solution; ADMINISTER 1.2 MG UNDER THE SKIN EVERY DAY  Dispense: 6 mL; Refill: 5  - glipiZIDE (GLUCOTROL XL) 10 MG 24 hr tablet; Take 1 tablet (10 mg) by mouth 2 times daily  Dispense: 180 tablet; Refill: 1    2. Benign essential hypertension  Follow-up 3 mos refills done  - lisinopril (ZESTRIL) 40 MG tablet; Take 1 tablet (40 mg) by mouth daily  Dispense: 90 tablet; Refill: 1  - amLODIPine (NORVASC) 2.5 MG tablet; Take 1 tablet (2.5 mg) by mouth daily  Dispense: 90 tablet; Refill: 1    3. Hyperlipidemia LDL goal <100  Refills given  - atorvastatin (LIPITOR) 20 MG  tablet; Take 1 tablet (20 mg) by mouth daily  Dispense: 90 tablet; Refill: 1    No follow-ups on file.      Phone call duration:  10 minutes    Kelly Yarbrough MD

## 2020-08-18 DIAGNOSIS — R97.20 ELEVATED PROSTATE SPECIFIC ANTIGEN (PSA): Primary | ICD-10-CM

## 2020-09-09 ENCOUNTER — OFFICE VISIT (OUTPATIENT)
Dept: UROLOGY | Facility: OTHER | Age: 65
End: 2020-09-09
Attending: UROLOGY
Payer: COMMERCIAL

## 2020-09-09 VITALS
HEART RATE: 81 BPM | DIASTOLIC BLOOD PRESSURE: 82 MMHG | BODY MASS INDEX: 35.1 KG/M2 | WEIGHT: 244.6 LBS | SYSTOLIC BLOOD PRESSURE: 142 MMHG | RESPIRATION RATE: 18 BRPM

## 2020-09-09 DIAGNOSIS — R97.20 ELEVATED PROSTATE SPECIFIC ANTIGEN (PSA): ICD-10-CM

## 2020-09-09 DIAGNOSIS — R97.20 ELEVATED PROSTATE SPECIFIC ANTIGEN (PSA): Primary | ICD-10-CM

## 2020-09-09 LAB — PSA SERPL-MCNC: 3.93 NG/ML

## 2020-09-09 PROCEDURE — 84153 ASSAY OF PSA TOTAL: CPT | Mod: ZL | Performed by: UROLOGY

## 2020-09-09 PROCEDURE — G0463 HOSPITAL OUTPT CLINIC VISIT: HCPCS

## 2020-09-09 PROCEDURE — 36415 COLL VENOUS BLD VENIPUNCTURE: CPT | Mod: ZL | Performed by: UROLOGY

## 2020-09-09 PROCEDURE — 99213 OFFICE O/P EST LOW 20 MIN: CPT | Performed by: UROLOGY

## 2020-09-09 ASSESSMENT — PAIN SCALES - GENERAL: PAINLEVEL: NO PAIN (0)

## 2020-09-09 NOTE — NURSING NOTE
Pt presents to clinic for a one year elevated PSA follow up    Review of Systems:    Weight loss:    No     Recent fever/chills:  No   Night sweats:   No  Current skin rash:  No   Recent hair loss:  No  Heat intolerance:  No   Cold intolerance:  No  Chest pain:   No   Palpitations:   No  Shortness of breath:  No   Wheezing:   No  Constipation:    No   Diarrhea:   No   Nausea:   No   Vomiting:   No   Kidney/side pain:  No   Back pain:   No  Frequent headaches:  No   Dizziness:     No  Leg swelling:   No   Calf pain:    No

## 2020-09-09 NOTE — PROGRESS NOTES
Type of Visit  EST    Chief Complaint  Elevated PSA    HPI  Mr. Hannah is a 64 year old male with a history of negative TRUS 2019 who follows up with an elevated PSA.  He does not have a family history of prostate cancer.  The patient has not previously undergone prostate biopsy.  No associated worsening LUTS, dysuria or prostatitis at the time of the PSA.  The most recent PSA was collected earlier today.  He denies any significant issues with urinary complaints or ED.      Family History  Family History   Problem Relation Age of Onset     Cardiovascular Father         aortic valve surgery     Cancer Father         lung cancer     Gastrointestinal Disease Father         benign esophageal tumor removed     Hypertension Father      Diabetes Father 70     Other - See Comments Mother         infection     Cerebrovascular Disease Maternal Grandmother      Other - See Comments Maternal Grandfather         hunting accident     Other - See Comments Paternal Grandmother         old age     Parkinsonism Paternal Grandfather      Cerebrovascular Disease Paternal Grandfather         tobacco negative     Family History Negative Sister        Review of Systems  I personally reviewed the ROS with the patient.    Nursing Notes:   Salma Garcia LPN  9/9/2020 11:35 AM  Signed  Pt presents to clinic for a one year elevated PSA follow up    Review of Systems:    Weight loss:    No     Recent fever/chills:  No   Night sweats:   No  Current skin rash:  No   Recent hair loss:  No  Heat intolerance:  No   Cold intolerance:  No  Chest pain:   No   Palpitations:   No  Shortness of breath:  No   Wheezing:   No  Constipation:    No   Diarrhea:   No   Nausea:   No   Vomiting:   No   Kidney/side pain:  No   Back pain:   No  Frequent headaches:  No   Dizziness:     No  Leg swelling:   No   Calf pain:    No            Physical Exam  Vitals:    09/09/20 1132   BP: (!) 142/82   BP Location: Left arm   Patient Position: Sitting   Cuff Size:  Adult Large   Pulse: 81   Resp: 18   Weight: 110.9 kg (244 lb 9.6 oz)     Constitutional: No acute distress.  Alert and cooperative   Head: NCAT  Eyes: Conjunctivae normal  Cardiovascular: Regular rate.  Pulmonary/Chest: Respirations are even and non-labored bilaterally, no audible wheezing  Abdominal: Soft. No distension, tenderness, masses or guarding.   Neurological: A + O x 3.  Cranial Nerves II-XII grossly intact.  Extremities: JIMBO x 4, Warm. No clubbing.  No cyanosis.    Skin: Pink, warm and dry.  No visible rashes noted.  Psychiatric:  Normal mood and affect  Back:  No left CVA tenderness.  No right CVA tenderness.  Genitourinary:  Nonpalpable bladder  Prostate:          40 grams, symmetric, no nodules or induration    Labs  Results for LUCAS HANNAH (MRN [id number]) as of 9/9/2020 11:34   9/9/2020 09:33   PSA 3.926 (H)     Results for LUCAS HANNAH (MRN [id number]) as of 9/11/2019 15:03   8/28/2019 08:48 9/11/2019 10:30   PSA 5.04 (H) 5.33 (H)     Results for LUCAS HANNAH (MRN [id number]) as of 9/11/2019 09:59   2/2/2007 08:37   PSA 1.10     Pathology  TRUS  10/22/2019  12 cores negative  49 gram prostate    Assessment  Mr. Hannah is a 64 year old male with a history of negative TRUS 2019 who follows up with an elevated PSA.    Reviewed his past PSAs.  Most recent PSA is lower than historic numbers.  We discussed that is a reassuring finding in addition to the fact that he did have a negative biopsy last year.    Plan  PSA annually

## 2020-10-06 NOTE — PROGRESS NOTES
Subjective     Edward Hannah is a 65 year old male who presents to clinic today for the following health issues:    HPI         Diabetes Follow-up    How often are you checking your blood sugar? One time daily  What time of day are you checking your blood sugars (select all that apply)?  Before meals  Have you had any blood sugars above 200?  No  Have you had any blood sugars below 70?  No    What symptoms do you notice when your blood sugar is low?  Can tell when its around 100 by the empty feeling in stomach    What concerns do you have today about your diabetes? None     Do you have any of these symptoms? (Select all that apply)  No numbness or tingling in feet.  No redness, sores or blisters on feet.  No complaints of excessive thirst.  No reports of blurry vision.  No significant changes to weight.    Have you had a diabetic eye exam in the last 12 months? Yes- Date of last eye exam: Will be 10-26-20,  Location: [facility].                 Hyperlipidemia Follow-Up      Are you regularly taking any medication or supplement to lower your cholesterol?   Yes- lipitor    Are you having muscle aches or other side effects that you think could be caused by your cholesterol lowering medication?  No    Hypertension Follow-up      Do you check your blood pressure regularly outside of the clinic? No     Are you following a low salt diet? No    Are your blood pressures ever more than 140 on the top number (systolic) OR more   than 90 on the bottom number (diastolic), for example 140/90? No    BP Readings from Last 2 Encounters:   10/09/20 130/70   09/09/20 (!) 142/82     Hemoglobin A1C (%)   Date Value   01/10/2020 7.1 (H)   10/17/2019 6.9 (H)     LDL Cholesterol Calculated (mg/dL)   Date Value   01/10/2020 79   02/28/2019 77         How many servings of fruits and vegetables do you eat daily?  2-3    On average, how many sweetened beverages do you drink each day (Examples: soda, juice, sweet tea, etc.  Do NOT  count diet or artificially sweetened beverages)?   0    How many days per week do you exercise enough to make your heart beat faster? 3 or less    How many minutes a day do you exercise enough to make your heart beat faster? 9 or less    How many days per week do you miss taking your medication? 0        Review of Systems   Constitutional, HEENT, cardiovascular, pulmonary, gi and gu systems are negative, except as otherwise noted.      Objective    /70 (BP Location: Left arm, Patient Position: Chair, Cuff Size: Adult Large)   Pulse 90   Temp 97.7  F (36.5  C) (Tympanic)   Resp 20   Wt 110.2 kg (243 lb)   SpO2 95%   BMI 34.87 kg/m    Body mass index is 34.87 kg/m .  Physical Exam   GENERAL: healthy, alert and no distress  NECK: no adenopathy, no asymmetry, masses, or scars and thyroid normal to palpation  RESP: lungs clear to auscultation - no rales, rhonchi or wheezes  CV: regular rate and rhythm, normal S1 S2, no S3 or S4, no murmur, click or rub, no peripheral edema and peripheral pulses strong  ABDOMEN: soft, nontender, no hepatosplenomegaly, no masses and bowel sounds normal  MS: no gross musculoskeletal defects noted, no edema  PSYCH: mentation appears normal, affect normal/bright    Results for orders placed or performed in visit on 10/09/20   Hemoglobin A1c     Status: Abnormal   Result Value Ref Range    Hemoglobin A1C 7.4 (H) 0 - 5.6 %           Assessment & Plan     Type 2 diabetes mellitus with hyperglycemia, without long-term current use of insulin (H)  Follow-up 3-4 mos  Need microalbumin -- hasn't been done in 3 yrs  - Hemoglobin A1c; Future    Hypovitaminosis D  stable    Hyperlipidemia LDL goal <100  Will come fasting today    Benign essential hypertension  Doing well, meds refilled    Need for prophylactic vaccination and inoculation against influenza    - FLUZONE HIGH DOSE 65+  [97038]  - ADMIN INFLUENZA (For MEDICARE Patients ONLY) []    Screening for AAA (abdominal aortic  "aneurysm)    - US Aorta Medicare AAA Screening; Future    (M75.82) Rotator cuff tendonitis, left  Comment:   Plan: XR SHOULDER LEFT G/E 2 VIEWS (Clinic         Performed), PHYSICAL THERAPY REFERRAL        Start PT in [place]     BMI:   Estimated body mass index is 34.87 kg/m  as calculated from the following:    Height as of 10/17/19: 1.778 m (5' 10\").    Weight as of this encounter: 110.2 kg (243 lb).   Weight management plan: Discussed healthy diet and exercise guidelines       Patient was agreeable to this plan and had no further questions.  There are no Patient Instructions on file for this visit.    No follow-ups on file.    Kelly Yarbrough MD  [facility] - [place]      "

## 2020-10-09 ENCOUNTER — OFFICE VISIT (OUTPATIENT)
Dept: FAMILY MEDICINE | Facility: OTHER | Age: 65
End: 2020-10-09
Attending: FAMILY MEDICINE
Payer: COMMERCIAL

## 2020-10-09 ENCOUNTER — ANCILLARY PROCEDURE (OUTPATIENT)
Dept: GENERAL RADIOLOGY | Facility: OTHER | Age: 65
End: 2020-10-09
Attending: FAMILY MEDICINE
Payer: COMMERCIAL

## 2020-10-09 VITALS
RESPIRATION RATE: 20 BRPM | TEMPERATURE: 97.7 F | WEIGHT: 243 LBS | DIASTOLIC BLOOD PRESSURE: 70 MMHG | HEART RATE: 90 BPM | SYSTOLIC BLOOD PRESSURE: 130 MMHG | OXYGEN SATURATION: 95 % | BODY MASS INDEX: 34.87 KG/M2

## 2020-10-09 DIAGNOSIS — E11.9 TYPE 2 DIABETES MELLITUS WITHOUT COMPLICATION, WITHOUT LONG-TERM CURRENT USE OF INSULIN (H): ICD-10-CM

## 2020-10-09 DIAGNOSIS — I10 BENIGN ESSENTIAL HYPERTENSION: ICD-10-CM

## 2020-10-09 DIAGNOSIS — E78.5 HYPERLIPIDEMIA LDL GOAL <100: ICD-10-CM

## 2020-10-09 DIAGNOSIS — E11.65 TYPE 2 DIABETES MELLITUS WITH HYPERGLYCEMIA, WITHOUT LONG-TERM CURRENT USE OF INSULIN (H): ICD-10-CM

## 2020-10-09 DIAGNOSIS — Z13.6 SCREENING FOR AAA (ABDOMINAL AORTIC ANEURYSM): ICD-10-CM

## 2020-10-09 DIAGNOSIS — E11.65 TYPE 2 DIABETES MELLITUS WITH HYPERGLYCEMIA, WITHOUT LONG-TERM CURRENT USE OF INSULIN (H): Primary | ICD-10-CM

## 2020-10-09 DIAGNOSIS — E55.9 HYPOVITAMINOSIS D: ICD-10-CM

## 2020-10-09 DIAGNOSIS — Z23 NEED FOR PROPHYLACTIC VACCINATION AND INOCULATION AGAINST INFLUENZA: ICD-10-CM

## 2020-10-09 DIAGNOSIS — M75.82 ROTATOR CUFF TENDONITIS, LEFT: ICD-10-CM

## 2020-10-09 LAB
EST. AVERAGE GLUCOSE BLD GHB EST-MCNC: 166 MG/DL
HBA1C MFR BLD: 7.4 % (ref 0–5.6)

## 2020-10-09 PROCEDURE — 99214 OFFICE O/P EST MOD 30 MIN: CPT | Performed by: FAMILY MEDICINE

## 2020-10-09 PROCEDURE — G0463 HOSPITAL OUTPT CLINIC VISIT: HCPCS | Mod: 25

## 2020-10-09 PROCEDURE — 73030 X-RAY EXAM OF SHOULDER: CPT | Mod: TC,LT

## 2020-10-09 PROCEDURE — 36415 COLL VENOUS BLD VENIPUNCTURE: CPT | Mod: ZL | Performed by: FAMILY MEDICINE

## 2020-10-09 PROCEDURE — 999N001182 HC STATISTIC ESTIMATED AVERAGE GLUCOSE: Mod: ZL | Performed by: FAMILY MEDICINE

## 2020-10-09 PROCEDURE — G0008 ADMIN INFLUENZA VIRUS VAC: HCPCS

## 2020-10-09 PROCEDURE — 83036 HEMOGLOBIN GLYCOSYLATED A1C: CPT | Mod: ZL | Performed by: FAMILY MEDICINE

## 2020-10-09 RX ORDER — LISINOPRIL 40 MG/1
40 TABLET ORAL DAILY
Qty: 90 TABLET | Refills: 1 | Status: SHIPPED | OUTPATIENT
Start: 2020-10-09 | End: 2021-03-31

## 2020-10-09 RX ORDER — ATORVASTATIN CALCIUM 20 MG/1
20 TABLET, FILM COATED ORAL DAILY
Qty: 90 TABLET | Refills: 1 | Status: SHIPPED | OUTPATIENT
Start: 2020-10-09 | End: 2021-03-31

## 2020-10-09 RX ORDER — AMLODIPINE BESYLATE 2.5 MG/1
2.5 TABLET ORAL DAILY
Qty: 90 TABLET | Refills: 1 | Status: SHIPPED | OUTPATIENT
Start: 2020-10-09 | End: 2021-03-31

## 2020-10-09 RX ORDER — GLIPIZIDE 10 MG/1
10 TABLET, FILM COATED, EXTENDED RELEASE ORAL 2 TIMES DAILY
Qty: 180 TABLET | Refills: 1 | Status: SHIPPED | OUTPATIENT
Start: 2020-10-09 | End: 2021-03-31

## 2020-10-09 ASSESSMENT — ANXIETY QUESTIONNAIRES
3. WORRYING TOO MUCH ABOUT DIFFERENT THINGS: NOT AT ALL
7. FEELING AFRAID AS IF SOMETHING AWFUL MIGHT HAPPEN: NOT AT ALL
2. NOT BEING ABLE TO STOP OR CONTROL WORRYING: NOT AT ALL
5. BEING SO RESTLESS THAT IT IS HARD TO SIT STILL: NOT AT ALL
GAD7 TOTAL SCORE: 0
6. BECOMING EASILY ANNOYED OR IRRITABLE: NOT AT ALL
1. FEELING NERVOUS, ANXIOUS, OR ON EDGE: NOT AT ALL

## 2020-10-09 ASSESSMENT — PATIENT HEALTH QUESTIONNAIRE - PHQ9
SUM OF ALL RESPONSES TO PHQ QUESTIONS 1-9: 0
5. POOR APPETITE OR OVEREATING: NOT AT ALL

## 2020-10-09 ASSESSMENT — PAIN SCALES - GENERAL: PAINLEVEL: NO PAIN (0)

## 2020-10-09 NOTE — NURSING NOTE
"Chief Complaint   Patient presents with     Diabetes       Initial /70 (BP Location: Left arm, Patient Position: Chair, Cuff Size: Adult Large)   Pulse 90   Temp 97.7  F (36.5  C) (Tympanic)   Resp 20   Wt 110.2 kg (243 lb)   SpO2 95%   BMI 34.87 kg/m   Estimated body mass index is 34.87 kg/m  as calculated from the following:    Height as of 10/17/19: 1.778 m (5' 10\").    Weight as of this encounter: 110.2 kg (243 lb).  Medication Reconciliation: complete  Miriam Diop LPN    "

## 2020-10-10 ASSESSMENT — ANXIETY QUESTIONNAIRES: GAD7 TOTAL SCORE: 0

## 2020-10-14 ENCOUNTER — TELEPHONE (OUTPATIENT)
Dept: EDUCATION SERVICES | Facility: OTHER | Age: 65
End: 2020-10-14

## 2020-10-14 NOTE — TELEPHONE ENCOUNTER
Fax received from [employer] that the pt's BG meter is not covered by his insurance company. Rx was sent to the pharmacy for an Accu-chek Guide meter. I called the pt and left a message. Pt has an appt next week and can discuss more at that time if he has questions per Karrie Coleman.

## 2020-10-16 ENCOUNTER — HOSPITAL ENCOUNTER (OUTPATIENT)
Dept: ULTRASOUND IMAGING | Facility: HOSPITAL | Age: 65
Discharge: HOME | End: 2020-10-16
Attending: FAMILY MEDICINE | Admitting: FAMILY MEDICINE
Payer: COMMERCIAL

## 2020-10-16 DIAGNOSIS — Z13.6 SCREENING FOR AAA (ABDOMINAL AORTIC ANEURYSM): ICD-10-CM

## 2020-10-16 PROCEDURE — 76706 US ABDL AORTA SCREEN AAA: CPT

## 2020-10-20 ENCOUNTER — HOSPITAL ENCOUNTER (OUTPATIENT)
Dept: EDUCATION SERVICES | Facility: HOSPITAL | Age: 65
Discharge: HOME | End: 2020-10-20
Attending: FAMILY MEDICINE | Admitting: FAMILY MEDICINE
Payer: COMMERCIAL

## 2020-10-20 VITALS
DIASTOLIC BLOOD PRESSURE: 81 MMHG | WEIGHT: 242.4 LBS | BODY MASS INDEX: 34.78 KG/M2 | RESPIRATION RATE: 16 BRPM | OXYGEN SATURATION: 95 % | HEART RATE: 81 BPM | SYSTOLIC BLOOD PRESSURE: 158 MMHG

## 2020-10-20 DIAGNOSIS — E11.9 TYPE 2 DIABETES MELLITUS WITHOUT COMPLICATION, WITHOUT LONG-TERM CURRENT USE OF INSULIN (H): Primary | ICD-10-CM

## 2020-10-20 PROCEDURE — G0108 DIAB MANAGE TRN  PER INDIV: HCPCS | Performed by: DIETITIAN, REGISTERED

## 2020-10-20 ASSESSMENT — PAIN SCALES - GENERAL: PAINLEVEL: NO PAIN (0)

## 2020-10-20 NOTE — PROGRESS NOTES
"Diabetes Self-Management Education & Support    Presents for: Individual review(Annual)    SUBJECTIVE/OBJECTIVE:  Presents for: Individual review(Annual)  Accompanied by: Self  Diabetes education in the past 24mo: Yes  Focus of Visit: Monitoring, Assistance w/ making life changes  Diabetes type: Type 2  Date of diagnosis: 2005  Disease course: Getting harder to manage  How confident are you filling out medical forms by yourself:: Extremely  Diabetes management related comments/concerns: Glucose levels increasing.  Transportation concerns: No  Difficulty affording diabetes medication?: No  Difficulty affording diabetes testing supplies?: No  Other concerns:: None  Cultural Influences/Ethnic Background:  American    Diabetes Symptoms & Complications:  Fatigue: No  Neuropathy: No  Polydipsia: No  Polyphagia: No  Polyuria: No  Visual change: No  Slow healing wounds: No  Symptom course: Stable  Weight trend: Stable  Complications assessed today?: Yes  Autonomic neuropathy: No  CVA: No  Heart disease: No  Nephropathy: No  Peripheral neuropathy: No  Foot ulcerations: No  Peripheral Vascular Disease: No  Retinopathy: No    Patient Problem List and Family Medical History reviewed for relevant medical history, current medical status, and diabetes risk factors.    Vitals:  /81   Pulse 81   Resp 16   Wt 110 kg (242 lb 6.4 oz)   SpO2 95%   BMI 34.78 kg/m    Estimated body mass index is 34.78 kg/m  as calculated from the following:    Height as of 10/17/19: 1.778 m (5' 10\").    Weight as of this encounter: 110 kg (242 lb 6.4 oz).   Last 3 BP:   BP Readings from Last 3 Encounters:   10/20/20 158/81   10/09/20 130/70   09/09/20 (!) 142/82       History   Smoking Status     Never Smoker   Smokeless Tobacco     Never Used     Comment: remote cigar history       Labs:  Lab Results   Component Value Date    A1C 7.4 10/09/2020     Lab Results   Component Value Date     01/10/2020     Lab Results   Component Value Date "    LDL 79 01/10/2020     HDL Cholesterol   Date Value Ref Range Status   01/10/2020 46 >39 mg/dL Final   ]  GFR Estimate   Date Value Ref Range Status   01/10/2020 >90 >60 mL/min/[1.73_m2] Final     Comment:     Non  GFR Calc  Starting 12/18/2018, serum creatinine based estimated GFR (eGFR) will be   calculated using the Chronic Kidney Disease Epidemiology Collaboration   (CKD-EPI) equation.       GFR Estimate If Black   Date Value Ref Range Status   01/10/2020 >90 >60 mL/min/[1.73_m2] Final     Comment:      GFR Calc  Starting 12/18/2018, serum creatinine based estimated GFR (eGFR) will be   calculated using the Chronic Kidney Disease Epidemiology Collaboration   (CKD-EPI) equation.       Lab Results   Component Value Date    CR 0.89 01/10/2020     No results found for: MICROALBUMIN    Healthy Eating:  Healthy Eating Assessed Today: Yes  Cultural/Pentecostalism diet restrictions?: No  Meal planning/habits: Avoiding sweets  Meals include: Breakfast  Breakfast: cold cereal or oatmeal or leftover pizza - milk or water  Lunch: sandwich or soup - water, milk  Dinner: meat, veggies, sometimes starch - water  Snacks: baked goods, fruit, nuts  Other: Dines out 2x/month  Beverages: Water, Milk, Tea, Diet soda  Has patient met with a dietitian in the past?: Yes    Being Active:  Being Active Assessed Today: Yes  Exercise:: Yes(Gym is closed.  Has some weights at home but does not use consistently.  Yard work, household chores.)  Days per week of moderate to strenuous exercise (like a brisk walk): 4  On average, minutes per day of exercise at this level: 60  How intense was your typical exercise? : Moderate (like brisk walking)  Exercise Minutes per Week: 240  Barrier to exercise: None    Monitoring:  Monitoring Assessed Today: Yes  Did patient bring glucose meter to appointment? : Yes  Blood Glucose Meter: Accu-chek  Times checking blood sugar at home (number): 1  Times checking blood sugar at home  (per): Day  Blood glucose trend: Increasing  Fasting-121, 141, 164, 124, 146, 154, 154, 142, 114  2 hours post meal-175, 150, 192  Two week average is 147.     Taking Medications:  Diabetes Medication(s)     Biguanides       metFORMIN (GLUCOPHAGE) 1000 MG tablet    TAKE 1 TABLET BY MOUTH TWICE DAILY WITH MEALS    Sulfonylureas       glipiZIDE (GLUCOTROL XL) 10 MG 24 hr tablet    Take 1 tablet (10 mg) by mouth 2 times daily    Incretin Mimetic Agents (GLP-1 Receptor Agonists)       liraglutide (VICTOZA PEN) 18 MG/3ML solution    ADMINISTER 1.2 MG UNDER THE SKIN EVERY DAY        Taking Medication Assessed Today: Yes  Current Treatments: Oral Medication (taken by mouth)(Metformin 1000 bid, Glipizide 10 mg bid, Victoza 1.2 mg daily)  Problems taking diabetes medications regularly?: No  Diabetes medication side effects?: No    Problem Solving:  Problem Solving Assessed Today: Yes  Is the patient at risk for hypoglycemia?: Yes  Hypoglycemia Frequency: Rarely  Hypoglycemia Treatment: Candy(Candy and then a snack or meal)  Patient carries a carbohydrate source: Yes  Medical ID: No  Does patient have DKA prevention plan?: No  Does patient have severe weather/disaster plan for diabetes management?: No    Hypoglycemia symptoms  Hunger: Yes  Feeling shaky: Yes    Reducing Risks:  Reducing Risks Assessed Today: No  Diabetes Risks: Age over 45 years, Family History(Aunts)  CAD Risks: Diabetes Mellitus, Male sex  Has dilated eye exam at least once a year?: Yes(Schedule for next Monday)  Sees dentist every 6 months?: No  Feet checked by healthcare provider in the last year?: Yes    Healthy Coping:  Healthy Coping Assessed Today: Yes  Emotional response to diabetes: Ready to learn, Acceptance  Stage of change: ACTION (Actively working towards change)  Support resources: None  Patient Activation Measure Survey Score:  EZ Score (Last Two) 6/28/2011 10/31/2018   EZ Raw Score 46 38   Activation Score 75.3 83.7   EZ Level 4 4      Diabetes knowledge and skills assessment:   Patient is knowledgeable in diabetes management concepts related to: Healthy Eating, Being Active, Monitoring, Taking Medication, Problem Solving, Reducing Risks and Healthy Coping    Patient needs further education on the following diabetes management concepts: Being Active, Monitoring, Taking Medication, Reducing Risks.    Based on learning assessment above, most appropriate setting for further diabetes education would be: Individual setting.    INTERVENTIONS:    Education provided today on:  AADE Self-Care Behaviors:  Healthy Eating: Benefits of limiting foods high in carbohydrates.    Being Active: relationship to blood glucose  Monitoring: individual blood glucose targets and frequency of monitoring  Taking Medication: Options of adjusting medications r/t elevated A1c. Favor increase in Victoza vs Glipizide.   Reducing Risks: major complications of diabetes and A1C - goals, relating to blood glucose levels, how often to check    Opportunities for ongoing education and support in diabetes-self management were discussed.    Pt verbalized understanding of concepts discussed and recommendations provided today.       Education Materials Provided:  No new materials today.     ASSESSMENT:  Recent A1c above target at 7.4%.  Pt feels it is related to him not being able to go to the gym as it has been closed.  Discussed importance/benefits of exercise.  Pt plans to snow shoe in the winter months for exercise.  He is not interested in adjusting medications at this time as would like to try to make changes to his diet and exercise routine first.  We did discuss that if A1c is still elevated in January then we need to consider medication adjustment at that time.       Patient's most recent   Lab Results   Component Value Date    A1C 7.4 10/09/2020    is not meeting goal of <7.0    PLAN  Make efforts to limit foods high in carbohydrates and limit portion sizes at meals.   Reviewed foods high in carbohydrates with pt.   Try to get some daily exercise.    Test glucose 1x/day - alternate times.  Follow up A1c scheduled with provider in January.  Call if A1c is above target.   See Patient Instructions for co-developed, patient-stated behavior change goals.  AVS printed and provided to patient today.   Follow up in six months.     Time Spent: 30 minutes  Encounter Type: Individual    Any diabetes medication dose changes were made via the CDE Protocol and Collaborative Practice Agreement with the patient's referring provider. A copy of this encounter was shared with the provider.

## 2020-10-20 NOTE — PATIENT INSTRUCTIONS
-Continue to limit foods high in carbohydrates.   -Test glucose 1x/day.  Good times are fasting, before meals, 2 hours after a meal.  -Target levels are fasting and before meals , 2 hours after a meal less than 180.  -Keep taking your same diabetes medications for now.   -Recent A1c was 7.4%.  Goal is less than 7.0%.   -Recheck A1c in January.  If level is increased please call.  -Follow up in six months.   -Call with any concerns - MARIZA Nichols, -[phone].

## 2020-10-20 NOTE — LETTER
"    10/20/2020        RE: Edward Hannah  [address]   [facility] [address]        Diabetes Self-Management Education & Support    Presents for: Individual review(Annual)    SUBJECTIVE/OBJECTIVE:  Presents for: Individual review(Annual)  Accompanied by: Self  Diabetes education in the past 24mo: Yes  Focus of Visit: Monitoring, Assistance w/ making life changes  Diabetes type: Type 2  Date of diagnosis: 2005  Disease course: Getting harder to manage  How confident are you filling out medical forms by yourself:: Extremely  Diabetes management related comments/concerns: Glucose levels increasing.  Transportation concerns: No  Difficulty affording diabetes medication?: No  Difficulty affording diabetes testing supplies?: No  Other concerns:: None  Cultural Influences/Ethnic Background:  American    Diabetes Symptoms & Complications:  Fatigue: No  Neuropathy: No  Polydipsia: No  Polyphagia: No  Polyuria: No  Visual change: No  Slow healing wounds: No  Symptom course: Stable  Weight trend: Stable  Complications assessed today?: Yes  Autonomic neuropathy: No  CVA: No  Heart disease: No  Nephropathy: No  Peripheral neuropathy: No  Foot ulcerations: No  Peripheral Vascular Disease: No  Retinopathy: No    Patient Problem List and Family Medical History reviewed for relevant medical history, current medical status, and diabetes risk factors.    Vitals:  /81   Pulse 81   Resp 16   Wt 110 kg (242 lb 6.4 oz)   SpO2 95%   BMI 34.78 kg/m    Estimated body mass index is 34.78 kg/m  as calculated from the following:    Height as of 10/17/19: 1.778 m (5' 10\").    Weight as of this encounter: 110 kg (242 lb 6.4 oz).   Last 3 BP:   BP Readings from Last 3 Encounters:   10/20/20 158/81   10/09/20 130/70   09/09/20 (!) 142/82       History   Smoking Status     Never Smoker   Smokeless Tobacco     Never Used     Comment: remote cigar history       Labs:  Lab Results   Component Value Date    A1C 7.4 10/09/2020     Lab Results "   Component Value Date     01/10/2020     Lab Results   Component Value Date    LDL 79 01/10/2020     HDL Cholesterol   Date Value Ref Range Status   01/10/2020 46 >39 mg/dL Final   ]  GFR Estimate   Date Value Ref Range Status   01/10/2020 >90 >60 mL/min/[1.73_m2] Final     Comment:     Non  GFR Calc  Starting 12/18/2018, serum creatinine based estimated GFR (eGFR) will be   calculated using the Chronic Kidney Disease Epidemiology Collaboration   (CKD-EPI) equation.       GFR Estimate If Black   Date Value Ref Range Status   01/10/2020 >90 >60 mL/min/[1.73_m2] Final     Comment:      GFR Calc  Starting 12/18/2018, serum creatinine based estimated GFR (eGFR) will be   calculated using the Chronic Kidney Disease Epidemiology Collaboration   (CKD-EPI) equation.       Lab Results   Component Value Date    CR 0.89 01/10/2020     No results found for: MICROALBUMIN    Healthy Eating:  Healthy Eating Assessed Today: Yes  Cultural/Congregation diet restrictions?: No  Meal planning/habits: Avoiding sweets  Meals include: Breakfast  Breakfast: cold cereal or oatmeal or leftover pizza - milk or water  Lunch: sandwich or soup - water, milk  Dinner: meat, veggies, sometimes starch - water  Snacks: baked goods, fruit, nuts  Other: Dines out 2x/month  Beverages: Water, Milk, Tea, Diet soda  Has patient met with a dietitian in the past?: Yes    Being Active:  Being Active Assessed Today: Yes  Exercise:: Yes(Gym is closed.  Has some weights at home but does not use consistently.  Yard work, household chores.)  Days per week of moderate to strenuous exercise (like a brisk walk): 4  On average, minutes per day of exercise at this level: 60  How intense was your typical exercise? : Moderate (like brisk walking)  Exercise Minutes per Week: 240  Barrier to exercise: None    Monitoring:  Monitoring Assessed Today: Yes  Did patient bring glucose meter to appointment? : Yes  Blood Glucose Meter:  Accu-chek  Times checking blood sugar at home (number): 1  Times checking blood sugar at home (per): Day  Blood glucose trend: Increasing  Fasting-121, 141, 164, 124, 146, 154, 154, 142, 114  2 hours post meal-175, 150, 192  Two week average is 147.     Taking Medications:  Diabetes Medication(s)     Biguanides       metFORMIN (GLUCOPHAGE) 1000 MG tablet    TAKE 1 TABLET BY MOUTH TWICE DAILY WITH MEALS    Sulfonylureas       glipiZIDE (GLUCOTROL XL) 10 MG 24 hr tablet    Take 1 tablet (10 mg) by mouth 2 times daily    Incretin Mimetic Agents (GLP-1 Receptor Agonists)       liraglutide (VICTOZA PEN) 18 MG/3ML solution    ADMINISTER 1.2 MG UNDER THE SKIN EVERY DAY        Taking Medication Assessed Today: Yes  Current Treatments: Oral Medication (taken by mouth)(Metformin 1000 bid, Glipizide 10 mg bid, Victoza 1.2 mg daily)  Problems taking diabetes medications regularly?: No  Diabetes medication side effects?: No    Problem Solving:  Problem Solving Assessed Today: Yes  Is the patient at risk for hypoglycemia?: Yes  Hypoglycemia Frequency: Rarely  Hypoglycemia Treatment: Candy(Candy and then a snack or meal)  Patient carries a carbohydrate source: Yes  Medical ID: No  Does patient have DKA prevention plan?: No  Does patient have severe weather/disaster plan for diabetes management?: No    Hypoglycemia symptoms  Hunger: Yes  Feeling shaky: Yes    Reducing Risks:  Reducing Risks Assessed Today: No  Diabetes Risks: Age over 45 years, Family History(Aunts)  CAD Risks: Diabetes Mellitus, Male sex  Has dilated eye exam at least once a year?: Yes(Schedule for next Monday)  Sees dentist every 6 months?: No  Feet checked by healthcare provider in the last year?: Yes    Healthy Coping:  Healthy Coping Assessed Today: Yes  Emotional response to diabetes: Ready to learn, Acceptance  Stage of change: ACTION (Actively working towards change)  Support resources: None  Patient Activation Measure Survey Score:  EZ Score (Last Two)  6/28/2011 10/31/2018   EZ Raw Score 46 38   Activation Score 75.3 83.7   EZ Level 4 4     Diabetes knowledge and skills assessment:   Patient is knowledgeable in diabetes management concepts related to: Healthy Eating, Being Active, Monitoring, Taking Medication, Problem Solving, Reducing Risks and Healthy Coping    Patient needs further education on the following diabetes management concepts: Being Active, Monitoring, Taking Medication, Reducing Risks.    Based on learning assessment above, most appropriate setting for further diabetes education would be: Individual setting.    INTERVENTIONS:    Education provided today on:  AADE Self-Care Behaviors:  Healthy Eating: Benefits of limiting foods high in carbohydrates.    Being Active: relationship to blood glucose  Monitoring: individual blood glucose targets and frequency of monitoring  Taking Medication: Options of adjusting medications r/t elevated A1c. Favor increase in Victoza vs Glipizide.   Reducing Risks: major complications of diabetes and A1C - goals, relating to blood glucose levels, how often to check    Opportunities for ongoing education and support in diabetes-self management were discussed.    Pt verbalized understanding of concepts discussed and recommendations provided today.       Education Materials Provided:  No new materials today.     ASSESSMENT:  Recent A1c above target at 7.4%.  Pt feels it is related to him not being able to go to the gym as it has been closed.  Discussed importance/benefits of exercise.  Pt plans to snow shoe in the winter months for exercise.  He is not interested in adjusting medications at this time as would like to try to make changes to his diet and exercise routine first.  We did discuss that if A1c is still elevated in January then we need to consider medication adjustment at that time.       Patient's most recent   Lab Results   Component Value Date    A1C 7.4 10/09/2020    is not meeting goal of  <7.0    PLAN  Make efforts to limit foods high in carbohydrates and limit portion sizes at meals.  Reviewed foods high in carbohydrates with pt.   Try to get some daily exercise.    Test glucose 1x/day - alternate times.  Follow up A1c scheduled with provider in January.  Call if A1c is above target.   See Patient Instructions for co-developed, patient-stated behavior change goals.  AVS printed and provided to patient today.   Follow up in six months.     Time Spent: 30 minutes  Encounter Type: Individual    Any diabetes medication dose changes were made via the CDE Protocol and Collaborative Practice Agreement with the patient's referring provider. A copy of this encounter was shared with the provider.          Sincerely,        Karrie Coleman RD

## 2020-10-22 ENCOUNTER — TRANSFERRED RECORDS (OUTPATIENT)
Dept: HEALTH INFORMATION MANAGEMENT | Facility: CLINIC | Age: 65
End: 2020-10-22

## 2020-10-26 ENCOUNTER — TRANSFERRED RECORDS (OUTPATIENT)
Dept: HEALTH INFORMATION MANAGEMENT | Facility: CLINIC | Age: 65
End: 2020-10-26

## 2020-10-26 LAB
RETINOPATHY: NEGATIVE
RETINOPATHY: NEGATIVE

## 2020-11-22 ENCOUNTER — HEALTH MAINTENANCE LETTER (OUTPATIENT)
Age: 65
End: 2020-11-22

## 2020-11-23 ENCOUNTER — TELEPHONE (OUTPATIENT)
Dept: FAMILY MEDICINE | Facility: OTHER | Age: 65
End: 2020-11-23

## 2020-11-23 NOTE — TELEPHONE ENCOUNTER
Faxed 10/09/2020 A1C to [facility] fax [phone] Attn: Debby

## 2021-01-05 DIAGNOSIS — E11.65 TYPE 2 DIABETES MELLITUS WITH HYPERGLYCEMIA, WITHOUT LONG-TERM CURRENT USE OF INSULIN (H): ICD-10-CM

## 2021-01-06 RX ORDER — LIRAGLUTIDE 6 MG/ML
INJECTION SUBCUTANEOUS
Qty: 6 ML | Refills: 1 | Status: SHIPPED | OUTPATIENT
Start: 2021-01-06 | End: 2021-03-25

## 2021-01-11 NOTE — PROGRESS NOTES
Assessment & Plan     Type 2 diabetes mellitus with hyperglycemia, without long-term current use of insulin (H)  Follow-up 4 mos  Keep up the good work  - Lipid Profile (Chol, Trig, HDL, LDL calc)  - Hemoglobin A1c  - Comprehensive metabolic panel  - Albumin Random Urine Quantitative with Creat Ratio; Future  - Estimated Average Glucose  - ZZC FOOT EXAM  NO CHARGE    Benign essential hypertension    - Comprehensive metabolic panel  - Blood Pressure Monitor KIT; 1 Application 2 times daily    Hyperlipidemia LDL goal <100    - Lipid Profile (Chol, Trig, HDL, LDL calc)    Other insomnia  Is doing ok    Morbid obesity (H)  Hasn't been able to get to the gym yet    Plantar warts    - cimetidine (TAGAMET) 800 MG tablet; Take 1 tablet (800 mg) by mouth At Bedtime           Patient was agreeable to this plan and had no further questions.  There are no Patient Instructions on file for this visit.    No follow-ups on file.    Kelly Yarbrough MD  [facility] - JUAN chowdhury is a 65 year old who presents to clinic today for the following health issues     HPI       Diabetes Follow-up    How often are you checking your blood sugar? A few times a week  What time of day are you checking your blood sugars (select all that apply)?  Before and after meals  Have you had any blood sugars above 200?  Yes 1-2  Have you had any blood sugars below 70?  No    What symptoms do you notice when your blood sugar is low?  Shaky and Dizzy    What concerns do you have today about your diabetes? None     Do you have any of these symptoms? (Select all that apply)  No numbness or tingling in feet.  No redness, sores or blisters on feet.  No complaints of excessive thirst.  No reports of blurry vision.  No significant changes to weight.              Hyperlipidemia Follow-Up      Are you regularly taking any medication or supplement to lower your cholesterol?   Yes- Lipitor    Are you having muscle aches or other  "side effects that you think could be caused by your cholesterol lowering medication?  No    Hypertension Follow-up      Do you check your blood pressure regularly outside of the clinic? No     Are you following a low salt diet? Yes    Are your blood pressures ever more than 140 on the top number (systolic) OR more   than 90 on the bottom number (diastolic), for example 140/90? No    BP Readings from Last 2 Encounters:   01/15/21 134/82   10/20/20 158/81     Hemoglobin A1C (%)   Date Value   10/09/2020 7.4 (H)   01/10/2020 7.1 (H)     LDL Cholesterol Calculated (mg/dL)   Date Value   01/10/2020 79   02/28/2019 77       How many servings of fruits and vegetables do you eat daily?  2-3    On average, how many sweetened beverages do you drink each day (Examples: soda, juice, sweet tea, etc.  Do NOT count diet or artificially sweetened beverages)?   0    How many days per week do you exercise enough to make your heart beat faster? 3 or less    How many minutes a day do you exercise enough to make your heart beat faster? 20 - 29    How many days per week do you miss taking your medication? 0    1. foot deformity   No  2. Current or previous foot ulceration     No  3. Current or previous pre-ulcerative calluses     No  4. previous partial amputation of one or both feet or complete amputation of one foot     No  5. peripheral neuropathy with evidence of callus formation     No  6. poor circulation     No    Review of Systems   Constitutional, HEENT, cardiovascular, pulmonary, gi and gu systems are negative, except as otherwise noted.      Objective    /82   Pulse 74   Temp 96.7  F (35.9  C) (Tympanic)   Ht 1.778 m (5' 10\")   Wt 112.8 kg (248 lb 9.6 oz)   SpO2 96%   BMI 35.67 kg/m    Body mass index is 35.67 kg/m .  Physical Exam   GENERAL: healthy, alert and no distress  NECK: no adenopathy, no asymmetry, masses, or scars and thyroid normal to palpation  RESP: lungs clear to auscultation - no rales, rhonchi or " wheezes  CV: regular rate and rhythm, normal S1 S2, no S3 or S4, no murmur, click or rub, no peripheral edema and peripheral pulses strong  ABDOMEN: soft, nontender, no hepatosplenomegaly, no masses and bowel sounds normal  MS: no gross musculoskeletal defects noted, no edema  Diabetic foot exam: normal DP and PT pulses, normal sensory exam, normal monofilament exam, dry cracking heels, onychomycosis and dry feet  PSYCH: mentation appears normal, affect normal/bright    Results for orders placed or performed in visit on 01/15/21   Lipid Profile (Chol, Trig, HDL, LDL calc)     Status: None   Result Value Ref Range    Cholesterol 136 <200 mg/dL    Triglycerides 127 <150 mg/dL    HDL Cholesterol 45 >39 mg/dL    LDL Cholesterol Calculated 66 <100 mg/dL    Non HDL Cholesterol 91 <130 mg/dL   Hemoglobin A1c     Status: Abnormal   Result Value Ref Range    Hemoglobin A1C 6.8 (H) 0 - 5.6 %   Comprehensive metabolic panel     Status: Abnormal   Result Value Ref Range    Sodium 137 133 - 144 mmol/L    Potassium 4.0 3.4 - 5.3 mmol/L    Chloride 104 94 - 109 mmol/L    Carbon Dioxide 26 20 - 32 mmol/L    Anion Gap 7 3 - 14 mmol/L    Glucose 168 (H) 70 - 99 mg/dL    Urea Nitrogen 21 7 - 30 mg/dL    Creatinine 0.93 0.66 - 1.25 mg/dL    GFR Estimate 86 >60 mL/min/[1.73_m2]    GFR Estimate If Black >90 >60 mL/min/[1.73_m2]    Calcium 9.1 8.5 - 10.1 mg/dL    Bilirubin Total 0.5 0.2 - 1.3 mg/dL    Albumin 4.0 3.4 - 5.0 g/dL    Protein Total 7.9 6.8 - 8.8 g/dL    Alkaline Phosphatase 92 40 - 150 U/L    ALT 36 0 - 70 U/L    AST 10 0 - 45 U/L   Estimated Average Glucose     Status: None   Result Value Ref Range    Estimated Average Glucose 148 mg/dL

## 2021-01-15 ENCOUNTER — TELEPHONE (OUTPATIENT)
Dept: FAMILY MEDICINE | Facility: OTHER | Age: 66
End: 2021-01-15

## 2021-01-15 ENCOUNTER — APPOINTMENT (OUTPATIENT)
Dept: LAB | Facility: OTHER | Age: 66
End: 2021-01-15
Attending: FAMILY MEDICINE
Payer: COMMERCIAL

## 2021-01-15 ENCOUNTER — OFFICE VISIT (OUTPATIENT)
Dept: FAMILY MEDICINE | Facility: OTHER | Age: 66
End: 2021-01-15
Attending: FAMILY MEDICINE
Payer: COMMERCIAL

## 2021-01-15 VITALS
HEART RATE: 74 BPM | BODY MASS INDEX: 35.59 KG/M2 | SYSTOLIC BLOOD PRESSURE: 134 MMHG | DIASTOLIC BLOOD PRESSURE: 82 MMHG | OXYGEN SATURATION: 96 % | WEIGHT: 248.6 LBS | HEIGHT: 70 IN | TEMPERATURE: 96.7 F

## 2021-01-15 DIAGNOSIS — E78.5 HYPERLIPIDEMIA LDL GOAL <100: ICD-10-CM

## 2021-01-15 DIAGNOSIS — E66.01 MORBID OBESITY (H): ICD-10-CM

## 2021-01-15 DIAGNOSIS — B07.0 PLANTAR WARTS: ICD-10-CM

## 2021-01-15 DIAGNOSIS — E11.65 TYPE 2 DIABETES MELLITUS WITH HYPERGLYCEMIA, WITHOUT LONG-TERM CURRENT USE OF INSULIN (H): Primary | ICD-10-CM

## 2021-01-15 DIAGNOSIS — I10 BENIGN ESSENTIAL HYPERTENSION: ICD-10-CM

## 2021-01-15 DIAGNOSIS — G47.09 OTHER INSOMNIA: ICD-10-CM

## 2021-01-15 LAB
ALBUMIN SERPL-MCNC: 4 G/DL (ref 3.4–5)
ALP SERPL-CCNC: 92 U/L (ref 40–150)
ALT SERPL W P-5'-P-CCNC: 36 U/L (ref 0–70)
ANION GAP SERPL CALCULATED.3IONS-SCNC: 7 MMOL/L (ref 3–14)
AST SERPL W P-5'-P-CCNC: 10 U/L (ref 0–45)
BILIRUB SERPL-MCNC: 0.5 MG/DL (ref 0.2–1.3)
BUN SERPL-MCNC: 21 MG/DL (ref 7–30)
CALCIUM SERPL-MCNC: 9.1 MG/DL (ref 8.5–10.1)
CHLORIDE SERPL-SCNC: 104 MMOL/L (ref 94–109)
CHOLEST SERPL-MCNC: 136 MG/DL
CO2 SERPL-SCNC: 26 MMOL/L (ref 20–32)
CREAT SERPL-MCNC: 0.93 MG/DL (ref 0.66–1.25)
EST. AVERAGE GLUCOSE BLD GHB EST-MCNC: 148 MG/DL
GFR SERPL CREATININE-BSD FRML MDRD: 86 ML/MIN/{1.73_M2}
GLUCOSE SERPL-MCNC: 168 MG/DL (ref 70–99)
HBA1C MFR BLD: 6.8 % (ref 0–5.6)
HDLC SERPL-MCNC: 45 MG/DL
LDLC SERPL CALC-MCNC: 66 MG/DL
NONHDLC SERPL-MCNC: 91 MG/DL
POTASSIUM SERPL-SCNC: 4 MMOL/L (ref 3.4–5.3)
PROT SERPL-MCNC: 7.9 G/DL (ref 6.8–8.8)
SODIUM SERPL-SCNC: 137 MMOL/L (ref 133–144)
TRIGL SERPL-MCNC: 127 MG/DL

## 2021-01-15 PROCEDURE — 80061 LIPID PANEL: CPT | Mod: ZL | Performed by: FAMILY MEDICINE

## 2021-01-15 PROCEDURE — 99214 OFFICE O/P EST MOD 30 MIN: CPT | Performed by: FAMILY MEDICINE

## 2021-01-15 PROCEDURE — 999N001182 HC STATISTIC ESTIMATED AVERAGE GLUCOSE: Mod: ZL | Performed by: FAMILY MEDICINE

## 2021-01-15 PROCEDURE — 36415 COLL VENOUS BLD VENIPUNCTURE: CPT | Mod: ZL | Performed by: FAMILY MEDICINE

## 2021-01-15 PROCEDURE — 83036 HEMOGLOBIN GLYCOSYLATED A1C: CPT | Mod: ZL | Performed by: FAMILY MEDICINE

## 2021-01-15 PROCEDURE — 80053 COMPREHEN METABOLIC PANEL: CPT | Mod: ZL | Performed by: FAMILY MEDICINE

## 2021-01-15 PROCEDURE — G0463 HOSPITAL OUTPT CLINIC VISIT: HCPCS

## 2021-01-15 RX ORDER — CIMETIDINE 800 MG
800 TABLET ORAL AT BEDTIME
Qty: 30 TABLET | Refills: 1 | Status: SHIPPED | OUTPATIENT
Start: 2021-01-15 | End: 2021-06-02

## 2021-01-15 ASSESSMENT — PAIN SCALES - GENERAL: PAINLEVEL: NO PAIN (0)

## 2021-01-15 ASSESSMENT — MIFFLIN-ST. JEOR: SCORE: 1918.89

## 2021-01-15 NOTE — NURSING NOTE
"Chief Complaint   Patient presents with     Diabetes     Hypertension     Lipids     RECHECK       Initial /82   Pulse 74   Temp 96.7  F (35.9  C) (Tympanic)   Ht 1.778 m (5' 10\")   Wt 112.8 kg (248 lb 9.6 oz)   SpO2 96%   BMI 35.67 kg/m   Estimated body mass index is 35.67 kg/m  as calculated from the following:    Height as of this encounter: 1.778 m (5' 10\").    Weight as of this encounter: 112.8 kg (248 lb 9.6 oz).  Medication Reconciliation: diallo Linares  "

## 2021-01-15 NOTE — TELEPHONE ENCOUNTER
Call received from [facility] pharmacy stating BP cuff is not covered through pts insurance. I called [facility] and [facility] and both stated they can not bill BP cuff d/t pts insurance.   Pt contacted and updated to the inability of pts insurance to cover BP cuff. Pt stated he will purchase his own

## 2021-01-15 NOTE — TELEPHONE ENCOUNTER
Telephone call received from Pharmacy - clarified that pts medical insurance does not cover BP cuffs. Pt contacted and updated - stated he will  his own when he has a chance

## 2021-01-25 ENCOUNTER — TELEPHONE (OUTPATIENT)
Dept: FAMILY MEDICINE | Facility: OTHER | Age: 66
End: 2021-01-25

## 2021-01-25 NOTE — TELEPHONE ENCOUNTER
Prior Authorization Retail Medication Request  [facility] KEY# [id number]    Medication/Dose: VICTOZA 18MG/3ML PEN-INJECTORS  ICD code (if different than what is on RX):    Previously Tried and Failed:    Rationale:      Insurance Name:    Insurance ID:        Pharmacy Information (if different than what is on RX)  Name:    Phone:

## 2021-01-27 NOTE — TELEPHONE ENCOUNTER
Prior Authorization Not Needed per Insurance    Medication: liraglutide (VICTOZA PEN) 18 MG/3ML solution--NO PA NEEDED  Insurance Company: [employer] - Phone [phone] Fax [phone]  Expected CoPay:      Pharmacy Filling the Rx: [facility] [address]  Pharmacy Notified: Yes  Patient Notified: Yes **Instructed pharmacy to notify patient when script is ready to /ship.**

## 2021-02-14 ENCOUNTER — ALLIED HEALTH/NURSE VISIT (OUTPATIENT)
Dept: FAMILY MEDICINE | Facility: OTHER | Age: 66
End: 2021-02-14
Attending: FAMILY MEDICINE
Payer: COMMERCIAL

## 2021-02-14 DIAGNOSIS — R09.81 NASAL CONGESTION: ICD-10-CM

## 2021-02-14 DIAGNOSIS — R50.9 FEVER AND CHILLS: ICD-10-CM

## 2021-02-14 DIAGNOSIS — R05.9 COUGH: Primary | ICD-10-CM

## 2021-02-14 PROCEDURE — C9803 HOPD COVID-19 SPEC COLLECT: HCPCS

## 2021-02-14 PROCEDURE — U0005 INFEC AGEN DETEC AMPLI PROBE: HCPCS | Mod: ZL | Performed by: FAMILY MEDICINE

## 2021-02-14 PROCEDURE — U0003 INFECTIOUS AGENT DETECTION BY NUCLEIC ACID (DNA OR RNA); SEVERE ACUTE RESPIRATORY SYNDROME CORONAVIRUS 2 (SARS-COV-2) (CORONAVIRUS DISEASE [COVID-19]), AMPLIFIED PROBE TECHNIQUE, MAKING USE OF HIGH THROUGHPUT TECHNOLOGIES AS DESCRIBED BY CMS-2020-01-R: HCPCS | Mod: ZL | Performed by: FAMILY MEDICINE

## 2021-02-14 NOTE — NURSING NOTE
Patient swabbed for COVID-19 testing with symptoms    Radha Harris LPN on 2/14/2021 at 12:13 PM

## 2021-02-15 LAB
LABORATORY COMMENT REPORT: NORMAL
SARS-COV-2 RNA RESP QL NAA+PROBE: NEGATIVE
SARS-COV-2 RNA RESP QL NAA+PROBE: NORMAL
SPECIMEN SOURCE: NORMAL
SPECIMEN SOURCE: NORMAL

## 2021-03-24 DIAGNOSIS — E11.65 TYPE 2 DIABETES MELLITUS WITH HYPERGLYCEMIA, WITHOUT LONG-TERM CURRENT USE OF INSULIN (H): ICD-10-CM

## 2021-03-25 RX ORDER — LIRAGLUTIDE 6 MG/ML
INJECTION SUBCUTANEOUS
Qty: 6 ML | Refills: 2 | Status: SHIPPED | OUTPATIENT
Start: 2021-03-25 | End: 2021-06-30

## 2021-03-29 ENCOUNTER — TELEPHONE (OUTPATIENT)
Dept: FAMILY MEDICINE | Facility: OTHER | Age: 66
End: 2021-03-29

## 2021-03-29 DIAGNOSIS — E11.65 TYPE 2 DIABETES MELLITUS WITH HYPERGLYCEMIA, WITHOUT LONG-TERM CURRENT USE OF INSULIN (H): Primary | ICD-10-CM

## 2021-03-30 DIAGNOSIS — I10 BENIGN ESSENTIAL HYPERTENSION: ICD-10-CM

## 2021-03-30 DIAGNOSIS — E78.5 HYPERLIPIDEMIA LDL GOAL <100: ICD-10-CM

## 2021-03-30 DIAGNOSIS — E11.65 TYPE 2 DIABETES MELLITUS WITH HYPERGLYCEMIA, WITHOUT LONG-TERM CURRENT USE OF INSULIN (H): ICD-10-CM

## 2021-03-30 DIAGNOSIS — R97.20 ELEVATED PROSTATE SPECIFIC ANTIGEN (PSA): Primary | ICD-10-CM

## 2021-03-30 NOTE — PROGRESS NOTES
"Per last office visit  9/9/20 with Yunior Llanes MD \"Plan  PSA annually\"        Orders Placed    "

## 2021-03-30 NOTE — TELEPHONE ENCOUNTER
Metformin  Last Written Prescription Date: 10/9/20  Last Fill Quantity: 180 # of Refills: 1  Last Office Visit: 1/15/21    Lisinopril  Last Written Prescription Date: 10/9/20  Last Fill Quantity: 90 # of Refills: 1  Last Office Visit: 1/15/21    Glipizide  Last Written Prescription Date: 10/9/20  Last Fill Quantity: 180 # of Refills: 1  Last Office Visit: 1/15/21    Lipitor  Last Written Prescription Date: 10/9/20  Last Fill Quantity: 90 # of Refills: 1  Last Office Visit: 1/15/21    Norvasc  Last Written Prescription Date: 10/9/20  Last Fill Quantity: 90 # of Refills: 1  Last Office Visit: 1/15/21

## 2021-03-31 RX ORDER — LISINOPRIL 40 MG/1
TABLET ORAL
Qty: 90 TABLET | Refills: 2 | Status: SHIPPED | OUTPATIENT
Start: 2021-03-31 | End: 2022-01-27

## 2021-03-31 RX ORDER — AMLODIPINE BESYLATE 2.5 MG/1
TABLET ORAL
Qty: 90 TABLET | Refills: 2 | Status: SHIPPED | OUTPATIENT
Start: 2021-03-31 | End: 2022-01-27

## 2021-03-31 RX ORDER — GLIPIZIDE 10 MG/1
TABLET, FILM COATED, EXTENDED RELEASE ORAL
Qty: 180 TABLET | Refills: 2 | Status: SHIPPED | OUTPATIENT
Start: 2021-03-31 | End: 2022-01-27

## 2021-03-31 RX ORDER — ATORVASTATIN CALCIUM 20 MG/1
TABLET, FILM COATED ORAL
Qty: 90 TABLET | Refills: 2 | Status: SHIPPED | OUTPATIENT
Start: 2021-03-31 | End: 2022-01-27

## 2021-04-12 NOTE — PROGRESS NOTES
"    Assessment & Plan     Benign essential hypertension    - Estimated Average Glucose    Type 2 diabetes mellitus with hyperglycemia, without long-term current use of insulin (H)  Recent viral illness but sugars have been higher since that and then vaccine  Discussed will look at another 3 mos but next step would be insulin  Discussed intermittent fasting, cutting carbs, increase activity  - TSH with free T4 reflex  - Hemoglobin A1c; Standing  - Hemoglobin A1c  - Albumin Random Urine Quantitative with Creat Ratio; Future    Hyperlipidemia LDL goal <100  Fasting next visit    Special screening for malignant neoplasms, colon    - GENERAL SURG ADULT REFERRAL           BMI:   Estimated body mass index is 34.58 kg/m  as calculated from the following:    Height as of this encounter: 1.778 m (5' 10\").    Weight as of this encounter: 109.3 kg (241 lb).   Weight management plan: Discussed healthy diet and exercise guidelines    Patient was agreeable to this plan and had no further questions.  Patient Instructions   intermittant fasting -- 2 meals over 6-7 hours in a day  For example:  Meal 1 at 10-11am and meal 2  Around 4-5 pm      Return in about 3 months (around 7/15/2021) for Follow up, with me.    Kelly Yarbrough MD  [facility] - JUAN chowdhury is a 65 year old who presents for the following health issues   HPI     Diabetes Follow-up    How often are you checking your blood sugar? One time daily  What time of day are you checking your blood sugars (select all that apply)?  Before meals and After meals  Have you had any blood sugars above 200?  Yes - 230's  Have you had any blood sugars below 70?  No    What symptoms do you notice when your blood sugar is low?  Shaky    What concerns do you have today about your diabetes? None     Do you have any of these symptoms? (Select all that apply)  No numbness or tingling in feet.  No redness, sores or blisters on feet.  No complaints of excessive " "thirst.  No reports of blurry vision.  No significant changes to weight.              Hyperlipidemia Follow-Up      Are you regularly taking any medication or supplement to lower your cholesterol?   Yes- lipitor    Are you having muscle aches or other side effects that you think could be caused by your cholesterol lowering medication?  No    Hypertension Follow-up      Do you check your blood pressure regularly outside of the clinic? Yes     Are you following a low salt diet? Yes    Are your blood pressures ever more than 140 on the top number (systolic) OR more   than 90 on the bottom number (diastolic), for example 140/90? No    BP Readings from Last 2 Encounters:   01/15/21 134/82   10/20/20 158/81     Hemoglobin A1C (%)   Date Value   01/15/2021 6.8 (H)   10/09/2020 7.4 (H)     LDL Cholesterol Calculated (mg/dL)   Date Value   01/15/2021 66   01/10/2020 79   recent viral illness and got 2 doses of covid vaccine  Sugars have been higher    Review of Systems   Constitutional, HEENT, cardiovascular, pulmonary, gi and gu systems are negative, except as otherwise noted.      Objective    /82   Pulse 73   Temp 96.6  F (35.9  C) (Tympanic)   Ht 1.778 m (5' 10\")   Wt 109.3 kg (241 lb)   SpO2 92%   BMI 34.58 kg/m    There is no height or weight on file to calculate BMI.  Physical Exam   GENERAL: healthy, alert and no distress  NECK: no adenopathy, no asymmetry, masses, or scars and thyroid normal to palpation  RESP: lungs clear to auscultation - no rales, rhonchi or wheezes  CV: regular rate and rhythm, normal S1 S2, no S3 or S4, no murmur, click or rub, no peripheral edema and peripheral pulses strong  ABDOMEN: soft, nontender, no hepatosplenomegaly, no masses and bowel sounds normal  MS: no gross musculoskeletal defects noted, no edema  PSYCH: mentation appears normal, affect normal/bright    Results for orders placed or performed in visit on 04/15/21   TSH with free T4 reflex     Status: None   Result " Value Ref Range    TSH 1.54 0.40 - 4.00 mU/L   Hemoglobin A1c     Status: Abnormal   Result Value Ref Range    Hemoglobin A1C 7.7 (H) 0 - 5.6 %   Estimated Average Glucose     Status: None   Result Value Ref Range    Estimated Average Glucose 174 mg/dL

## 2021-04-15 ENCOUNTER — OFFICE VISIT (OUTPATIENT)
Dept: FAMILY MEDICINE | Facility: OTHER | Age: 66
End: 2021-04-15
Attending: FAMILY MEDICINE
Payer: COMMERCIAL

## 2021-04-15 ENCOUNTER — APPOINTMENT (OUTPATIENT)
Dept: LAB | Facility: OTHER | Age: 66
End: 2021-04-15
Attending: FAMILY MEDICINE
Payer: COMMERCIAL

## 2021-04-15 VITALS
DIASTOLIC BLOOD PRESSURE: 82 MMHG | WEIGHT: 241 LBS | HEART RATE: 73 BPM | SYSTOLIC BLOOD PRESSURE: 124 MMHG | OXYGEN SATURATION: 92 % | BODY MASS INDEX: 34.5 KG/M2 | HEIGHT: 70 IN | TEMPERATURE: 96.6 F

## 2021-04-15 DIAGNOSIS — I10 BENIGN ESSENTIAL HYPERTENSION: Primary | ICD-10-CM

## 2021-04-15 DIAGNOSIS — E78.5 HYPERLIPIDEMIA LDL GOAL <100: ICD-10-CM

## 2021-04-15 DIAGNOSIS — E11.65 TYPE 2 DIABETES MELLITUS WITH HYPERGLYCEMIA, WITHOUT LONG-TERM CURRENT USE OF INSULIN (H): ICD-10-CM

## 2021-04-15 DIAGNOSIS — Z12.11 SPECIAL SCREENING FOR MALIGNANT NEOPLASMS, COLON: ICD-10-CM

## 2021-04-15 LAB
EST. AVERAGE GLUCOSE BLD GHB EST-MCNC: 174 MG/DL
HBA1C MFR BLD: 7.7 % (ref 0–5.6)
TSH SERPL DL<=0.005 MIU/L-ACNC: 1.54 MU/L (ref 0.4–4)

## 2021-04-15 PROCEDURE — 999N001182 HC STATISTIC ESTIMATED AVERAGE GLUCOSE: Mod: ZL | Performed by: FAMILY MEDICINE

## 2021-04-15 PROCEDURE — 99214 OFFICE O/P EST MOD 30 MIN: CPT | Performed by: FAMILY MEDICINE

## 2021-04-15 PROCEDURE — 83036 HEMOGLOBIN GLYCOSYLATED A1C: CPT | Mod: ZL | Performed by: FAMILY MEDICINE

## 2021-04-15 PROCEDURE — 36415 COLL VENOUS BLD VENIPUNCTURE: CPT | Mod: ZL | Performed by: FAMILY MEDICINE

## 2021-04-15 PROCEDURE — 36416 COLLJ CAPILLARY BLOOD SPEC: CPT | Mod: ZL | Performed by: FAMILY MEDICINE

## 2021-04-15 PROCEDURE — G0463 HOSPITAL OUTPT CLINIC VISIT: HCPCS

## 2021-04-15 PROCEDURE — 82043 UR ALBUMIN QUANTITATIVE: CPT | Mod: ZL | Performed by: FAMILY MEDICINE

## 2021-04-15 PROCEDURE — 84443 ASSAY THYROID STIM HORMONE: CPT | Mod: ZL | Performed by: FAMILY MEDICINE

## 2021-04-15 ASSESSMENT — ANXIETY QUESTIONNAIRES
3. WORRYING TOO MUCH ABOUT DIFFERENT THINGS: NOT AT ALL
GAD7 TOTAL SCORE: 0
2. NOT BEING ABLE TO STOP OR CONTROL WORRYING: NOT AT ALL
5. BEING SO RESTLESS THAT IT IS HARD TO SIT STILL: NOT AT ALL
6. BECOMING EASILY ANNOYED OR IRRITABLE: NOT AT ALL
4. TROUBLE RELAXING: NOT AT ALL
7. FEELING AFRAID AS IF SOMETHING AWFUL MIGHT HAPPEN: NOT AT ALL
1. FEELING NERVOUS, ANXIOUS, OR ON EDGE: NOT AT ALL

## 2021-04-15 ASSESSMENT — PATIENT HEALTH QUESTIONNAIRE - PHQ9: SUM OF ALL RESPONSES TO PHQ QUESTIONS 1-9: 0

## 2021-04-15 ASSESSMENT — PAIN SCALES - GENERAL: PAINLEVEL: NO PAIN (0)

## 2021-04-15 ASSESSMENT — MIFFLIN-ST. JEOR: SCORE: 1884.42

## 2021-04-15 NOTE — PATIENT INSTRUCTIONS
intermittant fasting -- 2 meals over 6-7 hours in a day  For example:  Meal 1 at 10-11am and meal 2  Around 4-5 pm

## 2021-04-15 NOTE — NURSING NOTE
"Chief Complaint   Patient presents with     Diabetes     Hypertension     Lipids     RECHECK       Initial /82   Pulse 73   Temp 96.6  F (35.9  C) (Tympanic)   Ht 1.778 m (5' 10\")   Wt 109.3 kg (241 lb)   SpO2 92%   BMI 34.58 kg/m   Estimated body mass index is 34.58 kg/m  as calculated from the following:    Height as of this encounter: 1.778 m (5' 10\").    Weight as of this encounter: 109.3 kg (241 lb).  Medication Reconciliation: complete  Jeane Warner LPN  "

## 2021-04-16 ASSESSMENT — ANXIETY QUESTIONNAIRES: GAD7 TOTAL SCORE: 0

## 2021-04-20 ENCOUNTER — HOSPITAL ENCOUNTER (OUTPATIENT)
Dept: EDUCATION SERVICES | Facility: HOSPITAL | Age: 66
Discharge: HOME | End: 2021-04-20
Attending: NURSE PRACTITIONER | Admitting: FAMILY MEDICINE
Payer: COMMERCIAL

## 2021-04-20 VITALS
SYSTOLIC BLOOD PRESSURE: 144 MMHG | WEIGHT: 241.4 LBS | HEART RATE: 78 BPM | BODY MASS INDEX: 34.56 KG/M2 | DIASTOLIC BLOOD PRESSURE: 72 MMHG | OXYGEN SATURATION: 92 % | HEIGHT: 70 IN

## 2021-04-20 DIAGNOSIS — E11.65 TYPE 2 DIABETES MELLITUS WITH HYPERGLYCEMIA, WITHOUT LONG-TERM CURRENT USE OF INSULIN (H): Primary | ICD-10-CM

## 2021-04-20 PROCEDURE — G0108 DIAB MANAGE TRN  PER INDIV: HCPCS | Performed by: DIETITIAN, REGISTERED

## 2021-04-20 ASSESSMENT — MIFFLIN-ST. JEOR: SCORE: 1886.23

## 2021-04-20 ASSESSMENT — PAIN SCALES - GENERAL: PAINLEVEL: NO PAIN (0)

## 2021-04-20 NOTE — PATIENT INSTRUCTIONS
-Keep following healthy, low carbohydrate meal plan.   -Try to get some daily exercise.  Goal is 30 minutes most days of the week.  -Test glucose 1x/day.  Alternate fasting and 2 hours after largest meal.  -Target levels are fasting  and 2 hours after a meal less than 180.  -Recent A1c was 7.7%.  Goal is < 7.0%.   -Follow up in six months.    -Call with any concerns - MARIZA Nichols, -[phone].

## 2021-04-20 NOTE — LETTER
"    4/20/2021        RE: Edward Hannah  [address]   [facility] [address]        Diabetes Self-Management Education & Support    Presents for: Individual review    SUBJECTIVE/OBJECTIVE:  Presents for: Individual review  Accompanied by: Self  Diabetes education in the past 24mo: Yes  Focus of Visit: Monitoring, Assistance w/ making life changes  Diabetes type: Type 2  Date of diagnosis: 2005  Disease course: Getting harder to manage  How confident are you filling out medical forms by yourself:: Extremely  Diabetes management related comments/concerns: Glucose levels were elevated above usual February until recently.  Transportation concerns: No  Difficulty affording diabetes medication?: No  Difficulty affording diabetes testing supplies?: No  Other concerns:: None  Cultural Influences/Ethnic Background:  American    Diabetes Symptoms & Complications:  Fatigue: No  Neuropathy: No  Polydipsia: No  Polyphagia: No  Polyuria: No  Visual change: No  Slow healing wounds: No  Symptom course: Stable  Weight trend: Stable  Complications assessed today?: No  Autonomic neuropathy: No  CVA: No  Heart disease: No  Nephropathy: No  Peripheral neuropathy: No  Foot ulcerations: No  Peripheral Vascular Disease: No  Retinopathy: No    Patient Problem List and Family Medical History reviewed for relevant medical history, current medical status, and diabetes risk factors.    Vitals:  /72 (BP Location: Left arm, Patient Position: Sitting, Cuff Size: Adult Large)   Pulse 78   Ht 1.778 m (5' 10\")   Wt 109.5 kg (241 lb 6.4 oz)   SpO2 92%   BMI 34.64 kg/m    Estimated body mass index is 34.64 kg/m  as calculated from the following:    Height as of this encounter: 1.778 m (5' 10\").    Weight as of this encounter: 109.5 kg (241 lb 6.4 oz).   Last 3 BP:   BP Readings from Last 3 Encounters:   04/20/21 144/72   04/15/21 124/82   01/15/21 134/82       History   Smoking Status     Never Smoker   Smokeless Tobacco     Never Used     " Comment: remote cigar history       Labs:  Lab Results   Component Value Date    A1C 7.7 04/15/2021     Lab Results   Component Value Date     01/15/2021     Lab Results   Component Value Date    LDL 66 01/15/2021     HDL Cholesterol   Date Value Ref Range Status   01/15/2021 45 >39 mg/dL Final   ]  GFR Estimate   Date Value Ref Range Status   01/15/2021 86 >60 mL/min/[1.73_m2] Final     Comment:     Non  GFR Calc  Starting 12/18/2018, serum creatinine based estimated GFR (eGFR) will be   calculated using the Chronic Kidney Disease Epidemiology Collaboration   (CKD-EPI) equation.       GFR Estimate If Black   Date Value Ref Range Status   01/15/2021 >90 >60 mL/min/[1.73_m2] Final     Comment:      GFR Calc  Starting 12/18/2018, serum creatinine based estimated GFR (eGFR) will be   calculated using the Chronic Kidney Disease Epidemiology Collaboration   (CKD-EPI) equation.       Lab Results   Component Value Date    CR 0.93 01/15/2021     No results found for: MICROALBUMIN    Healthy Eating:  Healthy Eating Assessed Today: Yes  Cultural/Jewish diet restrictions?: No  Meal planning/habits: Avoiding sweets  How many times a week on average do you eat food made away from home (restaurant/take-out)?: 1  Meals include: Breakfast, Lunch, Dinner  Breakfast: protein smoothie or 2 toast with peanut butter and banana or eggs or cereal or oatmeal  Lunch: sandwich or yogurt or may skip if busy  Dinner: meat, salad or veggies  - occasional starch  Snacks: fruit, 1 oz dark chocolate  Beverages: Water, Milk, Juice, Diet soda  Has patient met with a dietitian in the past?: Yes    Being Active:  Being Active Assessed Today: Yes  Exercise:: Yes(Gym is closed.  Has some weights at home but does not use consistently.  Yard work, household chores.)  Days per week of moderate to strenuous exercise (like a brisk walk): 4  On average, minutes per day of exercise at this level: 60  How intense was  your typical exercise? : Moderate (like brisk walking)  Exercise Minutes per Week: 240  Barrier to exercise: None    Monitoring:  Monitoring Assessed Today: Yes  Did patient bring glucose meter to appointment? : Yes  Blood Glucose Meter: One Touch  Times checking blood sugar at home (number): 1  Times checking blood sugar at home (per): Day  Blood glucose trend: Decreasing  Fasting-125, 132, 131, 132, 130, 131  2 hours post meal-160, 177, 130, 173    Taking Medications:  Diabetes Medication(s)     Biguanides       metFORMIN (GLUCOPHAGE) 1000 MG tablet    TAKE 1 TABLET BY MOUTH TWICE DAILY WITH MEALS    Sulfonylureas       glipiZIDE (GLUCOTROL XL) 10 MG 24 hr tablet    Take 1 tablet by mouth twice daily    Incretin Mimetic Agents (GLP-1 Receptor Agonists)       VICTOZA PEN 18 MG/3ML soln    INJECT 1.2 MG SUBCUTANEOUSLY ONCE DAILY        Taking Medication Assessed Today: Yes  Current Treatments: Oral Medication (taken by mouth), Diet(Metformin 1000 bid, Glipizide 10 mg bid, Victoza 1.2 mg daily)  Problems taking diabetes medications regularly?: No  Diabetes medication side effects?: No    Problem Solving:  Problem Solving Assessed Today: Yes  Is the patient at risk for hypoglycemia?: Yes  Hypoglycemia Frequency: Rarely  Hypoglycemia Treatment: Candy(Candy and then a snack or meal)  Patient carries a carbohydrate source: Yes  Medical ID: No  Does patient have DKA prevention plan?: No  Does patient have severe weather/disaster plan for diabetes management?: No    Hypoglycemia symptoms  Hunger: Yes  Feeling shaky: Yes    Reducing Risks:  Reducing Risks Assessed Today: No  Diabetes Risks: Age over 45 years, Family History(Aunts)  CAD Risks: Diabetes Mellitus, Male sex  Has dilated eye exam at least once a year?: Yes  Sees dentist every 6 months?: No  Feet checked by healthcare provider in the last year?: Yes    Healthy Coping:  Healthy Coping Assessed Today: Yes  Emotional response to diabetes: Ready to learn,  Acceptance  Stage of change: MAINTENANCE (Working to maintain change, with risk of relapse)  Support resources: None  Patient Activation Measure Survey Score:  EZ Score (Last Two) 6/28/2011 10/31/2018   EZ Raw Score 46 38   Activation Score 75.3 83.7   EZ Level 4 4     Diabetes knowledge and skills assessment:   Patient is knowledgeable in diabetes management concepts related to: Healthy Eating, Being Active, Monitoring, Taking Medication, Problem Solving, Reducing Risks and Healthy Coping    Based on learning assessment above, most appropriate setting for further diabetes education would be: Individual setting.      INTERVENTIONS:    Education provided today on:  AADE Self-Care Behaviors:  Healthy Eating: Encouraged continue healthy, low carbohydrate meal plan.   Being Active: relationship to blood glucose.  Discussed goal of 30 minutes exercise most days of the week.   Monitoring: individual blood glucose targets and frequency of monitoring.  Pt just started using new One Touch Verio meter.   Taking Medication: Discussed option of dose increase of Victoza if needed.   Reducing Risks: A1C - goals, relating to blood glucose levels, how often to check    Opportunities for ongoing education and support in diabetes-self management were discussed.    Pt verbalized understanding of concepts discussed and recommendations provided today.       Education Materials Provided:  No new materials today.        ASSESSMENT:  Recent A1c was 7.7% which is up from previous A1c of 6.8% in January.  Pt reports he was ill with a lingering cold from early February until recently.  Question if this played a role in elevated glucose levels as levels on meter download are in target today.  Discussed that Victoza dose can be increased if needed but to continue to follow healthy diet and try to get some daily exercise for now since levels seem improved.  He will have A1c check again in July.     Patient's most recent   Lab Results    Component Value Date    A1C 7.7 04/15/2021    is not meeting goal of <7.0    PLAN  Follow healthy, low carbohydrate meal plan.  Work towards 30 minutes exercise most days of the week.  Test glucose 1x/day - alternate times.   See Patient Instructions for co-developed, patient-stated behavior change goals.  AVS printed and provided to patient today.   Follow up in six months.     Time Spent: 35 minutes  Encounter Type: Individual    Any diabetes medication dose changes were made via the CDE Protocol and Collaborative Practice Agreement with the patient's referring provider. A copy of this encounter was shared with the provider.          Sincerely,        Karrie Coleman RD

## 2021-04-20 NOTE — PROGRESS NOTES
"Diabetes Self-Management Education & Support    Presents for: Individual review    SUBJECTIVE/OBJECTIVE:  Presents for: Individual review  Accompanied by: Self  Diabetes education in the past 24mo: Yes  Focus of Visit: Monitoring, Assistance w/ making life changes  Diabetes type: Type 2  Date of diagnosis: 2005  Disease course: Getting harder to manage  How confident are you filling out medical forms by yourself:: Extremely  Diabetes management related comments/concerns: Glucose levels were elevated above usual February until recently.  Transportation concerns: No  Difficulty affording diabetes medication?: No  Difficulty affording diabetes testing supplies?: No  Other concerns:: None  Cultural Influences/Ethnic Background:  American    Diabetes Symptoms & Complications:  Fatigue: No  Neuropathy: No  Polydipsia: No  Polyphagia: No  Polyuria: No  Visual change: No  Slow healing wounds: No  Symptom course: Stable  Weight trend: Stable  Complications assessed today?: No  Autonomic neuropathy: No  CVA: No  Heart disease: No  Nephropathy: No  Peripheral neuropathy: No  Foot ulcerations: No  Peripheral Vascular Disease: No  Retinopathy: No    Patient Problem List and Family Medical History reviewed for relevant medical history, current medical status, and diabetes risk factors.    Vitals:  /72 (BP Location: Left arm, Patient Position: Sitting, Cuff Size: Adult Large)   Pulse 78   Ht 1.778 m (5' 10\")   Wt 109.5 kg (241 lb 6.4 oz)   SpO2 92%   BMI 34.64 kg/m    Estimated body mass index is 34.64 kg/m  as calculated from the following:    Height as of this encounter: 1.778 m (5' 10\").    Weight as of this encounter: 109.5 kg (241 lb 6.4 oz).   Last 3 BP:   BP Readings from Last 3 Encounters:   04/20/21 144/72   04/15/21 124/82   01/15/21 134/82       History   Smoking Status     Never Smoker   Smokeless Tobacco     Never Used     Comment: remote cigar history       Labs:  Lab Results   Component Value Date    A1C " 7.7 04/15/2021     Lab Results   Component Value Date     01/15/2021     Lab Results   Component Value Date    LDL 66 01/15/2021     HDL Cholesterol   Date Value Ref Range Status   01/15/2021 45 >39 mg/dL Final   ]  GFR Estimate   Date Value Ref Range Status   01/15/2021 86 >60 mL/min/[1.73_m2] Final     Comment:     Non  GFR Calc  Starting 12/18/2018, serum creatinine based estimated GFR (eGFR) will be   calculated using the Chronic Kidney Disease Epidemiology Collaboration   (CKD-EPI) equation.       GFR Estimate If Black   Date Value Ref Range Status   01/15/2021 >90 >60 mL/min/[1.73_m2] Final     Comment:      GFR Calc  Starting 12/18/2018, serum creatinine based estimated GFR (eGFR) will be   calculated using the Chronic Kidney Disease Epidemiology Collaboration   (CKD-EPI) equation.       Lab Results   Component Value Date    CR 0.93 01/15/2021     No results found for: MICROALBUMIN    Healthy Eating:  Healthy Eating Assessed Today: Yes  Cultural/Sikhism diet restrictions?: No  Meal planning/habits: Avoiding sweets  How many times a week on average do you eat food made away from home (restaurant/take-out)?: 1  Meals include: Breakfast, Lunch, Dinner  Breakfast: protein smoothie or 2 toast with peanut butter and banana or eggs or cereal or oatmeal  Lunch: sandwich or yogurt or may skip if busy  Dinner: meat, salad or veggies  - occasional starch  Snacks: fruit, 1 oz dark chocolate  Beverages: Water, Milk, Juice, Diet soda  Has patient met with a dietitian in the past?: Yes    Being Active:  Being Active Assessed Today: Yes  Exercise:: Yes(Gym is closed.  Has some weights at home but does not use consistently.  Yard work, household chores.)  Days per week of moderate to strenuous exercise (like a brisk walk): 4  On average, minutes per day of exercise at this level: 60  How intense was your typical exercise? : Moderate (like brisk walking)  Exercise Minutes per Week:  240  Barrier to exercise: None    Monitoring:  Monitoring Assessed Today: Yes  Did patient bring glucose meter to appointment? : Yes  Blood Glucose Meter: One Touch  Times checking blood sugar at home (number): 1  Times checking blood sugar at home (per): Day  Blood glucose trend: Decreasing  Fasting-125, 132, 131, 132, 130, 131  2 hours post meal-160, 177, 130, 173    Taking Medications:  Diabetes Medication(s)     Biguanides       metFORMIN (GLUCOPHAGE) 1000 MG tablet    TAKE 1 TABLET BY MOUTH TWICE DAILY WITH MEALS    Sulfonylureas       glipiZIDE (GLUCOTROL XL) 10 MG 24 hr tablet    Take 1 tablet by mouth twice daily    Incretin Mimetic Agents (GLP-1 Receptor Agonists)       VICTOZA PEN 18 MG/3ML soln    INJECT 1.2 MG SUBCUTANEOUSLY ONCE DAILY        Taking Medication Assessed Today: Yes  Current Treatments: Oral Medication (taken by mouth), Diet(Metformin 1000 bid, Glipizide 10 mg bid, Victoza 1.2 mg daily)  Problems taking diabetes medications regularly?: No  Diabetes medication side effects?: No    Problem Solving:  Problem Solving Assessed Today: Yes  Is the patient at risk for hypoglycemia?: Yes  Hypoglycemia Frequency: Rarely  Hypoglycemia Treatment: Candy(Candy and then a snack or meal)  Patient carries a carbohydrate source: Yes  Medical ID: No  Does patient have DKA prevention plan?: No  Does patient have severe weather/disaster plan for diabetes management?: No    Hypoglycemia symptoms  Hunger: Yes  Feeling shaky: Yes    Reducing Risks:  Reducing Risks Assessed Today: No  Diabetes Risks: Age over 45 years, Family History(Aunts)  CAD Risks: Diabetes Mellitus, Male sex  Has dilated eye exam at least once a year?: Yes  Sees dentist every 6 months?: No  Feet checked by healthcare provider in the last year?: Yes    Healthy Coping:  Healthy Coping Assessed Today: Yes  Emotional response to diabetes: Ready to learn, Acceptance  Stage of change: MAINTENANCE (Working to maintain change, with risk of  relapse)  Support resources: None  Patient Activation Measure Survey Score:  EZ Score (Last Two) 6/28/2011 10/31/2018   EZ Raw Score 46 38   Activation Score 75.3 83.7   EZ Level 4 4     Diabetes knowledge and skills assessment:   Patient is knowledgeable in diabetes management concepts related to: Healthy Eating, Being Active, Monitoring, Taking Medication, Problem Solving, Reducing Risks and Healthy Coping    Based on learning assessment above, most appropriate setting for further diabetes education would be: Individual setting.      INTERVENTIONS:    Education provided today on:  AADE Self-Care Behaviors:  Healthy Eating: Encouraged continue healthy, low carbohydrate meal plan.   Being Active: relationship to blood glucose.  Discussed goal of 30 minutes exercise most days of the week.   Monitoring: individual blood glucose targets and frequency of monitoring.  Pt just started using new One Touch Verio meter.   Taking Medication: Discussed option of dose increase of Victoza if needed.   Reducing Risks: A1C - goals, relating to blood glucose levels, how often to check    Opportunities for ongoing education and support in diabetes-self management were discussed.    Pt verbalized understanding of concepts discussed and recommendations provided today.       Education Materials Provided:  No new materials today.        ASSESSMENT:  Recent A1c was 7.7% which is up from previous A1c of 6.8% in January.  Pt reports he was ill with a lingering cold from early February until recently.  Question if this played a role in elevated glucose levels as levels on meter download are in target today.  Discussed that Victoza dose can be increased if needed but to continue to follow healthy diet and try to get some daily exercise for now since levels seem improved.  He will have A1c check again in July.     Patient's most recent   Lab Results   Component Value Date    A1C 7.7 04/15/2021    is not meeting goal of <7.0    PLAN  Follow  healthy, low carbohydrate meal plan.  Work towards 30 minutes exercise most days of the week.  Test glucose 1x/day - alternate times.   See Patient Instructions for co-developed, patient-stated behavior change goals.  AVS printed and provided to patient today.   Follow up in six months.     Time Spent: 35 minutes  Encounter Type: Individual    Any diabetes medication dose changes were made via the CDE Protocol and Collaborative Practice Agreement with the patient's referring provider. A copy of this encounter was shared with the provider.

## 2021-05-05 ENCOUNTER — PREP FOR PROCEDURE (OUTPATIENT)
Dept: SURGERY | Facility: OTHER | Age: 66
End: 2021-05-05

## 2021-05-05 ENCOUNTER — TELEPHONE (OUTPATIENT)
Dept: SURGERY | Facility: OTHER | Age: 66
End: 2021-05-05

## 2021-05-05 DIAGNOSIS — Z01.818 PREOP TESTING: Primary | ICD-10-CM

## 2021-05-05 DIAGNOSIS — Z12.11 COLON CANCER SCREENING: Primary | ICD-10-CM

## 2021-05-05 DIAGNOSIS — D12.6 TUBULAR ADENOMA OF COLON: ICD-10-CM

## 2021-05-05 NOTE — LETTER
May 5, 2021          Edward Hannah  [address]    [place] [address]      Dear Edward,     We want to your Colonoscopy to be as pleasant as possible. Please review the instructions below. If you have questions, you may contact us at the any of the following numbers:   [facility] Coordinator: [phone]  Clinic Nurse (Delfina): [phone]  Surgery Education Nurse: [phone]    Date of procedure: 6/10/2021 with Dr. Lawson Storey  Admit Time: Hospital Surgery will call you the day before your procedure by 5pm with your arrival time. If your surgery is on Monday, expect a call on Friday.  If you are not contacted before 5PM, please call admitting at [phone].   After hours or on weekends, please call [phone] to postpone.   Call the clinic nurse if you become ill within 1 week of your procedure to reschedule.     You will need a preop appointment with your primary care provider within 30 days prior to your procedure. Please call Dr. Yarbrough's office if this has not already been scheduled.    COVID-19 test is needed 4-5 days before procedure in the morning. This testing is done in the [facility] on the West side of the [facility] (weekdays and weekends) or at the [facility] through door #3 (weekdays only).  Follow the signage for parking and bring your mobile phone (if you have one) to call the phone number ([phone]) on the sign outside the testing site for check-in. Stay in your vehicle until you are directed to enter. If you do not have a cell phone, please call the nurse for instructions on checking in.   This has been scheduled for 6/5/2021 at 9:30 at the [facility].      Please  the following over the counter items for your bowel prep:    Two 5 mg Dulcolax (bisacodyl) tablets   One 8.3 ounce (238 g) bottle of Miralax   One 10 ounce bottle of liquid Magnesium Citrate-not capsules.   One 64 ounce bottle of Gatorade-Not red, purple, or  "powdered.   (you will need additional sports drink for the 2 days before colonoscopy)    7 DAYS BEFORE THE EXAM:   6/3/2021  Call the Surgery Education Nurse at [phone] and have a medication list ready.   Stop Aspirin or NSAIDS (Ibuprofen, Celebrex, Naproxen, etc) 7 days before surgery.  Stop fiber supplements, herbals, vitamins, and iron. Stop eating corn, nuts and seeds.  If you are prescribed a daily 81mg Aspirin, you may continue this.  If you are prescribed blood thinners or insulin, talk to your primary provider for instructions.  Discuss your Victoza with your primary care provider.     2 DAYS BEFORE THE EXAM:   6/8/2021  Low fiber diet.   See list of low fiber foods on page 3 of the \"Miralax, Dulcolax and Magnesium Citrate\" packet.   Drink at least 4-6 large glasses of sports drink today and tomorrow (separate from the bowel prep).  Avoid red and purple.    1 DAY BEFORE THE EXAM:   6/9/2021  No solid food/milk products after 12:00 AM (midnight).   Drink only clear liquids all day, at least 8-10 glasses, including 4-6 glasses of sports drink.   Please see list of clear liquids on page 2 of \"Miralax, Dulcolax and Magnesium Citrate\" packet.    Avoid anything red or purple. No alcohol.            AT 12:00 PM NOON THE DAY BEFORE EXAM:  Take 2 Dulcolax tablets by mouth with clear liquids.            AT 6:00 PM THE DAY BEFORE EXAM:  Mix the bottle of Miralax and 64 oz. of Gatorade in a pitcher.   Drink one 8 oz. glass every 10-15 minutes until gone. Stay near a toilet.     DAY OF COLONOSCOPY:   6/10/2021            6 HOURS PRIOR TO EXAM ON DAY OF PROCEDURE:    Drink the bottle of Magnesium Citrate followed by a full glass of water.   You may have clear liquids up until 2 hours before arrival, then nothing by mouth.  If you need to take any medications after this, take them with a tiny sip of water.   If you have asthma, bring your inhaler with you.  Shower before arrival and wear clean, comfortable clothes. "   No jewelry, make-up, nail polish, hair spray, lotions, or perfumes.   [place] in Admitting through the [facility].   You must have a responsible adult to drive and to stay with you for 4 hours at home.         TIPS FOR COLON CLEANSING BEFORE YOUR COLONOSCOPY  To get accurate results from your exam, your colon must be completely empty or you may need to repeat the colon prep and exam. If you followed instructions and your stool is clear or yellow liquid, you are ready. If you are not sure if your colon is clean, please call the clinic nurse.    You may use Tucks wipes, hemorrhoid treatments, hydrocortisone cream or alcohol-free baby wipes to ease anal irritation. You may also use Vaseline to help protect the skin.     Quickly drink each glass. Even when you are sitting on the toilet, keep drinking every 15 minutes. If you have nausea or vomiting, take a break for 30 minutes and then resume drinking.    You will have loose watery stools and may also have chills. Dress for comfort. Expect to feel bloating, nausea and other discomfort until the stool clears from your colon.         Sincerely,         Lawson Storey MD/Delfina MOLINA LPN

## 2021-05-05 NOTE — TELEPHONE ENCOUNTER
Referral received for colonoscopy for screening.  Previous colonoscopy was in 2014 at [facility] with findings of tubular adenoma x2.   This patient was not approved by [employer] DOAN surgery education RN for meet and wendi colonoscopy without preop appointment.   Patient scheduled for colonoscopy on 6/10/2021 with Dr. Storey at [facility] with Gatorade bowel prep.   Instructions given via phone and instructions mailed to patient with surgery handbook.   COVID-19 test: 6/5 @ 9:30  Preop: Message sent to primary care [facility] requesting preop with Dr. Yarbrough.

## 2021-05-17 NOTE — PATIENT INSTRUCTIONS

## 2021-05-17 NOTE — PROGRESS NOTES
[facility] [place]  [address]  [place] [address]  Phone: [phone]  Primary Provider: Kelly Yarbrough  Pre-op Performing Provider: KELLY YARBROUGH      PREOPERATIVE EVALUATION:  Today's date: 6/2/2021    Edward Hannah is a 65 year old male who presents for a preoperative evaluation.    Surgical Information:  Surgery/Procedure: Colonoscopy   [place] [facility] [place]: Dr. Storey  Surgery Date: 06/10/2021  Time of Surgery: unknown   Where patient plans to recover: At home with family  Fax number for surgical facility: Note does not need to be faxed, will be available electronically in Epic.    Type of Anesthesia Anticipated: to be determined    Assessment & Plan     The proposed surgical procedure is considered LOW risk.    Preop general physical exam    - CBC with platelets    Benign essential hypertension    - Basic metabolic panel  - SLEEP EVALUATION & MANAGEMENT REFERRAL - [facility] [place] [phone] (Age 5 and up); Future    Type 2 diabetes mellitus with hyperglycemia, without long-term current use of insulin (H)    - Basic metabolic panel    Weight gain  Suspect of JIAN  - SLEEP EVALUATION & MANAGEMENT REFERRAL - [facility] [place] [phone] (Age 5 and up); Future    Snoring  As above  - SLEEP EVALUATION & MANAGEMENT REFERRAL - [facility] [place] [phone] (Age 5 and up); Future    Restless legs syndrome  - SLEEP EVALUATION & MANAGEMENT REFERRAL - [facility] [place] [phone] (Age 5 and up); Future         Risks and Recommendations:  The patient has the following additional risks and recommendations for perioperative complications:   - No identified additional risk factors other than previously addressed    Medication Instructions:  Patient is to take all scheduled medications on the day of surgery EXCEPT for modifications listed below:   - ACE/ARB: HOLD due to exceptional risk of  hypotension during surgery.    - Calcium Channel Blockers: May be continued on the day of surgery.   - metformin: HOLD day of surgery.   - GLP-1 Injectable (exenitide, liraglutide, semaglutide, dulaglutide, etc.): HOLD day of surgery     RECOMMENDATION:  APPROVAL GIVEN to proceed with proposed procedure, without further diagnostic evaluation.          Subjective     HPI related to upcoming procedure: due for colon screening      Preop Questions 6/2/2021   1. Have you ever had a heart attack or stroke? No   2. Have you ever had surgery on your heart or blood vessels, such as a stent placement, a coronary artery bypass, or surgery on an artery in your head, neck, heart, or legs? No   3. Do you have chest pain with activity? No   4. Do you have a history of  heart failure? No   5. Do you currently have a cold, bronchitis or symptoms of other infection? No   6. Do you have a cough, shortness of breath, or wheezing? No   7. Do you or anyone in your family have previous history of blood clots? No   8. Do you or does anyone in your family have a serious bleeding problem such as prolonged bleeding following surgeries or cuts? No   9. Have you ever had problems with anemia or been told to take iron pills? No   10. Have you had any abnormal blood loss such as black, tarry or bloody stools? No   11. Have you ever had a blood transfusion? No   12. Are you willing to have a blood transfusion if it is medically needed before, during, or after your surgery? Yes   13. Have you or any of your relatives ever had problems with anesthesia? No   14. Do you have sleep apnea, excessive snoring or daytime drowsiness? No   15. Do you have any artifical heart valves or other implanted medical devices like a pacemaker, defibrillator, or continuous glucose monitor? No   16. Do you have artificial joints? No   17. Are you allergic to latex? No     Health Care Directive:  Patient has a Health Care Directive on file      Preoperative Review of  :   reviewed - no record of controlled substances prescribed.      Status of Chronic Conditions:  DIABETES - Patient has a longstanding history of DiabetesType Type II . Patient is being treated with diet, oral agents, exercise, ASA and victoza injectable and denies significant side effects. Control has been good. Complicating factors include but are not limited to: hypertension and hyperlipidemia.     HYPERTENSION - Patient has longstanding history of HTN , currently denies any symptoms referable to elevated blood pressure. Specifically denies chest pain, palpitations, dyspnea, orthopnea, PND or peripheral edema. Blood pressure readings have been in normal range. Current medication regimen is as listed below. Patient denies any side effects of medication.       Review of Systems  CONSTITUTIONAL: NEGATIVE for fever, chills, change in weight  INTEGUMENTARY/SKIN: NEGATIVE for worrisome rashes, moles or lesions  EYES: NEGATIVE for vision changes or irritation  ENT/MOUTH: NEGATIVE for ear, mouth and throat problems  RESP: NEGATIVE for significant cough or SOB  BREAST: NEGATIVE for masses, tenderness or discharge  CV: NEGATIVE for chest pain, palpitations or peripheral edema  GI: NEGATIVE for nausea, abdominal pain, heartburn, or change in bowel habits  : NEGATIVE for frequency, dysuria, or hematuria  MUSCULOSKELETAL: NEGATIVE for significant arthralgias or myalgia  NEURO: NEGATIVE for weakness, dizziness or paresthesias  ENDOCRINE: NEGATIVE for temperature intolerance, skin/hair changes  HEME: NEGATIVE for bleeding problems  PSYCHIATRIC: NEGATIVE for changes in mood or affect    Patient Active Problem List    Diagnosis Date Noted     Colon cancer screening 05/05/2021     Priority: Medium     Added automatically from request for surgery [id number]       Tubular adenoma of colon 05/05/2021     Priority: Medium     Added automatically from request for surgery [id number]       Special screening for malignant neoplasms,  colon 04/15/2021     Priority: Medium     Morbid obesity (H) 01/15/2021     Priority: Medium     Rotator cuff tendonitis, left 10/09/2020     Priority: Medium     Type 2 diabetes mellitus with hyperglycemia, without long-term current use of insulin (H) 01/10/2020     Priority: Medium     Plantar warts 01/10/2020     Priority: Medium     Hypovitaminosis D 08/28/2019     Priority: Medium     Elevated prostate specific antigen (PSA) 08/28/2019     Priority: Medium     Herpes zoster without complication 11/06/2017     Priority: Medium     Trigger finger, acquired 11/06/2017     Priority: Medium     ACP (advance care planning) 07/21/2016     Priority: Medium     Advance Care Planning 7/21/2016: ACP Review of Chart / Resources Provided:  Reviewed chart for advance care plan.  Edward Hannah has no plan or code status on file however states presence of ACP document. Copy requested. Confirmed code status reflects current choices pending receipt of document/advance care plan review.  Confirmed/documented legally designated decision makers.  Added by Jadyn Singh             Benign essential hypertension 07/20/2016     Priority: Medium     Other insomnia 04/20/2016     Priority: Medium     Obesity due to excess calories, unspecified obesity severity 01/20/2016     Priority: Medium     BCC (basal cell carcinoma), face 09/07/2012     Priority: Medium     Advanced directives, counseling/discussion 06/28/2011     Priority: Medium     Advance Directive Problem List Overview:   Name Relationship Phone    Primary Health Care Agent            Alternative Health Care Agent          Discussed advance care planning with patient; however, patient declined at this time. 6/28/2011          Hyperlipidemia LDL goal <100 05/09/2010     Priority: Medium      Past Medical History:   Diagnosis Date     Basal cell carcinoma      DIABETES MELLITUS TYPE II-UNCOMPL 1/19/2006     Elevated PSA 2019     HYPERLIPIDEMIA NEC/NOS 10/18/2006      HYPERTENSION NOS 1/19/2006     Obesity      Shingles 11/2017     Past Surgical History:   Procedure Laterality Date     BIOPSY PROSTATE TRANSRECTAL N/A 10/22/2019    Procedure: Transrectal Ultrasoun guided biopsy of prostate;  Surgeon: Yunior Llanes MD;  Location:  OR     COLONOSCOPY  3-3-2014    due 2019  4 polyps benign     wisdom teeth extraction       Current Outpatient Medications   Medication Sig Dispense Refill     acetylcysteine (N-ACETYL CYSTEINE) 500 MG CAPS capsule Take 1 capsule (500 mg) by mouth daily       amLODIPine (NORVASC) 2.5 MG tablet Take 1 tablet by mouth once daily 90 tablet 2     ASPIRIN 81 MG OR TABS 1 tab po QD (Once per day) 100 3     atorvastatin (LIPITOR) 20 MG tablet Take 1 tablet by mouth once daily 90 tablet 2     BD TYRONE U/F 32G X 4 MM insulin pen needle USE ONE NEEDLE DAILY AS DIRECTED 100 each 3     blood glucose (ONETOUCH VERIO IQ) test strip Use to test blood sugar 1 time daily. 100 strip 3     Blood Glucose Monitoring Suppl (ONETOUCH VERIO FLEX SYSTEM) w/Device KIT 1 each continuous 1 kit 0     Blood Pressure Monitor KIT 1 Application 2 times daily 1 kit 0     glipiZIDE (GLUCOTROL XL) 10 MG 24 hr tablet Take 1 tablet by mouth twice daily 180 tablet 2     lisinopril (ZESTRIL) 40 MG tablet Take 1 tablet by mouth once daily 90 tablet 2     metFORMIN (GLUCOPHAGE) 1000 MG tablet TAKE 1 TABLET BY MOUTH TWICE DAILY WITH MEALS 180 tablet 2     Multiple Vitamins-Minerals (MULTIVITAMIN PO) Take 1 tablet by mouth daily       Omega-3 Fatty Acids (OMEGA-3 FISH OIL PO) Take 1 g by mouth daily       OneTouch Delica Lancets 33G MISC 1 each daily 100 each 3     sildenafil (REVATIO) 20 MG tablet Take 3-5 (60-100mg) tablets by mouth 1 hour prior to sexual activity 30 tablet 11     Turmeric 500 MG TABS Take 1 tablet by mouth 2 times daily       VICTOZA PEN 18 MG/3ML soln INJECT 1.2 MG SUBCUTANEOUSLY ONCE DAILY 6 mL 2     vitamin B complex with vitamin C (VITAMIN  B COMPLEX) tablet Take 1 tablet  by mouth daily       VITAMIN D, CHOLECALCIFEROL, PO Take 1,000 Units by mouth daily         Allergies   Allergen Reactions     Nkda [No Known Drug Allergies]         Social History     Tobacco Use     Smoking status: Never Smoker     Smokeless tobacco: Never Used     Tobacco comment: remote cigar history   Substance Use Topics     Alcohol use: Yes     Alcohol/week: 0.0 standard drinks     Frequency: 4 or more times a week     Drinks per session: 1 or 2     Comment: 1-2/day     Family History   Problem Relation Age of Onset     Cardiovascular Father         aortic valve surgery     Cancer Father         lung cancer     Gastrointestinal Disease Father         benign esophageal tumor removed     Hypertension Father      Diabetes Father 70     Other - See Comments Mother         infection     Cerebrovascular Disease Maternal Grandmother      Other - See Comments Maternal Grandfather         hunting accident     Other - See Comments Paternal Grandmother         old age     Parkinsonism Paternal Grandfather      Cerebrovascular Disease Paternal Grandfather         tobacco negative     Family History Negative Sister      History   Drug Use No     Comment: remote THC         Objective     BP (!) 150/70 (BP Location: Left arm, Patient Position: Chair, Cuff Size: Adult Large)   Pulse 86   Temp 97.4  F (36.3  C) (Tympanic)   Resp 16   Wt 110.2 kg (243 lb)   SpO2 96%   BMI 34.87 kg/m      Physical Exam    GENERAL APPEARANCE: healthy, alert and no distress     EYES: EOMI,  PERRL     HENT: ear canals and TM's normal and nose and mouth without ulcers or lesions     NECK: no adenopathy, no asymmetry, masses, or scars and thyroid normal to palpation     RESP: lungs clear to auscultation - no rales, rhonchi or wheezes     CV: regular rates and rhythm, normal S1 S2, no S3 or S4 and no murmur, click or rub     ABDOMEN:  soft, nontender, no HSM or masses and bowel sounds normal     MS: extremities normal- no gross deformities  noted, no evidence of inflammation in joints, FROM in all extremities.     SKIN: no suspicious lesions or rashes     NEURO: Normal strength and tone, sensory exam grossly normal, mentation intact and speech normal     PSYCH: mentation appears normal. and affect normal/bright     LYMPHATICS: No cervical adenopathy    Recent Labs   Lab Test 04/15/21  0911 01/15/21  0750 01/10/20  0914 01/10/20  0914 10/17/19  1407   HGB  --   --   --   --  16.3   PLT  --   --   --   --  281   NA  --  137  --  137 138   POTASSIUM  --  4.0  --  4.1 4.1   CR  --  0.93  --  0.89 0.89   A1C 7.7* 6.8*   < > 7.1* 6.9*    < > = values in this interval not displayed.        Diagnostics:  Recent Results (from the past 24 hour(s))   CBC with platelets    Collection Time: 06/02/21  9:16 AM   Result Value Ref Range    WBC 11.0 4.0 - 11.0 10e9/L    RBC Count 5.08 4.4 - 5.9 10e12/L    Hemoglobin 15.2 13.3 - 17.7 g/dL    Hematocrit 44.7 40.0 - 53.0 %    MCV 88 78 - 100 fl    MCH 29.9 26.5 - 33.0 pg    MCHC 34.0 31.5 - 36.5 g/dL    RDW 12.3 10.0 - 15.0 %    Platelet Count 266 150 - 450 10e9/L   Basic metabolic panel    Collection Time: 06/02/21  9:16 AM   Result Value Ref Range    Sodium 139 133 - 144 mmol/L    Potassium 4.1 3.4 - 5.3 mmol/L    Chloride 107 94 - 109 mmol/L    Carbon Dioxide 26 20 - 32 mmol/L    Anion Gap 6 3 - 14 mmol/L    Glucose 244 (H) 70 - 99 mg/dL    Urea Nitrogen 23 7 - 30 mg/dL    Creatinine 0.89 0.66 - 1.25 mg/dL    GFR Estimate 89 >60 mL/min/[1.73_m2]    GFR Estimate If Black >90 >60 mL/min/[1.73_m2]    Calcium 9.2 8.5 - 10.1 mg/dL      No EKG required for low risk surgery (cataract, skin procedure, breast biopsy, etc).    Revised Cardiac Risk Index (RCRI):  The patient has the following serious cardiovascular risks for perioperative complications:   - No serious cardiac risks = 0 points     RCRI Interpretation: 0 points: Class I (very low risk - 0.4% complication rate)           Signed Electronically by: Kelly Yarbrough,  MD  Copy of this evaluation report is provided to requesting physician.

## 2021-05-17 NOTE — H&P (VIEW-ONLY)
[facility] [place]  [address]  [place] [address]  Phone: [phone]  Primary Provider: Kelly Yarbrough  Pre-op Performing Provider: KELLY YARBROUGH      PREOPERATIVE EVALUATION:  Today's date: 6/2/2021    Edward Hannah is a 65 year old male who presents for a preoperative evaluation.    Surgical Information:  Surgery/Procedure: Colonoscopy   Surgery Location: [facility] [place]  Surgeon: Dr. Storey  Surgery Date: 06/10/2021  Time of Surgery: unknown   Where patient plans to recover: At home with family  Fax number for surgical facility: Note does not need to be faxed, will be available electronically in Epic.    Type of Anesthesia Anticipated: to be determined    Assessment & Plan     The proposed surgical procedure is considered LOW risk.    Preop general physical exam    - CBC with platelets    Benign essential hypertension    - Basic metabolic panel  - SLEEP EVALUATION & MANAGEMENT REFERRAL - [facility] [place] [phone] (Age 5 and up); Future    Type 2 diabetes mellitus with hyperglycemia, without long-term current use of insulin (H)    - Basic metabolic panel    Weight gain  Suspect of JIAN  - SLEEP EVALUATION & MANAGEMENT REFERRAL - [facility] [place] [phone] (Age 5 and up); Future    Snoring  As above  - SLEEP EVALUATION & MANAGEMENT REFERRAL - [facility] [place] [phone] (Age 5 and up); Future    Restless legs syndrome  - SLEEP EVALUATION & MANAGEMENT REFERRAL - [facility] [place] [phone] (Age 5 and up); Future         Risks and Recommendations:  The patient has the following additional risks and recommendations for perioperative complications:   - No identified additional risk factors other than previously addressed    Medication Instructions:  Patient is to take all scheduled medications on the day of surgery EXCEPT for modifications listed below:   - ACE/ARB: HOLD due to exceptional risk of  hypotension during surgery.    - Calcium Channel Blockers: May be continued on the day of surgery.   - metformin: HOLD day of surgery.   - GLP-1 Injectable (exenitide, liraglutide, semaglutide, dulaglutide, etc.): HOLD day of surgery     RECOMMENDATION:  APPROVAL GIVEN to proceed with proposed procedure, without further diagnostic evaluation.          Subjective     HPI related to upcoming procedure: due for colon screening      Preop Questions 6/2/2021   1. Have you ever had a heart attack or stroke? No   2. Have you ever had surgery on your heart or blood vessels, such as a stent placement, a coronary artery bypass, or surgery on an artery in your head, neck, heart, or legs? No   3. Do you have chest pain with activity? No   4. Do you have a history of  heart failure? No   5. Do you currently have a cold, bronchitis or symptoms of other infection? No   6. Do you have a cough, shortness of breath, or wheezing? No   7. Do you or anyone in your family have previous history of blood clots? No   8. Do you or does anyone in your family have a serious bleeding problem such as prolonged bleeding following surgeries or cuts? No   9. Have you ever had problems with anemia or been told to take iron pills? No   10. Have you had any abnormal blood loss such as black, tarry or bloody stools? No   11. Have you ever had a blood transfusion? No   12. Are you willing to have a blood transfusion if it is medically needed before, during, or after your surgery? Yes   13. Have you or any of your relatives ever had problems with anesthesia? No   14. Do you have sleep apnea, excessive snoring or daytime drowsiness? No   15. Do you have any artifical heart valves or other implanted medical devices like a pacemaker, defibrillator, or continuous glucose monitor? No   16. Do you have artificial joints? No   17. Are you allergic to latex? No     Health Care Directive:  Patient has a Health Care Directive on file      Preoperative Review of  :   reviewed - no record of controlled substances prescribed.      Status of Chronic Conditions:  DIABETES - Patient has a longstanding history of DiabetesType Type II . Patient is being treated with diet, oral agents, exercise, ASA and victoza injectable and denies significant side effects. Control has been good. Complicating factors include but are not limited to: hypertension and hyperlipidemia.     HYPERTENSION - Patient has longstanding history of HTN , currently denies any symptoms referable to elevated blood pressure. Specifically denies chest pain, palpitations, dyspnea, orthopnea, PND or peripheral edema. Blood pressure readings have been in normal range. Current medication regimen is as listed below. Patient denies any side effects of medication.       Review of Systems  CONSTITUTIONAL: NEGATIVE for fever, chills, change in weight  INTEGUMENTARY/SKIN: NEGATIVE for worrisome rashes, moles or lesions  EYES: NEGATIVE for vision changes or irritation  ENT/MOUTH: NEGATIVE for ear, mouth and throat problems  RESP: NEGATIVE for significant cough or SOB  BREAST: NEGATIVE for masses, tenderness or discharge  CV: NEGATIVE for chest pain, palpitations or peripheral edema  GI: NEGATIVE for nausea, abdominal pain, heartburn, or change in bowel habits  : NEGATIVE for frequency, dysuria, or hematuria  MUSCULOSKELETAL: NEGATIVE for significant arthralgias or myalgia  NEURO: NEGATIVE for weakness, dizziness or paresthesias  ENDOCRINE: NEGATIVE for temperature intolerance, skin/hair changes  HEME: NEGATIVE for bleeding problems  PSYCHIATRIC: NEGATIVE for changes in mood or affect    Patient Active Problem List    Diagnosis Date Noted     Colon cancer screening 05/05/2021     Priority: Medium     Added automatically from request for surgery [id number]       Tubular adenoma of colon 05/05/2021     Priority: Medium     Added automatically from request for surgery [id number]       Special screening for malignant neoplasms,  colon 04/15/2021     Priority: Medium     Morbid obesity (H) 01/15/2021     Priority: Medium     Rotator cuff tendonitis, left 10/09/2020     Priority: Medium     Type 2 diabetes mellitus with hyperglycemia, without long-term current use of insulin (H) 01/10/2020     Priority: Medium     Plantar warts 01/10/2020     Priority: Medium     Hypovitaminosis D 08/28/2019     Priority: Medium     Elevated prostate specific antigen (PSA) 08/28/2019     Priority: Medium     Herpes zoster without complication 11/06/2017     Priority: Medium     Trigger finger, acquired 11/06/2017     Priority: Medium     ACP (advance care planning) 07/21/2016     Priority: Medium     Advance Care Planning 7/21/2016: ACP Review of Chart / Resources Provided:  Reviewed chart for advance care plan.  Edward Hannah has no plan or code status on file however states presence of ACP document. Copy requested. Confirmed code status reflects current choices pending receipt of document/advance care plan review.  Confirmed/documented legally designated decision makers.  Added by Jadyn Singh             Benign essential hypertension 07/20/2016     Priority: Medium     Other insomnia 04/20/2016     Priority: Medium     Obesity due to excess calories, unspecified obesity severity 01/20/2016     Priority: Medium     BCC (basal cell carcinoma), face 09/07/2012     Priority: Medium     Advanced directives, counseling/discussion 06/28/2011     Priority: Medium     Advance Directive Problem List Overview:   Name Relationship Phone    Primary Health Care Agent            Alternative Health Care Agent          Discussed advance care planning with patient; however, patient declined at this time. 6/28/2011          Hyperlipidemia LDL goal <100 05/09/2010     Priority: Medium      Past Medical History:   Diagnosis Date     Basal cell carcinoma      DIABETES MELLITUS TYPE II-UNCOMPL 1/19/2006     Elevated PSA 2019     HYPERLIPIDEMIA NEC/NOS 10/18/2006      HYPERTENSION NOS 1/19/2006     Obesity      Shingles 11/2017     Past Surgical History:   Procedure Laterality Date     BIOPSY PROSTATE TRANSRECTAL N/A 10/22/2019    Procedure: Transrectal Ultrasoun guided biopsy of prostate;  Surgeon: Yunior Llanes MD;  Location:  OR     COLONOSCOPY  3-3-2014    due 2019  4 polyps benign     wisdom teeth extraction       Current Outpatient Medications   Medication Sig Dispense Refill     acetylcysteine (N-ACETYL CYSTEINE) 500 MG CAPS capsule Take 1 capsule (500 mg) by mouth daily       amLODIPine (NORVASC) 2.5 MG tablet Take 1 tablet by mouth once daily 90 tablet 2     ASPIRIN 81 MG OR TABS 1 tab po QD (Once per day) 100 3     atorvastatin (LIPITOR) 20 MG tablet Take 1 tablet by mouth once daily 90 tablet 2     BD TYRONE U/F 32G X 4 MM insulin pen needle USE ONE NEEDLE DAILY AS DIRECTED 100 each 3     blood glucose (ONETOUCH VERIO IQ) test strip Use to test blood sugar 1 time daily. 100 strip 3     Blood Glucose Monitoring Suppl (ONETOUCH VERIO FLEX SYSTEM) w/Device KIT 1 each continuous 1 kit 0     Blood Pressure Monitor KIT 1 Application 2 times daily 1 kit 0     glipiZIDE (GLUCOTROL XL) 10 MG 24 hr tablet Take 1 tablet by mouth twice daily 180 tablet 2     lisinopril (ZESTRIL) 40 MG tablet Take 1 tablet by mouth once daily 90 tablet 2     metFORMIN (GLUCOPHAGE) 1000 MG tablet TAKE 1 TABLET BY MOUTH TWICE DAILY WITH MEALS 180 tablet 2     Multiple Vitamins-Minerals (MULTIVITAMIN PO) Take 1 tablet by mouth daily       Omega-3 Fatty Acids (OMEGA-3 FISH OIL PO) Take 1 g by mouth daily       OneTouch Delica Lancets 33G MISC 1 each daily 100 each 3     sildenafil (REVATIO) 20 MG tablet Take 3-5 (60-100mg) tablets by mouth 1 hour prior to sexual activity 30 tablet 11     Turmeric 500 MG TABS Take 1 tablet by mouth 2 times daily       VICTOZA PEN 18 MG/3ML soln INJECT 1.2 MG SUBCUTANEOUSLY ONCE DAILY 6 mL 2     vitamin B complex with vitamin C (VITAMIN  B COMPLEX) tablet Take 1 tablet  by mouth daily       VITAMIN D, CHOLECALCIFEROL, PO Take 1,000 Units by mouth daily         Allergies   Allergen Reactions     Nkda [No Known Drug Allergies]         Social History     Tobacco Use     Smoking status: Never Smoker     Smokeless tobacco: Never Used     Tobacco comment: remote cigar history   Substance Use Topics     Alcohol use: Yes     Alcohol/week: 0.0 standard drinks     Frequency: 4 or more times a week     Drinks per session: 1 or 2     Comment: 1-2/day     Family History   Problem Relation Age of Onset     Cardiovascular Father         aortic valve surgery     Cancer Father         lung cancer     Gastrointestinal Disease Father         benign esophageal tumor removed     Hypertension Father      Diabetes Father 70     Other - See Comments Mother         infection     Cerebrovascular Disease Maternal Grandmother      Other - See Comments Maternal Grandfather         hunting accident     Other - See Comments Paternal Grandmother         old age     Parkinsonism Paternal Grandfather      Cerebrovascular Disease Paternal Grandfather         tobacco negative     Family History Negative Sister      History   Drug Use No     Comment: remote THC         Objective     BP (!) 150/70 (BP Location: Left arm, Patient Position: Chair, Cuff Size: Adult Large)   Pulse 86   Temp 97.4  F (36.3  C) (Tympanic)   Resp 16   Wt 110.2 kg (243 lb)   SpO2 96%   BMI 34.87 kg/m      Physical Exam    GENERAL APPEARANCE: healthy, alert and no distress     EYES: EOMI,  PERRL     HENT: ear canals and TM's normal and nose and mouth without ulcers or lesions     NECK: no adenopathy, no asymmetry, masses, or scars and thyroid normal to palpation     RESP: lungs clear to auscultation - no rales, rhonchi or wheezes     CV: regular rates and rhythm, normal S1 S2, no S3 or S4 and no murmur, click or rub     ABDOMEN:  soft, nontender, no HSM or masses and bowel sounds normal     MS: extremities normal- no gross deformities  noted, no evidence of inflammation in joints, FROM in all extremities.     SKIN: no suspicious lesions or rashes     NEURO: Normal strength and tone, sensory exam grossly normal, mentation intact and speech normal     PSYCH: mentation appears normal. and affect normal/bright     LYMPHATICS: No cervical adenopathy    Recent Labs   Lab Test 04/15/21  0911 01/15/21  0750 01/10/20  0914 01/10/20  0914 10/17/19  1407   HGB  --   --   --   --  16.3   PLT  --   --   --   --  281   NA  --  137  --  137 138   POTASSIUM  --  4.0  --  4.1 4.1   CR  --  0.93  --  0.89 0.89   A1C 7.7* 6.8*   < > 7.1* 6.9*    < > = values in this interval not displayed.        Diagnostics:  Recent Results (from the past 24 hour(s))   CBC with platelets    Collection Time: 06/02/21  9:16 AM   Result Value Ref Range    WBC 11.0 4.0 - 11.0 10e9/L    RBC Count 5.08 4.4 - 5.9 10e12/L    Hemoglobin 15.2 13.3 - 17.7 g/dL    Hematocrit 44.7 40.0 - 53.0 %    MCV 88 78 - 100 fl    MCH 29.9 26.5 - 33.0 pg    MCHC 34.0 31.5 - 36.5 g/dL    RDW 12.3 10.0 - 15.0 %    Platelet Count 266 150 - 450 10e9/L   Basic metabolic panel    Collection Time: 06/02/21  9:16 AM   Result Value Ref Range    Sodium 139 133 - 144 mmol/L    Potassium 4.1 3.4 - 5.3 mmol/L    Chloride 107 94 - 109 mmol/L    Carbon Dioxide 26 20 - 32 mmol/L    Anion Gap 6 3 - 14 mmol/L    Glucose 244 (H) 70 - 99 mg/dL    Urea Nitrogen 23 7 - 30 mg/dL    Creatinine 0.89 0.66 - 1.25 mg/dL    GFR Estimate 89 >60 mL/min/[1.73_m2]    GFR Estimate If Black >90 >60 mL/min/[1.73_m2]    Calcium 9.2 8.5 - 10.1 mg/dL      No EKG required for low risk surgery (cataract, skin procedure, breast biopsy, etc).    Revised Cardiac Risk Index (RCRI):  The patient has the following serious cardiovascular risks for perioperative complications:   - No serious cardiac risks = 0 points     RCRI Interpretation: 0 points: Class I (very low risk - 0.4% complication rate)           Signed Electronically by: Kelly Yarbrough,  MD  Copy of this evaluation report is provided to requesting physician.

## 2021-06-02 ENCOUNTER — OFFICE VISIT (OUTPATIENT)
Dept: FAMILY MEDICINE | Facility: OTHER | Age: 66
End: 2021-06-02
Attending: FAMILY MEDICINE
Payer: COMMERCIAL

## 2021-06-02 ENCOUNTER — APPOINTMENT (OUTPATIENT)
Dept: LAB | Facility: OTHER | Age: 66
End: 2021-06-02
Attending: FAMILY MEDICINE
Payer: COMMERCIAL

## 2021-06-02 ENCOUNTER — ANESTHESIA EVENT (OUTPATIENT)
Dept: SURGERY | Facility: HOSPITAL | Age: 66
End: 2021-06-02
Payer: COMMERCIAL

## 2021-06-02 VITALS
RESPIRATION RATE: 16 BRPM | SYSTOLIC BLOOD PRESSURE: 150 MMHG | BODY MASS INDEX: 34.87 KG/M2 | TEMPERATURE: 97.4 F | HEART RATE: 86 BPM | OXYGEN SATURATION: 96 % | DIASTOLIC BLOOD PRESSURE: 70 MMHG | WEIGHT: 243 LBS

## 2021-06-02 DIAGNOSIS — E11.65 TYPE 2 DIABETES MELLITUS WITH HYPERGLYCEMIA, WITHOUT LONG-TERM CURRENT USE OF INSULIN (H): ICD-10-CM

## 2021-06-02 DIAGNOSIS — I10 BENIGN ESSENTIAL HYPERTENSION: ICD-10-CM

## 2021-06-02 DIAGNOSIS — G25.81 RESTLESS LEGS SYNDROME: ICD-10-CM

## 2021-06-02 DIAGNOSIS — Z01.818 PREOP GENERAL PHYSICAL EXAM: Primary | ICD-10-CM

## 2021-06-02 DIAGNOSIS — R79.9 ABNORMAL FINDING OF BLOOD CHEMISTRY, UNSPECIFIED: ICD-10-CM

## 2021-06-02 DIAGNOSIS — R63.5 WEIGHT GAIN: ICD-10-CM

## 2021-06-02 DIAGNOSIS — R06.83 SNORING: ICD-10-CM

## 2021-06-02 LAB
ANION GAP SERPL CALCULATED.3IONS-SCNC: 6 MMOL/L (ref 3–14)
BUN SERPL-MCNC: 23 MG/DL (ref 7–30)
CALCIUM SERPL-MCNC: 9.2 MG/DL (ref 8.5–10.1)
CHLORIDE SERPL-SCNC: 107 MMOL/L (ref 94–109)
CO2 SERPL-SCNC: 26 MMOL/L (ref 20–32)
CREAT SERPL-MCNC: 0.89 MG/DL (ref 0.66–1.25)
ERYTHROCYTE [DISTWIDTH] IN BLOOD BY AUTOMATED COUNT: 12.3 % (ref 10–15)
GFR SERPL CREATININE-BSD FRML MDRD: 89 ML/MIN/{1.73_M2}
GLUCOSE SERPL-MCNC: 244 MG/DL (ref 70–99)
HCT VFR BLD AUTO: 44.7 % (ref 40–53)
HGB BLD-MCNC: 15.2 G/DL (ref 13.3–17.7)
MCH RBC QN AUTO: 29.9 PG (ref 26.5–33)
MCHC RBC AUTO-ENTMCNC: 34 G/DL (ref 31.5–36.5)
MCV RBC AUTO: 88 FL (ref 78–100)
PLATELET # BLD AUTO: 266 10E9/L (ref 150–450)
POTASSIUM SERPL-SCNC: 4.1 MMOL/L (ref 3.4–5.3)
RBC # BLD AUTO: 5.08 10E12/L (ref 4.4–5.9)
SODIUM SERPL-SCNC: 139 MMOL/L (ref 133–144)
WBC # BLD AUTO: 11 10E9/L (ref 4–11)

## 2021-06-02 PROCEDURE — 85027 COMPLETE CBC AUTOMATED: CPT | Mod: ZL | Performed by: FAMILY MEDICINE

## 2021-06-02 PROCEDURE — 80048 BASIC METABOLIC PNL TOTAL CA: CPT | Mod: ZL | Performed by: FAMILY MEDICINE

## 2021-06-02 PROCEDURE — 36415 COLL VENOUS BLD VENIPUNCTURE: CPT | Mod: ZL | Performed by: FAMILY MEDICINE

## 2021-06-02 PROCEDURE — 99214 OFFICE O/P EST MOD 30 MIN: CPT | Performed by: FAMILY MEDICINE

## 2021-06-02 PROCEDURE — G0463 HOSPITAL OUTPT CLINIC VISIT: HCPCS

## 2021-06-02 ASSESSMENT — PAIN SCALES - GENERAL: PAINLEVEL: NO PAIN (0)

## 2021-06-02 NOTE — ANESTHESIA PREPROCEDURE EVALUATION
Anesthesia Pre-Procedure Evaluation    Patient: Edward Hannah   MRN: [id number] : 1955        Preoperative Diagnosis: Colon cancer screening [Z12.11]  Tubular adenoma of colon [D12.6]   Procedure : Procedure(s):  surveillance colonoscopy, possible biopsy, possible polypectomy     Past Medical History:   Diagnosis Date     Basal cell carcinoma      DIABETES MELLITUS TYPE II-UNCOMPL 2006     Elevated PSA      HYPERLIPIDEMIA NEC/NOS 10/18/2006     HYPERTENSION NOS 2006     Obesity      Shingles 2017      Past Surgical History:   Procedure Laterality Date     BIOPSY PROSTATE TRANSRECTAL N/A 10/22/2019    Procedure: Transrectal Ultrasoun guided biopsy of prostate;  Surgeon: Yunior Llanes MD;  Location: [facility] OR     COLONOSCOPY  3-3-2014    due   4 polyps benign     wisdom teeth extraction        Allergies   Allergen Reactions     Nkda [No Known Drug Allergies]       Social History     Tobacco Use     Smoking status: Never Smoker     Smokeless tobacco: Never Used     Tobacco comment: remote cigar history   Substance Use Topics     Alcohol use: Yes     Alcohol/week: 0.0 standard drinks     Frequency: 4 or more times a week     Drinks per session: 1 or 2     Comment: 1-2/day      Wt Readings from Last 1 Encounters:   21 110.2 kg (243 lb)        Anesthesia Evaluation   Pt has had prior anesthetic. Type: General.    No history of anesthetic complications       ROS/MED HX  ENT/Pulmonary:     (+) JIAN risk factors, snores loudly, hypertension,     Neurologic: Comment: Restless leg      Cardiovascular:     (+) Dyslipidemia hypertension-----    METS/Exercise Tolerance: >4 METS    Hematologic:  - neg hematologic  ROS     Musculoskeletal: Comment: Restless leg syndrome  Acquired trigger finger  Left rotator cuff tendonitis      GI/Hepatic:  - neg GI/hepatic ROS     Renal/Genitourinary:  - neg Renal ROS     Endo:     (+) type II DM, Last HgA1c: 7.7, date: 21, Not using insulin, Obesity,      Psychiatric/Substance Use: Comment: ETOH daily      Infectious Disease: Comment: HSV      Malignancy: Comment: Tubular adenoma  (+) Malignancy, History of GI and Skin.Skin CA Remission status post Surgery.        Other: Comment: Plantar warts  Herpes zoster  Insomnia           Physical Exam    Airway        Mallampati: III   TM distance: > 3 FB   Neck ROM: full   Mouth opening: > 3 cm    Respiratory Devices and Support         Dental  no notable dental history         Cardiovascular   cardiovascular exam normal       Rhythm and rate: regular and normal     Pulmonary   pulmonary exam normal        breath sounds clear to auscultation           OUTSIDE LABS:  CBC:   Lab Results   Component Value Date    WBC 11.0 06/02/2021    WBC 9.8 10/17/2019    HGB 15.2 06/02/2021    HGB 16.3 10/17/2019    HCT 44.7 06/02/2021    HCT 47.1 10/17/2019     06/02/2021     10/17/2019     BMP:   Lab Results   Component Value Date     06/02/2021     01/15/2021    POTASSIUM 4.1 06/02/2021    POTASSIUM 4.0 01/15/2021    CHLORIDE 107 06/02/2021    CHLORIDE 104 01/15/2021    CO2 26 06/02/2021    CO2 26 01/15/2021    BUN 23 06/02/2021    BUN 21 01/15/2021    CR 0.89 06/02/2021    CR 0.93 01/15/2021     (H) 06/02/2021     (H) 01/15/2021     COAGS: No results found for: PTT, INR, FIBR  POC: No results found for: BGM, HCG, HCGS  HEPATIC:   Lab Results   Component Value Date    ALBUMIN 4.0 01/15/2021    PROTTOTAL 7.9 01/15/2021    ALT 36 01/15/2021    AST 10 01/15/2021    ALKPHOS 92 01/15/2021    BILITOTAL 0.5 01/15/2021     OTHER:   Lab Results   Component Value Date    A1C 7.7 (H) 04/15/2021    JEREMIE 9.2 06/02/2021    TSH 1.54 04/15/2021       Anesthesia Plan    ASA Status:  3   NPO Status:  NPO Appropriate    Anesthesia Type: MAC.     - Reason for MAC: straight local not clinically adequate   Induction: Propofol.   Maintenance: TIVA.        Consents    Anesthesia Plan(s) and associated risks, benefits, and  realistic alternatives discussed. Questions answered and patient/representative(s) expressed understanding.     - Discussed with:  Patient         Postoperative Care    Pain management: Multi-modal analgesia.   PONV prophylaxis: Background Propofol Infusion     Comments:    Risks and benefits of MAC anesthetic discussed including dental damage, aspiration, loss of airway, conversion to general anesthetic, CV complications, MI, stroke, death. Pt wishes to proceed.             KYRA Messer CRNA

## 2021-06-02 NOTE — NURSING NOTE
"Chief Complaint   Patient presents with     Pre-Op Exam       Initial BP (!) 150/70 (BP Location: Left arm, Patient Position: Chair, Cuff Size: Adult Large)   Pulse 86   Temp 97.4  F (36.3  C) (Tympanic)   Resp 16   Wt 110.2 kg (243 lb)   SpO2 96%   BMI 34.87 kg/m   Estimated body mass index is 34.87 kg/m  as calculated from the following:    Height as of 4/20/21: 1.778 m (5' 10\").    Weight as of this encounter: 110.2 kg (243 lb).  Medication Reconciliation: complete  Miriam Diop LPN    "

## 2021-06-05 ENCOUNTER — OFFICE VISIT (OUTPATIENT)
Dept: FAMILY MEDICINE | Facility: OTHER | Age: 66
End: 2021-06-05
Attending: SURGERY
Payer: COMMERCIAL

## 2021-06-05 DIAGNOSIS — Z01.818 PREOP TESTING: ICD-10-CM

## 2021-06-05 LAB
SARS-COV-2 RNA RESP QL NAA+PROBE: NORMAL
SPECIMEN SOURCE: NORMAL

## 2021-06-05 PROCEDURE — U0003 INFECTIOUS AGENT DETECTION BY NUCLEIC ACID (DNA OR RNA); SEVERE ACUTE RESPIRATORY SYNDROME CORONAVIRUS 2 (SARS-COV-2) (CORONAVIRUS DISEASE [COVID-19]), AMPLIFIED PROBE TECHNIQUE, MAKING USE OF HIGH THROUGHPUT TECHNOLOGIES AS DESCRIBED BY CMS-2020-01-R: HCPCS | Mod: ZL | Performed by: SURGERY

## 2021-06-05 PROCEDURE — U0005 INFEC AGEN DETEC AMPLI PROBE: HCPCS | Mod: ZL | Performed by: SURGERY

## 2021-06-06 LAB
LABORATORY COMMENT REPORT: NORMAL
SARS-COV-2 RNA RESP QL NAA+PROBE: NEGATIVE
SPECIMEN SOURCE: NORMAL

## 2021-06-10 ENCOUNTER — HOSPITAL ENCOUNTER (OUTPATIENT)
Facility: HOSPITAL | Age: 66
Discharge: HOME | End: 2021-06-10
Attending: SURGERY | Admitting: SURGERY
Payer: COMMERCIAL

## 2021-06-10 ENCOUNTER — ANESTHESIA (OUTPATIENT)
Dept: SURGERY | Facility: HOSPITAL | Age: 66
End: 2021-06-10
Payer: COMMERCIAL

## 2021-06-10 VITALS
HEART RATE: 72 BPM | BODY MASS INDEX: 33.5 KG/M2 | TEMPERATURE: 96.8 F | RESPIRATION RATE: 16 BRPM | WEIGHT: 234 LBS | HEIGHT: 70 IN | DIASTOLIC BLOOD PRESSURE: 85 MMHG | SYSTOLIC BLOOD PRESSURE: 132 MMHG | OXYGEN SATURATION: 96 %

## 2021-06-10 DIAGNOSIS — D12.6 TUBULAR ADENOMA OF COLON: ICD-10-CM

## 2021-06-10 DIAGNOSIS — Z12.11 COLON CANCER SCREENING: ICD-10-CM

## 2021-06-10 PROCEDURE — 88305 TISSUE EXAM BY PATHOLOGIST: CPT | Mod: TC | Performed by: SURGERY

## 2021-06-10 PROCEDURE — 710N000012 HC RECOVERY PHASE 2, PER MINUTE: Performed by: SURGERY

## 2021-06-10 PROCEDURE — 258N000003 HC RX IP 258 OP 636: Performed by: NURSE ANESTHETIST, CERTIFIED REGISTERED

## 2021-06-10 PROCEDURE — 250N000009 HC RX 250: Performed by: NURSE ANESTHETIST, CERTIFIED REGISTERED

## 2021-06-10 PROCEDURE — 360N000075 HC SURGERY LEVEL 2, PER MIN: Performed by: SURGERY

## 2021-06-10 PROCEDURE — 272N000001 HC OR GENERAL SUPPLY STERILE: Performed by: SURGERY

## 2021-06-10 PROCEDURE — 45380 COLONOSCOPY AND BIOPSY: CPT | Mod: PT | Performed by: SURGERY

## 2021-06-10 PROCEDURE — 370N000017 HC ANESTHESIA TECHNICAL FEE, PER MIN: Performed by: SURGERY

## 2021-06-10 PROCEDURE — 250N000011 HC RX IP 250 OP 636: Performed by: NURSE ANESTHETIST, CERTIFIED REGISTERED

## 2021-06-10 PROCEDURE — 999N000141 HC STATISTIC PRE-PROCEDURE NURSING ASSESSMENT: Performed by: SURGERY

## 2021-06-10 PROCEDURE — 45380 COLONOSCOPY AND BIOPSY: CPT | Performed by: NURSE ANESTHETIST, CERTIFIED REGISTERED

## 2021-06-10 RX ORDER — NALOXONE HYDROCHLORIDE 0.4 MG/ML
0.2 INJECTION, SOLUTION INTRAMUSCULAR; INTRAVENOUS; SUBCUTANEOUS
Status: DISCONTINUED | OUTPATIENT
Start: 2021-06-10 | End: 2021-06-10

## 2021-06-10 RX ORDER — FLUMAZENIL 0.1 MG/ML
0.2 INJECTION, SOLUTION INTRAVENOUS
Status: DISCONTINUED | OUTPATIENT
Start: 2021-06-10 | End: 2021-06-10 | Stop reason: HOSPADM

## 2021-06-10 RX ORDER — NALOXONE HYDROCHLORIDE 0.4 MG/ML
0.4 INJECTION, SOLUTION INTRAMUSCULAR; INTRAVENOUS; SUBCUTANEOUS
Status: DISCONTINUED | OUTPATIENT
Start: 2021-06-10 | End: 2021-06-10

## 2021-06-10 RX ORDER — NALOXONE HYDROCHLORIDE 0.4 MG/ML
0.4 INJECTION, SOLUTION INTRAMUSCULAR; INTRAVENOUS; SUBCUTANEOUS
Status: DISCONTINUED | OUTPATIENT
Start: 2021-06-10 | End: 2021-06-10 | Stop reason: HOSPADM

## 2021-06-10 RX ORDER — ONDANSETRON 4 MG/1
4 TABLET, ORALLY DISINTEGRATING ORAL EVERY 30 MIN PRN
Status: DISCONTINUED | OUTPATIENT
Start: 2021-06-10 | End: 2021-06-10 | Stop reason: HOSPADM

## 2021-06-10 RX ORDER — SODIUM CHLORIDE, SODIUM LACTATE, POTASSIUM CHLORIDE, CALCIUM CHLORIDE 600; 310; 30; 20 MG/100ML; MG/100ML; MG/100ML; MG/100ML
INJECTION, SOLUTION INTRAVENOUS CONTINUOUS PRN
Status: DISCONTINUED | OUTPATIENT
Start: 2021-06-10 | End: 2021-06-10

## 2021-06-10 RX ORDER — NALOXONE HYDROCHLORIDE 0.4 MG/ML
0.2 INJECTION, SOLUTION INTRAMUSCULAR; INTRAVENOUS; SUBCUTANEOUS
Status: DISCONTINUED | OUTPATIENT
Start: 2021-06-10 | End: 2021-06-10 | Stop reason: HOSPADM

## 2021-06-10 RX ORDER — PROPOFOL 10 MG/ML
INJECTION, EMULSION INTRAVENOUS PRN
Status: DISCONTINUED | OUTPATIENT
Start: 2021-06-10 | End: 2021-06-10

## 2021-06-10 RX ORDER — LIDOCAINE 40 MG/G
CREAM TOPICAL
Status: DISCONTINUED | OUTPATIENT
Start: 2021-06-10 | End: 2021-06-10 | Stop reason: HOSPADM

## 2021-06-10 RX ORDER — LIDOCAINE HYDROCHLORIDE 20 MG/ML
INJECTION, SOLUTION INFILTRATION; PERINEURAL PRN
Status: DISCONTINUED | OUTPATIENT
Start: 2021-06-10 | End: 2021-06-10

## 2021-06-10 RX ORDER — ONDANSETRON 2 MG/ML
4 INJECTION INTRAMUSCULAR; INTRAVENOUS EVERY 30 MIN PRN
Status: DISCONTINUED | OUTPATIENT
Start: 2021-06-10 | End: 2021-06-10 | Stop reason: HOSPADM

## 2021-06-10 RX ADMIN — PROPOFOL 40 MG: 10 INJECTION, EMULSION INTRAVENOUS at 09:03

## 2021-06-10 RX ADMIN — PROPOFOL 30 MG: 10 INJECTION, EMULSION INTRAVENOUS at 09:12

## 2021-06-10 RX ADMIN — PROPOFOL 40 MG: 10 INJECTION, EMULSION INTRAVENOUS at 09:05

## 2021-06-10 RX ADMIN — PROPOFOL 30 MG: 10 INJECTION, EMULSION INTRAVENOUS at 08:57

## 2021-06-10 RX ADMIN — PROPOFOL 40 MG: 10 INJECTION, EMULSION INTRAVENOUS at 09:01

## 2021-06-10 RX ADMIN — PROPOFOL 50 MG: 10 INJECTION, EMULSION INTRAVENOUS at 08:56

## 2021-06-10 RX ADMIN — PROPOFOL 50 MG: 10 INJECTION, EMULSION INTRAVENOUS at 08:59

## 2021-06-10 RX ADMIN — SODIUM CHLORIDE, POTASSIUM CHLORIDE, SODIUM LACTATE AND CALCIUM CHLORIDE: 600; 310; 30; 20 INJECTION, SOLUTION INTRAVENOUS at 08:43

## 2021-06-10 RX ADMIN — LIDOCAINE HYDROCHLORIDE 40 MG: 20 INJECTION, SOLUTION INFILTRATION; PERINEURAL at 08:56

## 2021-06-10 RX ADMIN — PROPOFOL 30 MG: 10 INJECTION, EMULSION INTRAVENOUS at 09:08

## 2021-06-10 RX ADMIN — PROPOFOL 30 MG: 10 INJECTION, EMULSION INTRAVENOUS at 09:15

## 2021-06-10 RX ADMIN — PROPOFOL 10 MG: 10 INJECTION, EMULSION INTRAVENOUS at 09:18

## 2021-06-10 ASSESSMENT — MIFFLIN-ST. JEOR: SCORE: 1852.67

## 2021-06-10 NOTE — ANESTHESIA CARE TRANSFER NOTE
Patient: Edward Hannah    Procedure(s):  surveillance colonoscopy,  biopsy    Diagnosis: Colon cancer screening [Z12.11]  Tubular adenoma of colon [D12.6]  Diagnosis Additional Information: No value filed.    Anesthesia Type:   MAC     Note:    Oropharynx: oropharynx clear of all foreign objects  Level of Consciousness: drowsy  Oxygen Supplementation: nasal cannula  Level of Supplemental Oxygen (L/min / FiO2): 2  Independent Airway: airway patency satisfactory and stable  Dentition: dentition unchanged  Vital Signs Stable: post-procedure vital signs reviewed and stable  Report to RN Given: handoff report given  Patient transferred to: Phase II    Handoff Report: Identifed the Patient, Identified the Reponsible Provider, Reviewed the pertinent medical history, Discussed the surgical course, Reviewed Intra-OP anesthesia mangement and issues during anesthesia, Set expectations for post-procedure period and Allowed opportunity for questions and acknowledgement of understanding      Vitals: (Last set prior to Anesthesia Care Transfer)  CRNA VITALS  6/10/2021 0852 - 6/10/2021 0923      6/10/2021             Resp Rate (set):  8        Electronically Signed By: KYRA Villeda CRNA  Dorys 10, 2021  9:23 AM

## 2021-06-10 NOTE — OR NURSING
Patient and responsible adult given discharge instructions with no questions regarding instructions. Bethany score 18/18. Denies pain.  Discharged from unit via walking. Patient discharged to home with friend. Tolerating PO intake. Pt waiting to see provider after procedure.

## 2021-06-10 NOTE — DISCHARGE INSTRUCTIONS
Dr. Storey performed some biopsies today.  His office will call you with pathology results when they become available.        Post-Anesthesia Patient Instructions    IMMEDIATELY FOLLOWING SURGERY:  Do not drive or operate machinery for the first twenty four hours after surgery.  Do not make any important decisions for twenty four hours after surgery or while taking narcotic pain medications or sedatives.  If you develop intractable nausea and vomiting or a severe headache please notify your doctor immediately.    FOLLOW-UP:  Please make an appointment with your surgeon as instructed. You do not need to follow up with anesthesia unless specifically instructed to do so.    WOUND CARE INSTRUCTIONS (if applicable):  Keep a dry clean dressing on the anesthesia/puncture wound site if there is drainage.  Once the wound has quit draining you may leave it open to air.  Generally you should leave the bandage intact for twenty four hours unless there is drainage.  If the epidural site drains for more than 36-48 hours please call the anesthesia department.    QUESTIONS?:  Please feel free to call your physician or the hospital  if you have any questions, and they will be happy to assist you.           INSTRUCTIONS AFTER COLONOSCOPY    WHEN YOU ARE BACK HOME:    Plan to rest for an hour or two after you get home.    You may have some cramping or pressure until you pass gas.    You may resume your regular medications.    Eat a small, light meal at first, and then gradually return to normal meal sizes.    You will be contacted the next day to see how you are doing.  If you had a polyp removed:    Slight bleeding may occur.  You may have a slight blood stain on the toilet paper after a bowel movement.    To lessen the chance of bleeding, avoid heavy exercise for ONE WEEK.  This includes heavy lifting, vigorous sport activities, and heavy physical labor.  You may resume your normal sexual activity.      Avoid aspirin or  aspirin products if instructed by your doctor.    If there is a polyp or biopsy, you will be contacted with results within one week.     WHAT TO WATCH FOR:  Problems rarely occur after the exam; however, it is important for you to watch for early signs of possible problems.  If you have     Unusual pain in your abdomen    Nausea and vomiting that persists    Excessive bleeding    Black or bloody bowel movements    Fever or temperature above 100.6 F  Please call your doctor ([facility] [phone]) or go to the nearest hospital emergency room.

## 2021-06-10 NOTE — OP NOTE
Edward Hannah MRN# [id number]   YOB: 1955 Age: 65 year old      Date of Admission:  6/10/2021  Date of Service:   6/10/21    Primary care provider: Kelly Yarbrough    PREOPERATIVE DIAGNOSIS:  Colon Cancer Screening        POSTOPERATIVE DIAGNOSIS:  Mild proctitis, perianal nodularity         PROCEDURE:  Colonoscopy with biopsy            INDICATIONS:  Screening colonoscopy.      Specimen:   ID Type Source Tests Collected by Time Destination   A :  Biopsy Large Intestine, Rectum SURGICAL PATHOLOGY EXAM Lawson Storey MD 6/10/2021  9:18 AM    B : Aimee-anal Biopsy Biopsy Anus SURGICAL PATHOLOGY EXAM Lawson Storey MD 6/10/2021  9:19 AM        SURGEON: Lawson Storey MD    DESCRIPTION OF PROCEDURE: Edward Hannah was brought into the endoscopy suite and placed in the left lateral decubitus position. After preprocedural pause and attended monitored anesthesia was administered, the external anus was inspected and was noted to have large nodularity to perianal area. Digital rectal exam was normal. The colonoscope was inserted and advanced under direct visualization to the level of the cecum which was identified by the appendiceal orifice and the ileocecal valve. The prep was excellent.. Upon slow withdrawal of the colonoscope, approximately 95% of the mucosa was directly visualized. There is mild proctitis that was biopsied. There was also some nodularity that was biopsied to rule out condyloma of the perianal region.   The rest of the colon was without mucosal abnormality. There was no evidence of further polyps, inflammation, bleeding or AVMs. Retroflexion of the rectum was normal. The extra air was removed from the colon, and the colonoscope withdrawn. The patient tolerated the procedure well and was taken to postanesthesia care unit.     We invite the patient to return in 5-10 years for follow up screening evaluation, pending pathology related to biopsies.    Lawson Storey MD

## 2021-06-10 NOTE — ANESTHESIA POSTPROCEDURE EVALUATION
Patient: Edward Hannah    Procedure(s):  surveillance colonoscopy,  biopsy    Diagnosis:Colon cancer screening [Z12.11]  Tubular adenoma of colon [D12.6]  Diagnosis Additional Information: No value filed.    Anesthesia Type:  MAC    Note:  Disposition: Outpatient   Postop Pain Control: Uneventful            Sign Out: Well controlled pain   PONV: No   Neuro/Psych: Uneventful            Sign Out: Acceptable/Baseline neuro status   Airway/Respiratory: Uneventful            Sign Out: Acceptable/Baseline resp. status   CV/Hemodynamics: Uneventful            Sign Out: Acceptable CV status; No obvious hypovolemia; No obvious fluid overload   Other NRE: NONE   DID A NON-ROUTINE EVENT OCCUR? No           Last vitals:  Vitals:    06/10/21 0935 06/10/21 0940 06/10/21 0945   BP: 120/74 132/80 132/85   Pulse: 72 73 72   Resp: 16 18 16   Temp:   96.8  F (36  C)   SpO2: 95% 95% 96%       Last vitals prior to Anesthesia Care Transfer:  CRNA VITALS  6/10/2021 0852 - 6/10/2021 0952      6/10/2021             Resp Rate (set):  8          Electronically Signed By: KYRA Ghosh CRNA  Dorys 10, 2021  11:00 AM

## 2021-06-14 LAB — COPATH REPORT: NORMAL

## 2021-06-24 ENCOUNTER — DOCUMENTATION ONLY (OUTPATIENT)
Dept: SLEEP MEDICINE | Facility: HOSPITAL | Age: 66
End: 2021-06-24

## 2021-06-24 NOTE — PROGRESS NOTES
SLEEP HISTORY QUESTIONNAIRE    Please describe the main reason for your sleep appointment? To see if my diabetes is affecting my sleep.    How long has this been a problem? unanswered    Have you been diagnosed with a sleep problem in the past? NO    If so, what?     What treatment was recommended?     Have you had a sleep study in the past? NO    If yes, where and when?     Sleep Habits:   Do you read in bed? Yes  Do you eat in bed? No  Do you watch TV in bed? No  Do you work in bed? No  Do you use a phone or computer in bed? Yes    Is you sleep disturbed by:   Bed partner: No  Children: No  Noise: Yes   Pets: No  Other:       On two or more nights per week, do you drink alcohol to help you fall asleep?NO    On two or more nights per week, do you take melatonin to help you fall asleep? NO    On two or more nights per week, do you take over the counter medicine to fall asleep?  NO    Do you take drinks with caffeine (coffee, tea, soda, energy drinks)? NO    Do you have 3 or more caffeine drinks in a day? NO    Do you have caffeine drinks within 6 hours of bedtime? NO    Do you smoke or use tobacco? NO    Do you exercise? YES    Sleep Routine:   Using a 24 Hour Clock    What time do you usually get into bed on workdays? 9 PM    Weekend/non work days? 9 PM    What time do you get out of bed on workdays? 9 PM?      Weekend/non work days?6-8 AM    Do you work the evening or night shift or do your shifts rotate? NO    How long does it usually take to fall to sleep? 2-30 minutes    How many times do you wake during the night? 1-3    How much time do you feel that you are awake during the entire night? 15 minutes-2 hours    How long does it take for you to fall back to sleep after you wake up? 5 minutes-2 hours    Why do you think you wake up? Bathroom, noise, dream, just wake up    What do you do when you wake up? Bathroom, drink water, read, housework    How much sleep do you think you get on work nights? 5-7  hours    How much sleep do you think you get on weekends/non work days? 5-7 hours    How much sleep do you think you need to feel your best? 7-8 hours    How many days during a week do you take a nap on average? 2-3    What is the average length of your naps? 20-30 minutes    Do you feel better after taking a nap? YES    If you could chose the best sleep schedule for you, what time would you go to bed? 9 PM  What time would you get up? 6-7 AM    Do you read in bed? YES    Do you eat in bed? NO    Do you watch TV in bed? NO    Do you do work in bed? NO    Do you use a computer or phone in bed? YES    Sleep Disruptions?   Leg movements:  Do you ever have restless, crawling, aching or other unusual feelings in your legs? YES    Do you ever wake yourself by kicking your legs during the night? NO    Are the sheets and blankets messed up or tossed about when you get up? YES    Night-time behaviors:   Do you have nightmares or night terrors? YES   How often? 3-4 a month    Have you had times when you were sleep walking? NO    Have you been seen doing anything unusual while you sleep at nights? NO  What?   How often?     Have you ever hurt yourself or someone else while you were sleeping? NO  Please describe:     Do you clench or grind your teeth during the night? NO    Sleep Apnea (pauses in breathing during sleep):  Do you wake with a headache in the morning? NO  How often?     Does your bed partner, family or friends ever say that you snore? YES  How many nights per week do you snore? Rare  Can snoring be heard outside the bedroom? NO    Do you ever wake yourself up from snoring, gasping or choking? NO    Have you ever been told that you stop breathing or have pauses in your breathing? NO    Do you wake in the morning with a dry throat or mouth? YES    Do you have trouble breathing through your nose? NO    Do you have problems with heartburn, reflux or a hiatal hernia? NO    Which positions do you usually sleep in?  (stomach, back, sides, all) sides    Do you use oxygen or any other medical equipment when you sleep? NO    Do members of your family (related by blood) snore? Don't know    Have any members of your family been diagnosed with with sleep apnea? Don't know    Do other members of your family have restless leg? NO    Do other members of your family have sleep walking? NO    Have you ever had an accident, or near accident due to sleepiness while driving? NO    Does your sleepiness affect your work on the job or at school? NO    Do you ever fall asleep by accident while doing a task? NO    Have you had sudden muscle weakness when laughing, angry or surprised? NO    Have you ever been unable to move your body when falling asleep or waking up? NO    Do you ever have trouble  your dreams from real life events? NO  Please describe:     Physical Health: (including illness and injury): During the past 30 days, on how many days was your physical health not good? 0/30 days     Mental Health: (including stress, depression, and problems with emotions): During the last 30 days, how may days was your mental health not good? 0/30 days.     During the past 30 days, on how many days did poor physical or mental health keep you from doing your usual activities? This might be self-care, work, or play? 0/30 days.     Social History:   Marital status:      Who lives in your home with you? No one    Mother (alive or dead)? dead If has , from what? infection  Father (alive or dead)? dead If has , from what? cancer    Siblings: YES  Have any ? NO  If so, from what?     Currently working? NO  If yes, work:   Former jobs: land surveying     Sleepiness Scale:   Sitting and reading 2   Watching TV 2   Sitting in a public place 0   Riding in a car 2   Lying down to rest in the afternoon 1   Sitting and talking to someone 0   Sitting quietly after a lunch without alcohol 1   In a car, stopping for a few minutes in  traffic 0       Surgical History:   Past Surgical History:   Procedure Laterality Date     BIOPSY PROSTATE TRANSRECTAL N/A 10/22/2019    Procedure: Transrectal Ultrasoun guided biopsy of prostate;  Surgeon: Yunior Llanes MD;  Location: [facility] OR     COLONOSCOPY  3-3-2014    due 2019  4 polyps benign     COLONOSCOPY N/A 6/10/2021    Procedure: surveillance colonoscopy,  biopsy;  Surgeon: Lawson Storey MD;  Location: [facility] OR     wisdom teeth extraction         Medical Conditions:   Past Medical History:   Diagnosis Date     Basal cell carcinoma      DIABETES MELLITUS TYPE II-UNCOMPL 1/19/2006     Elevated PSA 2019     HYPERLIPIDEMIA NEC/NOS 10/18/2006     HYPERTENSION NOS 1/19/2006     Obesity      Shingles 11/2017       Medications:   Current Outpatient Medications   Medication Sig     acetylcysteine (N-ACETYL CYSTEINE) 500 MG CAPS capsule Take 1 capsule (500 mg) by mouth daily     amLODIPine (NORVASC) 2.5 MG tablet Take 1 tablet by mouth once daily     ASPIRIN 81 MG OR TABS 1 tab po QD (Once per day)     atorvastatin (LIPITOR) 20 MG tablet Take 1 tablet by mouth once daily     BD TYRONE U/F 32G X 4 MM insulin pen needle USE ONE NEEDLE DAILY AS DIRECTED     blood glucose (ONETOUCH VERIO IQ) test strip Use to test blood sugar 1 time daily.     Blood Glucose Monitoring Suppl (ONETOUCH VERIO FLEX SYSTEM) w/Device KIT 1 each continuous     Blood Pressure Monitor KIT 1 Application 2 times daily     glipiZIDE (GLUCOTROL XL) 10 MG 24 hr tablet Take 1 tablet by mouth twice daily     lisinopril (ZESTRIL) 40 MG tablet Take 1 tablet by mouth once daily     metFORMIN (GLUCOPHAGE) 1000 MG tablet TAKE 1 TABLET BY MOUTH TWICE DAILY WITH MEALS     Multiple Vitamins-Minerals (MULTIVITAMIN PO) Take 1 tablet by mouth daily     Omega-3 Fatty Acids (OMEGA-3 FISH OIL PO) Take 1 g by mouth daily     OneTouch Delica Lancets 33G MISC 1 each daily     sildenafil (REVATIO) 20 MG tablet Take 3-5 (60-100mg) tablets by mouth 1 hour prior to sexual  activity     Turmeric 500 MG TABS Take 1 tablet by mouth 2 times daily     VICTOZA PEN 18 MG/3ML soln INJECT 1.2 MG SUBCUTANEOUSLY ONCE DAILY     vitamin B complex with vitamin C (VITAMIN  B COMPLEX) tablet Take 1 tablet by mouth daily     VITAMIN D, CHOLECALCIFEROL, PO Take 1,000 Units by mouth daily     No current facility-administered medications for this visit.        Are you currently having any of the following symptoms?   General:   Obvious weight gain or loss NO  Fever, chills or sweats NO  Drug allergies:     Eyes:   Changes in vision NO  Blind spots NO  Double vision NO  Other     Ear, Nose and Throat:   Ear pain NO  Sore throat NO  Sinus pain NO  Post-nasal drip NO  Runny nose NO  Bloody nose NO    Heart:   Rapid or irregular heart beat NO  Chest pain or pressure NO  Out of breath when lying down NO  Swelling in feet or legs NO  High blood pressure NO  Heart disease NO    Nervous system   Headaches NO  Weakness in arms or legs NO  Numbness in arms of legs NO  Other:     Skin  Rashes NO  New moles or skin changes NO  Other     Lungs  Shortness of breath at rest NO  Shortness of breath with activity NO  Dry cough NO  Coughing up mucous or phlegm NO  Coughing up blood NO  Wheezing when breathing NO    Lymph System  Swollen lymph nodes NO  New lumps or bumps NO  Changes in breasts or discharge NO    Digestive System   Nausea or vomiting NO  Loose or watery stools NO  Hard, dry stools (constipation) NO  Fat or grease in stools NO  Blood in stools NO  Stools are black or bloody NO  Abdominal (belly) pain NO    Urinary Tract   Pain when you urinate (pee) NO  Blood in your urine NO  Urinate (pee) more than normal NO  Irregular periods DOES NOT APPLY    Muscles and bones   Muscle pain NO  Joint or bone pain NO  Swollen joints NO  Other     Glands  Increased thirst or urination NO  Diabetes YES  Morning glucose: 120-150  Afternoon glucose: unanswered    Mental Health  Depression NO  Anxiety NO  Other mental health  issues:

## 2021-06-24 NOTE — PROGRESS NOTES
STOP PRITI       Name: Edward Hannah MRN# [id number]   Age: 65 year old YOB: 1955     Stop Bang questionnaire completed with a score of >3 to allow for HST     Have you been told you snore loudly (louder than talking or loud enough to be heard through doors)? NO    Do you often feel tired, fatigued, or sleepy during the daytime? NO    Has anyone observed you stop breathing during your sleep? NO    Do you have or are you being treated for high blood pressure? NO    Is your BMI greater than 35? NO    Is your neck size circumference 16 inches or greater? YES    Are you over 50 years old? YES    Stop Bang Score (# of yes): 3

## 2021-06-25 NOTE — PROGRESS NOTES
Chart review prior to sleep testing.    Patient Summary:  65 year old yo male who is referred for concerns for RLS, sleep-disordered breathing.    Patient Active Problem List    Diagnosis Date Noted     Restless legs syndrome 06/02/2021     Priority: Medium     Snoring 06/02/2021     Priority: Medium     Colon cancer screening 05/05/2021     Priority: Medium     Added automatically from request for surgery [id number]       Tubular adenoma of colon 05/05/2021     Priority: Medium     Added automatically from request for surgery [id number]       Special screening for malignant neoplasms, colon 04/15/2021     Priority: Medium     Morbid obesity (H) 01/15/2021     Priority: Medium     Rotator cuff tendonitis, left 10/09/2020     Priority: Medium     Type 2 diabetes mellitus with hyperglycemia, without long-term current use of insulin (H) 01/10/2020     Priority: Medium     Plantar warts 01/10/2020     Priority: Medium     Hypovitaminosis D 08/28/2019     Priority: Medium     Elevated prostate specific antigen (PSA) 08/28/2019     Priority: Medium     Herpes zoster without complication 11/06/2017     Priority: Medium     Trigger finger, acquired 11/06/2017     Priority: Medium     ACP (advance care planning) 07/21/2016     Priority: Medium     Advance Care Planning 7/21/2016: ACP Review of Chart / Resources Provided:  Reviewed chart for advance care plan.  Edward Hannah has no plan or code status on file however states presence of ACP document. Copy requested. Confirmed code status reflects current choices pending receipt of document/advance care plan review.  Confirmed/documented legally designated decision makers.  Added by Jadyn Singh             Benign essential hypertension 07/20/2016     Priority: Medium     Other insomnia 04/20/2016     Priority: Medium     Obesity due to excess calories, unspecified obesity severity 01/20/2016     Priority: Medium     BCC (basal cell carcinoma), face 09/07/2012     Priority: Medium  "    Advanced directives, counseling/discussion 06/28/2011     Priority: Medium     Advance Directive Problem List Overview:   Name Relationship Phone    Primary Health Care Agent            Alternative Health Care Agent          Discussed advance care planning with patient; however, patient declined at this time. 6/28/2011          Hyperlipidemia LDL goal <100 05/09/2010     Priority: Medium       Current Outpatient Medications   Medication     acetylcysteine (N-ACETYL CYSTEINE) 500 MG CAPS capsule     amLODIPine (NORVASC) 2.5 MG tablet     ASPIRIN 81 MG OR TABS     atorvastatin (LIPITOR) 20 MG tablet     BD TYRONE U/F 32G X 4 MM insulin pen needle     blood glucose (ONETOUCH VERIO IQ) test strip     Blood Glucose Monitoring Suppl (ONETOUCH VERIO FLEX SYSTEM) w/Device KIT     Blood Pressure Monitor KIT     glipiZIDE (GLUCOTROL XL) 10 MG 24 hr tablet     lisinopril (ZESTRIL) 40 MG tablet     metFORMIN (GLUCOPHAGE) 1000 MG tablet     Multiple Vitamins-Minerals (MULTIVITAMIN PO)     Omega-3 Fatty Acids (OMEGA-3 FISH OIL PO)     OneTouch Delica Lancets 33G MISC     sildenafil (REVATIO) 20 MG tablet     Turmeric 500 MG TABS     VICTOZA PEN 18 MG/3ML soln     vitamin B complex with vitamin C (VITAMIN  B COMPLEX) tablet     VITAMIN D, CHOLECALCIFEROL, PO     No current facility-administered medications for this visit.        STOP-BANG score of 3, with unknown neck circumference.  [place] score of 8.  BMI of Estimated body mass index is 33.58 kg/m  as calculated from the following:    Height as of 6/10/21: 1.778 m (5' 10\").    Weight as of 6/10/21: 106.1 kg (234 lb).     Per questionnaire: \"To see if my diabetes is affecting my sleep.\"    Pertinent PMHx of RLS, DM II, HTN, obesity.    No specific RLS medications noted on EMR.    No prior ferritin values for review.    Caffeine use:  No for 3+ per day.  No for within 6 hours of bed.    Tobacco use: No    Sleep pattern:  Workdays.  9pm - ?, total sleep time 5-7 " hours.  Weekends.  9pm to 6-8am, total sleep time 5-7 hours.  Time to fall asleep: ~2-30 minutes.  Awakenings: 1-3 times per night, 5-120 minutes to return to sleep.  Napping.  2-3 days per week, 0.5 hours per nap.    Yes for RLS screen.  No for sleep walking.  No for dream enactment behavior.  No for bruxism.    No for morning headaches.  Yes for snoring.  No for observed apnea.  Unknown for FHx of JIAN.    SHx:  , lives alone.  Not currently working.    A/P:  1.)  Borderline increased likelihood of JIAN with STOP-BANG score of 3, but potential that snoring / observed apnea being unreported given no current bed partner.  2.)  RLS   - Would appear to be candidate for either home sleep testing or in-lab PSG.   - Plan to discuss RLS symptoms at clinic visit, consider baseline ferritin.    ---  This note was written with the assistance of the Dragon voice-dictation technology software. The final document, although reviewed, may contain errors. For corrections, please contact the office.    Carlos Salguero MD    Sleep Medicine  [facility]  ([phone])  [facility]  ([phone])

## 2021-06-30 DIAGNOSIS — E11.65 TYPE 2 DIABETES MELLITUS WITH HYPERGLYCEMIA, WITHOUT LONG-TERM CURRENT USE OF INSULIN (H): ICD-10-CM

## 2021-06-30 RX ORDER — LIRAGLUTIDE 6 MG/ML
INJECTION SUBCUTANEOUS
Qty: 6 ML | Refills: 0 | Status: SHIPPED | OUTPATIENT
Start: 2021-06-30 | End: 2021-07-29

## 2021-06-30 NOTE — TELEPHONE ENCOUNTER
Phylicia       Last Written Prescription Date:  3/25/2021  Last Fill Quantity: 6mL,   # refills: 2  Last Office Visit: 6/2/2021  Future Office visit:    Next 5 appointments (look out 90 days)    Jul 15, 2021  8:30 AM  (Arrive by 8:15 AM)  SHORT with Kelly Yarbrough MD  [facility] [place] ([facility] [place] ) [address]  [place] [address]  [phone]

## 2021-07-02 NOTE — PROGRESS NOTES
Assessment & Plan     Type 2 diabetes mellitus with hyperglycemia, without long-term current use of insulin (H)  Follow-up 3 mos  He is trying to find out reasons why he is having high sugars  a1c is better  He is back at the gym    Hyperlipidemia LDL goal <100  Fasting next visit    Benign essential hypertension  stable    Restless legs syndrome  Iron levels normal -- he is only having this once per month  Declines medications          Patient was agreeable to this plan and had no further questions.  There are no Patient Instructions on file for this visit.    No follow-ups on file.    Kelly Yarbrough MD  [facility] - JUAN chowdhury is a 65 year old who presents for the following health issues   HPI     Diabetes Follow-up    How often are you checking your blood sugar? One time daily  What time of day are you checking your blood sugars (select all that apply)?  Before and after meals  Have you had any blood sugars above 200?  No  Have you had any blood sugars below 70?  No    What symptoms do you notice when your blood sugar is low?  Shaky    What concerns do you have today about your diabetes? None     Do you have any of these symptoms? (Select all that apply)  No numbness or tingling in feet.  No redness, sores or blisters on feet.  No complaints of excessive thirst.  No reports of blurry vision.  No significant changes to weight.              Hyperlipidemia Follow-Up      Are you regularly taking any medication or supplement to lower your cholesterol?   Yes- Lipitor 20mg    Are you having muscle aches or other side effects that you think could be caused by your cholesterol lowering medication?  No    Hypertension Follow-up      Do you check your blood pressure regularly outside of the clinic? Yes     Are you following a low salt diet? Yes    Are your blood pressures ever more than 140 on the top number (systolic) OR more   than 90 on the bottom number (diastolic), for example  140/90? No    BP Readings from Last 2 Encounters:   06/10/21 132/85   06/02/21 (!) 150/70     Hemoglobin A1C (%)   Date Value   04/15/2021 7.7 (H)   01/15/2021 6.8 (H)     LDL Cholesterol Calculated (mg/dL)   Date Value   01/15/2021 66   01/10/2020 79         How many servings of fruits and vegetables do you eat daily?  2-3    On average, how many sweetened beverages do you drink each day (Examples: soda, juice, sweet tea, etc.  Do NOT count diet or artificially sweetened beverages)?   0    How many days per week do you exercise enough to make your heart beat faster? 4    How many minutes a day do you exercise enough to make your heart beat faster? 60 or more    How many days per week do you miss taking your medication? 0    Review of Systems   Constitutional, HEENT, cardiovascular, pulmonary, gi and gu systems are negative, except as otherwise noted.      Objective    /80   Pulse 76   Temp 96.9  F (36.1  C)   Resp 18   Wt 109.8 kg (242 lb)   SpO2 95%   BMI 34.72 kg/m    There is no height or weight on file to calculate BMI.  Physical Exam   GENERAL: healthy, alert and no distress  NECK: no adenopathy, no asymmetry, masses, or scars and thyroid normal to palpation  RESP: lungs clear to auscultation - no rales, rhonchi or wheezes  CV: regular rate and rhythm, normal S1 S2, no S3 or S4, no murmur, click or rub, no peripheral edema and peripheral pulses strong  ABDOMEN: soft, nontender, no hepatosplenomegaly, no masses and bowel sounds normal  MS: no gross musculoskeletal defects noted, no edema  PSYCH: mentation appears normal, affect normal/bright    Results for orders placed or performed in visit on 07/15/21   Ferritin     Status: Normal   Result Value Ref Range    Ferritin 58 26 - 388 ng/mL   Iron and iron binding capacity     Status: Normal   Result Value Ref Range    Iron 104 35 - 180 ug/dL    Iron Binding Capacity 348 240 - 430 ug/dL    Iron Sat Index 30 15 - 46 %   Albumin Random Urine Quantitative  with Creat Ratio     Status: None   Result Value Ref Range    Creatinine Urine mg/dL 174 mg/dL    Albumin Urine mg/L 14 mg/dL    Albumin Urine mg/g Cr 8.05 0.00 - 17.00 mg/g Cr   Hemoglobin A1c     Status: Abnormal   Result Value Ref Range    Estimated Average Glucose 160 mg/dL    Hemoglobin A1C 7.2 (H) 0.0 - 5.6 %

## 2021-07-15 ENCOUNTER — OFFICE VISIT (OUTPATIENT)
Dept: FAMILY MEDICINE | Facility: OTHER | Age: 66
End: 2021-07-15
Attending: FAMILY MEDICINE
Payer: COMMERCIAL

## 2021-07-15 ENCOUNTER — LAB (OUTPATIENT)
Dept: LAB | Facility: OTHER | Age: 66
End: 2021-07-15
Payer: COMMERCIAL

## 2021-07-15 VITALS
RESPIRATION RATE: 18 BRPM | DIASTOLIC BLOOD PRESSURE: 80 MMHG | OXYGEN SATURATION: 95 % | BODY MASS INDEX: 34.72 KG/M2 | WEIGHT: 242 LBS | TEMPERATURE: 96.9 F | HEART RATE: 76 BPM | SYSTOLIC BLOOD PRESSURE: 138 MMHG

## 2021-07-15 DIAGNOSIS — I10 BENIGN ESSENTIAL HYPERTENSION: ICD-10-CM

## 2021-07-15 DIAGNOSIS — G25.81 RESTLESS LEGS SYNDROME: ICD-10-CM

## 2021-07-15 DIAGNOSIS — E11.65 TYPE 2 DIABETES MELLITUS WITH HYPERGLYCEMIA, WITHOUT LONG-TERM CURRENT USE OF INSULIN (H): Primary | ICD-10-CM

## 2021-07-15 DIAGNOSIS — R79.9 ABNORMAL FINDING OF BLOOD CHEMISTRY, UNSPECIFIED: ICD-10-CM

## 2021-07-15 DIAGNOSIS — E78.5 HYPERLIPIDEMIA LDL GOAL <100: ICD-10-CM

## 2021-07-15 DIAGNOSIS — E11.65 TYPE 2 DIABETES MELLITUS WITH HYPERGLYCEMIA, WITHOUT LONG-TERM CURRENT USE OF INSULIN (H): ICD-10-CM

## 2021-07-15 LAB
CREAT UR-MCNC: 174 MG/DL
EST. AVERAGE GLUCOSE BLD GHB EST-MCNC: 160 MG/DL
FERRITIN SERPL-MCNC: 58 NG/ML (ref 26–388)
HBA1C MFR BLD: 7.2 % (ref 0–5.6)
IRON SATN MFR SERPL: 30 % (ref 15–46)
IRON SERPL-MCNC: 104 UG/DL (ref 35–180)
MICROALBUMIN UR-MCNC: 14 MG/DL
MICROALBUMIN/CREAT UR: 8.05 MG/G CR (ref 0–17)
TIBC SERPL-MCNC: 348 UG/DL (ref 240–430)

## 2021-07-15 PROCEDURE — 99214 OFFICE O/P EST MOD 30 MIN: CPT | Performed by: FAMILY MEDICINE

## 2021-07-15 PROCEDURE — 36415 COLL VENOUS BLD VENIPUNCTURE: CPT | Mod: ZL

## 2021-07-15 PROCEDURE — 83036 HEMOGLOBIN GLYCOSYLATED A1C: CPT | Mod: ZL

## 2021-07-15 PROCEDURE — 82728 ASSAY OF FERRITIN: CPT | Mod: ZL

## 2021-07-15 PROCEDURE — 83550 IRON BINDING TEST: CPT | Mod: ZL

## 2021-07-15 PROCEDURE — G0463 HOSPITAL OUTPT CLINIC VISIT: HCPCS

## 2021-07-15 PROCEDURE — 82043 UR ALBUMIN QUANTITATIVE: CPT | Mod: ZL

## 2021-07-15 ASSESSMENT — PAIN SCALES - GENERAL: PAINLEVEL: NO PAIN (0)

## 2021-07-15 NOTE — NURSING NOTE
"Chief Complaint   Patient presents with     Lipids     Hypertension     Diabetes     RECHECK       Initial /80   Pulse 76   Temp 96.9  F (36.1  C)   Resp 18   Wt 109.8 kg (242 lb)   SpO2 95%   BMI 34.72 kg/m   Estimated body mass index is 34.72 kg/m  as calculated from the following:    Height as of 6/10/21: 1.778 m (5' 10\").    Weight as of this encounter: 109.8 kg (242 lb).  Medication Reconciliation: diallo Linares  "

## 2021-07-28 DIAGNOSIS — E11.65 TYPE 2 DIABETES MELLITUS WITH HYPERGLYCEMIA, WITHOUT LONG-TERM CURRENT USE OF INSULIN (H): ICD-10-CM

## 2021-07-29 RX ORDER — LIRAGLUTIDE 6 MG/ML
INJECTION SUBCUTANEOUS
Qty: 6 ML | Refills: 3 | Status: SHIPPED | OUTPATIENT
Start: 2021-07-29 | End: 2021-11-29

## 2021-07-29 NOTE — TELEPHONE ENCOUNTER
Jesus Alberto      Last Written Prescription Date:  6/30/21  Last Fill Quantity: 6,   # refills: 0  Last Office Visit: 7/15/21  Future Office visit:    Next 5 appointments (look out 90 days)    Sep 27, 2021 11:15 AM  Return Visit with Yunior Llanes MD  [facility] ([facility] ) [address]  [place] [address]  [phone]   Oct 27, 2021  8:15 AM  (Arrive by 8:00 AM)  SHORT with Kelly Yarbrough MD  [facility] [place] ([facility] [place] ) [address]  [place] [address]  [phone]

## 2021-08-02 ENCOUNTER — OFFICE VISIT (OUTPATIENT)
Dept: SLEEP MEDICINE | Facility: HOSPITAL | Age: 66
End: 2021-08-02
Attending: FAMILY MEDICINE
Payer: COMMERCIAL

## 2021-08-02 DIAGNOSIS — G47.33 OSA (OBSTRUCTIVE SLEEP APNEA): Primary | ICD-10-CM

## 2021-08-03 ENCOUNTER — DOCUMENTATION ONLY (OUTPATIENT)
Dept: SLEEP MEDICINE | Facility: HOSPITAL | Age: 66
End: 2021-08-03
Attending: FAMILY MEDICINE
Payer: COMMERCIAL

## 2021-08-03 DIAGNOSIS — G47.33 OSA (OBSTRUCTIVE SLEEP APNEA): ICD-10-CM

## 2021-08-03 PROCEDURE — G0399 HOME SLEEP TEST/TYPE 3 PORTA: HCPCS

## 2021-08-03 PROCEDURE — G0399 HOME SLEEP TEST/TYPE 3 PORTA: HCPCS | Mod: 26 | Performed by: FAMILY MEDICINE

## 2021-08-03 NOTE — PROGRESS NOTES
This HSAT was performed using a Noxturnal T3 device which recorded snore, sound, movement activity, body position, nasal pressure, oronasal thermal airflow, pulse, oximetry and both chest and abdominal respiratory effort. HSAT data was restricted to the time patient states they were in bed.     HSAT was scored using 1B 4% hypopnea rule.     HST AHI (Non-PAT): 11.2     Snoring was reported as moderate and loud.  Time with SpO2 below 89% was 26.9 minutes.   Overall signal quality was good     Pt will follow up with sleep provider to determine appropriate therapy.

## 2021-08-03 NOTE — PROCEDURES
"HOME SLEEP STUDY INTERPRETATION    Patient: Edward Hannah  MRN: [id number]  YOB: 1955  Study Date: 8/2/2021  Referring Provider: Kelly Yarbrough  Ordering Provider: Carlos Salguero MD, MD     Indications for Home Study: Edward Hannah is a 65 year old male with a history of RLS, DM II, HTN, obesity who presents with symptoms suggestive of obstructive sleep apnea.    Estimated body mass index is 34.72 kg/m  as calculated from the following:    Height as of 6/10/21: 1.778 m (5' 10\").    Weight as of 7/15/21: 109.8 kg (242 lb).  [place] Sleepiness Scale: 8/24  STOP-BANG: 3/8    Data: A full night home sleep study was performed recording the standard physiologic parameters including body position, movement, sound, nasal pressure, thermal oral airflow, chest and abdominal movements with respiratory inductance plethysmography, and oxygen saturation by pulse oximetry. Pulse rate was estimated by oximetry recording. This study was considered adequate based on > 4 hours of quality oximetry and respiratory recording. As specified by the AASM Manual for the Scoring of Sleep and Associated events, version 2.3, Rule VIII.D 1B, 4% oxygen desaturation scoring for hypopneas is used as a standard of care on all home sleep apnea testing.    Analysis Time:  529.2 minutes    Respiration:   Sleep Associated Hypoxemia: sustained hypoxemia was not present. Average oxygen saturation was 90.3%.  Time with saturation less than or equal to 88% was 8.2 minutes. The lowest oxygen saturation was 85%.   Snoring: Snoring was present.  Respiratory events: The home study revealed a presence of 35 obstructive apneas and 1 mixed and central apneas. There were 61 hypopneas resulting in a combined apnea/hypopnea index [AHI] of 11.2 events per hour.  AHI was 17.8 per hour supine, 0 per hour prone, 12.2 per hour on left side, and 6.5 per hour on right side.   Pattern: Excluding events noted above, respiratory rate and pattern was " Normal.    Position: Percent of time spent: supine - 23%, prone - 0.1%, on left - 36.3%, on right - 38.5%.    Heart Rate: By pulse oximetry normal rate was noted.     Assessment:   Mild obstructive sleep apnea.  Sleep associated hypoxemia was present.    Recommendations:  Consider auto-CPAP at 5-15 cmH2O, oral appliance therapy or polysomnography with full night PAP titration.  Suggest optimizing sleep hygiene and avoiding sleep deprivation.  Weight management.    Diagnosis Code(s): Obstructive Sleep Apnea G47.33, Hypoxemia G47.36    Carlos Salguero MD, MD, August 3, 2021   Diplomate, American Board of Family Medicine, Sleep Medicine

## 2021-08-09 NOTE — PROGRESS NOTES
"Edward Hannah is a 65 year old male who is being evaluated via a billable video visit.       The patient has been notified of following:      \"This video visit will be conducted via a call between you and your physician/provider. We have found that certain health care needs can be provided without the need for an in-person physical exam.  This service lets us provide the care you need with a video conversation.  If a prescription is necessary we can send it directly to your pharmacy.  If lab work is needed we can place an order for that and you can then stop by our lab to have the test done at a later time.     Video visits are billed at different rates depending on your insurance coverage.  Please reach out to your insurance provider with any questions.     If during the course of the call the physician/provider feels a video visit is not appropriate, you will not be charged for this service.\"     Patient has given verbal consent for Video visit? Yes  How would you like to obtain your AVS? Mail a copy  If you are dropped from the video visit, the video invite should be resent to: Text to cell phone: -  Will anyone else be joining your video visit? No  If patient encounters technical issues they should call [phone]      Video-Visit Details     Type of service:  Video Visit     Video Start Time: 10am  Video End Time: 10:14 AM    Originating Location (pt. Location): Home     Distant Location (provider location):  [facility]      Platform used for Video Visit: [email or web address]    Virtual visit for review of home sleep testing results.     Assessment:  - Mild JIAN with mild sleep-associated hypoxemia    Plan:  - Overall, we discussed that mild JIAN without significant hypoxemia is not felt to have a significant impact on long-term cardiovascular risk factors and would also seem unlikely to be a primary cause for the variable daytime blood sugars.  - He is reassured, we did not arrange further " "treatment for this level of JIAN and he is continuing to work on weight management.    SUBJECTIVE:  Edward Hannah is a 65 year old year old male.    STOP-BANG score of 3, with unknown neck circumference.  [place] score of 8.  BMI of Estimated body mass index is 33.58 kg/m  as calculated from the following:    Height as of 6/10/21: 1.778 m (5' 10\").    Weight as of 6/10/21: 106.1 kg (234 lb).      Per questionnaire: \"To see if my diabetes is affecting my sleep.\"     Pertinent PMHx of RLS, DM II, HTN, obesity.     No specific RLS medications noted on EMR.     No prior ferritin values for review.     Caffeine use:  No for 3+ per day.  No for within 6 hours of bed.     Tobacco use: No     Sleep pattern:  Workdays.  9pm - ?, total sleep time 5-7 hours.  Weekends.  9pm to 6-8am, total sleep time 5-7 hours.  Time to fall asleep: ~2-30 minutes.  Awakenings: 1-3 times per night, 5-120 minutes to return to sleep.  Napping.  2-3 days per week, 0.5 hours per nap.     Yes for RLS screen.  No for sleep walking.  No for dream enactment behavior.  No for bruxism.     No for morning headaches.  Yes for snoring.  No for observed apnea.  Unknown for FHx of JIAN.     SHx:  , lives alone.  Not currently working.    HOME SLEEP STUDY INTERPRETATION     Patient: Edward Hannah  MRN: [id number]  YOB: 1955  Study Date: 8/2/2021  Referring Provider: Kelly Yarbrough  Ordering Provider: Carlos Salguero MD, MD     Indications for Home Study: Edward Hannah is a 65 year old male with a history of RLS, DM II, HTN, obesity who presents with symptoms suggestive of obstructive sleep apnea.     Estimated body mass index is 34.72 kg/m  as calculated from the following:    Height as of 6/10/21: 1.778 m (5' 10\").    Weight as of 7/15/21: 109.8 kg (242 lb).  [place] Sleepiness Scale: 8/24  STOP-BANG: 3/8     Data: A full night home sleep study was performed recording the standard physiologic parameters including body " position, movement, sound, nasal pressure, thermal oral airflow, chest and abdominal movements with respiratory inductance plethysmography, and oxygen saturation by pulse oximetry. Pulse rate was estimated by oximetry recording. This study was considered adequate based on > 4 hours of quality oximetry and respiratory recording. As specified by the AASM Manual for the Scoring of Sleep and Associated events, version 2.3, Rule VIII.D 1B, 4% oxygen desaturation scoring for hypopneas is used as a standard of care on all home sleep apnea testing.     Analysis Time:  529.2 minutes     Respiration:   Sleep Associated Hypoxemia: sustained hypoxemia was not present. Average oxygen saturation was 90.3%.  Time with saturation less than or equal to 88% was 8.2 minutes. The lowest oxygen saturation was 85%.   Snoring: Snoring was present.  Respiratory events: The home study revealed a presence of 35 obstructive apneas and 1 mixed and central apneas. There were 61 hypopneas resulting in a combined apnea/hypopnea index [AHI] of 11.2 events per hour.  AHI was 17.8 per hour supine, 0 per hour prone, 12.2 per hour on left side, and 6.5 per hour on right side.   Pattern: Excluding events noted above, respiratory rate and pattern was Normal.     Position: Percent of time spent: supine - 23%, prone - 0.1%, on left - 36.3%, on right - 38.5%.     Heart Rate: By pulse oximetry normal rate was noted.      Assessment:   Mild obstructive sleep apnea.  Sleep associated hypoxemia was present.     Recommendations:  Consider auto-CPAP at 5-15 cmH2O, oral appliance therapy or polysomnography with full night PAP titration.  Suggest optimizing sleep hygiene and avoiding sleep deprivation.  Weight management.     Diagnosis Code(s): Obstructive Sleep Apnea G47.33, Hypoxemia G47.36     Carlos Salguero MD, MD, August 3, 2021   Diplomate, American Board of Family Medicine, Sleep Medicine            Past medical history:    Patient Active Problem  List    Diagnosis Date Noted     Restless legs syndrome 06/02/2021     Priority: Medium     Snoring 06/02/2021     Priority: Medium     Colon cancer screening 05/05/2021     Priority: Medium     Added automatically from request for surgery [id number]       Tubular adenoma of colon 05/05/2021     Priority: Medium     Added automatically from request for surgery [id number]       Special screening for malignant neoplasms, colon 04/15/2021     Priority: Medium     Morbid obesity (H) 01/15/2021     Priority: Medium     Rotator cuff tendonitis, left 10/09/2020     Priority: Medium     Type 2 diabetes mellitus with hyperglycemia, without long-term current use of insulin (H) 01/10/2020     Priority: Medium     Plantar warts 01/10/2020     Priority: Medium     Hypovitaminosis D 08/28/2019     Priority: Medium     Elevated prostate specific antigen (PSA) 08/28/2019     Priority: Medium     Herpes zoster without complication 11/06/2017     Priority: Medium     Trigger finger, acquired 11/06/2017     Priority: Medium     ACP (advance care planning) 07/21/2016     Priority: Medium     Advance Care Planning 7/21/2016: ACP Review of Chart / Resources Provided:  Reviewed chart for advance care plan.  Edward Camden has no plan or code status on file however states presence of ACP document. Copy requested. Confirmed code status reflects current choices pending receipt of document/advance care plan review.  Confirmed/documented legally designated decision makers.  Added by Jadyn Singh             Benign essential hypertension 07/20/2016     Priority: Medium     Other insomnia 04/20/2016     Priority: Medium     Obesity due to excess calories, unspecified obesity severity 01/20/2016     Priority: Medium     BCC (basal cell carcinoma), face 09/07/2012     Priority: Medium     Advanced directives, counseling/discussion 06/28/2011     Priority: Medium     Advance Directive Problem List Overview:   Name Relationship Phone    Primary Health  Care Agent            Alternative Health Care Agent          Discussed advance care planning with patient; however, patient declined at this time. 6/28/2011          Hyperlipidemia LDL goal <100 05/09/2010     Priority: Medium       10 point ROS of systems including Constitutional, Eyes, Respiratory, Cardiovascular, Gastroenterology, Genitourinary, Integumentary, Muscularskeletal, Psychiatric were all negative except for pertinent positives noted in my HPI.    Current Outpatient Medications   Medication Sig Dispense Refill     acetylcysteine (N-ACETYL CYSTEINE) 500 MG CAPS capsule Take 1 capsule (500 mg) by mouth daily       amLODIPine (NORVASC) 2.5 MG tablet Take 1 tablet by mouth once daily 90 tablet 2     ASPIRIN 81 MG OR TABS 1 tab po QD (Once per day) 100 3     atorvastatin (LIPITOR) 20 MG tablet Take 1 tablet by mouth once daily 90 tablet 2     BD TYRONE U/F 32G X 4 MM insulin pen needle USE ONE NEEDLE DAILY AS DIRECTED 100 each 3     blood glucose (ONETOUCH VERIO IQ) test strip Use to test blood sugar 1 time daily. 100 strip 3     Blood Glucose Monitoring Suppl (ONETOUCH VERIO FLEX SYSTEM) w/Device KIT 1 each continuous 1 kit 0     Blood Pressure Monitor KIT 1 Application 2 times daily 1 kit 0     glipiZIDE (GLUCOTROL XL) 10 MG 24 hr tablet Take 1 tablet by mouth twice daily 180 tablet 2     lisinopril (ZESTRIL) 40 MG tablet Take 1 tablet by mouth once daily 90 tablet 2     metFORMIN (GLUCOPHAGE) 1000 MG tablet TAKE 1 TABLET BY MOUTH TWICE DAILY WITH MEALS 180 tablet 2     Multiple Vitamins-Minerals (MULTIVITAMIN PO) Take 1 tablet by mouth daily       Omega-3 Fatty Acids (OMEGA-3 FISH OIL PO) Take 1 g by mouth daily       OneTouch Delica Lancets 33G MISC 1 each daily 100 each 3     sildenafil (REVATIO) 20 MG tablet Take 3-5 (60-100mg) tablets by mouth 1 hour prior to sexual activity 30 tablet 11     Turmeric 500 MG TABS Take 1 tablet by mouth 2 times daily       VICTOZA PEN 18 MG/3ML soln INJECT 1.2 MG  SUBCUTANEOUSLY ONCE DAILY. 6 mL 3     vitamin B complex with vitamin C (VITAMIN  B COMPLEX) tablet Take 1 tablet by mouth daily       VITAMIN D, CHOLECALCIFEROL, PO Take 1,000 Units by mouth daily         OBJECTIVE:  There were no vitals taken for this visit.    Physical Exam     ---  This note was written with the assistance of the Dragon voice-dictation technology software. The final document, although reviewed, may contain errors. For corrections, please contact the office.    Carlos Salguero MD    Sleep Medicine  [facility]  ([phone])  [facility]  ([phone])

## 2021-08-10 ENCOUNTER — VIRTUAL VISIT (OUTPATIENT)
Dept: SLEEP MEDICINE | Facility: HOSPITAL | Age: 66
End: 2021-08-10
Attending: FAMILY MEDICINE
Payer: COMMERCIAL

## 2021-08-10 DIAGNOSIS — G47.33 OSA (OBSTRUCTIVE SLEEP APNEA): Primary | ICD-10-CM

## 2021-08-10 PROCEDURE — 99203 OFFICE O/P NEW LOW 30 MIN: CPT | Mod: 95 | Performed by: FAMILY MEDICINE

## 2021-08-10 NOTE — Clinical Note
Hi Dr. Yarbrough,    On Edward's home sleep test, he had mild JIAN without significant hypoxemia.  Overall, this level of JIAN is not felt to have a significant increase in long-term cardiovascular risk factors and also seems unlikely to be a primary cause for his variable daytime blood sugars.  We agreed to continue weight management and did not start CPAP today.

## 2021-08-10 NOTE — PROGRESS NOTES
"luciana is a 65 year old who is being evaluated via a billable video visit.      How would you like to obtain your AVS? MyChart  If the video visit is dropped, the invitation should be resent by: Text to cell phone: [phone]  Will anyone else be joining your video visit? No  {If patient encounters technical issues they should call [phone] :[id number]}    Video Start Time: {video visit start/end time for provider to select:152948}    {PROVIDER CHARTING PREFERENCE:184077}    Subjective   luciana is a 65 year old who presents for the following health issues {ACCOMPANIED BY STATEMENT (Optional):425198}    HPI     ***    Review of Systems   {ROS COMP (Optional):004361}      Objective           Vitals:  No vitals were obtained today due to virtual visit.    Physical Exam   {video visit exam brief selected:761339::\"GENERAL: Healthy, alert and no distress\",\"EYES: Eyes grossly normal to inspection.  No discharge or erythema, or obvious scleral/conjunctival abnormalities.\",\"RESP: No audible wheeze, cough, or visible cyanosis.  No visible retractions or increased work of breathing.  \",\"SKIN: Visible skin clear. No significant rash, abnormal pigmentation or lesions.\",\"NEURO: Cranial nerves grossly intact.  Mentation and speech appropriate for age.\",\"PSYCH: Mentation appears normal, affect normal/bright, judgement and insight intact, normal speech and appearance well-groomed.\"}    {Diagnostic Test Results (Optional):107856}    {AMBULATORY ATTESTATION (Optional):845205}        Video-Visit Details    Type of service:  Video Visit    Video End Time:{video visit start/end time for provider to select:152948}    Originating Location (pt. Location): {video visit patient location:549340::\"Home\"}    Distant Location (provider location):  [facility]     Platform used for Video Visit: {Virtual Visit Platforms:091093::\"[employer]\"}    "

## 2021-08-28 DIAGNOSIS — E11.65 TYPE 2 DIABETES MELLITUS WITH HYPERGLYCEMIA, WITHOUT LONG-TERM CURRENT USE OF INSULIN (H): ICD-10-CM

## 2021-08-30 NOTE — TELEPHONE ENCOUNTER
Insulin pen needles      Last Written Prescription Date:  6/2/2020  Last Fill Quantity: 100,   # refills: 3  Last Office Visit: 7/15/21  Future Office visit:    Next 5 appointments (look out 90 days)    Sep 27, 2021 11:15 AM  Return Visit with Yunior Llanes MD  [facility] ([facility] ) [address]  [place] [address]  [phone]   Oct 27, 2021  8:15 AM  (Arrive by 8:00 AM)  SHORT with Kelly Yarbrough MD  [facility] [place] ([facility] [place] ) [address]  [place] [address]  [phone]

## 2021-09-01 RX ORDER — PEN NEEDLE, DIABETIC 32GX 5/32"
NEEDLE, DISPOSABLE MISCELLANEOUS
Qty: 100 EACH | Refills: 3 | Status: SHIPPED | OUTPATIENT
Start: 2021-09-01 | End: 2022-11-30

## 2021-09-19 ENCOUNTER — HEALTH MAINTENANCE LETTER (OUTPATIENT)
Age: 66
End: 2021-09-19

## 2021-09-27 ENCOUNTER — LAB (OUTPATIENT)
Dept: LAB | Facility: OTHER | Age: 66
End: 2021-09-27
Attending: UROLOGY
Payer: COMMERCIAL

## 2021-09-27 ENCOUNTER — OFFICE VISIT (OUTPATIENT)
Dept: UROLOGY | Facility: OTHER | Age: 66
End: 2021-09-27
Attending: UROLOGY
Payer: COMMERCIAL

## 2021-09-27 VITALS
DIASTOLIC BLOOD PRESSURE: 84 MMHG | HEART RATE: 82 BPM | RESPIRATION RATE: 16 BRPM | OXYGEN SATURATION: 92 % | SYSTOLIC BLOOD PRESSURE: 138 MMHG | WEIGHT: 236.2 LBS | BODY MASS INDEX: 33.89 KG/M2

## 2021-09-27 DIAGNOSIS — N52.9 ED (ERECTILE DYSFUNCTION) OF ORGANIC ORIGIN: ICD-10-CM

## 2021-09-27 DIAGNOSIS — R97.20 ELEVATED PROSTATE SPECIFIC ANTIGEN (PSA): Primary | ICD-10-CM

## 2021-09-27 DIAGNOSIS — R97.20 ELEVATED PROSTATE SPECIFIC ANTIGEN (PSA): ICD-10-CM

## 2021-09-27 LAB — PSA SERPL-MCNC: 3.93 UG/L (ref 0–4)

## 2021-09-27 PROCEDURE — 99213 OFFICE O/P EST LOW 20 MIN: CPT | Performed by: UROLOGY

## 2021-09-27 PROCEDURE — G0463 HOSPITAL OUTPT CLINIC VISIT: HCPCS

## 2021-09-27 PROCEDURE — 84153 ASSAY OF PSA TOTAL: CPT | Mod: ZL

## 2021-09-27 PROCEDURE — 36415 COLL VENOUS BLD VENIPUNCTURE: CPT | Mod: ZL

## 2021-09-27 ASSESSMENT — PAIN SCALES - GENERAL: PAINLEVEL: NO PAIN (0)

## 2021-09-27 NOTE — PROGRESS NOTES
Type of Visit  EST    Chief Complaint  Elevated PSA  ED    HPI  Mr. Hannah is a 66 year old male with a history of negative TRUS 2019 who follows up with an elevated PSA and ED.  He does not have a family history of prostate cancer.  The patient has not previously undergone prostate biopsy.    Patient underwent a PSA earlier today.  He denies acute urinary symptoms today such as dysuria or urgency.    He also is being treated for ED.  Sildenafil 20 mg has been satisfactory for management.  He denies side effects.      Review of Systems  I personally reviewed the ROS with the patient.    Nursing Notes:   Beatriz Pollock LPN  9/27/2021 11:11 AM  Signed  Chief Complaint   Patient presents with     Follow Up     1 year elevated PSA   Patient presents to the clinic today for a 1 year follow up for elevated PSA    Review of Systems:    Weight loss:    No     Recent fever/chills:  No   Night sweats:   No  Current skin rash:  No   Recent hair loss:  No  Heat intolerance:  No   Cold intolerance:  No  Chest pain:   No   Palpitations:   No  Shortness of breath:  No   Wheezing:   No  Constipation:    No   Diarrhea:   No   Nausea:   No   Vomiting:   No   Kidney/side pain:  No   Back pain:   No  Frequent headaches:  No   Dizziness:     No  Leg swelling:   No   Calf pain:    No        Medication Reconciliation: completed   Beatriz Pollock LPN  9/27/2021 11:10 AM       Physical Exam  Vitals:    09/27/21 1113   BP: 138/84   BP Location: Right arm   Patient Position: Sitting   Cuff Size: Adult Large   Pulse: 82   Resp: 16   SpO2: 92%   Weight: 107.1 kg (236 lb 3.2 oz)     Constitutional: No acute distress.  Alert and cooperative   Head: NCAT  Eyes: Conjunctivae normal  Cardiovascular: Regular rate.  Pulmonary/Chest: Respirations are even and non-labored bilaterally, no audible wheezing  Abdominal: Soft. No distension, tenderness, masses or guarding.   Neurological: A + O x 3.  Cranial Nerves II-XII grossly intact.  Extremities: JIMBO x 4,  Warm. No clubbing.  No cyanosis.    Skin: Pink, warm and dry.  No visible rashes noted.  Psychiatric:  Normal mood and affect  Back:  No left CVA tenderness.  No right CVA tenderness.  Genitourinary:  Nonpalpable bladder  Prostate:          40 grams, symmetric, no nodules or induration    Labs  Results for LUCAS HANNAH (MRN [id number]) as of 9/27/2021 11:18   9/27/2021 09:11   PSA 3.93     Results for LUCAS HANNAH (MRN [id number]) as of 9/9/2020 11:34   9/9/2020 09:33   PSA 3.926 (H)     Results for LUCAS HANNAH (MRN [id number]) as of 9/11/2019 15:03   8/28/2019 08:48 9/11/2019 10:30   PSA 5.04 (H) 5.33 (H)     Results for LUCAS HANNAH (MRN [id number]) as of 9/11/2019 09:59   2/2/2007 08:37   PSA 1.10     Pathology  TRUS  10/22/2019  12 cores negative  49 gram prostate    Assessment  Mr. Hannah is a 66 year old male with a history of negative TRUS 2019 who follows up with an elevated PSA and ED.  His PSA is completely stable.    Discussed following up as needed but the patient prefers annual visits.    Plan  PSA annually  Continue sildenafil for ED.

## 2021-09-27 NOTE — NURSING NOTE
Chief Complaint   Patient presents with     Follow Up     1 year elevated PSA   Patient presents to the clinic today for a 1 year follow up for elevated PSA    Review of Systems:    Weight loss:    No     Recent fever/chills:  No   Night sweats:   No  Current skin rash:  No   Recent hair loss:  No  Heat intolerance:  No   Cold intolerance:  No  Chest pain:   No   Palpitations:   No  Shortness of breath:  No   Wheezing:   No  Constipation:    No   Diarrhea:   No   Nausea:   No   Vomiting:   No   Kidney/side pain:  No   Back pain:   No  Frequent headaches:  No   Dizziness:     No  Leg swelling:   No   Calf pain:    No        Medication Reconciliation: completed   Beatriz Pollock LPN  9/27/2021 11:10 AM

## 2021-10-26 NOTE — PROGRESS NOTES
"  Assessment & Plan     Type 2 diabetes mellitus with hyperglycemia, without long-term current use of insulin (H)  Follow-up 3 mos  Come fasting  Encouraged him to continue working out  - Hemoglobin A1c    Need for prophylactic vaccination and inoculation against influenza  today    JIAN (obstructive sleep apnea)  stable    Hyperlipidemia LDL goal <100  Due next visit  - Comprehensive metabolic panel; Future  - Lipid Profile (Chol, Trig, HDL, LDL calc); Future    Benign essential hypertension  Borderline, will monitor           BMI:   Estimated body mass index is 34.38 kg/m  as calculated from the following:    Height as of this encounter: 1.778 m (5' 10\").    Weight as of this encounter: 108.7 kg (239 lb 9.6 oz).   Weight management plan: Discussed healthy diet and exercise guidelines    Patient was agreeable to this plan and had no further questions.  There are no Patient Instructions on file for this visit.    No follow-ups on file.    Kelly Yarbrough MD  [facility] - [place] Leon is a 66 year old who presents for the following health issues     HPI     Diabetes Follow-up    How often are you checking your blood sugar? One time daily  What time of day are you checking your blood sugars (select all that apply)?  Before and after meals  Have you had any blood sugars above 200?  No  Have you had any blood sugars below 70?  No    What symptoms do you notice when your blood sugar is low?  Shaky    What concerns do you have today about your diabetes? None     Do you have any of these symptoms? (Select all that apply)  No numbness or tingling in feet.  No redness, sores or blisters on feet.  No complaints of excessive thirst.  No reports of blurry vision.  No significant changes to weight.              Hyperlipidemia Follow-Up      Are you regularly taking any medication or supplement to lower your cholesterol?   Yes- lipitor    Are you having muscle aches or other side effects that you " "think could be caused by your cholesterol lowering medication?  No    Hypertension Follow-up      Do you check your blood pressure regularly outside of the clinic? Yes     Are you following a low salt diet? Yes    Are your blood pressures ever more than 140 on the top number (systolic) OR more   than 90 on the bottom number (diastolic), for example 140/90? No    BP Readings from Last 2 Encounters:   09/27/21 138/84   07/15/21 138/80     Hemoglobin A1C (%)   Date Value   07/15/2021 7.2 (H)   04/15/2021 7.7 (H)   01/15/2021 6.8 (H)     LDL Cholesterol Calculated (mg/dL)   Date Value   01/15/2021 66   01/10/2020 79         How many servings of fruits and vegetables do you eat daily?  4 or more    On average, how many sweetened beverages do you drink each day (Examples: soda, juice, sweet tea, etc.  Do NOT count diet or artificially sweetened beverages)?   0    How many days per week do you exercise enough to make your heart beat faster? 3 or less    How many minutes a day do you exercise enough to make your heart beat faster? 30 - 60    How many days per week do you miss taking your medication? 0      Review of Systems   Constitutional, HEENT, cardiovascular, pulmonary, gi and gu systems are negative, except as otherwise noted.      Objective    /76 (BP Location: Left arm, Patient Position: Sitting, Cuff Size: Adult Regular)   Pulse 71   Temp 97.2  F (36.2  C) (Tympanic)   Resp 16   Ht 1.778 m (5' 10\")   Wt 108.7 kg (239 lb 9.6 oz)   SpO2 97%   BMI 34.38 kg/m    There is no height or weight on file to calculate BMI.  Physical Exam   GENERAL: healthy, alert and no distress  NECK: no adenopathy, no asymmetry, masses, or scars and thyroid normal to palpation  RESP: lungs clear to auscultation - no rales, rhonchi or wheezes  CV: regular rate and rhythm, normal S1 S2, no S3 or S4, no murmur, click or rub, no peripheral edema and peripheral pulses strong  ABDOMEN: soft, nontender, no hepatosplenomegaly, no " masses and bowel sounds normal  MS: no gross musculoskeletal defects noted, no edema  PSYCH: mentation appears normal, affect normal/bright    No results found for any visits on 10/27/21.           Quality 130: Documentation Of Current Medications In The Medical Record: Current Medications Documented Quality 431: Preventive Care And Screening: Unhealthy Alcohol Use - Screening: Patient screened for unhealthy alcohol use using a single question and scores less than 2 times per year Quality 110: Preventive Care And Screening: Influenza Immunization: Influenza immunization was not ordered or administered, reason not given Quality 226: Preventive Care And Screening: Tobacco Use: Screening And Cessation Intervention: Patient screened for tobacco use and is an ex/non-smoker Detail Level: Detailed

## 2021-10-27 ENCOUNTER — OFFICE VISIT (OUTPATIENT)
Dept: FAMILY MEDICINE | Facility: OTHER | Age: 66
End: 2021-10-27
Attending: FAMILY MEDICINE
Payer: COMMERCIAL

## 2021-10-27 VITALS
HEIGHT: 70 IN | DIASTOLIC BLOOD PRESSURE: 76 MMHG | WEIGHT: 239.6 LBS | TEMPERATURE: 97.2 F | OXYGEN SATURATION: 97 % | HEART RATE: 71 BPM | BODY MASS INDEX: 34.3 KG/M2 | SYSTOLIC BLOOD PRESSURE: 138 MMHG | RESPIRATION RATE: 16 BRPM

## 2021-10-27 DIAGNOSIS — E78.5 HYPERLIPIDEMIA LDL GOAL <100: ICD-10-CM

## 2021-10-27 DIAGNOSIS — I10 BENIGN ESSENTIAL HYPERTENSION: ICD-10-CM

## 2021-10-27 DIAGNOSIS — E11.65 TYPE 2 DIABETES MELLITUS WITH HYPERGLYCEMIA, WITHOUT LONG-TERM CURRENT USE OF INSULIN (H): Primary | ICD-10-CM

## 2021-10-27 DIAGNOSIS — G47.33 OSA (OBSTRUCTIVE SLEEP APNEA): ICD-10-CM

## 2021-10-27 DIAGNOSIS — Z23 NEED FOR PROPHYLACTIC VACCINATION AND INOCULATION AGAINST INFLUENZA: ICD-10-CM

## 2021-10-27 LAB
EST. AVERAGE GLUCOSE BLD GHB EST-MCNC: 154 MG/DL
HBA1C MFR BLD: 7 % (ref 0–5.6)

## 2021-10-27 PROCEDURE — 99214 OFFICE O/P EST MOD 30 MIN: CPT | Performed by: FAMILY MEDICINE

## 2021-10-27 PROCEDURE — 83036 HEMOGLOBIN GLYCOSYLATED A1C: CPT | Mod: ZL | Performed by: FAMILY MEDICINE

## 2021-10-27 PROCEDURE — G0463 HOSPITAL OUTPT CLINIC VISIT: HCPCS

## 2021-10-27 PROCEDURE — 36415 COLL VENOUS BLD VENIPUNCTURE: CPT | Mod: ZL | Performed by: FAMILY MEDICINE

## 2021-10-27 SDOH — HEALTH STABILITY: PHYSICAL HEALTH: ON AVERAGE, HOW MANY DAYS PER WEEK DO YOU ENGAGE IN MODERATE TO STRENUOUS EXERCISE (LIKE A BRISK WALK)?: 3 DAYS

## 2021-10-27 SDOH — HEALTH STABILITY: PHYSICAL HEALTH: ON AVERAGE, HOW MANY MINUTES DO YOU ENGAGE IN EXERCISE AT THIS LEVEL?: 30 MIN

## 2021-10-27 ASSESSMENT — PAIN SCALES - GENERAL: PAINLEVEL: NO PAIN (0)

## 2021-10-27 ASSESSMENT — MIFFLIN-ST. JEOR: SCORE: 1873.07

## 2021-10-27 ASSESSMENT — LIFESTYLE VARIABLES
HOW OFTEN DO YOU HAVE A DRINK CONTAINING ALCOHOL: 2-3 TIMES A WEEK
HOW MANY STANDARD DRINKS CONTAINING ALCOHOL DO YOU HAVE ON A TYPICAL DAY: 1 OR 2
HOW OFTEN DO YOU HAVE SIX OR MORE DRINKS ON ONE OCCASION: NEVER

## 2021-10-27 NOTE — NURSING NOTE
"Chief Complaint   Patient presents with     Diabetes     Hypertension     Lipids       Initial /76 (BP Location: Left arm, Patient Position: Sitting, Cuff Size: Adult Regular)   Pulse 71   Temp 97.2  F (36.2  C) (Tympanic)   Resp 16   Ht 1.778 m (5' 10\")   Wt 108.7 kg (239 lb 9.6 oz)   SpO2 97%   BMI 34.38 kg/m   Estimated body mass index is 34.38 kg/m  as calculated from the following:    Height as of this encounter: 1.778 m (5' 10\").    Weight as of this encounter: 108.7 kg (239 lb 9.6 oz).  Medication Reconciliation: complete  Lynsey Mcgregor MA  "

## 2021-11-29 DIAGNOSIS — E11.65 TYPE 2 DIABETES MELLITUS WITH HYPERGLYCEMIA, WITHOUT LONG-TERM CURRENT USE OF INSULIN (H): ICD-10-CM

## 2021-11-29 RX ORDER — LIRAGLUTIDE 6 MG/ML
INJECTION SUBCUTANEOUS
Qty: 6 ML | Refills: 0 | Status: SHIPPED | OUTPATIENT
Start: 2021-11-29 | End: 2021-12-29

## 2022-01-09 ENCOUNTER — HEALTH MAINTENANCE LETTER (OUTPATIENT)
Age: 67
End: 2022-01-09

## 2022-01-26 DIAGNOSIS — E11.65 TYPE 2 DIABETES MELLITUS WITH HYPERGLYCEMIA, WITHOUT LONG-TERM CURRENT USE OF INSULIN (H): ICD-10-CM

## 2022-01-26 DIAGNOSIS — I10 BENIGN ESSENTIAL HYPERTENSION: ICD-10-CM

## 2022-01-26 DIAGNOSIS — E78.5 HYPERLIPIDEMIA LDL GOAL <100: ICD-10-CM

## 2022-01-27 RX ORDER — ATORVASTATIN CALCIUM 20 MG/1
TABLET, FILM COATED ORAL
Qty: 90 TABLET | Refills: 0 | Status: SHIPPED | OUTPATIENT
Start: 2022-01-27 | End: 2022-01-28 | Stop reason: DRUGHIGH

## 2022-01-27 RX ORDER — LISINOPRIL 40 MG/1
TABLET ORAL
Qty: 90 TABLET | Refills: 0 | Status: SHIPPED | OUTPATIENT
Start: 2022-01-27 | End: 2022-01-28

## 2022-01-27 RX ORDER — AMLODIPINE BESYLATE 2.5 MG/1
TABLET ORAL
Qty: 90 TABLET | Refills: 0 | Status: SHIPPED | OUTPATIENT
Start: 2022-01-27 | End: 2022-01-28

## 2022-01-27 RX ORDER — GLIPIZIDE 10 MG/1
TABLET, FILM COATED, EXTENDED RELEASE ORAL
Qty: 180 TABLET | Refills: 0 | Status: SHIPPED | OUTPATIENT
Start: 2022-01-27 | End: 2022-01-28

## 2022-01-27 NOTE — TELEPHONE ENCOUNTER
Norvasc      Last Written Prescription Date:  3/31/21  Last Fill Quantity: 90,   # refills: 2  Last Office Visit: 10/27/21  Future Office visit:    Next 5 appointments (look out 90 days)    Jan 28, 2022  9:15 AM  (Arrive by 9:00 AM)  SHORT with Kelly Yarbrough MD  [facility] [place] ([facility] [place] ) [address]  [place] [address]  [phone]           Routing refill request to provider for review/approval because:      Lipitor      Last Written Prescription Date:  3/31/21  Last Fill Quantity: 90,   # refills: 2  Last Office Visit: 10/27/21  Future Office visit:    Next 5 appointments (look out 90 days)    Jan 28, 2022  9:15 AM  (Arrive by 9:00 AM)  SHORT with Kelly Yarbrough MD  [facility] [place] ([facility] [place] ) [address]  [place] [address]  [phone]           Routing refill request to provider for review/approval because:      Glipizide      Last Written Prescription Date:  3/31/21  Last Fill Quantity: 180,   # refills: 2  Last Office Visit: 10/27/21  Future Office visit:    Next 5 appointments (look out 90 days)    Jan 28, 2022  9:15 AM  (Arrive by 9:00 AM)  [facility] [place] [facility] [place] ) [address]  [place] [address]  [phone]           Routing refill request to provider for review/approval because:        Lisinopril      Last Written Prescription Date:  3/31/21  Last Fill Quantity: 90,   # refills: 2  Last Office Visit: 10/27/21  Future Office visit:    Next 5 appointments (look out 90 days)    Jan 28, 2022  9:15 AM  (Arrive by 9:00 AM)  SHORT with Kelly Yarbrough MD  [facility] [place] ([facility] [place] ) [address]  [place] [address]  [phone]           Routing refill request to provider for review/approval because:      Metformin      Last Written Prescription Date:  3/31/21  Last Fill Quantity: 180,    # refills: 2  Last Office Visit: 10/27/21  Future Office visit:    Next 5 appointments (look out 90 days)    Jan 28, 2022  9:15 AM  (Arrive by 9:00 AM)  SHORT with Kelly Yarbrough MD  [facility] [place] ([facility] [place] ) [address]  [place] [address]  [phone]           Routing refill request to provider for review/approval because:

## 2022-01-28 ENCOUNTER — OFFICE VISIT (OUTPATIENT)
Dept: FAMILY MEDICINE | Facility: OTHER | Age: 67
End: 2022-01-28
Attending: FAMILY MEDICINE
Payer: COMMERCIAL

## 2022-01-28 ENCOUNTER — LAB (OUTPATIENT)
Dept: LAB | Facility: OTHER | Age: 67
End: 2022-01-28
Payer: COMMERCIAL

## 2022-01-28 VITALS
TEMPERATURE: 97 F | OXYGEN SATURATION: 96 % | DIASTOLIC BLOOD PRESSURE: 74 MMHG | BODY MASS INDEX: 34.44 KG/M2 | RESPIRATION RATE: 18 BRPM | HEART RATE: 66 BPM | WEIGHT: 240 LBS | SYSTOLIC BLOOD PRESSURE: 126 MMHG

## 2022-01-28 DIAGNOSIS — E11.65 TYPE 2 DIABETES MELLITUS WITH HYPERGLYCEMIA, WITHOUT LONG-TERM CURRENT USE OF INSULIN (H): ICD-10-CM

## 2022-01-28 DIAGNOSIS — E11.65 TYPE 2 DIABETES MELLITUS WITH HYPERGLYCEMIA, WITHOUT LONG-TERM CURRENT USE OF INSULIN (H): Primary | ICD-10-CM

## 2022-01-28 DIAGNOSIS — E66.01 MORBID OBESITY (H): ICD-10-CM

## 2022-01-28 DIAGNOSIS — E78.5 HYPERLIPIDEMIA LDL GOAL <100: ICD-10-CM

## 2022-01-28 DIAGNOSIS — I10 BENIGN ESSENTIAL HYPERTENSION: ICD-10-CM

## 2022-01-28 DIAGNOSIS — Z23 NEED FOR VACCINATION: ICD-10-CM

## 2022-01-28 LAB
ALBUMIN SERPL-MCNC: 4.1 G/DL (ref 3.4–5)
ALP SERPL-CCNC: 88 U/L (ref 40–150)
ALT SERPL W P-5'-P-CCNC: 32 U/L (ref 0–70)
ANION GAP SERPL CALCULATED.3IONS-SCNC: 6 MMOL/L (ref 3–14)
AST SERPL W P-5'-P-CCNC: 10 U/L (ref 0–45)
BILIRUB SERPL-MCNC: 0.5 MG/DL (ref 0.2–1.3)
BUN SERPL-MCNC: 17 MG/DL (ref 7–30)
CALCIUM SERPL-MCNC: 9.3 MG/DL (ref 8.5–10.1)
CHLORIDE BLD-SCNC: 104 MMOL/L (ref 94–109)
CHOLEST SERPL-MCNC: 151 MG/DL
CO2 SERPL-SCNC: 28 MMOL/L (ref 20–32)
CREAT SERPL-MCNC: 0.9 MG/DL (ref 0.66–1.25)
EST. AVERAGE GLUCOSE BLD GHB EST-MCNC: 166 MG/DL
FASTING STATUS PATIENT QL REPORTED: YES
GFR SERPL CREATININE-BSD FRML MDRD: >90 ML/MIN/1.73M2
GLUCOSE BLD-MCNC: 158 MG/DL (ref 70–99)
HBA1C MFR BLD: 7.4 % (ref 0–5.6)
HDLC SERPL-MCNC: 52 MG/DL
LDLC SERPL CALC-MCNC: 77 MG/DL
NONHDLC SERPL-MCNC: 99 MG/DL
POTASSIUM BLD-SCNC: 4 MMOL/L (ref 3.4–5.3)
PROT SERPL-MCNC: 7.9 G/DL (ref 6.8–8.8)
SODIUM SERPL-SCNC: 138 MMOL/L (ref 133–144)
TRIGL SERPL-MCNC: 108 MG/DL

## 2022-01-28 PROCEDURE — 83036 HEMOGLOBIN GLYCOSYLATED A1C: CPT | Mod: ZL

## 2022-01-28 PROCEDURE — 80053 COMPREHEN METABOLIC PANEL: CPT | Mod: ZL

## 2022-01-28 PROCEDURE — 99214 OFFICE O/P EST MOD 30 MIN: CPT | Performed by: FAMILY MEDICINE

## 2022-01-28 PROCEDURE — 83718 ASSAY OF LIPOPROTEIN: CPT | Mod: ZL | Performed by: FAMILY MEDICINE

## 2022-01-28 PROCEDURE — G0009 ADMIN PNEUMOCOCCAL VACCINE: HCPCS

## 2022-01-28 PROCEDURE — 36415 COLL VENOUS BLD VENIPUNCTURE: CPT | Mod: ZL

## 2022-01-28 PROCEDURE — G0008 ADMIN INFLUENZA VIRUS VAC: HCPCS

## 2022-01-28 PROCEDURE — 82040 ASSAY OF SERUM ALBUMIN: CPT | Mod: ZL

## 2022-01-28 PROCEDURE — G0463 HOSPITAL OUTPT CLINIC VISIT: HCPCS | Mod: 25

## 2022-01-28 PROCEDURE — G0463 HOSPITAL OUTPT CLINIC VISIT: HCPCS

## 2022-01-28 RX ORDER — LISINOPRIL 40 MG/1
40 TABLET ORAL DAILY
Qty: 90 TABLET | Refills: 3 | Status: SHIPPED | OUTPATIENT
Start: 2022-01-28 | End: 2022-04-28

## 2022-01-28 RX ORDER — GLIPIZIDE 10 MG/1
10 TABLET, FILM COATED, EXTENDED RELEASE ORAL 2 TIMES DAILY
Qty: 180 TABLET | Refills: 3 | Status: SHIPPED | OUTPATIENT
Start: 2022-01-28 | End: 2022-04-28

## 2022-01-28 RX ORDER — ATORVASTATIN CALCIUM 40 MG/1
40 TABLET, FILM COATED ORAL DAILY
Qty: 90 TABLET | Refills: 0 | Status: SHIPPED | OUTPATIENT
Start: 2022-01-28 | End: 2022-04-28

## 2022-01-28 RX ORDER — AMLODIPINE BESYLATE 2.5 MG/1
2.5 TABLET ORAL DAILY
Qty: 90 TABLET | Refills: 3 | Status: SHIPPED | OUTPATIENT
Start: 2022-01-28 | End: 2023-01-30

## 2022-01-28 ASSESSMENT — PAIN SCALES - GENERAL: PAINLEVEL: NO PAIN (0)

## 2022-01-28 NOTE — PROGRESS NOTES
Assessment & Plan     Type 2 diabetes mellitus with hyperglycemia, without long-term current use of insulin (H)  Follow-up 3 months  - Comprehensive metabolic panel (BMP + Alb, Alk Phos, ALT, AST, Total. Bili, TP); Future  - Hemoglobin A1c; Future  - glipiZIDE (GLUCOTROL XL) 10 MG 24 hr tablet; Take 1 tablet (10 mg) by mouth 2 times daily  - metFORMIN (GLUCOPHAGE) 1000 MG tablet; TAKE 1 TABLET BY MOUTH TWICE DAILY WITH MEALS  - ZZC FOOT EXAM  NO CHARGE    Hyperlipidemia LDL goal <100  The 10-year ASCVD risk score (Caitlin PATEL Jr., et al., 2013) is: 23%    Values used to calculate the score:      Age: 66 years      Sex: Male      Is Non- : No      Diabetic: Yes      Tobacco smoker: No      Systolic Blood Pressure: 126 mmHg      Is BP treated: Yes      HDL Cholesterol: 52 mg/dL      Total Cholesterol: 151 mg/dL  Increase lipitor  - Lipid Profile (Chol, Trig, HDL, LDL calc)  - atorvastatin (LIPITOR) 40 MG tablet; Take 1 tablet (40 mg) by mouth daily    Need for vaccination    - INFLUENZA, QUAD, HD, PF, 65+ (FLUZONE HD)  - PPSV23, IM/SUBQ (2+ YRS) - [id number]    Benign essential hypertension  refilled  - amLODIPine (NORVASC) 2.5 MG tablet; Take 1 tablet (2.5 mg) by mouth daily  - lisinopril (ZESTRIL) 40 MG tablet; Take 1 tablet (40 mg) by mouth daily    Morbid obesity (H)  Computer generated diagnosis -- he is working on this          Patient was agreeable to this plan and had no further questions.  There are no Patient Instructions on file for this visit.    No follow-ups on file.    Kelly Yarbrough MD  [facility] - [place] chowdhury is a 66 year old who presents for the following health issues     HPI     Diabetes Follow-up    How often are you checking your blood sugar? A few times a week  What time of day are you checking your blood sugars (select all that apply)?  Before meals  Have you had any blood sugars above 200?  No  Have you had any blood sugars below 70?   No    What symptoms do you notice when your blood sugar is low?  Shaky    What concerns do you have today about your diabetes? None     Do you have any of these symptoms? (Select all that apply)  No numbness or tingling in feet.  No redness, sores or blisters on feet.  No complaints of excessive thirst.  No reports of blurry vision.  No significant changes to weight.    Have you had a diabetic eye exam in the last 12 months? Yes- Date of last eye exam: Upcoming 4/2022,  Location: [facility]    1. foot deformity   Yes, wart/corn on left 5th digit foot pad  2. Current or previous foot ulceration     No  3. Current or previous pre-ulcerative calluses     No  4. previous partial amputation of one or both feet or complete amputation of one foot     No  5. peripheral neuropathy with evidence of callus formation     No  6. poor circulation     No              Hyperlipidemia Follow-Up      Are you regularly taking any medication or supplement to lower your cholesterol?   Yes- Lipitor    Are you having muscle aches or other side effects that you think could be caused by your cholesterol lowering medication?  No    Hypertension Follow-up      Do you check your blood pressure regularly outside of the clinic? Yes     Are you following a low salt diet? No    Are your blood pressures ever more than 140 on the top number (systolic) OR more   than 90 on the bottom number (diastolic), for example 140/90? No    BP Readings from Last 2 Encounters:   01/28/22 126/74   10/27/21 138/76     Hemoglobin A1C POCT (%)   Date Value   04/15/2021 7.7 (H)   01/15/2021 6.8 (H)     Hemoglobin A1C (%)   Date Value   10/27/2021 7.0 (H)   07/15/2021 7.2 (H)     LDL Cholesterol Calculated (mg/dL)   Date Value   01/15/2021 66   01/10/2020 79         How many servings of fruits and vegetables do you eat daily?  2-3    On average, how many sweetened beverages do you drink each day (Examples: soda, juice, sweet tea, etc.  Do NOT count diet or  artificially sweetened beverages)?   0    How many days per week do you exercise enough to make your heart beat faster? 3 or less    How many minutes a day do you exercise enough to make your heart beat faster? 30 - 60    How many days per week do you miss taking your medication? 0    Review of Systems   Constitutional, HEENT, cardiovascular, pulmonary, gi and gu systems are negative, except as otherwise noted.      Objective    /74   Pulse 66   Temp 97  F (36.1  C)   Resp 18   Wt 108.9 kg (240 lb)   SpO2 96%   BMI 34.44 kg/m    Body mass index is 34.44 kg/m .  Physical Exam   GENERAL: healthy, alert and no distress  NECK: no adenopathy, no asymmetry, masses, or scars and thyroid normal to palpation  RESP: lungs clear to auscultation - no rales, rhonchi or wheezes  CV: regular rate and rhythm, normal S1 S2, no S3 or S4, no murmur, click or rub, no peripheral edema and peripheral pulses strong  ABDOMEN: soft, nontender, no hepatosplenomegaly, no masses and bowel sounds normal  MS: no gross musculoskeletal defects noted, no edema  PSYCH: mentation appears normal, affect normal/bright  Diabetic foot exam: normal DP and PT pulses, normal sensory exam, normal monofilament exam and wart left 5th digit foot pad    Results for orders placed or performed in visit on 01/28/22   Lipid Profile (Chol, Trig, HDL, LDL calc)     Status: None   Result Value Ref Range    Cholesterol 151 <200 mg/dL    Triglycerides 108 <150 mg/dL    Direct Measure HDL 52 >=40 mg/dL    LDL Cholesterol Calculated 77 <=100 mg/dL    Non HDL Cholesterol 99 <130 mg/dL    Patient Fasting > 8hrs? Yes     Narrative    Cholesterol  Desirable:  <200 mg/dL    Triglycerides  Normal:  Less than 150 mg/dL  Borderline High:  150-199 mg/dL  High:  200-499 mg/dL  Very High:  Greater than or equal to 500 mg/dL    Direct Measure HDL  Female:  Greater than or equal to 50 mg/dL   Male:  Greater than or equal to 40 mg/dL    LDL Cholesterol  Desirable:   <100mg/dL  Above Desirable:  100-129 mg/dL   Borderline High:  130-159 mg/dL   High:  160-189 mg/dL   Very High:  >= 190 mg/dL    Non HDL Cholesterol  Desirable:  130 mg/dL  Above Desirable:  130-159 mg/dL  Borderline High:  160-189 mg/dL  High:  190-219 mg/dL  Very High:  Greater than or equal to 220 mg/dL   Results for orders placed or performed in visit on 01/28/22   Comprehensive metabolic panel (BMP + Alb, Alk Phos, ALT, AST, Total. Bili, TP)     Status: Abnormal   Result Value Ref Range    Sodium 138 133 - 144 mmol/L    Potassium 4.0 3.4 - 5.3 mmol/L    Chloride 104 94 - 109 mmol/L    Carbon Dioxide (CO2) 28 20 - 32 mmol/L    Anion Gap 6 3 - 14 mmol/L    Urea Nitrogen 17 7 - 30 mg/dL    Creatinine 0.90 0.66 - 1.25 mg/dL    Calcium 9.3 8.5 - 10.1 mg/dL    Glucose 158 (H) 70 - 99 mg/dL    Alkaline Phosphatase 88 40 - 150 U/L    AST 10 0 - 45 U/L    ALT 32 0 - 70 U/L    Protein Total 7.9 6.8 - 8.8 g/dL    Albumin 4.1 3.4 - 5.0 g/dL    Bilirubin Total 0.5 0.2 - 1.3 mg/dL    GFR Estimate >90 >60 mL/min/1.73m2   Hemoglobin A1c     Status: Abnormal   Result Value Ref Range    Estimated Average Glucose 166 mg/dL    Hemoglobin A1C 7.4 (H) 0.0 - 5.6 %

## 2022-01-28 NOTE — NURSING NOTE
"Chief Complaint   Patient presents with     Diabetes     Lipids     Hypertension       Initial /74   Pulse 66   Temp 97  F (36.1  C)   Resp 18   Wt 108.9 kg (240 lb)   SpO2 96%   BMI 34.44 kg/m   Estimated body mass index is 34.44 kg/m  as calculated from the following:    Height as of 10/27/21: 1.778 m (5' 10\").    Weight as of this encounter: 108.9 kg (240 lb).  Medication Reconciliation: diallo Linares  "

## 2022-02-01 ENCOUNTER — HOSPITAL ENCOUNTER (OUTPATIENT)
Dept: EDUCATION SERVICES | Facility: HOSPITAL | Age: 67
Discharge: HOME | End: 2022-02-01
Attending: NURSE PRACTITIONER | Admitting: FAMILY MEDICINE
Payer: COMMERCIAL

## 2022-02-01 VITALS
BODY MASS INDEX: 34.54 KG/M2 | HEART RATE: 72 BPM | RESPIRATION RATE: 16 BRPM | DIASTOLIC BLOOD PRESSURE: 85 MMHG | OXYGEN SATURATION: 95 % | WEIGHT: 241.3 LBS | HEIGHT: 70 IN | SYSTOLIC BLOOD PRESSURE: 148 MMHG

## 2022-02-01 DIAGNOSIS — E11.65 TYPE 2 DIABETES MELLITUS WITH HYPERGLYCEMIA, WITHOUT LONG-TERM CURRENT USE OF INSULIN (H): Primary | ICD-10-CM

## 2022-02-01 PROCEDURE — G0108 DIAB MANAGE TRN  PER INDIV: HCPCS | Performed by: DIETITIAN, REGISTERED

## 2022-02-01 ASSESSMENT — PAIN SCALES - GENERAL: PAINLEVEL: NO PAIN (0)

## 2022-02-01 ASSESSMENT — MIFFLIN-ST. JEOR: SCORE: 1876.81

## 2022-02-01 NOTE — LETTER
"    2/1/2022        RE: Edward Hannah  [address]   [place] [address]        Diabetes Self-Management Education & Support    Presents for: Individual review    SUBJECTIVE/OBJECTIVE:  Presents for: Individual review  Accompanied by: Self  Diabetes education in the past 24mo: Yes  Focus of Visit: Monitoring,Assistance w/ making life changes  Diabetes type: Type 2  Date of diagnosis: 2005  Disease course: Getting harder to manage  How confident are you filling out medical forms by yourself:: Extremely  Diabetes management related comments/concerns: No concerns.  Transportation concerns: No  Difficulty affording diabetes medication?: No  Difficulty affording diabetes testing supplies?: No  Other concerns:: None  Cultural Influences/Ethnic Background:  American    Diabetes Symptoms & Complications:  Fatigue: No  Neuropathy: No  Polydipsia: No  Polyphagia: No  Polyuria: No  Visual change: No  Slow healing wounds: No  Symptom course: Stable  Weight trend: Stable  Complications assessed today?: No  Autonomic neuropathy: No  CVA: No  Heart disease: No  Nephropathy: No  Peripheral neuropathy: No  Foot ulcerations: No  Peripheral Vascular Disease: No  Retinopathy: No    Patient Problem List and Family Medical History reviewed for relevant medical history, current medical status, and diabetes risk factors.    Vitals:  /85   Pulse 72   Resp 16   Ht 1.772 m (5' 9.75\")   Wt 109.5 kg (241 lb 4.8 oz)   SpO2 95%   BMI 34.87 kg/m    Estimated body mass index is 34.87 kg/m  as calculated from the following:    Height as of this encounter: 1.772 m (5' 9.75\").    Weight as of this encounter: 109.5 kg (241 lb 4.8 oz).   Last 3 BP:   BP Readings from Last 3 Encounters:   02/01/22 148/85   01/28/22 126/74   10/27/21 138/76       History   Smoking Status     Never Smoker   Smokeless Tobacco     Never Used     Comment: remote cigar history       Labs:  Lab Results   Component Value Date    A1C 7.4 01/28/2022    A1C 7.7 " 04/15/2021     Lab Results   Component Value Date     01/28/2022     06/02/2021     Lab Results   Component Value Date    LDL 77 01/28/2022    LDL 66 01/15/2021     HDL Cholesterol   Date Value Ref Range Status   01/15/2021 45 >39 mg/dL Final     Direct Measure HDL   Date Value Ref Range Status   01/28/2022 52 >=40 mg/dL Final   ]  GFR Estimate   Date Value Ref Range Status   01/28/2022 >90 >60 mL/min/1.73m2 Final     Comment:     Effective December 21, 2021 eGFRcr in adults is calculated using the 2021 CKD-EPI creatinine equation which includes age and gender (Eulalia et al., NEJ, DOI: 10.1056/GMWVcm5828492)   06/02/2021 89 >60 mL/min/[1.73_m2] Final     Comment:     Non  GFR Calc  Starting 12/18/2018, serum creatinine based estimated GFR (eGFR) will be   calculated using the Chronic Kidney Disease Epidemiology Collaboration   (CKD-EPI) equation.       GFR Estimate If Black   Date Value Ref Range Status   06/02/2021 >90 >60 mL/min/[1.73_m2] Final     Comment:      GFR Calc  Starting 12/18/2018, serum creatinine based estimated GFR (eGFR) will be   calculated using the Chronic Kidney Disease Epidemiology Collaboration   (CKD-EPI) equation.       Lab Results   Component Value Date    CR 0.90 01/28/2022    CR 0.89 06/02/2021     No results found for: MICROALBUMIN    Healthy Eating:  Healthy Eating Assessed Today: Yes  Cultural/Temple diet restrictions?: No  Meal planning/habits: Avoiding sweets  How many times a week on average do you eat food made away from home (restaurant/take-out)?: 0  Meals include: Breakfast,Lunch,Dinner  Breakfast: yogurt with fruit/granola or bagel with cream cheese or peanut butter or eggs/2 toast with peanut butter or jelly - milk  Lunch: may skip or sandwich or leftovers - water  Dinner: stir koehler or meat, veggie or salad - occasional sweet potato  Snacks: yogurt with granola or fruit 1 oz dark chocolate  Other: Dines out 2x/month  Beverages:  Water,Milk,Juice,Diet soda (only occasional juice)  Has patient met with a dietitian in the past?: Yes    Being Active:  Being Active Assessed Today: Yes  Exercise:: Yes (Gym 3x/week, snow removal or snowshoeing)  Days per week of moderate to strenuous exercise (like a brisk walk): 4  On average, minutes per day of exercise at this level: 60  How intense was your typical exercise? : Moderate (like brisk walking)  Exercise Minutes per Week: 240  Barrier to exercise: None    Monitoring:  Monitoring Assessed Today: Yes  Did patient bring glucose meter to appointment? : Yes  Blood Glucose Meter: One Touch  Times checking blood sugar at home (number): 1  Times checking blood sugar at home (per): Day  Blood glucose trend: No change  Fasting-134, 129, 176, 123, 128, 154, 118, 102, 148, 102, 100    Taking Medications:  Diabetes Medication(s)     Biguanides       metFORMIN (GLUCOPHAGE) 1000 MG tablet    TAKE 1 TABLET BY MOUTH TWICE DAILY WITH MEALS    Sulfonylureas       glipiZIDE (GLUCOTROL XL) 10 MG 24 hr tablet    Take 1 tablet (10 mg) by mouth 2 times daily    Incretin Mimetic Agents (GLP-1 Receptor Agonists)       liraglutide (VICTOZA PEN) 18 MG/3ML solution    INJECT 1.2 MG SUBCUTANEOUS ONCE A DAY        Taking Medication Assessed Today: Yes  Current Treatments: Oral Medication (taken by mouth),Diet (Metformin 1000 bid, Glipizide 10 mg bid, Victoza 1.2 mg daily)  Problems taking diabetes medications regularly?: No  Diabetes medication side effects?: No    Problem Solving:  Problem Solving Assessed Today: Yes  Is the patient at risk for hypoglycemia?: Yes  Hypoglycemia Frequency: Rarely  Hypoglycemia Treatment: Candy (Candy and then a snack or meal)  Patient carries a carbohydrate source: Yes  Medical ID: No  Does patient have DKA prevention plan?: No  Does patient have severe weather/disaster plan for diabetes management?: No    Hypoglycemia symptoms  Hunger: Yes  Feeling shaky: Yes    Reducing Risks:  Reducing Risks  Assessed Today: No  Diabetes Risks: Age over 45 years,Family History (Aunts)  CAD Risks: Diabetes Mellitus,Male sex  Has dilated eye exam at least once a year?: No (Scheduled April 2022)  Sees dentist every 6 months?: No  Feet checked by healthcare provider in the last year?: Yes    Healthy Coping:  Healthy Coping Assessed Today: Yes  Emotional response to diabetes: Acceptance  Informal Support system:: Children  Stage of change: MAINTENANCE (Working to maintain change, with risk of relapse)  Support resources: None  Patient Activation Measure Survey Score:  EZ Score (Last Two) 6/28/2011 10/31/2018   EZ Raw Score 46 38   Activation Score 75.3 83.7   EZ Level 4 4       Diabetes knowledge and skills assessment:   Patient is knowledgeable in diabetes management concepts related to: Healthy Eating, Being Active, Taking Medication, Problem Solving and Healthy Coping    Patient needs further education on the following diabetes management concepts: Monitoring and Reducing Risks    Based on learning assessment above, most appropriate setting for further diabetes education would be: Individual setting.      INTERVENTIONS:    Education provided today on:  AADE Self-Care Behaviors:  Monitoring: individual blood glucose targets and frequency of monitoring  Taking Medication: Discussed increasing Victoza to 1.8 mg daily r/t increased A1c.  Pt declines at this time.   Reducing Risks: annual eye exam, A1C - goals, relating to blood glucose levels, how often to check and blood pressure and goals    Opportunities for ongoing education and support in diabetes-self management were discussed.    Pt verbalized understanding of concepts discussed and recommendations provided today.       Education Materials Provided:  No new materials today.     ASSESSMENT:  Recent A1c was 7.4%.  A1c has been > or at 7.0% for last four checks.  Discussed increasing Victoza to 1.8 mg daily but pt declines for now.  Encouraged him to consider if next  A1c remains above 7.0%.  He does seem to follow a healthy diet and gets some exercise.  Eye exam is due and scheduled.      Patient's most recent   Lab Results   Component Value Date    A1C 7.4 01/28/2022    A1C 7.7 04/15/2021    is not meeting goal of <7.0    PLAN  Continue to eat a healthy, low carbohydrate diet.   Work towards 30 minutes exercise most days of the week.  Test glucose 1x/day - alternate times.   Consider increase in Victoza to 1.8 mg daily if next A1c > 7.0%.   Test glucose See Patient Instructions for co-developed, patient-stated behavior change goals.  AVS printed and provided to patient today.   Follow up annually or sooner if indicated.     Time Spent: 30 minutes  Encounter Type: Individual    Any diabetes medication dose changes were made via the CDE Protocol and Collaborative Practice Agreement with the patient's referring provider. A copy of this encounter was shared with the provider.          Sincerely,        Karrie Coleman RD

## 2022-02-01 NOTE — PROGRESS NOTES
"Diabetes Self-Management Education & Support    Presents for: Individual review    SUBJECTIVE/OBJECTIVE:  Presents for: Individual review  Accompanied by: Self  Diabetes education in the past 24mo: Yes  Focus of Visit: Monitoring,Assistance w/ making life changes  Diabetes type: Type 2  Date of diagnosis: 2005  Disease course: Getting harder to manage  How confident are you filling out medical forms by yourself:: Extremely  Diabetes management related comments/concerns: No concerns.  Transportation concerns: No  Difficulty affording diabetes medication?: No  Difficulty affording diabetes testing supplies?: No  Other concerns:: None  Cultural Influences/Ethnic Background:  American    Diabetes Symptoms & Complications:  Fatigue: No  Neuropathy: No  Polydipsia: No  Polyphagia: No  Polyuria: No  Visual change: No  Slow healing wounds: No  Symptom course: Stable  Weight trend: Stable  Complications assessed today?: No  Autonomic neuropathy: No  CVA: No  Heart disease: No  Nephropathy: No  Peripheral neuropathy: No  Foot ulcerations: No  Peripheral Vascular Disease: No  Retinopathy: No    Patient Problem List and Family Medical History reviewed for relevant medical history, current medical status, and diabetes risk factors.    Vitals:  /85   Pulse 72   Resp 16   Ht 1.772 m (5' 9.75\")   Wt 109.5 kg (241 lb 4.8 oz)   SpO2 95%   BMI 34.87 kg/m    Estimated body mass index is 34.87 kg/m  as calculated from the following:    Height as of this encounter: 1.772 m (5' 9.75\").    Weight as of this encounter: 109.5 kg (241 lb 4.8 oz).   Last 3 BP:   BP Readings from Last 3 Encounters:   02/01/22 148/85   01/28/22 126/74   10/27/21 138/76       History   Smoking Status     Never Smoker   Smokeless Tobacco     Never Used     Comment: remote cigar history       Labs:  Lab Results   Component Value Date    A1C 7.4 01/28/2022    A1C 7.7 04/15/2021     Lab Results   Component Value Date     01/28/2022     " 06/02/2021     Lab Results   Component Value Date    LDL 77 01/28/2022    LDL 66 01/15/2021     HDL Cholesterol   Date Value Ref Range Status   01/15/2021 45 >39 mg/dL Final     Direct Measure HDL   Date Value Ref Range Status   01/28/2022 52 >=40 mg/dL Final   ]  GFR Estimate   Date Value Ref Range Status   01/28/2022 >90 >60 mL/min/1.73m2 Final     Comment:     Effective December 21, 2021 eGFRcr in adults is calculated using the 2021 CKD-EPI creatinine equation which includes age and gender (Eulalia et al., [place], DOI: 10.1056/PJSNce8153676)   06/02/2021 89 >60 mL/min/[1.73_m2] Final     Comment:     Non  GFR Calc  Starting 12/18/2018, serum creatinine based estimated GFR (eGFR) will be   calculated using the Chronic Kidney Disease Epidemiology Collaboration   (CKD-EPI) equation.       GFR Estimate If Black   Date Value Ref Range Status   06/02/2021 >90 >60 mL/min/[1.73_m2] Final     Comment:      GFR Calc  Starting 12/18/2018, serum creatinine based estimated GFR (eGFR) will be   calculated using the Chronic Kidney Disease Epidemiology Collaboration   (CKD-EPI) equation.       Lab Results   Component Value Date    CR 0.90 01/28/2022    CR 0.89 06/02/2021     No results found for: MICROALBUMIN    Healthy Eating:  Healthy Eating Assessed Today: Yes  Cultural/Islam diet restrictions?: No  Meal planning/habits: Avoiding sweets  How many times a week on average do you eat food made away from home (restaurant/take-out)?: 0  Meals include: Breakfast,Lunch,Dinner  Breakfast: yogurt with fruit/granola or bagel with cream cheese or peanut butter or eggs/2 toast with peanut butter or jelly - milk  Lunch: may skip or sandwich or leftovers - water  Dinner: stir koehler or meat, veggie or salad - occasional sweet potato  Snacks: yogurt with granola or fruit 1 oz dark chocolate  Other: Dines out 2x/month  Beverages: Water,Milk,Juice,Diet soda (only occasional juice)  Has patient met with a dietitian  in the past?: Yes    Being Active:  Being Active Assessed Today: Yes  Exercise:: Yes (Gym 3x/week, snow removal or snowshoeing)  Days per week of moderate to strenuous exercise (like a brisk walk): 4  On average, minutes per day of exercise at this level: 60  How intense was your typical exercise? : Moderate (like brisk walking)  Exercise Minutes per Week: 240  Barrier to exercise: None    Monitoring:  Monitoring Assessed Today: Yes  Did patient bring glucose meter to appointment? : Yes  Blood Glucose Meter: One Touch  Times checking blood sugar at home (number): 1  Times checking blood sugar at home (per): Day  Blood glucose trend: No change  Fasting-134, 129, 176, 123, 128, 154, 118, 102, 148, 102, 100    Taking Medications:  Diabetes Medication(s)     Biguanides       metFORMIN (GLUCOPHAGE) 1000 MG tablet    TAKE 1 TABLET BY MOUTH TWICE DAILY WITH MEALS    Sulfonylureas       glipiZIDE (GLUCOTROL XL) 10 MG 24 hr tablet    Take 1 tablet (10 mg) by mouth 2 times daily    Incretin Mimetic Agents (GLP-1 Receptor Agonists)       liraglutide (VICTOZA PEN) 18 MG/3ML solution    INJECT 1.2 MG SUBCUTANEOUS ONCE A DAY        Taking Medication Assessed Today: Yes  Current Treatments: Oral Medication (taken by mouth),Diet (Metformin 1000 bid, Glipizide 10 mg bid, Victoza 1.2 mg daily)  Problems taking diabetes medications regularly?: No  Diabetes medication side effects?: No    Problem Solving:  Problem Solving Assessed Today: Yes  Is the patient at risk for hypoglycemia?: Yes  Hypoglycemia Frequency: Rarely  Hypoglycemia Treatment: Candy (Candy and then a snack or meal)  Patient carries a carbohydrate source: Yes  Medical ID: No  Does patient have DKA prevention plan?: No  Does patient have severe weather/disaster plan for diabetes management?: No    Hypoglycemia symptoms  Hunger: Yes  Feeling shaky: Yes    Reducing Risks:  Reducing Risks Assessed Today: No  Diabetes Risks: Age over 45 years,Family History (Aunts)  CAD  Risks: Diabetes Mellitus,Male sex  Has dilated eye exam at least once a year?: No (Scheduled April 2022)  Sees dentist every 6 months?: No  Feet checked by healthcare provider in the last year?: Yes    Healthy Coping:  Healthy Coping Assessed Today: Yes  Emotional response to diabetes: Acceptance  Informal Support system:: Children  Stage of change: MAINTENANCE (Working to maintain change, with risk of relapse)  Support resources: None  Patient Activation Measure Survey Score:  EZ Score (Last Two) 6/28/2011 10/31/2018   EZ Raw Score 46 38   Activation Score 75.3 83.7   EZ Level 4 4       Diabetes knowledge and skills assessment:   Patient is knowledgeable in diabetes management concepts related to: Healthy Eating, Being Active, Taking Medication, Problem Solving and Healthy Coping    Patient needs further education on the following diabetes management concepts: Monitoring and Reducing Risks    Based on learning assessment above, most appropriate setting for further diabetes education would be: Individual setting.      INTERVENTIONS:    Education provided today on:  AADE Self-Care Behaviors:  Monitoring: individual blood glucose targets and frequency of monitoring  Taking Medication: Discussed increasing Victoza to 1.8 mg daily r/t increased A1c.  Pt declines at this time.   Reducing Risks: annual eye exam, A1C - goals, relating to blood glucose levels, how often to check and blood pressure and goals    Opportunities for ongoing education and support in diabetes-self management were discussed.    Pt verbalized understanding of concepts discussed and recommendations provided today.       Education Materials Provided:  No new materials today.     ASSESSMENT:  Recent A1c was 7.4%.  A1c has been > or at 7.0% for last four checks.  Discussed increasing Victoza to 1.8 mg daily but pt declines for now.  Encouraged him to consider if next A1c remains above 7.0%.  He does seem to follow a healthy diet and gets some  exercise.  Eye exam is due and scheduled.      Patient's most recent   Lab Results   Component Value Date    A1C 7.4 01/28/2022    A1C 7.7 04/15/2021    is not meeting goal of <7.0    PLAN  Continue to eat a healthy, low carbohydrate diet.   Work towards 30 minutes exercise most days of the week.  Test glucose 1x/day - alternate times.   Consider increase in Victoza to 1.8 mg daily if next A1c > 7.0%.   Test glucose See Patient Instructions for co-developed, patient-stated behavior change goals.  AVS printed and provided to patient today.   Follow up annually or sooner if indicated.     Time Spent: 30 minutes  Encounter Type: Individual    Any diabetes medication dose changes were made via the CDE Protocol and Collaborative Practice Agreement with the patient's referring provider. A copy of this encounter was shared with the provider.

## 2022-02-01 NOTE — PATIENT INSTRUCTIONS
-Keep limiting foods high in carbohydrates.    -Continue to get some exercise most days of the week.  30 minutes per day is a good goal.  -Test glucose 1x/day- good times are fasting or 2 hours after a meal.  -Target levels are fasting  and 2 hours after a meal < 180.  -Recent A1c was 7.4%.  Goal is < 7.0%.  Consider increase Victoza to 1.8 mg daily if still elevated at next check.  -Follow up annually or sooner if needed.  -Call with any concerns - MARIZA Nichols, [phone].

## 2022-04-05 ENCOUNTER — TRANSFERRED RECORDS (OUTPATIENT)
Dept: HEALTH INFORMATION MANAGEMENT | Facility: HOSPITAL | Age: 67
End: 2022-04-05
Payer: COMMERCIAL

## 2022-04-05 LAB — RETINOPATHY: NEGATIVE

## 2022-04-27 DIAGNOSIS — I10 BENIGN ESSENTIAL HYPERTENSION: ICD-10-CM

## 2022-04-27 DIAGNOSIS — E11.65 TYPE 2 DIABETES MELLITUS WITH HYPERGLYCEMIA, WITHOUT LONG-TERM CURRENT USE OF INSULIN (H): ICD-10-CM

## 2022-04-27 DIAGNOSIS — E78.5 HYPERLIPIDEMIA LDL GOAL <100: ICD-10-CM

## 2022-04-27 RX ORDER — LIRAGLUTIDE 6 MG/ML
INJECTION SUBCUTANEOUS
Qty: 6 ML | Refills: 11 | Status: SHIPPED | OUTPATIENT
Start: 2022-04-27 | End: 2023-04-27

## 2022-04-27 NOTE — TELEPHONE ENCOUNTER
Victoza Pen      Last Written Prescription Date:  12/29/21  Last Fill Quantity: 6ml,   # refills: 3  Last Office Visit: 1/28/22  Future Office visit:    Next 5 appointments (look out 90 days)    May 05, 2022  8:45 AM  (Arrive by 8:30 AM)  SHORT with Kelly Yarbrough MD  [facility] [place] ([facility] [place] ) [address]  [place] [address]  [phone]

## 2022-04-28 RX ORDER — ATORVASTATIN CALCIUM 40 MG/1
TABLET, FILM COATED ORAL
Qty: 90 TABLET | Refills: 0 | Status: SHIPPED | OUTPATIENT
Start: 2022-04-28 | End: 2022-08-08

## 2022-04-28 RX ORDER — GLIPIZIDE 10 MG/1
TABLET, FILM COATED, EXTENDED RELEASE ORAL
Qty: 180 TABLET | Refills: 3 | Status: SHIPPED | OUTPATIENT
Start: 2022-04-28 | End: 2023-04-27

## 2022-04-28 RX ORDER — LISINOPRIL 40 MG/1
TABLET ORAL
Qty: 90 TABLET | Refills: 3 | Status: SHIPPED | OUTPATIENT
Start: 2022-04-28 | End: 2023-04-27

## 2022-04-28 NOTE — TELEPHONE ENCOUNTER
lisinopril      Last Written Prescription Date:  1/28/22  Last Fill Quantity: 90,   # refills: 3  Last Office Visit: 1/28/22  Future Office visit:    Next 5 appointments (look out 90 days)    May 05, 2022  8:45 AM  (Arrive by 8:30 AM)  SHORT with Kelly Yarbrough MD  [facility] ([facility] ) [address]  [place] [address]  [phone]           Routing refill request to provider for review/approval because:    metformin      Last Written Prescription Date:  1/28/22  Last Fill Quantity: 180,   # refills: 3    Atorvastatin       Last Written Prescription Date:  1/28/22  Last Fill Quantity: 90,   # refills: 0    Glipizide      Last Written Prescription Date:  1/28/22  Last Fill Quantity: 180,   # refills: 3

## 2022-05-06 ENCOUNTER — ALLIED HEALTH/NURSE VISIT (OUTPATIENT)
Dept: FAMILY MEDICINE | Facility: OTHER | Age: 67
End: 2022-05-06
Attending: FAMILY MEDICINE
Payer: COMMERCIAL

## 2022-05-06 DIAGNOSIS — M79.10 MUSCLE PAIN: Primary | ICD-10-CM

## 2022-05-06 PROCEDURE — U0003 INFECTIOUS AGENT DETECTION BY NUCLEIC ACID (DNA OR RNA); SEVERE ACUTE RESPIRATORY SYNDROME CORONAVIRUS 2 (SARS-COV-2) (CORONAVIRUS DISEASE [COVID-19]), AMPLIFIED PROBE TECHNIQUE, MAKING USE OF HIGH THROUGHPUT TECHNOLOGIES AS DESCRIBED BY CMS-2020-01-R: HCPCS | Mod: ZL

## 2022-05-06 PROCEDURE — C9803 HOPD COVID-19 SPEC COLLECT: HCPCS

## 2022-05-06 NOTE — PROGRESS NOTES
Patient here today for Covid test. Having body aches and sore throat.    Josefina Pritchard CNA .............................on 5/6/2022 at 9:38 AM

## 2022-05-07 LAB — SARS-COV-2 RNA RESP QL NAA+PROBE: NEGATIVE

## 2022-05-09 ENCOUNTER — MYC MEDICAL ADVICE (OUTPATIENT)
Dept: FAMILY MEDICINE | Facility: OTHER | Age: 67
End: 2022-05-09
Payer: COMMERCIAL

## 2022-05-10 NOTE — TELEPHONE ENCOUNTER
Called patient back, patient just wanted to know if he had to wear a mask when he comes in to the clinic.

## 2022-05-11 ENCOUNTER — OFFICE VISIT (OUTPATIENT)
Dept: FAMILY MEDICINE | Facility: OTHER | Age: 67
End: 2022-05-11
Attending: FAMILY MEDICINE
Payer: COMMERCIAL

## 2022-05-11 ENCOUNTER — LAB (OUTPATIENT)
Dept: LAB | Facility: OTHER | Age: 67
End: 2022-05-11
Payer: COMMERCIAL

## 2022-05-11 VITALS
TEMPERATURE: 97.3 F | HEIGHT: 70 IN | DIASTOLIC BLOOD PRESSURE: 70 MMHG | RESPIRATION RATE: 20 BRPM | OXYGEN SATURATION: 94 % | WEIGHT: 236 LBS | BODY MASS INDEX: 33.79 KG/M2 | HEART RATE: 78 BPM | SYSTOLIC BLOOD PRESSURE: 126 MMHG

## 2022-05-11 DIAGNOSIS — I10 BENIGN ESSENTIAL HYPERTENSION: ICD-10-CM

## 2022-05-11 DIAGNOSIS — E11.65 TYPE 2 DIABETES MELLITUS WITH HYPERGLYCEMIA, WITHOUT LONG-TERM CURRENT USE OF INSULIN (H): Primary | ICD-10-CM

## 2022-05-11 DIAGNOSIS — E78.5 HYPERLIPIDEMIA LDL GOAL <100: ICD-10-CM

## 2022-05-11 DIAGNOSIS — E11.65 TYPE 2 DIABETES MELLITUS WITH HYPERGLYCEMIA, WITHOUT LONG-TERM CURRENT USE OF INSULIN (H): ICD-10-CM

## 2022-05-11 LAB
EST. AVERAGE GLUCOSE BLD GHB EST-MCNC: 163 MG/DL
HBA1C MFR BLD: 7.3 % (ref 0–5.6)

## 2022-05-11 PROCEDURE — 83036 HEMOGLOBIN GLYCOSYLATED A1C: CPT | Mod: ZL

## 2022-05-11 PROCEDURE — 99214 OFFICE O/P EST MOD 30 MIN: CPT | Performed by: FAMILY MEDICINE

## 2022-05-11 PROCEDURE — 36415 COLL VENOUS BLD VENIPUNCTURE: CPT | Mod: ZL

## 2022-05-11 PROCEDURE — G0463 HOSPITAL OUTPT CLINIC VISIT: HCPCS | Performed by: FAMILY MEDICINE

## 2022-05-11 ASSESSMENT — PAIN SCALES - GENERAL: PAINLEVEL: NO PAIN (0)

## 2022-05-11 NOTE — NURSING NOTE
"Chief Complaint   Patient presents with     Hypertension     Diabetes       Initial /70 (BP Location: Right arm, Patient Position: Sitting, Cuff Size: Adult Large)   Pulse 78   Temp 97.3  F (36.3  C) (Tympanic)   Resp 20   Ht 1.778 m (5' 10\")   Wt 107 kg (236 lb)   SpO2 94%   BMI 33.86 kg/m   Estimated body mass index is 33.86 kg/m  as calculated from the following:    Height as of this encounter: 1.778 m (5' 10\").    Weight as of this encounter: 107 kg (236 lb).  Medication Reconciliation: complete  Avis Ramos LPN    "

## 2022-08-04 DIAGNOSIS — R97.20 ELEVATED PROSTATE SPECIFIC ANTIGEN (PSA): Primary | ICD-10-CM

## 2022-08-04 NOTE — PROGRESS NOTES
"Per last office visit  09/27/21 with Yunior Llanes MD \"Plan  PSA annually  Continue sildenafil for ED.\"        Orders Placed  Beatriz Pollock LPN on 8/4/2022 at 8:20 AM    "

## 2022-08-05 DIAGNOSIS — E78.5 HYPERLIPIDEMIA LDL GOAL <100: ICD-10-CM

## 2022-08-08 RX ORDER — ATORVASTATIN CALCIUM 40 MG/1
TABLET, FILM COATED ORAL
Qty: 90 TABLET | Refills: 3 | Status: SHIPPED | OUTPATIENT
Start: 2022-08-08 | End: 2023-07-27

## 2022-08-08 NOTE — TELEPHONE ENCOUNTER
Lipitor      Last Written Prescription Date:  4/28/22  Last Fill Quantity: 90,   # refills: 0  Last Office Visit: 5/11/22  Future Office visit:    Next 5 appointments (look out 90 days)    Sep 12, 2022  8:15 AM  (Arrive by 8:00 AM)  SHORT with Kelly [facility]rough [facility]  [facility] [address] [facility] [address]  [phone]   Sep 26, 2022 11:30 AM  Return Visit with Yunior Llanes MD  [facility] ([facility] ) [address]  [place] [address] [phone]           Routing refill request to provider for review/approval because:

## 2022-08-25 ENCOUNTER — TRANSFERRED RECORDS (OUTPATIENT)
Dept: HEALTH INFORMATION MANAGEMENT | Facility: CLINIC | Age: 67
End: 2022-08-25

## 2022-10-10 DIAGNOSIS — E11.9 TYPE 2 DIABETES MELLITUS WITHOUT COMPLICATION, WITHOUT LONG-TERM CURRENT USE OF INSULIN (H): ICD-10-CM

## 2022-10-10 RX ORDER — BLOOD SUGAR DIAGNOSTIC
STRIP MISCELLANEOUS
Qty: 100 STRIP | Refills: 0 | Status: SHIPPED | OUTPATIENT
Start: 2022-10-10 | End: 2024-02-02

## 2022-10-31 ENCOUNTER — LAB (OUTPATIENT)
Dept: LAB | Facility: OTHER | Age: 67
End: 2022-10-31
Attending: UROLOGY
Payer: COMMERCIAL

## 2022-10-31 ENCOUNTER — OFFICE VISIT (OUTPATIENT)
Dept: UROLOGY | Facility: OTHER | Age: 67
End: 2022-10-31
Attending: UROLOGY
Payer: COMMERCIAL

## 2022-10-31 VITALS
HEART RATE: 85 BPM | BODY MASS INDEX: 34.01 KG/M2 | WEIGHT: 237 LBS | SYSTOLIC BLOOD PRESSURE: 130 MMHG | DIASTOLIC BLOOD PRESSURE: 80 MMHG | OXYGEN SATURATION: 94 %

## 2022-10-31 DIAGNOSIS — E78.5 HYPERLIPIDEMIA LDL GOAL <100: ICD-10-CM

## 2022-10-31 DIAGNOSIS — N52.9 ED (ERECTILE DYSFUNCTION) OF ORGANIC ORIGIN: ICD-10-CM

## 2022-10-31 DIAGNOSIS — R97.20 ELEVATED PROSTATE SPECIFIC ANTIGEN (PSA): Primary | ICD-10-CM

## 2022-10-31 DIAGNOSIS — R97.20 ELEVATED PROSTATE SPECIFIC ANTIGEN (PSA): ICD-10-CM

## 2022-10-31 LAB
HOLD SPECIMEN: NORMAL
PSA SERPL-MCNC: 3.8 UG/L (ref 0–4)

## 2022-10-31 PROCEDURE — G0463 HOSPITAL OUTPT CLINIC VISIT: HCPCS

## 2022-10-31 PROCEDURE — 36415 COLL VENOUS BLD VENIPUNCTURE: CPT | Mod: ZL

## 2022-10-31 PROCEDURE — 99213 OFFICE O/P EST LOW 20 MIN: CPT | Performed by: UROLOGY

## 2022-10-31 PROCEDURE — 84153 ASSAY OF PSA TOTAL: CPT | Mod: ZL

## 2022-10-31 RX ORDER — SILDENAFIL CITRATE 20 MG/1
TABLET ORAL
Qty: 30 TABLET | Refills: 11 | Status: SHIPPED | OUTPATIENT
Start: 2022-10-31

## 2022-10-31 ASSESSMENT — PAIN SCALES - GENERAL: PAINLEVEL: NO PAIN (0)

## 2022-10-31 NOTE — PROGRESS NOTES
Type of Visit  EST    Chief Complaint  Elevated PSA  ED    HPI  Mr. Hannah is a 67 year old male (-TRUS in 2019) who follows up with an elevated PSA and ED.  He does not have a family history of prostate cancer.  The patient has not previously undergone prostate biopsy.  Patient underwent a PSA earlier today.  He denies acute urinary symptoms such as dysuria.    He also is being treated for ED.  Sildenafil 20 mg has been satisfactory for management.  He typically takes 3 to 4 tablets per dose.  He denies side effects with that dose and reports effectiveness.      Review of Systems  I personally reviewed the ROS with the patient.    Nursing Notes:   Salma Garcia LPN  10/31/2022  2:48 PM  Addendum  Pt presents to clinic for a one year elevated PSA follow up  Review of Systems:    Weight loss:    No     Recent fever/chills:  No   Night sweats:   No  Current skin rash:  No   Recent hair loss:  No  Heat intolerance:  No   Cold intolerance:  No  Chest pain:   No   Palpitations:   No  Shortness of breath:  No   Wheezing:   No  Constipation:    No   Diarrhea:   No   Nausea:   No   Vomiting:   No   Kidney/side pain:  Yes   Back pain:   No  Frequent headaches:  No   Dizziness:     No  Leg swelling:   No   Calf pain:    No        Physical Exam  Vitals:    10/31/22 1448   BP: 130/80   BP Location: Left arm   Patient Position: Sitting   Cuff Size: Adult Large   Pulse: 85   SpO2: 94%   Weight: 107.5 kg (237 lb)   Constitutional: No acute distress.  Alert and cooperative   Pulmonary/Chest: Respirations are even and non-labored bilaterally, no audible wheezing  Abdominal: Soft. No distension, tenderness, masses or guarding.   Extremities: JIMBO x 4, Warm. No clubbing.  No cyanosis.    Skin: Pink, warm and dry.  No visible rashes noted.  Back:  No left CVA tenderness.  No right CVA tenderness.  Genitourinary:  Nonpalpable bladder  Prostate:          40 grams, symmetric, no nodules or induration    Labs   10/31/22 12:56   PSA  3.80     Results for LUCAS HANNAH (MRN [id number]) as of 9/27/2021 11:18   9/27/2021 09:11   PSA 3.93     Results for LUCAS HANNAH (MRN [id number]) as of 9/9/2020 11:34   9/9/2020 09:33   PSA 3.926 (H)     Results for LUCAS HANNAH (MRN [id number]) as of 9/11/2019 15:03   8/28/2019 08:48 9/11/2019 10:30   PSA 5.04 (H) 5.33 (H)     Results for LUCAS HANNAH (MRN [id number]) as of 9/11/2019 09:59   2/2/2007 08:37   PSA 1.10     Pathology  TRUS  10/22/2019  12 cores negative  49 gram prostate    Assessment  Mr. Hannah is a 67 year old male (-TRUS in 2019) who follows up with an elevated PSA and ED.  Patient's PSA is stable and ED management is at goal.    Discussed following up as needed but the patient prefers annual visits.    Plan  PSA annually  Continue sildenafil for ED-refill sent

## 2022-10-31 NOTE — NURSING NOTE
Pt presents to clinic for a one year elevated PSA follow up  Review of Systems:    Weight loss:    No     Recent fever/chills:  No   Night sweats:   No  Current skin rash:  No   Recent hair loss:  No  Heat intolerance:  No   Cold intolerance:  No  Chest pain:   No   Palpitations:   No  Shortness of breath:  No   Wheezing:   No  Constipation:    No   Diarrhea:   No   Nausea:   No   Vomiting:   No   Kidney/side pain:  Yes   Back pain:   No  Frequent headaches:  No   Dizziness:     No  Leg swelling:   No   Calf pain:    No

## 2022-11-18 ENCOUNTER — HOSPITAL ENCOUNTER (EMERGENCY)
Facility: OTHER | Age: 67
Discharge: HOME | End: 2022-11-18
Attending: INTERNAL MEDICINE | Admitting: INTERNAL MEDICINE
Payer: COMMERCIAL

## 2022-11-18 VITALS
HEART RATE: 78 BPM | BODY MASS INDEX: 33.72 KG/M2 | TEMPERATURE: 97.5 F | OXYGEN SATURATION: 95 % | RESPIRATION RATE: 16 BRPM | DIASTOLIC BLOOD PRESSURE: 98 MMHG | WEIGHT: 235 LBS | SYSTOLIC BLOOD PRESSURE: 172 MMHG

## 2022-11-18 DIAGNOSIS — N20.0 RECURRENT KIDNEY STONES: ICD-10-CM

## 2022-11-18 DIAGNOSIS — I10 BENIGN ESSENTIAL HYPERTENSION: ICD-10-CM

## 2022-11-18 DIAGNOSIS — R31.29 MICROSCOPIC HEMATURIA: Primary | ICD-10-CM

## 2022-11-18 DIAGNOSIS — E11.65 TYPE 2 DIABETES MELLITUS WITH HYPERGLYCEMIA, WITHOUT LONG-TERM CURRENT USE OF INSULIN (H): ICD-10-CM

## 2022-11-18 LAB
ALBUMIN UR-MCNC: 10 MG/DL
APPEARANCE UR: CLEAR
BILIRUB UR QL STRIP: NEGATIVE
COLOR UR AUTO: ABNORMAL
GLUCOSE UR STRIP-MCNC: >1000 MG/DL
HGB UR QL STRIP: ABNORMAL
KETONES UR STRIP-MCNC: ABNORMAL MG/DL
LEUKOCYTE ESTERASE UR QL STRIP: NEGATIVE
MUCOUS THREADS #/AREA URNS LPF: PRESENT /LPF
NITRATE UR QL: NEGATIVE
PH UR STRIP: 6 [PH] (ref 5–9)
RBC URINE: >182 /HPF
SP GR UR STRIP: 1.03 (ref 1–1.03)
UROBILINOGEN UR STRIP-MCNC: NORMAL MG/DL
WBC URINE: 1 /HPF

## 2022-11-18 PROCEDURE — 99283 EMERGENCY DEPT VISIT LOW MDM: CPT | Performed by: INTERNAL MEDICINE

## 2022-11-18 PROCEDURE — 81001 URINALYSIS AUTO W/SCOPE: CPT | Performed by: INTERNAL MEDICINE

## 2022-11-18 ASSESSMENT — ACTIVITIES OF DAILY LIVING (ADL): ADLS_ACUITY_SCORE: 33

## 2022-11-18 NOTE — DISCHARGE INSTRUCTIONS
Recommend follow-up with urology clinic, Dr. Llanes -hematuria, and recurrent kidney stones    Recommend follow-up with your primary care provider for diabetes follow-up, high urine glucose levels.    Increase oral hydration.  Urinalysis was quite concentrated today.    Results for orders placed or performed during the hospital encounter of 11/18/22   UA reflex to Microscopic     Status: Abnormal   Result Value Ref Range    Color Urine Dark Yellow (A) Colorless, Straw, Light Yellow, Yellow    Appearance Urine Clear Clear    Glucose Urine >1000 (A) Negative mg/dL    Bilirubin Urine Negative Negative    Ketones Urine Trace (A) Negative mg/dL    Specific Gravity Urine 1.033 (H) 1.000 - 1.030    Blood Urine Large (A) Negative    pH Urine 6.0 5.0 - 9.0    Protein Albumin Urine 10 (A) Negative mg/dL    Urobilinogen Urine Normal Normal, 2.0 mg/dL    Nitrite Urine Negative Negative    Leukocyte Esterase Urine Negative Negative    RBC Urine >182 (H) <=2 /HPF    WBC Urine 1 <=5 /HPF    Mucus Urine Present (A) None Seen /LPF

## 2022-11-18 NOTE — ED PROVIDER NOTES
Emergency Department Provider Note  : 1955 Age: 67 year old Sex: male MRN: [id number]    Chief Complaint   Patient presents with     Hematuria       Medical Decision Making / Assessment / Plan   67 year old male presenting with hematuria, recurrent kidney stones, hypertension, diabetes with hyperglycemia    ED Course as of 22 1417      1326 Patient evaluated.  Has been having some right flank pain that started up yesterday.  He took some Aleve last evening.  Pain got to a maximum of about 2 out of 10.  Woke up this morning at about 5 AM with some hematuria.  Had a number of episodes of hematuria since that time.  Was concerned this is going to continue.  He has had to come in for evaluation.  Reports right flank pain now 0-1 out of 10.  Basically gone.  No radiation of pain.  Denies fevers or chills.    He was seen at the end of September and before ER with kidney stones.  Evidently had a few kidney stones in his left kidney, gallstones, possibly some right-sided kidney stones as well.  States that after few hours he possibly passed it because the pain resolved.  I do not have these reports available or imaging available for review.    Urinalysis ordered and collected.    Given that he is basically pain-free at this time, as long as his urinalysis does not show signs of infection, would recommend clinic follow-up with urology regarding kidney stones, hematuria.   1402 Urinalysis shows large amounts of blood.  Greater than 180 RBCs.  Trace ketones, urine is concentrated with high amounts of glucose.   1415 No indication for CT abdomen/pelvis at this time.  Recommend close clinic follow-up.  Return as needed for new or worsening symptoms.        The patient was informed of the plan and verbalized understanding and agreed with the plan. The patient was given strict return to Emergency Department precautions as well as appropriate follow up instructions. The patient was discharged in stable  condition.    New Prescriptions    No medications on file       Final diagnoses:   Microscopic hematuria   Recurrent kidney stones   Benign essential hypertension   Type 2 diabetes mellitus with hyperglycemia, without long-term current use of insulin (H)       Michael Clifton MD  11/18/2022   Emergency Department    Chantale Leon is a 67 year old male who presents at  1:00 PM with hematuria throughout the day today.  Started at about 5 AM.  Right flank pain started last evening.    History of kidney stones, diagnosed towards end of September, per patient's report, [facility] emergency room.  Had CT scan at that time.  These results are not currently available for review.    Denies fevers or chills, no nausea or vomiting.  Pain is localized in the right CVA area.  No radiation.  Basically resolved at this time 0-1/10.    I have reviewed the Medications, Allergies, Past Medical and Surgical History, and Social History in the [facility] and with family.    Review of Systems:  Please see Subjective / HPI for pertinent positives and negatives. All other systems reviewed and found to be negative.      Objective     Patient Vitals for the past 24 hrs:   BP Temp Temp src Pulse Resp SpO2 Weight   11/18/22 1102 (!) 172/98 97.5  F (36.4  C) Tympanic 78 16 95 % 106.6 kg (235 lb)       Physical Exam:   General: Awake, alert, in no acute distress.  Head: Normocephalic, atraumatic.  Eyes: Conjugate gaze.  Chest/Respiratory: Equal chest rise, clear bilateral.  Cardiovascular: Peripheral pulses present, regular rate and rhythm.  Abdominal: Soft, non-distended, non-tender, no CVA tenderness to percussion.  Extremities: No obvious deformity.  Neurological: GCS 15, moving all extremities without gross deficit.  Skin: Warm, no rashes, lesions, or bruising.   Psychiatric: Appropriate affect.     Procedures / Critical Care   Procedures    Aggregate Critical Care Time: None.     Orders Placed This Encounter   Procedures     UA  reflex to Microscopic     Adult Urology  Referral       RESULTS:  Results for orders placed or performed during the hospital encounter of 11/18/22   UA reflex to Microscopic     Status: Abnormal   Result Value Ref Range    Color Urine Dark Yellow (A) Colorless, Straw, Light Yellow, Yellow    Appearance Urine Clear Clear    Glucose Urine >1000 (A) Negative mg/dL    Bilirubin Urine Negative Negative    Ketones Urine Trace (A) Negative mg/dL    Specific Gravity Urine 1.033 (H) 1.000 - 1.030    Blood Urine Large (A) Negative    pH Urine 6.0 5.0 - 9.0    Protein Albumin Urine 10 (A) Negative mg/dL    Urobilinogen Urine Normal Normal, 2.0 mg/dL    Nitrite Urine Negative Negative    Leukocyte Esterase Urine Negative Negative    RBC Urine >182 (H) <=2 /HPF    WBC Urine 1 <=5 /HPF    Mucus Urine Present (A) None Seen /LPF             Medical/Surgical History:  Past Medical History:   Diagnosis Date     Basal cell carcinoma      DIABETES MELLITUS TYPE II-UNCOMPL 1/19/2006     Elevated PSA 2019     HYPERLIPIDEMIA NEC/NOS 10/18/2006     HYPERTENSION NOS 1/19/2006     Obesity      Shingles 11/2017     Past Surgical History:   Procedure Laterality Date     BIOPSY PROSTATE TRANSRECTAL N/A 10/22/2019    Procedure: Transrectal Ultrasoun guided biopsy of prostate;  Surgeon: Yunior Llanes MD;  Location:  OR     COLONOSCOPY  3-3-2014    due 2019  4 polyps benign     COLONOSCOPY N/A 6/10/2021    Procedure: surveillance colonoscopy,  biopsy;  Surgeon: Lawson Storey MD;  Location: [facility] OR     wisdom teeth extraction         Medications:  No current facility-administered medications for this encounter.     Current Outpatient Medications   Medication     acetylcysteine (N-ACETYL CYSTEINE) 500 MG CAPS capsule     amLODIPine (NORVASC) 2.5 MG tablet     ASPIRIN 81 MG OR TABS     atorvastatin (LIPITOR) 40 MG tablet     BD PEN NEEDLE TYRONE 2ND GEN 32G X 4 MM miscellaneous     blood glucose (ONETOUCH VERIO IQ) test strip     Blood  Glucose Monitoring Suppl (ONETOUCH VERIO FLEX SYSTEM) w/Device KIT     Blood Pressure Monitor KIT     glipiZIDE (GLUCOTROL XL) 10 MG 24 hr tablet     liraglutide (VICTOZA PEN) 18 MG/3ML solution     lisinopril (ZESTRIL) 40 MG tablet     metFORMIN (GLUCOPHAGE) 1000 MG tablet     Multiple Vitamins-Minerals (MULTIVITAMIN PO)     Omega-3 Fatty Acids (OMEGA-3 FISH OIL PO)     OneTouch Delica Lancets 33G MISC     sildenafil (REVATIO) 20 MG tablet     Turmeric 500 MG TABS     vitamin B complex with vitamin C (STRESS TAB) tablet     VITAMIN D, CHOLECALCIFEROL, PO       Allergies:  Nkda [no known drug allergies]    Relevant labs, images, EKGs, Epic and outside hospital (if applicable) charts were reviewed. The findings, diagnosis, plan, and need for follow up were discussed with the patient/family. Nursing notes were reviewed.      Michael Clifton MD  11/18/22 [id number]

## 2022-11-18 NOTE — ED TRIAGE NOTES
Pt presents to ED from home for c/o blood in urine. Started this morning along with minimal right flank pain. Hx kidney stones in September.  BP (!) 172/98   Pulse 78   Temp 97.5  F (36.4  C) (Tympanic)   Resp 16   Wt 106.6 kg (235 lb)   SpO2 95%   BMI 33.72 kg/m         Triage Assessment     Row Name 11/18/22 1101       Triage Assessment (Adult)    Airway WDL WDL       Respiratory WDL    Respiratory WDL WDL       Skin Circulation/Temperature WDL    Skin Circulation/Temperature WDL WDL       Cardiac WDL    Cardiac WDL WDL       Peripheral/Neurovascular WDL    Peripheral Neurovascular WDL WDL       Cognitive/Neuro/Behavioral WDL    Cognitive/Neuro/Behavioral WDL WDL

## 2022-11-20 ENCOUNTER — HEALTH MAINTENANCE LETTER (OUTPATIENT)
Age: 67
End: 2022-11-20

## 2022-11-29 DIAGNOSIS — E11.65 TYPE 2 DIABETES MELLITUS WITH HYPERGLYCEMIA, WITHOUT LONG-TERM CURRENT USE OF INSULIN (H): ICD-10-CM

## 2022-11-30 RX ORDER — PEN NEEDLE, DIABETIC 32GX 5/32"
NEEDLE, DISPOSABLE MISCELLANEOUS
Qty: 100 EACH | Refills: 3 | Status: SHIPPED | OUTPATIENT
Start: 2022-11-30 | End: 2024-02-02

## 2022-11-30 NOTE — TELEPHONE ENCOUNTER
BD PEN NEEDLE TYRONE 2ND GEN      Last Written Prescription Date:  9/1/22  Last Fill Quantity: 100,   # refills: 3  Last Office Visit: 10/31/22  Future Office visit:    Next 5 appointments (look out 90 days)    Dec 19, 2022 11:15 AM  Return Visit with Yunior Llanes MD  [facility] ([facility] ) [address]  [place] [address]  [phone]   Jan 05, 2023  9:30 AM  (Arrive by 9:15 AM)  SHORT with Kelly Yarbrough MD  [facility] [place] ([facility] [place] ) [address]  [place] [address]  [phone]           Routing refill request to provider for review/approval because:

## 2023-01-05 ENCOUNTER — OFFICE VISIT (OUTPATIENT)
Dept: FAMILY MEDICINE | Facility: OTHER | Age: 68
End: 2023-01-05
Attending: FAMILY MEDICINE
Payer: COMMERCIAL

## 2023-01-05 ENCOUNTER — LAB (OUTPATIENT)
Dept: LAB | Facility: OTHER | Age: 68
End: 2023-01-05
Attending: FAMILY MEDICINE
Payer: COMMERCIAL

## 2023-01-05 VITALS
BODY MASS INDEX: 32.93 KG/M2 | TEMPERATURE: 97.3 F | DIASTOLIC BLOOD PRESSURE: 72 MMHG | HEIGHT: 70 IN | HEART RATE: 71 BPM | WEIGHT: 230 LBS | SYSTOLIC BLOOD PRESSURE: 128 MMHG | OXYGEN SATURATION: 96 %

## 2023-01-05 DIAGNOSIS — K80.20 CALCULUS OF GALLBLADDER WITHOUT CHOLECYSTITIS WITHOUT OBSTRUCTION: ICD-10-CM

## 2023-01-05 DIAGNOSIS — E66.01 MORBID OBESITY (H): ICD-10-CM

## 2023-01-05 DIAGNOSIS — E11.65 TYPE 2 DIABETES MELLITUS WITH HYPERGLYCEMIA, WITHOUT LONG-TERM CURRENT USE OF INSULIN (H): ICD-10-CM

## 2023-01-05 DIAGNOSIS — E78.5 HYPERLIPIDEMIA LDL GOAL <100: Primary | ICD-10-CM

## 2023-01-05 DIAGNOSIS — E55.9 HYPOVITAMINOSIS D: ICD-10-CM

## 2023-01-05 DIAGNOSIS — I10 BENIGN ESSENTIAL HYPERTENSION: ICD-10-CM

## 2023-01-05 DIAGNOSIS — Z13.220 SCREENING FOR HYPERLIPIDEMIA: ICD-10-CM

## 2023-01-05 DIAGNOSIS — Z23 NEED FOR PROPHYLACTIC VACCINATION AND INOCULATION AGAINST INFLUENZA: ICD-10-CM

## 2023-01-05 LAB
ALBUMIN SERPL BCG-MCNC: 4.5 G/DL (ref 3.5–5.2)
ALP SERPL-CCNC: 87 U/L (ref 40–129)
ALT SERPL W P-5'-P-CCNC: 27 U/L (ref 10–50)
ANION GAP SERPL CALCULATED.3IONS-SCNC: 10 MMOL/L (ref 7–15)
AST SERPL W P-5'-P-CCNC: 16 U/L (ref 10–50)
BILIRUB SERPL-MCNC: 0.7 MG/DL
BUN SERPL-MCNC: 17.6 MG/DL (ref 8–23)
CALCIUM SERPL-MCNC: 9.7 MG/DL (ref 8.8–10.2)
CHLORIDE SERPL-SCNC: 101 MMOL/L (ref 98–107)
CHOLEST SERPL-MCNC: 140 MG/DL
CREAT SERPL-MCNC: 0.91 MG/DL (ref 0.67–1.17)
DEPRECATED HCO3 PLAS-SCNC: 27 MMOL/L (ref 22–29)
EST. AVERAGE GLUCOSE BLD GHB EST-MCNC: 169 MG/DL
GFR SERPL CREATININE-BSD FRML MDRD: >90 ML/MIN/1.73M2
GLUCOSE SERPL-MCNC: 164 MG/DL (ref 70–99)
HBA1C MFR BLD: 7.5 %
HDLC SERPL-MCNC: 47 MG/DL
LDLC SERPL CALC-MCNC: 72 MG/DL
NONHDLC SERPL-MCNC: 93 MG/DL
POTASSIUM SERPL-SCNC: 4.4 MMOL/L (ref 3.4–5.3)
PROT SERPL-MCNC: 7.5 G/DL (ref 6.4–8.3)
SODIUM SERPL-SCNC: 138 MMOL/L (ref 136–145)
TRIGL SERPL-MCNC: 106 MG/DL
TSH SERPL DL<=0.005 MIU/L-ACNC: 1.78 UIU/ML (ref 0.3–4.2)

## 2023-01-05 PROCEDURE — 80053 COMPREHEN METABOLIC PANEL: CPT | Mod: ZL

## 2023-01-05 PROCEDURE — 83036 HEMOGLOBIN GLYCOSYLATED A1C: CPT | Mod: ZL

## 2023-01-05 PROCEDURE — 36415 COLL VENOUS BLD VENIPUNCTURE: CPT | Mod: ZL

## 2023-01-05 PROCEDURE — 99214 OFFICE O/P EST MOD 30 MIN: CPT | Performed by: FAMILY MEDICINE

## 2023-01-05 PROCEDURE — 80061 LIPID PANEL: CPT | Mod: ZL

## 2023-01-05 PROCEDURE — 84443 ASSAY THYROID STIM HORMONE: CPT | Mod: ZL

## 2023-01-05 PROCEDURE — 90662 IIV NO PRSV INCREASED AG IM: CPT

## 2023-01-05 PROCEDURE — G0463 HOSPITAL OUTPT CLINIC VISIT: HCPCS | Mod: 25 | Performed by: FAMILY MEDICINE

## 2023-01-05 PROCEDURE — 82306 VITAMIN D 25 HYDROXY: CPT | Mod: ZL | Performed by: FAMILY MEDICINE

## 2023-01-05 ASSESSMENT — PAIN SCALES - GENERAL: PAINLEVEL: NO PAIN (0)

## 2023-01-06 LAB — DEPRECATED CALCIDIOL+CALCIFEROL SERPL-MC: 42 UG/L (ref 20–75)

## 2023-01-23 ENCOUNTER — TELEPHONE (OUTPATIENT)
Dept: FAMILY MEDICINE | Facility: OTHER | Age: 68
End: 2023-01-23

## 2023-01-23 DIAGNOSIS — E11.65 TYPE 2 DIABETES MELLITUS WITH HYPERGLYCEMIA, WITHOUT LONG-TERM CURRENT USE OF INSULIN (H): Primary | ICD-10-CM

## 2023-01-29 DIAGNOSIS — I10 BENIGN ESSENTIAL HYPERTENSION: ICD-10-CM

## 2023-01-30 RX ORDER — AMLODIPINE BESYLATE 2.5 MG/1
TABLET ORAL
Qty: 90 TABLET | Refills: 0 | Status: SHIPPED | OUTPATIENT
Start: 2023-01-30 | End: 2023-04-27

## 2023-02-01 ENCOUNTER — HOSPITAL ENCOUNTER (OUTPATIENT)
Dept: EDUCATION SERVICES | Facility: HOSPITAL | Age: 68
Discharge: HOME | End: 2023-02-01
Attending: DIETITIAN, REGISTERED | Admitting: FAMILY MEDICINE
Payer: COMMERCIAL

## 2023-02-01 VITALS
OXYGEN SATURATION: 94 % | HEIGHT: 70 IN | HEART RATE: 81 BPM | SYSTOLIC BLOOD PRESSURE: 151 MMHG | DIASTOLIC BLOOD PRESSURE: 82 MMHG | WEIGHT: 237 LBS | BODY MASS INDEX: 33.93 KG/M2 | RESPIRATION RATE: 16 BRPM

## 2023-02-01 DIAGNOSIS — E11.9 TYPE 2 DIABETES MELLITUS WITHOUT COMPLICATION, WITHOUT LONG-TERM CURRENT USE OF INSULIN (H): Primary | ICD-10-CM

## 2023-02-01 PROCEDURE — G0108 DIAB MANAGE TRN  PER INDIV: HCPCS | Performed by: DIETITIAN, REGISTERED

## 2023-02-01 ASSESSMENT — ANXIETY QUESTIONNAIRES
GAD7 TOTAL SCORE: 0
GAD7 TOTAL SCORE: 0
3. WORRYING TOO MUCH ABOUT DIFFERENT THINGS: NOT AT ALL
2. NOT BEING ABLE TO STOP OR CONTROL WORRYING: NOT AT ALL
6. BECOMING EASILY ANNOYED OR IRRITABLE: NOT AT ALL
4. TROUBLE RELAXING: NOT AT ALL
7. FEELING AFRAID AS IF SOMETHING AWFUL MIGHT HAPPEN: NOT AT ALL
5. BEING SO RESTLESS THAT IT IS HARD TO SIT STILL: NOT AT ALL
IF YOU CHECKED OFF ANY PROBLEMS ON THIS QUESTIONNAIRE, HOW DIFFICULT HAVE THESE PROBLEMS MADE IT FOR YOU TO DO YOUR WORK, TAKE CARE OF THINGS AT HOME, OR GET ALONG WITH OTHER PEOPLE: NOT DIFFICULT AT ALL
1. FEELING NERVOUS, ANXIOUS, OR ON EDGE: NOT AT ALL

## 2023-02-01 ASSESSMENT — PATIENT HEALTH QUESTIONNAIRE - PHQ9: SUM OF ALL RESPONSES TO PHQ QUESTIONS 1-9: 0

## 2023-02-01 ASSESSMENT — PAIN SCALES - GENERAL: PAINLEVEL: NO PAIN (0)

## 2023-02-01 NOTE — PROGRESS NOTES
Diabetes Self-Management Education & Support    Presents for: Individual review (Annual)    Type of Service: In Person Visit    ASSESSMENT:  Recent A1c 7.5%.  Weight is stable.  Pt feels increase may be r/t him being lax on exercise in recent months.  He has now resumed going to the gym and doing some snow shoeing.  Eye exam is up to date.  Foot exam is due - discussed.  BP elevated today on two checks.  Pt has cuff and will check at home as he [place] near [place] so long distance for recheck.  Discussed increasing Victoza to 1.8 mg daily vs seeing if alternate weekly GLP-1 is covered.  Pt would like to see if A1c will decrease at next check with resuming exercise.      Patient's most recent   Lab Results   Component Value Date    A1C 7.5 01/05/2023    A1C 7.7 04/15/2021     is not meeting goal of <7.5% for age.      Diabetes knowledge and skills assessment:   Patient is knowledgeable in diabetes management concepts related to: Healthy Eating, Being Active, Monitoring, Taking Medication and Problem Solving    Education today focused on: Taking Medication and Reducing Risks    Based on learning assessment above, most appropriate setting for further diabetes education would be: Individual setting.      PLAN  Continue to work on healthy food choices.    Maintain exercise plan - goal is 30 minutes at least 5x/week.  Continue current diabetes medications for now - consider increase Victoza dose vs try alternate GLP-1 if next A1c does not trend below 7.5%.   Foot exam at next provider visit.     Follow-up: Annually or sooner if indicated.     See Care Plan for co-developed, patient-state behavior change goals.  AVS provided for patient today.    Education Materials Provided:  No new materials today.     SUBJECTIVE/OBJECTIVE:  Presents for: Individual review (Annual)  Accompanied by: Self  Diabetes education in the past 24mo: Yes  Focus of Visit: Assistance w/ making life changes  Diabetes type: Type 2  Date of diagnosis:  "2005  Disease course: Stable  How confident are you filling out medical forms by yourself:: Extremely  Diabetes management related comments/concerns: None  Transportation concerns: No  Difficulty affording diabetes medication?: No  Difficulty affording diabetes testing supplies?: No  Other concerns:: None  Cultural Influences/Ethnic Background:  Not  or     Diabetes Symptoms & Complications:  Fatigue: No  Neuropathy: No  Polydipsia: No  Polyphagia: No  Polyuria: No  Visual change: No  Slow healing wounds: No  Symptom course: Stable  Weight trend: Stable  Complications assessed today?: Yes  Autonomic neuropathy: No  CVA: No  Heart disease: No  Nephropathy: No  Peripheral neuropathy: No  Peripheral Vascular Disease: No  Retinopathy: No  Sexual dysfunction: No    Patient Problem List and Family Medical History reviewed for relevant medical history, current medical status, and diabetes risk factors.    Vitals:  /82   Pulse 81   Resp 16   Ht 1.772 m (5' 9.75\")   Wt 107.5 kg (237 lb)   SpO2 94%   BMI 34.25 kg/m    Estimated body mass index is 34.25 kg/m  as calculated from the following:    Height as of this encounter: 1.772 m (5' 9.75\").    Weight as of this encounter: 107.5 kg (237 lb).   Last 3 BP:   BP Readings from Last 3 Encounters:   02/01/23 151/82   01/05/23 128/72   11/18/22 (!) 172/98       History   Smoking Status     Never   Smokeless Tobacco     Never       Labs:  Lab Results   Component Value Date    A1C 7.5 01/05/2023    A1C 7.7 04/15/2021     Lab Results   Component Value Date     01/05/2023     01/28/2022     06/02/2021     Lab Results   Component Value Date    LDL 72 01/05/2023    LDL 66 01/15/2021     HDL Cholesterol   Date Value Ref Range Status   01/15/2021 45 >39 mg/dL Final     Direct Measure HDL   Date Value Ref Range Status   01/05/2023 47 >=40 mg/dL Final   ]  GFR Estimate   Date Value Ref Range Status   01/05/2023 >90 >60 mL/min/1.73m2 Final     " "Comment:     Effective December 21, 2021 eGFRcr in adults is calculated using the 2021 CKD-EPI creatinine equation which includes age and gender (Eulalia et al., NEJM, DOI: 10.1056/XSMGbj6938209)   06/02/2021 89 >60 mL/min/[1.73_m2] Final     Comment:     Non  GFR Calc  Starting 12/18/2018, serum creatinine based estimated GFR (eGFR) will be   calculated using the Chronic Kidney Disease Epidemiology Collaboration   (CKD-EPI) equation.       GFR Estimate If Black   Date Value Ref Range Status   06/02/2021 >90 >60 mL/min/[1.73_m2] Final     Comment:      GFR Calc  Starting 12/18/2018, serum creatinine based estimated GFR (eGFR) will be   calculated using the Chronic Kidney Disease Epidemiology Collaboration   (CKD-EPI) equation.       Lab Results   Component Value Date    CR 0.91 01/05/2023    CR 0.89 06/02/2021     No results found for: MICROALBUMIN    Healthy Eating:  Healthy Eating Assessed Today: Yes  Cultural/Jehovah's witness diet restrictions?: No  Meal planning/habits: Avoiding sweets, Heart healthy, Low salt  How many times a week on average do you eat food made away from home (restaurant/take-out)?: 1  Meals include: Breakfast, Dinner  Breakfast: cheerios/milk with bagel or oatmeal or leftover pizza  Dinner: salad - \"doesn't eliminate anything from diet\"  Snacks: fruits/veggies with dip - sweets would be dark chocolate if he eats them  Beverages: Water  Has patient met with a dietitian in the past?: Yes    Being Active:  Being Active Assessed Today: Yes  Exercise:: Yes (gym or snow shoes)  Days per week of moderate to strenuous exercise (like a brisk walk): (P) 3  On average, minutes per day of exercise at this level: (P) 60  How intense was your typical exercise? : (P) Moderate (like brisk walking)  Exercise Minutes per Week: (P) 180  Barrier to exercise: None    Monitoring:  Monitoring Assessed Today: Yes  Did patient bring glucose meter to appointment? : Yes  Blood Glucose Meter: One " Touch (Verio)  Times checking blood sugar at home (number): Other  Blood glucose trend: Fluctuating  Pt has five checks in past two weeks:  Fasting-112, 112  2 hours post supper-140, 159, 177    Taking Medications:  Diabetes Medication(s)     Biguanides       metFORMIN (GLUCOPHAGE) 1000 MG tablet    TAKE 1 TABLET BY MOUTH TWICE DAILY WITH MEALS    Sulfonylureas       glipiZIDE (GLUCOTROL XL) 10 MG 24 hr tablet    Take 1 tablet by mouth twice daily    Incretin Mimetic Agents       liraglutide (VICTOZA PEN) 18 MG/3ML solution    INJECT 1.2MG SUBCUTANEOUSLY ONCE A DAY.          Current Treatments: Diet, Non-insulin Injectables, Oral Medication (taken by mouth) (Metformin 1000 mg bid, Glipizide 10 mg bid, Victoza 1.2 mg daily)  Problems taking diabetes medications regularly?: No  Diabetes medication side effects?: No    Problem Solving:  Problem Solving Assessed Today: Yes  Is the patient at risk for hypoglycemia?: Yes  Hypoglycemia Frequency: Rarely  Hypoglycemia Treatment: Other food    Hypoglycemia symptoms  Dizziness or Light-Headedness: Yes  Feeling shaky: Yes     Reducing Risks:  Reducing Risks Assessed Today: Yes  CAD Risks: Diabetes Mellitus, Dyslipidemia, Family history, Hypertension  Has dilated eye exam at least once a year?: Yes  Sees dentist every 6 months?: No  Feet checked by healthcare provider in the last year?: No    Healthy Coping:  Healthy Coping Assessed Today: Yes  Emotional response to diabetes: Acceptance  Informal Support system:: None  Stage of change: MAINTENANCE (Working to maintain change, with risk of relapse)  Patient Activation Measure Survey Score:  EZ Score (Last Two) 6/28/2011 10/31/2018   EZ Raw Score 46 38   Activation Score 75.3 83.7   EZ Level 4 4       Care Plan and Education Provided:  Care Plan: Diabetes   Updates made by Karrie Coleman RD since 2/1/2023 12:00 AM      Problem: HbA1C Not In Goal       Goal: Establish Regular Follow-Ups with PCP       Task: Discuss with PCP the  recommended timing for patient's next follow up visit(s)    Responsible User: Karrie Coleman RD      Task: Discuss schedule for PCP visits with patient    Responsible User: Karrie Coleman RD      Goal: Get HbA1C Level in Goal       Task: Educate patient on diabetes education self-management topics    Responsible User: Karrie Coleman RD      Task: Educate patient on benefits of regular glucose monitoring    Responsible User: Karrie Coleman RD      Task: Refer patient to appropriate extended care team member, as needed (Medication Therapy Management, Behavioral Health, Physical Therapy, etc.)    Responsible User: Karrie Coleman RD      Task: Discuss diabetes treatment plan with patient    Responsible User: Karrie Coleman RD      Problem: Diabetes Self-Management Education Needed to Optimize Self-Care Behaviors       Goal: Understand diabetes pathophysiology and disease progression       Task: Provide education on diabetes pathophysiology and disease progression specfic to patient's diabetes type Completed 2/1/2023   Responsible User: Karrie Coleman RD      Goal: Healthy Eating - follow a healthy eating pattern for diabetes       Task: Provide education on portion control and consistency in amount, composition and timing of food intake    Responsible User: Karrie Coleman RD      Task: Provide education on managing carbohydrate intake (carbohydrate counting, plate planning method, etc.)    Responsible User: Karrie Coleman RD      Task: Provide education on weight management    Responsible User: Karrie Coleman RD      Task: Provide education on heart healthy eating    Responsible User: Karrie Coleman RD      Task: Provide education on eating out    Responsible User: Karrie Coleman RD      Task: Develop individualized healthy eating plan with patient    Responsible User: Karrie Coleman RD      Goal: Being Active - get regular physical activity, working up to at least 150 minutes per week       Task: Provide education on  relationship of activity to glucose and precautions to take if at risk for low glucose Completed 2/1/2023   Responsible User: Karrie Coleman RD      Task: Discuss barriers to physical activity with patient    Responsible User: Karrie Coleman RD      Task: Develop physical activity plan with patient    Responsible User: Karrie Coleman RD      Task: Explore community resources including walking groups, assistance programs, and home videos    Responsible User: Karrie Coleman RD      Goal: Monitoring - monitor glucose and ketones as directed       Task: Provide education on blood glucose monitoring (purpose, proper technique, frequency, glucose targets, interpreting results, when to use glucose control solution, sharps disposal)    Responsible User: Karrie Coleman RD      Task: Provide education on continuous glucose monitoring (sensor placement, use of chiquita or /reader, understanding glucose trends, alerts and alarms, differences between sensor glucose and blood glucose)    Responsible User: Karrie Coleman RD      Task: Provide education on ketone monitoring (when to monitor, frequency, etc.)    Responsible User: Karrie Coleman RD      Goal: Taking Medication - patient is consistently taking medications as directed       Task: Provide education on action of prescribed medication, including when to take and possible side effects Completed 2/1/2023   Responsible User: Karrie Coleman RD      Task: Provide education on insulin and injectable diabetes medications, including administration, storage, site selection and rotation for injection sites    Responsible User: Karrie Coleman RD      Task: Discuss barriers to medication adherence with patient and provide management technique ideas as appropriate    Responsible User: Karrie Coleman RD      Task: Provide education on frequency and refill details of medications    Responsible User: Karrie Coleman RD      Goal: Problem Solving - know how to prevent and manage  short-term diabetes complications       Task: Provide education on high blood glucose - causes, signs/symptoms, prevention and treatment    Responsible User: Karrie Coleman RD      Task: Provide education on low blood glucose - causes, signs/symptoms, prevention, treatment, carrying a carbohydrate source at all times, and medical identification Completed 2/1/2023   Responsible User: Karrie Coleman RD      Task: Provide education on safe travel with diabetes    Responsible User: Karrie Coleman RD      Task: Provide education on how to care for diabetes on sick days    Responsible User: Karrie Coleman RD      Task: Provide education on when to call a health care provider    Responsible User: Karrie Coleman RD      Goal: Reducing Risks - know how to prevent and treat long-term diabetes complications    Start Date: 2/1/2023   This Visit's Progress: 0%   Note:    Pt will begin checking BP at home and contact provider if consistently >140/90.       Task: Provide education on major complications of diabetes, prevention, early diagnostic measures and treatment of complications    Responsible User: Karrie Coleman RD      Task: Provide education on recommended care for dental, eye and foot health Completed 2/1/2023   Responsible User: Karrie Coleman RD      Task: Provide education on Hemoglobin A1c - goals and relationship to blood glucose levels Completed 2/1/2023   Responsible User: Karrie Coleman RD      Task: Provide education on recommendations for heart health - lipid levels and goals, blood pressure and goals, and aspirin therapy, if indicated    Responsible User: Karrie Coleman RD      Task: Provide education on tobacco cessation    Responsible User: Karrie Coleman RD      Goal: Healthy Coping - use available resources to cope with the challenges of managing diabetes       Task: Discuss recognizing feelings about having diabetes    Responsible User: Karrie Coleman RD      Task: Provide education on the benefits of  making appropriate lifestyle changes    Responsible User: Karrie Coleman RD      Task: Provide education on benefits of utilizing support systems    Responsible User: Karrie Coleman RD      Task: Discuss methods for coping with stress    Responsible User: Karrie Coleman RD      Task: Provide education on when to seek professional counseling    Responsible User: Karrie Coleman RD          Time Spent: 40 minutes  Encounter Type: Individual    Any diabetes medication dose changes were made via the CDE Protocol per the patient's referring provider. A copy of this encounter was shared with the provider.

## 2023-02-01 NOTE — LETTER
2/1/2023        RE: Edward Hannah  [address]   [facility] [id number]        Diabetes Self-Management Education & Support    Presents for: Individual review (Annual)    Type of Service: In Person Visit    ASSESSMENT:  Recent A1c 7.5%.  Weight is stable.  Pt feels increase may be r/t him being lax on exercise in recent months.  He has now resumed going to the gym and doing some snow shoeing.  Eye exam is up to date.  Foot exam is due - discussed.  BP elevated today on two checks.  Pt has cuff and will check at home as he lives near [place] so long distance for recheck.  Discussed increasing Victoza to 1.8 mg daily vs seeing if alternate weekly GLP-1 is covered.  Pt would like to see if A1c will decrease at next check with resuming exercise.      Patient's most recent   Lab Results   Component Value Date    A1C 7.5 01/05/2023    A1C 7.7 04/15/2021     is not meeting goal of <7.5% for age.      Diabetes knowledge and skills assessment:   Patient is knowledgeable in diabetes management concepts related to: Healthy Eating, Being Active, Monitoring, Taking Medication and Problem Solving    Education today focused on: Taking Medication and Reducing Risks    Based on learning assessment above, most appropriate setting for further diabetes education would be: Individual setting.      PLAN  Continue to work on healthy food choices.    Maintain exercise plan - goal is 30 minutes at least 5x/week.  Continue current diabetes medications for now - consider increase Victoza dose vs try alternate GLP-1 if next A1c does not trend below 7.5%.   Foot exam at next provider visit.     Follow-up: Annually or sooner if indicated.     See Care Plan for co-developed, patient-state behavior change goals.  AVS provided for patient today.    Education Materials Provided:  No new materials today.     SUBJECTIVE/OBJECTIVE:  Presents for: Individual review (Annual)  Accompanied by: Self  Diabetes education in the past 24mo: Yes  Focus  "of Visit: Assistance w/ making life changes  Diabetes type: Type 2  Date of diagnosis: 2005  Disease course: Stable  How confident are you filling out medical forms by yourself:: Extremely  Diabetes management related comments/concerns: None  Transportation concerns: No  Difficulty affording diabetes medication?: No  Difficulty affording diabetes testing supplies?: No  Other concerns:: None  Cultural Influences/Ethnic Background:  Not  or     Diabetes Symptoms & Complications:  Fatigue: No  Neuropathy: No  Polydipsia: No  Polyphagia: No  Polyuria: No  Visual change: No  Slow healing wounds: No  Symptom course: Stable  Weight trend: Stable  Complications assessed today?: Yes  Autonomic neuropathy: No  CVA: No  Heart disease: No  Nephropathy: No  Peripheral neuropathy: No  Peripheral Vascular Disease: No  Retinopathy: No  Sexual dysfunction: No    Patient Problem List and Family Medical History reviewed for relevant medical history, current medical status, and diabetes risk factors.    Vitals:  /82   Pulse 81   Resp 16   Ht 1.772 m (5' 9.75\")   Wt 107.5 kg (237 lb)   SpO2 94%   BMI 34.25 kg/m    Estimated body mass index is 34.25 kg/m  as calculated from the following:    Height as of this encounter: 1.772 m (5' 9.75\").    Weight as of this encounter: 107.5 kg (237 lb).   Last 3 BP:   BP Readings from Last 3 Encounters:   02/01/23 151/82   01/05/23 128/72   11/18/22 (!) 172/98       History   Smoking Status     Never   Smokeless Tobacco     Never       Labs:  Lab Results   Component Value Date    A1C 7.5 01/05/2023    A1C 7.7 04/15/2021     Lab Results   Component Value Date     01/05/2023     01/28/2022     06/02/2021     Lab Results   Component Value Date    LDL 72 01/05/2023    LDL 66 01/15/2021     HDL Cholesterol   Date Value Ref Range Status   01/15/2021 45 >39 mg/dL Final     Direct Measure HDL   Date Value Ref Range Status   01/05/2023 47 >=40 mg/dL Final   ]  GFR " "Estimate   Date Value Ref Range Status   01/05/2023 >90 >60 mL/min/1.73m2 Final     Comment:     Effective December 21, 2021 eGFRcr in adults is calculated using the 2021 CKD-EPI creatinine equation which includes age and gender (Eulalia jack al., NEJM, DOI: 10.1056/SKHWtj4233798)   06/02/2021 89 >60 mL/min/[1.73_m2] Final     Comment:     Non  GFR Calc  Starting 12/18/2018, serum creatinine based estimated GFR (eGFR) will be   calculated using the Chronic Kidney Disease Epidemiology Collaboration   (CKD-EPI) equation.       GFR Estimate If Black   Date Value Ref Range Status   06/02/2021 >90 >60 mL/min/[1.73_m2] Final     Comment:      GFR Calc  Starting 12/18/2018, serum creatinine based estimated GFR (eGFR) will be   calculated using the Chronic Kidney Disease Epidemiology Collaboration   (CKD-EPI) equation.       Lab Results   Component Value Date    CR 0.91 01/05/2023    CR 0.89 06/02/2021     No results found for: MICROALBUMIN    Healthy Eating:  Healthy Eating Assessed Today: Yes  Cultural/Caodaism diet restrictions?: No  Meal planning/habits: Avoiding sweets, Heart healthy, Low salt  How many times a week on average do you eat food made away from home (restaurant/take-out)?: 1  Meals include: Breakfast, Dinner  Breakfast: cheerios/milk with bagel or oatmeal or leftover pizza  Dinner: salad - \"doesn't eliminate anything from diet\"  Snacks: fruits/veggies with dip - sweets would be dark chocolate if he eats them  Beverages: Water  Has patient met with a dietitian in the past?: Yes    Being Active:  Being Active Assessed Today: Yes  Exercise:: Yes (gym or snow shoes)  Days per week of moderate to strenuous exercise (like a brisk walk): (P) 3  On average, minutes per day of exercise at this level: (P) 60  How intense was your typical exercise? : (P) Moderate (like brisk walking)  Exercise Minutes per Week: (P) 180  Barrier to exercise: None    Monitoring:  Monitoring Assessed Today: " Yes  Did patient bring glucose meter to appointment? : Yes  Blood Glucose Meter: One Touch (Verio)  Times checking blood sugar at home (number): Other  Blood glucose trend: Fluctuating  Pt has five checks in past two weeks:  Fasting-112, 112  2 hours post supper-140, 159, 177    Taking Medications:  Diabetes Medication(s)     Biguanides       metFORMIN (GLUCOPHAGE) 1000 MG tablet    TAKE 1 TABLET BY MOUTH TWICE DAILY WITH MEALS    Sulfonylureas       glipiZIDE (GLUCOTROL XL) 10 MG 24 hr tablet    Take 1 tablet by mouth twice daily    Incretin Mimetic Agents       liraglutide (VICTOZA PEN) 18 MG/3ML solution    INJECT 1.2MG SUBCUTANEOUSLY ONCE A DAY.          Current Treatments: Diet, Non-insulin Injectables, Oral Medication (taken by mouth) (Metformin 1000 mg bid, Glipizide 10 mg bid, Victoza 1.2 mg daily)  Problems taking diabetes medications regularly?: No  Diabetes medication side effects?: No    Problem Solving:  Problem Solving Assessed Today: Yes  Is the patient at risk for hypoglycemia?: Yes  Hypoglycemia Frequency: Rarely  Hypoglycemia Treatment: Other food    Hypoglycemia symptoms  Dizziness or Light-Headedness: Yes  Feeling shaky: Yes     Reducing Risks:  Reducing Risks Assessed Today: Yes  CAD Risks: Diabetes Mellitus, Dyslipidemia, Family history, Hypertension  Has dilated eye exam at least once a year?: Yes  Sees dentist every 6 months?: No  Feet checked by healthcare provider in the last year?: No    Healthy Coping:  Healthy Coping Assessed Today: Yes  Emotional response to diabetes: Acceptance  Informal Support system:: None  Stage of change: MAINTENANCE (Working to maintain change, with risk of relapse)  Patient Activation Measure Survey Score:  EZ Score (Last Two) 6/28/2011 10/31/2018   EZ Raw Score 46 38   Activation Score 75.3 83.7   EZ Level 4 4       Care Plan and Education Provided:  Care Plan: Diabetes   Updates made by Karrie Coleman RD since 2/1/2023 12:00 AM      Problem: HbA1C Not In  Goal       Goal: Establish Regular Follow-Ups with PCP       Task: Discuss with PCP the recommended timing for patient's next follow up visit(s)    Responsible User: Karrie Coleman RD      Task: Discuss schedule for PCP visits with patient    Responsible User: Karrie Coleman RD      Goal: Get HbA1C Level in Goal       Task: Educate patient on diabetes education self-management topics    Responsible User: Karrie Coleman RD      Task: Educate patient on benefits of regular glucose monitoring    Responsible User: Karrie Coleman RD      Task: Refer patient to appropriate extended care team member, as needed (Medication Therapy Management, Behavioral Health, Physical Therapy, etc.)    Responsible User: Karrie Coleman RD      Task: Discuss diabetes treatment plan with patient    Responsible User: Karrie Coleman RD      Problem: Diabetes Self-Management Education Needed to Optimize Self-Care Behaviors       Goal: Understand diabetes pathophysiology and disease progression       Task: Provide education on diabetes pathophysiology and disease progression specfic to patient's diabetes type Completed 2/1/2023   Responsible User: Karrie Coleman RD      Goal: Healthy Eating - follow a healthy eating pattern for diabetes       Task: Provide education on portion control and consistency in amount, composition and timing of food intake    Responsible User: Karrie Coleman RD      Task: Provide education on managing carbohydrate intake (carbohydrate counting, plate planning method, etc.)    Responsible User: Karrie Coleman RD      Task: Provide education on weight management    Responsible User: Karrie Coleman RD      Task: Provide education on heart healthy eating    Responsible User: Karrie Coleman RD      Task: Provide education on eating out    Responsible User: Karrie Coleman RD      Task: Develop individualized healthy eating plan with patient    Responsible User: Karrie Coleman RD      Goal: Being Active - get regular physical  activity, working up to at least 150 minutes per week       Task: Provide education on relationship of activity to glucose and precautions to take if at risk for low glucose Completed 2/1/2023   Responsible User: Karrie Coleman RD      Task: Discuss barriers to physical activity with patient    Responsible User: Karrie Coleman RD      Task: Develop physical activity plan with patient    Responsible User: Karrie Coleman RD      Task: Explore community resources including walking groups, assistance programs, and home videos    Responsible User: Karrie Coleman RD      Goal: Monitoring - monitor glucose and ketones as directed       Task: Provide education on blood glucose monitoring (purpose, proper technique, frequency, glucose targets, interpreting results, when to use glucose control solution, sharps disposal)    Responsible User: Karrie Coleman RD      Task: Provide education on continuous glucose monitoring (sensor placement, use of chiquita or /reader, understanding glucose trends, alerts and alarms, differences between sensor glucose and blood glucose)    Responsible User: Karrie Coleman RD      Task: Provide education on ketone monitoring (when to monitor, frequency, etc.)    Responsible User: Karrie Coleman RD      Goal: Taking Medication - patient is consistently taking medications as directed       Task: Provide education on action of prescribed medication, including when to take and possible side effects Completed 2/1/2023   Responsible User: Karrie Coleman RD      Task: Provide education on insulin and injectable diabetes medications, including administration, storage, site selection and rotation for injection sites    Responsible User: Karrie Coleman RD      Task: Discuss barriers to medication adherence with patient and provide management technique ideas as appropriate    Responsible User: Karrie Coleman RD      Task: Provide education on frequency and refill details of medications    Responsible  User: Karrie Coleman RD      Goal: Problem Solving - know how to prevent and manage short-term diabetes complications       Task: Provide education on high blood glucose - causes, signs/symptoms, prevention and treatment    Responsible User: Karrie Coleman RD      Task: Provide education on low blood glucose - causes, signs/symptoms, prevention, treatment, carrying a carbohydrate source at all times, and medical identification Completed 2/1/2023   Responsible User: Karrie Coleman RD      Task: Provide education on safe travel with diabetes    Responsible User: Karrie Coleman RD      Task: Provide education on how to care for diabetes on sick days    Responsible User: Karrie Coleman RD      Task: Provide education on when to call a health care provider    Responsible User: Karrie Coleman RD      Goal: Reducing Risks - know how to prevent and treat long-term diabetes complications    Start Date: 2/1/2023   This Visit's Progress: 0%   Note:    Pt will begin checking BP at home and contact provider if consistently >140/90.       Task: Provide education on major complications of diabetes, prevention, early diagnostic measures and treatment of complications    Responsible User: Karrie Coleman RD      Task: Provide education on recommended care for dental, eye and foot health Completed 2/1/2023   Responsible User: Karrie Coleman RD      Task: Provide education on Hemoglobin A1c - goals and relationship to blood glucose levels Completed 2/1/2023   Responsible User: Karrie Coleman RD      Task: Provide education on recommendations for heart health - lipid levels and goals, blood pressure and goals, and aspirin therapy, if indicated    Responsible User: Karrie Coleman RD      Task: Provide education on tobacco cessation    Responsible User: Karrie Coleman RD      Goal: Healthy Coping - use available resources to cope with the challenges of managing diabetes       Task: Discuss recognizing feelings about having diabetes     Responsible User: Karrie Coleman RD      Task: Provide education on the benefits of making appropriate lifestyle changes    Responsible User: Karrie Coleman RD      Task: Provide education on benefits of utilizing support systems    Responsible User: Karrie Coleman RD      Task: Discuss methods for coping with stress    Responsible User: Karrie Coleman RD      Task: Provide education on when to seek professional counseling    Responsible User: Karrie Coleman RD          Time Spent: 40 minutes  Encounter Type: Individual    Any diabetes medication dose changes were made via the CDE Protocol per the patient's referring provider. A copy of this encounter was shared with the provider.        Sincerely,        Karrie Coleman RD

## 2023-02-01 NOTE — PATIENT INSTRUCTIONS
-Keep following healthy, low carbohydrate meal plan - 60 grams/meal, 15-30 grams/snack.  -Your exercise is great!  Keep it up.  -Good times to test your glucose are fasting or 2 hours after a meal.  -Target levels are fasting , 2 hours after a meal < 180.  -Keep taking your same diabetes medications for now.    -Recent A1c was 7.5%.  Goal is for it to be <7.5%.   -Foot exam is due at next provider check.  -Follow up annually or sooner if needed.  -MARIZA Nichols, [facility] [phone].

## 2023-04-15 ENCOUNTER — HEALTH MAINTENANCE LETTER (OUTPATIENT)
Age: 68
End: 2023-04-15

## 2023-05-05 NOTE — PROGRESS NOTES
Assessment & Plan     Type 2 diabetes mellitus with hyperglycemia, without long-term current use of insulin (H)  Increase Victoza to 1.8 mg subcu daily and follow-up in 3 months  He is going to work on exercise and increase activity this summer  - Adult Eye  Referral; Future  - Hemoglobin A1c; Future  - Albumin Random Urine Quantitative with Creat Ratio; Future  - liraglutide (VICTOZA) 18 MG/3ML solution; Inject 1.8 mg Subcutaneous daily    Need for vaccination  Due  - Pneumococcal 20 Valent Conjugate (PCV20)    Benign essential hypertension  Borderline numbers at home as well we will increase Norvasc to 5 mg daily  - amLODIPine (NORVASC) 5 MG tablet; Take 1 tablet (5 mg) by mouth daily    BCC (basal cell carcinoma), face  Follows with dermatology    Restless legs syndrome  Stable    Seasonal allergic rhinitis due to other allergic trigger  Worse with the melting snow the last few months starting to improve now that the weather is drier    Microscopic hematuria  Sounds as though he had some kidney stones a month or 2 ago we will check a urine to make sure everything is clear  - UA Macroscopic with reflex to Microscopic and Culture    Plantar warts  Cryotherapy see procedure note use duct tape at home and follow-up if not improving  - Treat Benign Wart or Mulloscum Contagiosum or Milia up to 14 lesions (No quantity required)    Provider  Link to [facility] Help Grid :[id number]}    Patient was agreeable to this plan and had no further questions.  There are no Patient Instructions on file for this visit.    No follow-ups on file.    Kelly Yarbrough MD  [facility] - JUAN chowdhury is a 67 year old, presenting for the following health issues:  Diabetes      HPI     Diabetes Follow-up    How often are you checking your blood sugar? A few times a week  What time of day are you checking your blood sugars (select all that apply)?  Before meals  Have you had any blood sugars above 200?   No  Have you had any blood sugars below 70?  No    What symptoms do you notice when your blood sugar is low?  None    What concerns do you have today about your diabetes? None     Do you have any of these symptoms? (Select all that apply)  No numbness or tingling in feet.  No redness, sores or blisters on feet.  No complaints of excessive thirst.  No reports of blurry vision.  No significant changes to weight.    Have you had a diabetic eye exam in the last 12 months? Yes-  Location: [facility]      Diabetic Foot Screen:  Any complaints of increased pain or numbness ? No  Is there a foot ulcer now or a history of foot ulcer? No  Does the foot have an abnormal shape? No  Are the nails thick, too long or ingrown? No  Are there any redness or open areas? No         Sensation Testing done at all points on the diagram with monofilament     Right Foot: Sensation Normal at all points  Left Foot: Sensation Normal at all points     Risk Category: 0- No loss of protective sensation  Performed by Key Jerez      Hyperlipidemia Follow-Up      Are you regularly taking any medication or supplement to lower your cholesterol?   Yes- LIPITOR    Are you having muscle aches or other side effects that you think could be caused by your cholesterol lowering medication?  No    Hypertension Follow-up      Do you check your blood pressure regularly outside of the clinic? Yes     Are you following a low salt diet? Yes    Are your blood pressures ever more than 140 on the top number (systolic) OR more   than 90 on the bottom number (diastolic), for example 140/90? Yes    BP Readings from Last 2 Encounters:   05/08/23 (!) 138/90   02/01/23 151/82     Hemoglobin A1C (%)   Date Value   01/05/2023 7.5 (H)   05/11/2022 7.3 (H)   04/15/2021 7.7 (H)   01/15/2021 6.8 (H)     LDL Cholesterol Calculated (mg/dL)   Date Value   01/05/2023 72   01/28/2022 77   01/15/2021 66   01/10/2020 79         How many servings of fruits and vegetables  "do you eat daily?  2-3    On average, how many sweetened beverages do you drink each day (Examples: soda, juice, sweet tea, etc.  Do NOT count diet or artificially sweetened beverages)?   1 OR 2 A WEEK    How many days per week do you exercise enough to make your heart beat faster? 3 or less    How many minutes a day do you exercise enough to make your heart beat faster? 60 or more    How many days per week do you miss taking your medication? 0    Review of Systems   Constitutional, HEENT, cardiovascular, pulmonary, gi and gu systems are negative, except as otherwise noted.      Objective    BP (!) 138/90 (BP Location: Right arm, Patient Position: Sitting, Cuff Size: Adult Large)   Pulse 73   Temp 97.7  F (36.5  C) (Tympanic)   Resp 18   Ht 1.765 m (5' 9.5\")   Wt 106.6 kg (235 lb)   SpO2 95%   BMI 34.21 kg/m    Body mass index is 34.21 kg/m .  Physical Exam   GENERAL: healthy, alert and no distress  NECK: no adenopathy, no asymmetry, masses, or scars and thyroid normal to palpation  RESP: lungs clear to auscultation - no rales, rhonchi or wheezes  CV: regular rate and rhythm, normal S1 S2, no S3 or S4, no murmur, click or rub, no peripheral edema and peripheral pulses strong  ABDOMEN: soft, nontender, no hepatosplenomegaly, no masses and bowel sounds normal  MS: no gross musculoskeletal defects noted, no edema  SKIN: plantar wart - foot left  PSYCH: mentation appears normal, affect normal/bright  Diabetic foot exam: normal DP and PT pulses, no trophic changes or ulcerative lesions, normal sensory exam and normal monofilament exam    Results for orders placed or performed in visit on 05/08/23   Hemoglobin A1c     Status: Abnormal   Result Value Ref Range    Estimated Average Glucose 171 mg/dL    Hemoglobin A1C 7.6 (H) <5.7 %   Albumin Random Urine Quantitative with Creat Ratio     Status: None   Result Value Ref Range    Creatinine Urine mg/dL 165.5 mg/dL    Albumin Urine mg/L 16.6 mg/L    Albumin Urine mg/g " Cr 10.03 0.00 - 17.00 mg/g Cr               Procedure: Cryotherapy  Diagnosis: plantar wart  Location: left foot  Specimen number: 1  Lesion size: 7mm  Total size: above  Discussion of treatment options, all of patient's questions answered. Cryotherapy with LN2 with 2-3 mm margins and 7-10 second thaw cycle. Curettage and cryotherapy performed in triplicate. Wound dressed with bandage. Wound care instructions given. Follow up with any wound problems, poor healing, or signs of infection.

## 2023-05-08 ENCOUNTER — LAB (OUTPATIENT)
Dept: LAB | Facility: OTHER | Age: 68
End: 2023-05-08
Payer: COMMERCIAL

## 2023-05-08 ENCOUNTER — OFFICE VISIT (OUTPATIENT)
Dept: FAMILY MEDICINE | Facility: OTHER | Age: 68
End: 2023-05-08
Attending: FAMILY MEDICINE
Payer: COMMERCIAL

## 2023-05-08 VITALS
BODY MASS INDEX: 33.64 KG/M2 | SYSTOLIC BLOOD PRESSURE: 138 MMHG | TEMPERATURE: 97.7 F | HEART RATE: 73 BPM | HEIGHT: 70 IN | OXYGEN SATURATION: 95 % | DIASTOLIC BLOOD PRESSURE: 90 MMHG | WEIGHT: 235 LBS | RESPIRATION RATE: 18 BRPM

## 2023-05-08 DIAGNOSIS — J30.89 SEASONAL ALLERGIC RHINITIS DUE TO OTHER ALLERGIC TRIGGER: ICD-10-CM

## 2023-05-08 DIAGNOSIS — Z23 NEED FOR VACCINATION: ICD-10-CM

## 2023-05-08 DIAGNOSIS — I10 BENIGN ESSENTIAL HYPERTENSION: ICD-10-CM

## 2023-05-08 DIAGNOSIS — E11.65 TYPE 2 DIABETES MELLITUS WITH HYPERGLYCEMIA, WITHOUT LONG-TERM CURRENT USE OF INSULIN (H): ICD-10-CM

## 2023-05-08 DIAGNOSIS — E11.65 TYPE 2 DIABETES MELLITUS WITH HYPERGLYCEMIA, WITHOUT LONG-TERM CURRENT USE OF INSULIN (H): Primary | ICD-10-CM

## 2023-05-08 DIAGNOSIS — R31.29 MICROSCOPIC HEMATURIA: ICD-10-CM

## 2023-05-08 DIAGNOSIS — B07.0 PLANTAR WARTS: ICD-10-CM

## 2023-05-08 DIAGNOSIS — C44.310 BCC (BASAL CELL CARCINOMA), FACE: ICD-10-CM

## 2023-05-08 DIAGNOSIS — G25.81 RESTLESS LEGS SYNDROME: ICD-10-CM

## 2023-05-08 LAB
ALBUMIN UR-MCNC: 10 MG/DL
APPEARANCE UR: CLEAR
BACTERIA #/AREA URNS HPF: ABNORMAL /HPF
BILIRUB UR QL STRIP: NEGATIVE
COLOR UR AUTO: YELLOW
CREAT UR-MCNC: 165.5 MG/DL
EST. AVERAGE GLUCOSE BLD GHB EST-MCNC: 171 MG/DL
GLUCOSE UR STRIP-MCNC: >1000 MG/DL
HBA1C MFR BLD: 7.6 %
HGB UR QL STRIP: NEGATIVE
KETONES UR STRIP-MCNC: NEGATIVE MG/DL
LEUKOCYTE ESTERASE UR QL STRIP: NEGATIVE
MICROALBUMIN UR-MCNC: 16.6 MG/L
MICROALBUMIN/CREAT UR: 10.03 MG/G CR (ref 0–17)
MUCOUS THREADS #/AREA URNS LPF: PRESENT /LPF
NITRATE UR QL: NEGATIVE
PH UR STRIP: 5.5 [PH] (ref 4.7–8)
RBC URINE: 1 /HPF
SP GR UR STRIP: 1.03 (ref 1–1.03)
SPERM #/AREA URNS HPF: PRESENT /HPF
SQUAMOUS EPITHELIAL: <1 /HPF
UROBILINOGEN UR STRIP-MCNC: NORMAL MG/DL
WBC URINE: 5 /HPF

## 2023-05-08 PROCEDURE — 17110 DESTRUCTION B9 LES UP TO 14: CPT | Performed by: FAMILY MEDICINE

## 2023-05-08 PROCEDURE — 83036 HEMOGLOBIN GLYCOSYLATED A1C: CPT | Mod: ZL

## 2023-05-08 PROCEDURE — 90677 PCV20 VACCINE IM: CPT

## 2023-05-08 PROCEDURE — 36415 COLL VENOUS BLD VENIPUNCTURE: CPT | Mod: ZL

## 2023-05-08 PROCEDURE — 99214 OFFICE O/P EST MOD 30 MIN: CPT | Mod: 25 | Performed by: FAMILY MEDICINE

## 2023-05-08 PROCEDURE — 82570 ASSAY OF URINE CREATININE: CPT | Mod: ZL

## 2023-05-08 PROCEDURE — G0463 HOSPITAL OUTPT CLINIC VISIT: HCPCS | Mod: 25

## 2023-05-08 PROCEDURE — 81003 URINALYSIS AUTO W/O SCOPE: CPT | Mod: ZL | Performed by: FAMILY MEDICINE

## 2023-05-08 RX ORDER — LIRAGLUTIDE 6 MG/ML
1.8 INJECTION SUBCUTANEOUS DAILY
Qty: 27 ML | Refills: 3 | Status: SHIPPED | OUTPATIENT
Start: 2023-05-08 | End: 2023-09-08

## 2023-05-08 RX ORDER — AMLODIPINE BESYLATE 5 MG/1
5 TABLET ORAL DAILY
Qty: 90 TABLET | Refills: 3 | Status: SHIPPED | OUTPATIENT
Start: 2023-05-08 | End: 2024-04-26

## 2023-05-08 ASSESSMENT — PAIN SCALES - GENERAL: PAINLEVEL: NO PAIN (0)

## 2023-06-16 ENCOUNTER — TRANSFERRED RECORDS (OUTPATIENT)
Dept: HEALTH INFORMATION MANAGEMENT | Facility: CLINIC | Age: 68
End: 2023-06-16
Payer: COMMERCIAL

## 2023-06-16 LAB — RETINOPATHY: NEGATIVE

## 2023-07-26 DIAGNOSIS — E11.65 TYPE 2 DIABETES MELLITUS WITH HYPERGLYCEMIA, WITHOUT LONG-TERM CURRENT USE OF INSULIN (H): ICD-10-CM

## 2023-07-26 DIAGNOSIS — E78.5 HYPERLIPIDEMIA LDL GOAL <100: ICD-10-CM

## 2023-07-27 RX ORDER — GLIPIZIDE 10 MG/1
TABLET, FILM COATED, EXTENDED RELEASE ORAL
Qty: 180 TABLET | Refills: 0 | Status: SHIPPED | OUTPATIENT
Start: 2023-07-27 | End: 2023-10-30

## 2023-07-27 RX ORDER — ATORVASTATIN CALCIUM 40 MG/1
TABLET, FILM COATED ORAL
Qty: 90 TABLET | Refills: 0 | Status: SHIPPED | OUTPATIENT
Start: 2023-07-27 | End: 2023-10-30

## 2023-08-11 NOTE — PATIENT INSTRUCTIONS
Inositol 2 grams 2x/day    Patient Education   Personalized Prevention Plan  You are due for the preventive services outlined below.  Your care team is available to assist you in scheduling these services.  If you have already completed any of these items, please share that information with your care team to update in your medical record.  Health Maintenance Due   Topic Date Due    Zoster (Shingles) Vaccine (1 of 2) Never done    Annual Wellness Visit  Never done    COVID-19 Vaccine (4 - Moderna series) 01/26/2022

## 2023-08-11 NOTE — PROGRESS NOTES
"SUBJECTIVE:   Edward is a 67 year old who presents for Preventive Visit.      8/14/2023     9:28 AM   Additional Questions   Roomed by Sourav Doty   Accompanied by None         8/14/2023     9:28 AM   Patient Reported Additional Medications   Patient reports taking the following new medications None       Are you in the first 12 months of your Medicare coverage?  No    Healthy Habits:     In general, how would you rate your overall health?  Good    Frequency of exercise:  2-3 days/week    Duration of exercise:  15-30 minutes    Do you usually eat at least 4 servings of fruit and vegetables a day, include whole grains    & fiber and avoid regularly eating high fat or \"junk\" foods?  Yes    Taking medications regularly:  Yes    Medication side effects:  Not applicable    Ability to successfully perform activities of daily living:  No assistance needed    Home Safety:  No safety concerns identified    Hearing Impairment:  No hearing concerns    In the past 6 months, have you been bothered by leaking of urine?  No    In general, how would you rate your overall mental or emotional health?  Excellent        Have you ever done Advance Care Planning? (For example, a Health Directive, POLST, or a discussion with a medical provider or your loved ones about your wishes): Yes, advance care planning is on file.     Fall risk  Fallen 2 or more times in the past year?: No  Any fall with injury in the past year?: No  Cognitive Screening   1) Repeat 3 items (Leader, Season, Table)    2) Clock draw: NORMAL  3) 3 item recall: Recalls 2 objects   Results: NORMAL clock, 1-2 items recalled: COGNITIVE IMPAIRMENT LESS LIKELY    Mini-CogTM Copyright YOLANDA Neff. Licensed by the author for use in [facility]; reprinted with permission (galo@.[facility]). All rights reserved.      Do you have sleep apnea, excessive snoring or daytime drowsiness? : no    Reviewed and updated as needed this visit by clinical staff   Tobacco  Allergies  " Meds    Surg Hx  Fam Hx  Soc Hx        Reviewed and updated as needed this visit by Provider   Tobacco      Surg Hx  Fam Hx  Soc Hx       Social History     Tobacco Use    Smoking status: Never     Passive exposure: Never    Smokeless tobacco: Never    Tobacco comments:     remote cigar history   Substance Use Topics    Alcohol use: Yes     Comment: 1/day             8/12/2023     2:09 PM   Alcohol Use   Prescreen: >3 drinks/day or >7 drinks/week? No   Do you have a current opioid prescription? No  Do you use any other controlled substances or medications that are not prescribed by a provider? None      Diabetes Follow-up    How often are you checking your blood sugar? A few times a week  What time of day are you checking your blood sugars (select all that apply)?   Morning time and after meals   Have you had any blood sugars above 200?  Yes 210   Have you had any blood sugars below 70?  No  What symptoms do you notice when your blood sugar is low?  Shaky, Dizzy, and Weak  What concerns do you have today about your diabetes? None   Do you have any of these symptoms? (Select all that apply)  No numbness or tingling in feet.  No redness, sores or blisters on feet.  No complaints of excessive thirst.  No reports of blurry vision.  No significant changes to weight.          Hyperlipidemia Follow-Up    Are you regularly taking any medication or supplement to lower your cholesterol?   Yes- Lipitor   Are you having muscle aches or other side effects that you think could be caused by your cholesterol lowering medication?  No    Hypertension Follow-up    Do you check your blood pressure regularly outside of the clinic? No   Are you following a low salt diet? Yes  Are your blood pressures ever more than 140 on the top number (systolic) OR more   than 90 on the bottom number (diastolic), for example 140/90? No    BP Readings from Last 2 Encounters:   08/14/23 136/80   05/08/23 (!) 138/90     Hemoglobin A1C (%)   Date  Value   08/14/2023 7.7 (H)   05/08/2023 7.6 (H)   04/15/2021 7.7 (H)   01/15/2021 6.8 (H)     LDL Cholesterol Calculated (mg/dL)   Date Value   08/14/2023 55   01/05/2023 72   01/15/2021 66   01/10/2020 79       Current providers sharing in care for this patient include:   Patient Care Team:  Kelly Yarbrough MD as PCP - General (Family Practice)  Kelly Yarbrough MD as Assigned PCP  Karrie Coleman RD as Diabetes Educator (Diabetes Education)  Brie Garza LPN as LPN  Yunior Llanes MD as Assigned Surgical Provider    The following health maintenance items are reviewed in Epic and correct as of today:  Health Maintenance   Topic Date Due    ZOSTER IMMUNIZATION (1 of 2) Never done    COVID-19 Vaccine (4 - Moderna series) 01/26/2022    INFLUENZA VACCINE (1) 09/01/2023    A1C  02/14/2024    MICROALBUMIN  05/08/2024    DIABETIC FOOT EXAM  05/08/2024    EYE EXAM  06/16/2024    MEDICARE ANNUAL WELLNESS VISIT  08/14/2024    BMP  08/14/2024    LIPID  08/14/2024    FALL RISK ASSESSMENT  08/14/2024    DTAP/TDAP/TD IMMUNIZATION (5 - Td or Tdap) 03/09/2026    COLORECTAL CANCER SCREENING  06/10/2026    ADVANCE CARE PLANNING  08/14/2028    HEPATITIS C SCREENING  Completed    PHQ-2 (once per calendar year)  Completed    Pneumococcal Vaccine: 65+ Years  Completed    AORTIC ANEURYSM SCREENING (SYSTEM ASSIGNED)  Completed    IPV IMMUNIZATION  Aged Out    MENINGITIS IMMUNIZATION  Aged Out     Lab work is in process  Labs reviewed in EPIC  BP Readings from Last 3 Encounters:   08/14/23 136/80   05/08/23 (!) 138/90   02/01/23 151/82    Wt Readings from Last 3 Encounters:   08/14/23 105.2 kg (231 lb 14.4 oz)   05/08/23 106.6 kg (235 lb)   02/01/23 107.5 kg (237 lb)                  Patient Active Problem List   Diagnosis    Hyperlipidemia LDL goal <100    Advanced directives, counseling/discussion    BCC (basal cell carcinoma), face    Obesity due to excess calories, unspecified obesity severity    Other insomnia    Benign  essential hypertension    ACP (advance care planning)    Herpes zoster without complication    Trigger finger, acquired    Hypovitaminosis D    Elevated prostate specific antigen (PSA)    Type 2 diabetes mellitus with hyperglycemia, without long-term current use of insulin (H)    Plantar warts    Rotator cuff tendonitis, left    Morbid obesity (H)    Special screening for malignant neoplasms, colon    Colon cancer screening    Tubular adenoma of colon    Restless legs syndrome    Snoring    JIAN (obstructive sleep apnea)    Microscopic hematuria    Calculus of gallbladder without cholecystitis without obstruction    Seasonal allergic rhinitis due to other allergic trigger    Gross hematuria     Past Surgical History:   Procedure Laterality Date    BIOPSY PROSTATE TRANSRECTAL N/A 10/22/2019    Procedure: Transrectal Ultrasoun guided biopsy of prostate;  Surgeon: Yunior Llanes MD;  Location: [facility] OR    COLONOSCOPY  3-3-2014    due 2019  4 polyps benign    COLONOSCOPY N/A 6/10/2021    Procedure: surveillance colonoscopy,  biopsy;  Surgeon: Lawson Storey MD;  Location: [facility] OR    wisdom teeth extraction         Social History     Tobacco Use    Smoking status: Never     Passive exposure: Never    Smokeless tobacco: Never    Tobacco comments:     remote cigar history   Substance Use Topics    Alcohol use: Yes     Comment: 1/day     Family History   Problem Relation Age of Onset    Cardiovascular Father         aortic valve surgery    Cancer Father         lung cancer    Gastrointestinal Disease Father         benign esophageal tumor removed    Hypertension Father     Diabetes Father     Other - See Comments Mother         infection    Other - See Comments Maternal Grandfather         hunting accident    Other - See Comments Paternal Grandmother         old age    Parkinsonism Paternal Grandfather     Family History Negative Sister     Cerebrovascular Disease No family hx of          Current Outpatient Medications    Medication Sig Dispense Refill    acetylcysteine (N-ACETYL CYSTEINE) 500 MG CAPS capsule Take 1 capsule (500 mg) by mouth daily      amLODIPine (NORVASC) 5 MG tablet Take 1 tablet (5 mg) by mouth daily 90 tablet 3    ASPIRIN 81 MG OR TABS 1 tab po QD (Once per day) 100 3    atorvastatin (LIPITOR) 40 MG tablet Take 1 tablet by mouth once daily 90 tablet 0    BD PEN NEEDLE TYRONE 2ND GEN 32G X 4 MM miscellaneous USE 1 PEN NEEDLE ONCE DAILY AS DIRECTED 100 each 3    blood glucose (ONETOUCH VERIO IQ) test strip USE  STRIP TO CHECK GLUCOSE ONCE DAILY 100 strip 0    Blood Glucose Monitoring Suppl (ONETOUCH VERIO FLEX SYSTEM) w/Device KIT 1 each continuous 1 kit 0    Blood Pressure Monitor KIT 1 Application 2 times daily 1 kit 0    glipiZIDE (GLUCOTROL XL) 10 MG 24 hr tablet Take 1 tablet by mouth twice daily 180 tablet 0    liraglutide (VICTOZA) 18 MG/3ML solution Inject 1.8 mg Subcutaneous daily 27 mL 3    lisinopril (ZESTRIL) 40 MG tablet Take 1 tablet by mouth once daily 90 tablet 2    metFORMIN (GLUCOPHAGE) 1000 MG tablet TAKE 1 TABLET BY MOUTH TWICE DAILY WITH MEALS 180 tablet 0    Multiple Vitamins-Minerals (MULTIVITAMIN PO) Take 1 tablet by mouth daily      Omega-3 Fatty Acids (OMEGA-3 FISH OIL PO) Take 1 g by mouth daily      OneTouch Delica Lancets 33G MISC 1 each daily 100 each 3    sildenafil (REVATIO) 20 MG tablet Take 3-5 (60-100mg) tablets by mouth 1 hour prior to sexual activity 30 tablet 11    Turmeric 500 MG TABS Take 1 tablet by mouth 2 times daily      vitamin B complex with vitamin C (STRESS TAB) tablet Take 1 tablet by mouth daily      VITAMIN D, CHOLECALCIFEROL, PO Take 1,000 Units by mouth daily       Allergies   Allergen Reactions    Nkda [No Known Drug Allergy]      Recent Labs   Lab Test 08/14/23  0927 05/08/23  0901 01/05/23  0912 05/11/22  0830 01/28/22  0905 07/15/21  0819 06/02/21  0916 04/15/21  0911 01/15/21  0750   A1C 7.7* 7.6* 7.5*   < > 7.4*   < >  --  7.7* 6.8*   LDL 55  --  72  --  77  " --   --   --  66   HDL 40  --  47  --  52  --   --   --  45   TRIG 121  --  106  --  108  --   --   --  127   ALT 18  --  27  --  32  --   --   --  36   CR 0.94  --  0.91  --  0.90   < > 0.89  --  0.93   GFRESTIMATED 89  --  >90  --  >90   < > 89  --  86   GFRESTBLACK  --   --   --   --   --   --  >90  --  >90   POTASSIUM 4.4  --  4.4  --  4.0  --  4.1  --  4.0   TSH  --   --  1.78  --   --   --   --  1.54  --     < > = values in this interval not displayed.        Review of Systems   Constitutional:  Negative for chills and fever.   HENT:  Negative for congestion, ear pain, hearing loss and sore throat.    Eyes:  Negative for pain and visual disturbance.   Respiratory:  Negative for cough and shortness of breath.    Cardiovascular:  Negative for chest pain, palpitations and peripheral edema.   Gastrointestinal:  Negative for abdominal pain, constipation, diarrhea, heartburn, hematochezia and nausea.   Genitourinary:  Negative for dysuria, frequency, genital sores, hematuria, impotence, penile discharge and urgency.   Musculoskeletal:  Negative for arthralgias, joint swelling and myalgias.   Skin:  Negative for rash.   Neurological:  Negative for dizziness, weakness, headaches and paresthesias.   Psychiatric/Behavioral:  Negative for mood changes. The patient is not nervous/anxious.        OBJECTIVE:   /80 (BP Location: Left arm, Patient Position: Chair, Cuff Size: Adult Regular)   Pulse 72   Temp 97.2  F (36.2  C) (Tympanic)   Wt 105.2 kg (231 lb 14.4 oz)   SpO2 94%   BMI 33.75 kg/m   Estimated body mass index is 33.75 kg/m  as calculated from the following:    Height as of 5/8/23: 1.765 m (5' 9.5\").    Weight as of this encounter: 105.2 kg (231 lb 14.4 oz).  Physical Exam  GENERAL: healthy, alert and no distress  EYES: Eyes grossly normal to inspection, PERRL and conjunctivae and sclerae normal  HENT: ear canals and TM's normal, nose and mouth without ulcers or lesions  NECK: no adenopathy, no " asymmetry, masses, or scars and thyroid normal to palpation  RESP: lungs clear to auscultation - no rales, rhonchi or wheezes  CV: regular rate and rhythm, normal S1 S2, no S3 or S4, no murmur, click or rub, no peripheral edema and peripheral pulses strong  ABDOMEN: soft, nontender, no hepatosplenomegaly, no masses and bowel sounds normal  MS: no gross musculoskeletal defects noted, no edema  SKIN: no suspicious lesions or rashes  NEURO: Normal strength and tone, mentation intact and speech normal  PSYCH: mentation appears normal, affect normal/bright    Diagnostic Test Results:  Labs reviewed in Epic  Results for orders placed or performed in visit on 08/14/23   Hemoglobin A1c     Status: Abnormal   Result Value Ref Range    Estimated Average Glucose 174 mg/dL    Hemoglobin A1C 7.7 (H) <5.7 %   Comprehensive metabolic panel     Status: Abnormal   Result Value Ref Range    Sodium 138 136 - 145 mmol/L    Potassium 4.4 3.4 - 5.3 mmol/L    Chloride 103 98 - 107 mmol/L    Carbon Dioxide (CO2) 24 22 - 29 mmol/L    Anion Gap 11 7 - 15 mmol/L    Urea Nitrogen 19.0 8.0 - 23.0 mg/dL    Creatinine 0.94 0.67 - 1.17 mg/dL    Calcium 9.8 8.8 - 10.2 mg/dL    Glucose 146 (H) 70 - 99 mg/dL    Alkaline Phosphatase 81 40 - 129 U/L    AST 15 0 - 45 U/L    ALT 18 0 - 70 U/L    Protein Total 7.3 6.4 - 8.3 g/dL    Albumin 4.5 3.5 - 5.2 g/dL    Bilirubin Total 0.5 <=1.2 mg/dL    GFR Estimate 89 >60 mL/min/1.73m2   Lipid Profile (Chol, Trig, HDL, LDL calc)     Status: Normal   Result Value Ref Range    Cholesterol 119 <200 mg/dL    Triglycerides 121 <150 mg/dL    Direct Measure HDL 40 >=40 mg/dL    LDL Cholesterol Calculated 55 <=100 mg/dL    Non HDL Cholesterol 79 <130 mg/dL    Narrative    Cholesterol  Desirable:  <200 mg/dL    Triglycerides  Normal:  Less than 150 mg/dL  Borderline High:  150-199 mg/dL  High:  200-499 mg/dL  Very High:  Greater than or equal to 500 mg/dL    Direct Measure HDL  Female:  Greater than or equal to 50 mg/dL  "  Male:  Greater than or equal to 40 mg/dL    LDL Cholesterol  Desirable:  <100mg/dL  Above Desirable:  100-129 mg/dL   Borderline High:  130-159 mg/dL   High:  160-189 mg/dL   Very High:  >= 190 mg/dL    Non HDL Cholesterol  Desirable:  130 mg/dL  Above Desirable:  130-159 mg/dL  Borderline High:  160-189 mg/dL  High:  190-219 mg/dL  Very High:  Greater than or equal to 220 mg/dL       ASSESSMENT / PLAN:   (Z00.00) Encounter for Medicare annual wellness exam  (primary encounter diagnosis)  Comment:   Plan: follow-up 1 year    (E78.5) Hyperlipidemia LDL goal <100  Comment:   Plan: Comprehensive metabolic panel, Lipid Profile         (Chol, Trig, HDL, LDL calc)        The ASCVD Risk score (Aubrey WERNER, et al., 2019) failed to calculate for the following reasons:    The valid total cholesterol range is 130 to 320 mg/dL    (E11.65) Type 2 diabetes mellitus with hyperglycemia, without long-term current use of insulin (H)  Comment:   Plan: Hemoglobin A1c        Start inositol, follow-up 3 mos    (G47.33) JIAN (obstructive sleep apnea)  Comment:   Plan: not being treated due to mild status    (E55.9) Hypovitaminosis D  Comment:   Plan: labs pending    (I10) Benign essential hypertension  Comment:   Plan: stable    (C44.310) BCC (basal cell carcinoma), face  Comment:   Plan: Adult Dermatology Referral        Due for skin check    (R31.0) Gross hematuria  Comment:   Plan: UA Macroscopic with reflex to Microscopic and         Culture            COUNSELING:  Reviewed preventive health counseling, as reflected in patient instructions  Special attention given to:       Consider AAA screening for ages 65-75 and smoking history       Regular exercise       Healthy diet/nutrition       Vision screening       Hearing screening       Dental care      BMI:   Estimated body mass index is 33.75 kg/m  as calculated from the following:    Height as of 5/8/23: 1.765 m (5' 9.5\").    Weight as of this encounter: 105.2 kg (231 lb 14.4 oz). "   Weight management plan: Discussed healthy diet and exercise guidelines      He reports that he has never smoked. He has never been exposed to tobacco smoke. He has never used smokeless tobacco.      Appropriate preventive services were discussed with this patient, including applicable screening as appropriate for cardiovascular disease, diabetes, osteopenia/osteoporosis, and glaucoma.  As appropriate for age/gender, discussed screening for colorectal cancer, prostate cancer, breast cancer, and cervical cancer. Checklist reviewing preventive services available has been given to the patient.    Reviewed patients plan of care and provided an AVS. The Intermediate Care Plan ( asthma action plan, low back pain action plan, and migraine action plan) for Edward meets the Care Plan requirement. This Care Plan has been established and reviewed with the Patient.          Kelly Yarbrough MD  [facility] [place]    Identified Health Risks:  I have reviewed Opioid Use Disorder and Substance Use Disorder risk factors and made any needed referrals.

## 2023-08-12 ASSESSMENT — ENCOUNTER SYMPTOMS
SORE THROAT: 0
HEMATOCHEZIA: 0
FREQUENCY: 0
DYSURIA: 0
HEADACHES: 0
PARESTHESIAS: 0
DIARRHEA: 0
HEARTBURN: 0
HEMATURIA: 0
ARTHRALGIAS: 0
SHORTNESS OF BREATH: 0
EYE PAIN: 0
FEVER: 0
NAUSEA: 0
WEAKNESS: 0
PALPITATIONS: 0
DIZZINESS: 0
CONSTIPATION: 0
JOINT SWELLING: 0
MYALGIAS: 0
NERVOUS/ANXIOUS: 0
ABDOMINAL PAIN: 0
COUGH: 0
CHILLS: 0

## 2023-08-12 ASSESSMENT — ACTIVITIES OF DAILY LIVING (ADL): CURRENT_FUNCTION: NO ASSISTANCE NEEDED

## 2023-08-14 ENCOUNTER — OFFICE VISIT (OUTPATIENT)
Dept: FAMILY MEDICINE | Facility: OTHER | Age: 68
End: 2023-08-14
Attending: FAMILY MEDICINE
Payer: COMMERCIAL

## 2023-08-14 VITALS
DIASTOLIC BLOOD PRESSURE: 80 MMHG | SYSTOLIC BLOOD PRESSURE: 136 MMHG | HEART RATE: 72 BPM | TEMPERATURE: 97.2 F | WEIGHT: 231.9 LBS | BODY MASS INDEX: 33.75 KG/M2 | OXYGEN SATURATION: 94 %

## 2023-08-14 DIAGNOSIS — Z00.00 ENCOUNTER FOR MEDICARE ANNUAL WELLNESS EXAM: Primary | ICD-10-CM

## 2023-08-14 DIAGNOSIS — I10 BENIGN ESSENTIAL HYPERTENSION: ICD-10-CM

## 2023-08-14 DIAGNOSIS — E78.5 HYPERLIPIDEMIA LDL GOAL <100: ICD-10-CM

## 2023-08-14 DIAGNOSIS — E11.65 TYPE 2 DIABETES MELLITUS WITH HYPERGLYCEMIA, WITHOUT LONG-TERM CURRENT USE OF INSULIN (H): ICD-10-CM

## 2023-08-14 DIAGNOSIS — G47.33 OSA (OBSTRUCTIVE SLEEP APNEA): ICD-10-CM

## 2023-08-14 DIAGNOSIS — C44.310 BCC (BASAL CELL CARCINOMA), FACE: ICD-10-CM

## 2023-08-14 DIAGNOSIS — E55.9 HYPOVITAMINOSIS D: ICD-10-CM

## 2023-08-14 DIAGNOSIS — R31.0 GROSS HEMATURIA: ICD-10-CM

## 2023-08-14 LAB
ALBUMIN SERPL BCG-MCNC: 4.5 G/DL (ref 3.5–5.2)
ALBUMIN UR-MCNC: 20 MG/DL
ALP SERPL-CCNC: 81 U/L (ref 40–129)
ALT SERPL W P-5'-P-CCNC: 18 U/L (ref 0–70)
ANION GAP SERPL CALCULATED.3IONS-SCNC: 11 MMOL/L (ref 7–15)
APPEARANCE UR: CLEAR
AST SERPL W P-5'-P-CCNC: 15 U/L (ref 0–45)
BILIRUB SERPL-MCNC: 0.5 MG/DL
BILIRUB UR QL STRIP: NEGATIVE
BUN SERPL-MCNC: 19 MG/DL (ref 8–23)
CALCIUM SERPL-MCNC: 9.8 MG/DL (ref 8.8–10.2)
CHLORIDE SERPL-SCNC: 103 MMOL/L (ref 98–107)
CHOLEST SERPL-MCNC: 119 MG/DL
COLOR UR AUTO: YELLOW
CREAT SERPL-MCNC: 0.94 MG/DL (ref 0.67–1.17)
DEPRECATED HCO3 PLAS-SCNC: 24 MMOL/L (ref 22–29)
EST. AVERAGE GLUCOSE BLD GHB EST-MCNC: 174 MG/DL
GFR SERPL CREATININE-BSD FRML MDRD: 89 ML/MIN/1.73M2
GLUCOSE SERPL-MCNC: 146 MG/DL (ref 70–99)
GLUCOSE UR STRIP-MCNC: 100 MG/DL
HBA1C MFR BLD: 7.7 %
HDLC SERPL-MCNC: 40 MG/DL
HGB UR QL STRIP: NEGATIVE
KETONES UR STRIP-MCNC: NEGATIVE MG/DL
LDLC SERPL CALC-MCNC: 55 MG/DL
LEUKOCYTE ESTERASE UR QL STRIP: NEGATIVE
MUCOUS THREADS #/AREA URNS LPF: PRESENT /LPF
NITRATE UR QL: NEGATIVE
NONHDLC SERPL-MCNC: 79 MG/DL
PH UR STRIP: 5.5 [PH] (ref 4.7–8)
POTASSIUM SERPL-SCNC: 4.4 MMOL/L (ref 3.4–5.3)
PROT SERPL-MCNC: 7.3 G/DL (ref 6.4–8.3)
RBC URINE: <1 /HPF
SODIUM SERPL-SCNC: 138 MMOL/L (ref 136–145)
SP GR UR STRIP: 1.03 (ref 1–1.03)
SQUAMOUS EPITHELIAL: 1 /HPF
TRIGL SERPL-MCNC: 121 MG/DL
UROBILINOGEN UR STRIP-MCNC: NORMAL MG/DL
WBC URINE: 2 /HPF

## 2023-08-14 PROCEDURE — 81003 URINALYSIS AUTO W/O SCOPE: CPT | Mod: ZL | Performed by: FAMILY MEDICINE

## 2023-08-14 PROCEDURE — G0463 HOSPITAL OUTPT CLINIC VISIT: HCPCS

## 2023-08-14 PROCEDURE — 83036 HEMOGLOBIN GLYCOSYLATED A1C: CPT | Mod: ZL | Performed by: FAMILY MEDICINE

## 2023-08-14 PROCEDURE — G0439 PPPS, SUBSEQ VISIT: HCPCS | Performed by: FAMILY MEDICINE

## 2023-08-14 PROCEDURE — 36415 COLL VENOUS BLD VENIPUNCTURE: CPT | Mod: ZL | Performed by: FAMILY MEDICINE

## 2023-08-14 PROCEDURE — 80053 COMPREHEN METABOLIC PANEL: CPT | Mod: ZL | Performed by: FAMILY MEDICINE

## 2023-08-14 PROCEDURE — 80061 LIPID PANEL: CPT | Mod: ZL | Performed by: FAMILY MEDICINE

## 2023-08-14 SDOH — HEALTH STABILITY: PHYSICAL HEALTH: ON AVERAGE, HOW MANY DAYS PER WEEK DO YOU ENGAGE IN MODERATE TO STRENUOUS EXERCISE (LIKE A BRISK WALK)?: 4 DAYS

## 2023-08-14 SDOH — HEALTH STABILITY: PHYSICAL HEALTH: ON AVERAGE, HOW MANY MINUTES DO YOU ENGAGE IN EXERCISE AT THIS LEVEL?: 60 MIN

## 2023-08-14 ASSESSMENT — SOCIAL DETERMINANTS OF HEALTH (SDOH)

## 2023-08-14 ASSESSMENT — ENCOUNTER SYMPTOMS
NERVOUS/ANXIOUS: 0
SHORTNESS OF BREATH: 0
HEARTBURN: 0
DIZZINESS: 0
WEAKNESS: 0
CHILLS: 0
DIARRHEA: 0
CONSTIPATION: 0
ABDOMINAL PAIN: 0
ARTHRALGIAS: 0
COUGH: 0
HEMATOCHEZIA: 0
MYALGIAS: 0
JOINT SWELLING: 0
FREQUENCY: 0
DYSURIA: 0
FEVER: 0
HEADACHES: 0
PALPITATIONS: 0
SORE THROAT: 0
NAUSEA: 0
EYE PAIN: 0
PARESTHESIAS: 0
HEMATURIA: 0

## 2023-08-14 ASSESSMENT — PAIN SCALES - GENERAL: PAINLEVEL: NO PAIN (0)

## 2023-08-14 ASSESSMENT — LIFESTYLE VARIABLES
HOW MANY STANDARD DRINKS CONTAINING ALCOHOL DO YOU HAVE ON A TYPICAL DAY: 1 OR 2
SKIP TO QUESTIONS 9-10: 1
AUDIT-C TOTAL SCORE: 3
HOW OFTEN DO YOU HAVE SIX OR MORE DRINKS ON ONE OCCASION: NEVER
HOW OFTEN DO YOU HAVE A DRINK CONTAINING ALCOHOL: 2-3 TIMES A WEEK

## 2023-08-14 ASSESSMENT — ACTIVITIES OF DAILY LIVING (ADL): CURRENT_FUNCTION: NO ASSISTANCE NEEDED

## 2023-08-17 ENCOUNTER — DOCUMENTATION ONLY (OUTPATIENT)
Dept: OTHER | Facility: CLINIC | Age: 68
End: 2023-08-17
Payer: COMMERCIAL

## 2023-09-05 ENCOUNTER — MYC MEDICAL ADVICE (OUTPATIENT)
Dept: FAMILY MEDICINE | Facility: OTHER | Age: 68
End: 2023-09-05

## 2023-09-05 DIAGNOSIS — E11.65 TYPE 2 DIABETES MELLITUS WITH HYPERGLYCEMIA, WITHOUT LONG-TERM CURRENT USE OF INSULIN (H): ICD-10-CM

## 2023-09-08 RX ORDER — LIRAGLUTIDE 6 MG/ML
1.8 INJECTION SUBCUTANEOUS DAILY
Qty: 27 ML | Refills: 1 | Status: SHIPPED | OUTPATIENT
Start: 2023-09-08 | End: 2023-11-12

## 2023-09-12 ENCOUNTER — MYC MEDICAL ADVICE (OUTPATIENT)
Dept: FAMILY MEDICINE | Facility: OTHER | Age: 68
End: 2023-09-12

## 2023-09-12 DIAGNOSIS — E11.65 TYPE 2 DIABETES MELLITUS WITH HYPERGLYCEMIA, WITHOUT LONG-TERM CURRENT USE OF INSULIN (H): Primary | ICD-10-CM

## 2023-09-14 RX ORDER — LIRAGLUTIDE 6 MG/ML
1.2 INJECTION SUBCUTANEOUS DAILY
Qty: 6 ML | Refills: 3 | Status: SHIPPED | OUTPATIENT
Start: 2023-09-14 | End: 2024-01-10

## 2023-09-14 NOTE — TELEPHONE ENCOUNTER
Pt called and wanted to increase the dose to 1.8 mg but it is too costly ($700/3 month supply) and would like to go back to the 1.2 mg dose as this is more manageable financially.        Pended up the 1.2 mg dose liraglutide 18mg/3mL  1.2 mg dose per old order 6 mL 3 refills.  Diagnosis: DMII  Please review and sign is appropriate.

## 2023-10-29 DIAGNOSIS — E78.5 HYPERLIPIDEMIA LDL GOAL <100: ICD-10-CM

## 2023-10-29 DIAGNOSIS — E11.65 TYPE 2 DIABETES MELLITUS WITH HYPERGLYCEMIA, WITHOUT LONG-TERM CURRENT USE OF INSULIN (H): ICD-10-CM

## 2023-10-30 RX ORDER — ATORVASTATIN CALCIUM 40 MG/1
TABLET, FILM COATED ORAL
Qty: 90 TABLET | Refills: 2 | Status: SHIPPED | OUTPATIENT
Start: 2023-10-30 | End: 2024-08-08

## 2023-10-30 RX ORDER — GLIPIZIDE 10 MG/1
TABLET, FILM COATED, EXTENDED RELEASE ORAL
Qty: 180 TABLET | Refills: 0 | Status: SHIPPED | OUTPATIENT
Start: 2023-10-30 | End: 2024-01-30

## 2023-10-30 NOTE — TELEPHONE ENCOUNTER
Lipitor      Last Written Prescription Date:  7/27/23  Last Fill Quantity: 90,   # refills: 0  Last Office Visit: 8/14/23  Future Office visit:    Next 5 appointments (look out 90 days)      Nov 20, 2023  8:30 AM  (Arrive by 8:15 AM)  SHORT with Kelly Yarbrough MD  [facility] [place] ([facility] [place] ) [address]  [place] [address]  [phone]             Routing refill request to provider for review/approval because:    Glipizide      Last Written Prescription Date:  7/27/23  Last Fill Quantity: 180,   # refills: 0  Last Office Visit: 8/14/23  Future Office visit:    Next 5 appointments (look out 90 days)      Nov 20, 2023  8:30 AM  (Arrive by 8:15 AM)  SHORT with Kelly Yarbrough MD  [facility] [place] ([facility] [place] ) [address]  [place] [address]  [phone]             Routing refill request to provider for review/approval because:    Metformin      Last Written Prescription Date:  7/27/23  Last Fill Quantity: 180,   # refills: 0  Last Office Visit: 8/14/23  Future Office visit:    Next 5 appointments (look out 90 days)      Nov 20, 2023  8:30 AM  (Arrive by 8:15 AM)  SHORT with Kelly Yarbrough MD  [facility] [place] ([facility] [place] ) [address] [place] [address]  [phone]             Routing refill request to provider for review/approval because:

## 2023-11-06 ENCOUNTER — OFFICE VISIT (OUTPATIENT)
Dept: DERMATOLOGY | Facility: OTHER | Age: 68
End: 2023-11-06
Attending: FAMILY MEDICINE
Payer: COMMERCIAL

## 2023-11-06 VITALS
RESPIRATION RATE: 16 BRPM | OXYGEN SATURATION: 94 % | SYSTOLIC BLOOD PRESSURE: 144 MMHG | HEART RATE: 81 BPM | DIASTOLIC BLOOD PRESSURE: 76 MMHG

## 2023-11-06 DIAGNOSIS — C44.310 BCC (BASAL CELL CARCINOMA), FACE: ICD-10-CM

## 2023-11-06 PROCEDURE — G0463 HOSPITAL OUTPT CLINIC VISIT: HCPCS

## 2023-11-06 PROCEDURE — 17000 DESTRUCT PREMALG LESION: CPT | Performed by: DERMATOLOGY

## 2023-11-06 ASSESSMENT — PAIN SCALES - GENERAL: PAINLEVEL: NO PAIN (0)

## 2023-11-06 NOTE — PROGRESS NOTES
S: Return visit for Edward was last seen in June 2017.  He has a history of 1 basal cell on his face - he continues to wear hats and use sunscreen on a regular basis he is not worried about anything other than 1 lesion on his cheek    O,: 1 small lesion on his right cheek that  is likely a classic actinic keratosis.  I examined his face neck chest and back carefully and saw no lesions of any concern in other areas.    A: 1 actinic keratosis.    P: Cryotherapy to the actinic keratosis.  Reassured that he had no other lesions of concern.  Advised that he continue his sun protection measures as these are being very helpful for him I am sure.    Return as needed Meds and allergies reviewed    20 minutes spent reviewing his record, examining his skin, and counseling him about sun protection.

## 2023-11-06 NOTE — LETTER
11/6/2023       RE: Edward Hannah  [address]   [facility] [address]     Dear Colleague,    Thank you for referring your patient, Edward Hannah, to the [facility]. Please see a copy of my visit note below.    S: Return visit for Edward was last seen in June 2017.  He has a history of 1 basal cell on his face - he continues to wear hats and use sunscreen on a regular basis he is not worried about anything other than 1 lesion on his cheek    O,: 1 small lesion on his right cheek that  is likely a classic actinic keratosis.  I examined his face neck chest and back carefully and saw no lesions of any concern in other areas.    A: 1 actinic keratosis.    P: Cryotherapy to the actinic keratosis.  Reassured that he had no other lesions of concern.  Advised that he continue his sun protection measures as these are being very helpful for him I am sure.    Return as needed Meds and allergies reviewed    20 minutes spent reviewing his record, examining his skin, and counseling him about sun protection.       Again, thank you for allowing me to participate in the care of your patient.      Sincerely,    TRACY Dominguez MD

## 2023-11-07 NOTE — DISCHARGE INSTRUCTIONS
reviewed, no concerns. Prescription sent electronically      -Keep trying to limit foods high in carbohydrates in your diet.    -Be as active as you can.    -Keep your current testing schedule.   -Continue your Glucotrol XL 10 mg bid and Metformin 1000 mg bid.   -I will let you know when Dr. Yarbrough gives okay for Victoza.    -Begin 0.6 gm Victoza x 1 week and then increase to 1.2 mg after first week.   -I will call you in approximately 2 weeks to check on your glucose levels.   -Call me with any concerns - MARIZA Nichols, [phone]

## 2023-11-20 ENCOUNTER — APPOINTMENT (OUTPATIENT)
Dept: LAB | Facility: OTHER | Age: 68
End: 2023-11-20
Attending: FAMILY MEDICINE
Payer: COMMERCIAL

## 2023-11-20 ENCOUNTER — OFFICE VISIT (OUTPATIENT)
Dept: FAMILY MEDICINE | Facility: OTHER | Age: 68
End: 2023-11-20
Attending: FAMILY MEDICINE
Payer: COMMERCIAL

## 2023-11-20 VITALS
HEART RATE: 75 BPM | TEMPERATURE: 97.4 F | OXYGEN SATURATION: 97 % | DIASTOLIC BLOOD PRESSURE: 70 MMHG | BODY MASS INDEX: 33.7 KG/M2 | SYSTOLIC BLOOD PRESSURE: 140 MMHG | WEIGHT: 231.5 LBS

## 2023-11-20 DIAGNOSIS — G47.09 OTHER INSOMNIA: ICD-10-CM

## 2023-11-20 DIAGNOSIS — E78.5 HYPERLIPIDEMIA LDL GOAL <100: ICD-10-CM

## 2023-11-20 DIAGNOSIS — E55.9 HYPOVITAMINOSIS D: ICD-10-CM

## 2023-11-20 DIAGNOSIS — E11.65 TYPE 2 DIABETES MELLITUS WITH HYPERGLYCEMIA, WITHOUT LONG-TERM CURRENT USE OF INSULIN (H): Primary | ICD-10-CM

## 2023-11-20 DIAGNOSIS — I10 BENIGN ESSENTIAL HYPERTENSION: ICD-10-CM

## 2023-11-20 LAB
EST. AVERAGE GLUCOSE BLD GHB EST-MCNC: 160 MG/DL
HBA1C MFR BLD: 7.2 %
HOLD SPECIMEN: NORMAL
HOLD SPECIMEN: NORMAL

## 2023-11-20 PROCEDURE — G0008 ADMIN INFLUENZA VIRUS VAC: HCPCS

## 2023-11-20 PROCEDURE — 36415 COLL VENOUS BLD VENIPUNCTURE: CPT | Mod: ZL | Performed by: FAMILY MEDICINE

## 2023-11-20 PROCEDURE — 99214 OFFICE O/P EST MOD 30 MIN: CPT | Performed by: FAMILY MEDICINE

## 2023-11-20 PROCEDURE — G0463 HOSPITAL OUTPT CLINIC VISIT: HCPCS | Mod: 25

## 2023-11-20 PROCEDURE — 83036 HEMOGLOBIN GLYCOSYLATED A1C: CPT | Mod: ZL | Performed by: FAMILY MEDICINE

## 2023-11-20 ASSESSMENT — PAIN SCALES - GENERAL: PAINLEVEL: NO PAIN (0)

## 2023-11-20 NOTE — PROGRESS NOTES
Assessment & Plan     Type 2 diabetes mellitus with hyperglycemia, without long-term current use of insulin (H)  Doing well, has been eating more vegetables and has been sleeping better  - Hemoglobin A1c; Future  - Hemoglobin A1c    Hyperlipidemia LDL goal <100  The ASCVD Risk score (Aubrey WERNER, et al., 2019) failed to calculate for the following reasons:    The valid total cholesterol range is 130 to 320 mg/dL  Continue statin    Benign essential hypertension  Borderline, he is working on sleep and getting to the gym    Other insomnia  Start vit c 1000mg daily    Hypovitaminosis D  stable      Provider  Link to [facility] Help Grid :[id number]}    Patient was agreeable to this plan and had no further questions.  Patient Instructions   Vitamin C  500-1000mg morning    The glucose revolution by Delphine Blank  (glucose goddess)  -veggies first    The obesity code  by Dr Jake Vick   (you tube)    No follow-ups on file.    Kelly Yarbrough MD  [facility] [place] Leon is a 68 year old, presenting for the following health issues:  Lipids, Hypertension, and Diabetes        11/20/2023     8:18 AM   Additional Questions   Roomed by Tammy Wesley   Accompanied by None         11/20/2023     8:18 AM   Patient Reported Additional Medications   Patient reports taking the following new medications None       HPI     Diabetes Follow-up    How often are you checking your blood sugar? A few times a week  What time of day are you checking your blood sugars (select all that apply)?  Before meals and At bedtime  Have you had any blood sugars above 200?  No  Have you had any blood sugars below 70?  No  What symptoms do you notice when your blood sugar is low?  Shaky and Dizzy  What concerns do you have today about your diabetes? None   Do you have any of these symptoms? (Select all that apply)  No numbness or tingling in feet.  No redness, sores or blisters on feet.  No complaints of excessive thirst.  No  reports of blurry vision.  No significant changes to weight.          Hyperlipidemia Follow-Up    Are you regularly taking any medication or supplement to lower your cholesterol?   Yes- Lipitor   Are you having muscle aches or other side effects that you think could be caused by your cholesterol lowering medication?  No    Hypertension Follow-up    Do you check your blood pressure regularly outside of the clinic? Yes patient was for awhile but has not been checking it recently   Are you following a low salt diet? Yes  Are your blood pressures ever more than 140 on the top number (systolic) OR more   than 90 on the bottom number (diastolic), for example 140/90? No    BP Readings from Last 2 Encounters:   11/20/23 (!) 150/87   11/06/23 (!) 144/76     Hemoglobin A1C (%)   Date Value   08/14/2023 7.7 (H)   05/08/2023 7.6 (H)   04/15/2021 7.7 (H)   01/15/2021 6.8 (H)     LDL Cholesterol Calculated (mg/dL)   Date Value   08/14/2023 55   01/05/2023 72   01/15/2021 66   01/10/2020 79     How many servings of fruits and vegetables do you eat daily?  2-3  On average, how many sweetened beverages do you drink each day (Examples: soda, juice, sweet tea, etc.  Do NOT count diet or artificially sweetened beverages)?   1  How many days per week do you exercise enough to make your heart beat faster? 3 or less  How many minutes a day do you exercise enough to make your heart beat faster? 60 or more  How many days per week do you miss taking your medication? 0      Review of Systems   Constitutional, HEENT, cardiovascular, pulmonary, gi and gu systems are negative, except as otherwise noted.      Objective    BP (!) 150/87 (BP Location: Left arm, Patient Position: Sitting, Cuff Size: Adult Large)   Pulse 75   Temp 97.4  F (36.3  C) (Tympanic)   Wt 105 kg (231 lb 8 oz)   SpO2 97%   BMI 33.70 kg/m    Body mass index is 33.7 kg/m .  Physical Exam   GENERAL: healthy, alert and no distress  NECK: no adenopathy, no asymmetry, masses,  or scars and thyroid normal to palpation  RESP: lungs clear to auscultation - no rales, rhonchi or wheezes  CV: regular rate and rhythm, normal S1 S2, no S3 or S4, no murmur, click or rub, no peripheral edema and peripheral pulses strong  ABDOMEN: soft, nontender, no hepatosplenomegaly, no masses and bowel sounds normal  MS: no gross musculoskeletal defects noted, no edema  PSYCH: mentation appears normal, affect normal/bright    Results for orders placed or performed in visit on 11/20/23   Hemoglobin A1c     Status: Abnormal   Result Value Ref Range    Estimated Average Glucose 160 mg/dL    Hemoglobin A1C 7.2 (H) <5.7 %   Extra Tube     Status: None    Narrative    The following orders were created for panel order Extra Tube.  Procedure                               Abnormality         Status                     ---------                               -----------         ------                     Extra Serum Separator Tu...[[id number]]                      Final result               Extra Green Top (Lithium...[[id number]]                      Final result                 Please view results for these tests on the individual orders.   Extra Serum Separator Tube (SST)     Status: None   Result Value Ref Range    Hold Specimen JIC    Extra Green Top (Lithium Heparin) Tube     Status: None   Result Value Ref Range    Hold Specimen JIC

## 2023-11-27 ENCOUNTER — PATIENT OUTREACH (OUTPATIENT)
Dept: GASTROENTEROLOGY | Facility: CLINIC | Age: 68
End: 2023-11-27
Payer: COMMERCIAL

## 2024-01-09 DIAGNOSIS — E11.65 TYPE 2 DIABETES MELLITUS WITH HYPERGLYCEMIA, WITHOUT LONG-TERM CURRENT USE OF INSULIN (H): ICD-10-CM

## 2024-01-10 RX ORDER — LIRAGLUTIDE 6 MG/ML
1.2 INJECTION SUBCUTANEOUS DAILY
Qty: 6 ML | Refills: 3 | Status: SHIPPED | OUTPATIENT
Start: 2024-01-10 | End: 2024-05-20

## 2024-01-10 NOTE — TELEPHONE ENCOUNTER
Phylicia      Last Written Prescription Date:  9/14/23  Last Fill Quantity: 6mL,   # refills: 3  Last Office Visit: 11/20/23  Future Office visit:    Next 5 appointments (look out 90 days)      Feb 28, 2024  9:45 AM  (Arrive by 9:30 AM)  SHORT with Kelly Yarbrough MD  [facility] [place] ([facility] [place] ) [address]  [place] [address]  [phone]             Routing refill request to provider for review/approval because:

## 2024-01-19 ENCOUNTER — TELEPHONE (OUTPATIENT)
Dept: FAMILY MEDICINE | Facility: OTHER | Age: 69
End: 2024-01-19

## 2024-01-19 DIAGNOSIS — E11.9 TYPE 2 DIABETES MELLITUS WITHOUT COMPLICATION, WITHOUT LONG-TERM CURRENT USE OF INSULIN (H): Primary | ICD-10-CM

## 2024-01-29 DIAGNOSIS — E11.65 TYPE 2 DIABETES MELLITUS WITH HYPERGLYCEMIA, WITHOUT LONG-TERM CURRENT USE OF INSULIN (H): ICD-10-CM

## 2024-01-29 DIAGNOSIS — I10 BENIGN ESSENTIAL HYPERTENSION: ICD-10-CM

## 2024-01-30 RX ORDER — LISINOPRIL 40 MG/1
TABLET ORAL
Qty: 90 TABLET | Refills: 0 | Status: SHIPPED | OUTPATIENT
Start: 2024-01-30 | End: 2024-04-26

## 2024-01-30 RX ORDER — GLIPIZIDE 10 MG/1
TABLET, FILM COATED, EXTENDED RELEASE ORAL
Qty: 180 TABLET | Refills: 0 | Status: SHIPPED | OUTPATIENT
Start: 2024-01-30 | End: 2024-04-26

## 2024-01-30 NOTE — TELEPHONE ENCOUNTER
Disp Refills Start End LUX   lisinopril (ZESTRIL) 40 MG tablet 90 tablet 2 4/27/2023 -- No      Disp Refills Start End LUX   glipiZIDE (GLUCOTROL XL) 10 MG 24 hr tablet 180 tablet 0 10/30/2023 -- No      Disp Refills Start End LUX   metFORMIN (GLUCOPHAGE) 1000 MG tablet 180 tablet 0 10/30/2023 -- No     Last Office Visit: 11/20/2023  Future Office visit:    Next 5 appointments (look out 90 days)      Feb 28, 2024  9:45 AM  (Arrive by 9:30 AM)  SHORT with Kelly Yarbrough MD  [facility] [place] ([facility] [place] ) [address]  [place] [address]  [phone]             Routing refill request to provider for review/approval because:

## 2024-01-31 DIAGNOSIS — E11.9 TYPE 2 DIABETES MELLITUS WITHOUT COMPLICATION, WITHOUT LONG-TERM CURRENT USE OF INSULIN (H): ICD-10-CM

## 2024-01-31 DIAGNOSIS — E11.65 TYPE 2 DIABETES MELLITUS WITH HYPERGLYCEMIA, WITHOUT LONG-TERM CURRENT USE OF INSULIN (H): ICD-10-CM

## 2024-02-01 ENCOUNTER — HOSPITAL ENCOUNTER (OUTPATIENT)
Dept: EDUCATION SERVICES | Facility: HOSPITAL | Age: 69
Discharge: HOME | End: 2024-02-01
Attending: DIETITIAN, REGISTERED | Admitting: DIETITIAN, REGISTERED
Payer: COMMERCIAL

## 2024-02-01 VITALS
HEART RATE: 75 BPM | OXYGEN SATURATION: 96 % | WEIGHT: 235.2 LBS | BODY MASS INDEX: 32.93 KG/M2 | SYSTOLIC BLOOD PRESSURE: 126 MMHG | RESPIRATION RATE: 16 BRPM | HEIGHT: 71 IN | DIASTOLIC BLOOD PRESSURE: 68 MMHG

## 2024-02-01 DIAGNOSIS — E11.9 TYPE 2 DIABETES MELLITUS WITHOUT COMPLICATION, WITHOUT LONG-TERM CURRENT USE OF INSULIN (H): Primary | ICD-10-CM

## 2024-02-01 PROCEDURE — G0108 DIAB MANAGE TRN  PER INDIV: HCPCS | Performed by: DIETITIAN, REGISTERED

## 2024-02-01 ASSESSMENT — PAIN SCALES - GENERAL: PAINLEVEL: NO PAIN (0)

## 2024-02-01 NOTE — LETTER
2/1/2024        RE: Edward Hannah  [address]   [place] [address]        Diabetes Self-Management Education & Support    Presents for: Individual review (Annual)    Type of Service: In Person Visit    ASSESSMENT:  Recent A1c 7.2% which is improved from two previous A1c checks.  Pt states he does not know what he has done different to allow for improvement.  Weight is stable.  Eye eye exam and foot exam are up to date.  He does not follow restrictive diet but states he does try to practice portion control.  He is active daily.      Patient's most recent   Lab Results   Component Value Date    A1C 7.2 11/20/2023    A1C 7.7 04/15/2021     is meeting goal of  <7.5% for age.     Diabetes knowledge and skills assessment:   Patient is knowledgeable in diabetes management concepts related to: Healthy Eating, Being Active, Monitoring, Taking Medication, Problem Solving, Reducing Risks, and Healthy Coping    Will continue to educate on diabetes management concepts as needed.     Based on learning assessment above, most appropriate setting for further diabetes education would be: Individual setting.      PLAN  Healthy food choices encouraged.   Continue to be active daily.  Test glucose 1x/day - alternate times.  Take Victoza in am vs evening.   Pt will have A1c at provider visit later this month.     Follow-up: Annually or sooner if indicated.     See Care Plan for co-developed, patient-state behavior change goals.  AVS provided for patient today.    Education Materials Provided:  No new materials     SUBJECTIVE/OBJECTIVE:  Presents for: Individual review (Annual)  Accompanied by: Self  Diabetes education in the past 24mo: Yes  Focus of Visit: Assistance w/ making life changes  Diabetes type: Type 2  Date of diagnosis: 2005  Disease course: Stable  How confident are you filling out medical forms by yourself:: Extremely  Diabetes management related comments/concerns: None  Transportation concerns: No  Difficulty  "affording diabetes medication?: No  Difficulty affording diabetes testing supplies?: No  Other concerns:: None  Cultural Influences/Ethnic Background:  Not  or     Diabetes Symptoms & Complications:  Diabetes Related Symptoms: None  Weight trend: Stable  Symptom course: Stable  Disease course: Stable  Complications assessed today?: Yes  CVA: No  Heart disease: No  Nephropathy: No  Retinopathy: No    Patient Problem List and Family Medical History reviewed for relevant medical history, current medical status, and diabetes risk factors.    Vitals:  /68   Pulse 75   Resp 16   Ht 1.803 m (5' 11\")   Wt 106.7 kg (235 lb 3.2 oz)   SpO2 96%   BMI 32.80 kg/m    Estimated body mass index is 32.8 kg/m  as calculated from the following:    Height as of this encounter: 1.803 m (5' 11\").    Weight as of this encounter: 106.7 kg (235 lb 3.2 oz).   Last 3 BP:   BP Readings from Last 3 Encounters:   02/01/24 126/68   11/20/23 (!) 140/70   11/06/23 (!) 144/76       History   Smoking Status     Never   Smokeless Tobacco     Never       Labs:  Lab Results   Component Value Date    A1C 7.2 11/20/2023    A1C 7.7 04/15/2021     Lab Results   Component Value Date     08/14/2023     01/28/2022     06/02/2021     Lab Results   Component Value Date    LDL 55 08/14/2023    LDL 66 01/15/2021     HDL Cholesterol   Date Value Ref Range Status   01/15/2021 45 >39 mg/dL Final     Direct Measure HDL   Date Value Ref Range Status   08/14/2023 40 >=40 mg/dL Final   ]  GFR Estimate   Date Value Ref Range Status   08/14/2023 89 >60 mL/min/1.73m2 Final   06/02/2021 89 >60 mL/min/[1.73_m2] Final     Comment:     Non  GFR Calc  Starting 12/18/2018, serum creatinine based estimated GFR (eGFR) will be   calculated using the Chronic Kidney Disease Epidemiology Collaboration   (CKD-EPI) equation.       GFR Estimate If Black   Date Value Ref Range Status   06/02/2021 >90 >60 mL/min/[1.73_m2] Final " "    Comment:      GFR Calc  Starting 12/18/2018, serum creatinine based estimated GFR (eGFR) will be   calculated using the Chronic Kidney Disease Epidemiology Collaboration   (CKD-EPI) equation.       Lab Results   Component Value Date    CR 0.94 08/14/2023    CR 0.89 06/02/2021     No results found for: \"MICROALBUMIN\"    Healthy Eating:  Healthy Eating Assessed Today: Yes  Cultural/Hindu diet restrictions?: No  Do you have any food allergies or intolerances?: No  Meal planning/habits: Heart healthy, Low salt  Who cooks/prepares meals for you?: Self  Who purchases food in  your home?: Self  How many times a week on average do you eat food made away from home (restaurant/take-out)?: 1  Meals include: Breakfast, Lunch, Dinner  Breakfast: oatmeal with fruit 3-4x/week with bagel or 2 peanut butter toast or cold cereal with fruit and bagel or 2 peanut butter toast or leftovers  Lunch: may skip or leftovers or ham and cheese sandwich or salad with protein/low calorie dressing  Dinner: chicken, ribs, meatloaf, fish with veggies and sweet potatoes vs white potatoes  Snacks: fruit or nuts or chips  Beverages: Water, Tea, Milk, Diet soda  Has patient met with a dietitian in the past?: Yes    Being Active:  Being Active Assessed Today: Yes  Exercise:: Yes (works in the woods - cutting trails, cutting wood or gym)  Days per week of moderate to strenuous exercise (like a brisk walk): 7  On average, minutes per day of exercise at this level: 60  How intense was your typical exercise? : Moderate (like brisk walking)  Exercise Minutes per Week: 420  Barrier to exercise: None    Monitoring:  Monitoring Assessed Today: Yes  Did patient bring glucose meter to appointment? : Yes  Blood Glucose Meter: One Touch (Verio)  Times checking blood sugar at home (number): 1  Times checking blood sugar at home (per): Day  Blood glucose trend: No change  Fasting-136, 145, 104, 95, 128, 122  2 hours post meal-97, 188, 170  Two " week average 132.     Taking Medications:  Diabetes Medication(s)       Biguanides       metFORMIN (GLUCOPHAGE) 1000 MG tablet TAKE 1 TABLET BY MOUTH TWICE DAILY WITH MEALS       Sulfonylureas       glipiZIDE (GLUCOTROL XL) 10 MG 24 hr tablet Take 1 tablet by mouth twice daily       Incretin Mimetic Agents       liraglutide (VICTOZA PEN) 18 MG/3ML solution INJECT 1.2 MG SUBCUTANEOUS DAILY.            Taking Medication Assessed Today: Yes  Current Treatments: Diet, Non-insulin Injectables, Oral Medication (taken by mouth) (Metformin 1000 mg bid, Glucotrol XL 10 mg bid, Victoza 1.2 mg daily)  Problems taking diabetes medications regularly?: No  Diabetes medication side effects?: No    Problem Solving:  Problem Solving Assessed Today: Yes  Is the patient at risk for hypoglycemia?: Yes  Hypoglycemia Frequency: Monthly  Hypoglycemia Treatment: Other food, Candy  Patient carries a carbohydrate source: Yes  Does patient have glucagon emergency kit?: No    Hypoglycemia Symptoms  Hypoglycemia: Feeling shaky, Hunger, Headaches    Hypoglycemia Complications  Hypoglycemia Complications: None    Reducing Risks:  Reducing Risks Assessed Today: Yes  Diabetes Risks: Age over 45 years  CAD Risks: Diabetes Mellitus, Male sex  Has dilated eye exam at least once a year?: Yes  Sees dentist every 6 months?: No (Has appointment scheduled.)  Feet checked by healthcare provider in the last year?: Yes    Healthy Coping:  Healthy Coping Assessed Today: Yes  Emotional response to diabetes: Acceptance, Concern for health and well-being  Stage of change: MAINTENANCE (Working to maintain change, with risk of relapse)  Patient Activation Measure Survey Score:      6/28/2011    10:00 AM 10/31/2018     9:00 AM   EZ Score (Last Two)   EZ Raw Score 46 38   Activation Score 75.3 83.7   EZ Level 4 4       Time Spent: 30 minutes  Encounter Type: Individual    Any diabetes medication dose changes were made via the CDE Protocol per the patient's  referring provider. A copy of this encounter was shared with the provider.       Sincerely,        Karrie Coleman RD

## 2024-02-01 NOTE — PATIENT INSTRUCTIONS
-Keep working on healthy food choices, avoiding large portions.   -Be as active as able.  Exercise helps lower your glucose levels.   -Try moving your Victoza to am vs evening.   -Good times to check are fasting or 2 hours after a meal.  -Target levels are fasting , 2 hours after a meal < 180.  -Recent A1c was 7.2% in November.  Goal is < 7.5%.   -You are doing great!   -Follow up annually or sooner if needed.   -MARIZA Nichols, [facility] [phone].

## 2024-02-01 NOTE — TELEPHONE ENCOUNTER
Test Strips      Last Written Prescription Date:  10/10/22  Last Fill Quantity: 100,   # refills: 0  Last Office Visit: 11/20/23  Future Office visit:    Next 5 appointments (look out 90 days)      Feb 28, 2024  9:45 AM  (Arrive by 9:30 AM)  SHORT with Kelly Yarbrough MD  [facility] [place] ([facility] [place] ) [address] [place] [address]  [phone]             Routing refill request to provider for review/approval because:      Pen Needle      Last Written Prescription Date:  11/30/22  Last Fill Quantity: 100,   # refills: 3  Last Office Visit: 11/20/23  Future Office visit:    Next 5 appointments (look out 90 days)      Feb 28, 2024  9:45 AM  (Arrive by 9:30 AM)  SHORT with Kelly Yarbrough MD  [facility] [place] ([facility] [place] ) [address]  [place] [address]  [phone]             Routing refill request to provider for review/approval because:

## 2024-02-01 NOTE — PROGRESS NOTES
Diabetes Self-Management Education & Support    Presents for: Individual review (Annual)    Type of Service: In Person Visit    ASSESSMENT:  Recent A1c 7.2% which is improved from two previous A1c checks.  Pt states he does not know what he has done different to allow for improvement.  Weight is stable.  Eye eye exam and foot exam are up to date.  He does not follow restrictive diet but states he does try to practice portion control.  He is active daily.      Patient's most recent   Lab Results   Component Value Date    A1C 7.2 11/20/2023    A1C 7.7 04/15/2021     is meeting goal of  <7.5% for age.     Diabetes knowledge and skills assessment:   Patient is knowledgeable in diabetes management concepts related to: Healthy Eating, Being Active, Monitoring, Taking Medication, Problem Solving, Reducing Risks, and Healthy Coping    Will continue to educate on diabetes management concepts as needed.     Based on learning assessment above, most appropriate setting for further diabetes education would be: Individual setting.      PLAN  Healthy food choices encouraged.   Continue to be active daily.  Test glucose 1x/day - alternate times.  Take Victoza in am vs evening.   Pt will have A1c at provider visit later this month.     Follow-up: Annually or sooner if indicated.     See Care Plan for co-developed, patient-state behavior change goals.  AVS provided for patient today.    Education Materials Provided:  No new materials     SUBJECTIVE/OBJECTIVE:  Presents for: Individual review (Annual)  Accompanied by: Self  Diabetes education in the past 24mo: Yes  Focus of Visit: Assistance w/ making life changes  Diabetes type: Type 2  Date of diagnosis: 2005  Disease course: Stable  How confident are you filling out medical forms by yourself:: Extremely  Diabetes management related comments/concerns: None  Transportation concerns: No  Difficulty affording diabetes medication?: No  Difficulty affording diabetes testing supplies?:  "No  Other concerns:: None  Cultural Influences/Ethnic Background:  Not  or     Diabetes Symptoms & Complications:  Diabetes Related Symptoms: None  Weight trend: Stable  Symptom course: Stable  Disease course: Stable  Complications assessed today?: Yes  CVA: No  Heart disease: No  Nephropathy: No  Retinopathy: No    Patient Problem List and Family Medical History reviewed for relevant medical history, current medical status, and diabetes risk factors.    Vitals:  /68   Pulse 75   Resp 16   Ht 1.803 m (5' 11\")   Wt 106.7 kg (235 lb 3.2 oz)   SpO2 96%   BMI 32.80 kg/m    Estimated body mass index is 32.8 kg/m  as calculated from the following:    Height as of this encounter: 1.803 m (5' 11\").    Weight as of this encounter: 106.7 kg (235 lb 3.2 oz).   Last 3 BP:   BP Readings from Last 3 Encounters:   02/01/24 126/68   11/20/23 (!) 140/70   11/06/23 (!) 144/76       History   Smoking Status    Never   Smokeless Tobacco    Never       Labs:  Lab Results   Component Value Date    A1C 7.2 11/20/2023    A1C 7.7 04/15/2021     Lab Results   Component Value Date     08/14/2023     01/28/2022     06/02/2021     Lab Results   Component Value Date    LDL 55 08/14/2023    LDL 66 01/15/2021     HDL Cholesterol   Date Value Ref Range Status   01/15/2021 45 >39 mg/dL Final     Direct Measure HDL   Date Value Ref Range Status   08/14/2023 40 >=40 mg/dL Final   ]  GFR Estimate   Date Value Ref Range Status   08/14/2023 89 >60 mL/min/1.73m2 Final   06/02/2021 89 >60 mL/min/[1.73_m2] Final     Comment:     Non  GFR Calc  Starting 12/18/2018, serum creatinine based estimated GFR (eGFR) will be   calculated using the Chronic Kidney Disease Epidemiology Collaboration   (CKD-EPI) equation.       GFR Estimate If Black   Date Value Ref Range Status   06/02/2021 >90 >60 mL/min/[1.73_m2] Final     Comment:      GFR Calc  Starting 12/18/2018, serum creatinine based " "estimated GFR (eGFR) will be   calculated using the Chronic Kidney Disease Epidemiology Collaboration   (CKD-EPI) equation.       Lab Results   Component Value Date    CR 0.94 08/14/2023    CR 0.89 06/02/2021     No results found for: \"MICROALBUMIN\"    Healthy Eating:  Healthy Eating Assessed Today: Yes  Cultural/Confucianist diet restrictions?: No  Do you have any food allergies or intolerances?: No  Meal planning/habits: Heart healthy, Low salt  Who cooks/prepares meals for you?: Self  Who purchases food in  your home?: Self  How many times a week on average do you eat food made away from home (restaurant/take-out)?: 1  Meals include: Breakfast, Lunch, Dinner  Breakfast: oatmeal with fruit 3-4x/week with bagel or 2 peanut butter toast or cold cereal with fruit and bagel or 2 peanut butter toast or leftovers  Lunch: may skip or leftovers or ham and cheese sandwich or salad with protein/low calorie dressing  Dinner: chicken, ribs, meatloaf, fish with veggies and sweet potatoes vs white potatoes  Snacks: fruit or nuts or chips  Beverages: Water, Tea, Milk, Diet soda  Has patient met with a dietitian in the past?: Yes    Being Active:  Being Active Assessed Today: Yes  Exercise:: Yes (works in the woods - cutting trails, cutting wood or gym)  Days per week of moderate to strenuous exercise (like a brisk walk): 7  On average, minutes per day of exercise at this level: 60  How intense was your typical exercise? : Moderate (like brisk walking)  Exercise Minutes per Week: 420  Barrier to exercise: None    Monitoring:  Monitoring Assessed Today: Yes  Did patient bring glucose meter to appointment? : Yes  Blood Glucose Meter: One Touch (Verio)  Times checking blood sugar at home (number): 1  Times checking blood sugar at home (per): Day  Blood glucose trend: No change  Fasting-136, 145, 104, 95, 128, 122  2 hours post meal-97, 188, 170  Two week average 132.     Taking Medications:  Diabetes Medication(s)       Biguanides      "  metFORMIN (GLUCOPHAGE) 1000 MG tablet TAKE 1 TABLET BY MOUTH TWICE DAILY WITH MEALS       Sulfonylureas       glipiZIDE (GLUCOTROL XL) 10 MG 24 hr tablet Take 1 tablet by mouth twice daily       Incretin Mimetic Agents       liraglutide (VICTOZA PEN) 18 MG/3ML solution INJECT 1.2 MG SUBCUTANEOUS DAILY.            Taking Medication Assessed Today: Yes  Current Treatments: Diet, Non-insulin Injectables, Oral Medication (taken by mouth) (Metformin 1000 mg bid, Glucotrol XL 10 mg bid, Victoza 1.2 mg daily)  Problems taking diabetes medications regularly?: No  Diabetes medication side effects?: No    Problem Solving:  Problem Solving Assessed Today: Yes  Is the patient at risk for hypoglycemia?: Yes  Hypoglycemia Frequency: Monthly  Hypoglycemia Treatment: Other food, Candy  Patient carries a carbohydrate source: Yes  Does patient have glucagon emergency kit?: No    Hypoglycemia Symptoms  Hypoglycemia: Feeling shaky, Hunger, Headaches    Hypoglycemia Complications  Hypoglycemia Complications: None    Reducing Risks:  Reducing Risks Assessed Today: Yes  Diabetes Risks: Age over 45 years  CAD Risks: Diabetes Mellitus, Male sex  Has dilated eye exam at least once a year?: Yes  Sees dentist every 6 months?: No (Has appointment scheduled.)  Feet checked by healthcare provider in the last year?: Yes    Healthy Coping:  Healthy Coping Assessed Today: Yes  Emotional response to diabetes: Acceptance, Concern for health and well-being  Stage of change: MAINTENANCE (Working to maintain change, with risk of relapse)  Patient Activation Measure Survey Score:      6/28/2011    10:00 AM 10/31/2018     9:00 AM   EZ Score (Last Two)   EZ Raw Score 46 38   Activation Score 75.3 83.7   EZ Level 4 4       Time Spent: 30 minutes  Encounter Type: Individual    Any diabetes medication dose changes were made via the CDE Protocol per the patient's referring provider. A copy of this encounter was shared with the provider.

## 2024-02-02 RX ORDER — BLOOD SUGAR DIAGNOSTIC
STRIP MISCELLANEOUS
Qty: 100 STRIP | Refills: 0 | Status: SHIPPED | OUTPATIENT
Start: 2024-02-02

## 2024-02-02 RX ORDER — PEN NEEDLE, DIABETIC 32GX 5/32"
NEEDLE, DISPOSABLE MISCELLANEOUS
Qty: 100 EACH | Refills: 0 | Status: SHIPPED | OUTPATIENT
Start: 2024-02-02 | End: 2024-05-29

## 2024-03-14 DIAGNOSIS — E11.9 TYPE 2 DIABETES MELLITUS WITHOUT COMPLICATION, WITHOUT LONG-TERM CURRENT USE OF INSULIN (H): Primary | ICD-10-CM

## 2024-03-18 ENCOUNTER — OFFICE VISIT (OUTPATIENT)
Dept: FAMILY MEDICINE | Facility: OTHER | Age: 69
End: 2024-03-18
Attending: STUDENT IN AN ORGANIZED HEALTH CARE EDUCATION/TRAINING PROGRAM
Payer: COMMERCIAL

## 2024-03-18 VITALS
WEIGHT: 232.7 LBS | HEART RATE: 72 BPM | HEIGHT: 71 IN | BODY MASS INDEX: 32.58 KG/M2 | SYSTOLIC BLOOD PRESSURE: 146 MMHG | RESPIRATION RATE: 16 BRPM | TEMPERATURE: 97 F | DIASTOLIC BLOOD PRESSURE: 82 MMHG | OXYGEN SATURATION: 95 %

## 2024-03-18 DIAGNOSIS — R97.20 ELEVATED PSA: ICD-10-CM

## 2024-03-18 DIAGNOSIS — E11.65 TYPE 2 DIABETES MELLITUS WITH HYPERGLYCEMIA, WITHOUT LONG-TERM CURRENT USE OF INSULIN (H): Primary | ICD-10-CM

## 2024-03-18 DIAGNOSIS — E11.9 TYPE 2 DIABETES MELLITUS WITHOUT COMPLICATION, WITHOUT LONG-TERM CURRENT USE OF INSULIN (H): ICD-10-CM

## 2024-03-18 DIAGNOSIS — I10 BENIGN ESSENTIAL HYPERTENSION: ICD-10-CM

## 2024-03-18 DIAGNOSIS — Z12.5 SCREENING FOR PROSTATE CANCER: ICD-10-CM

## 2024-03-18 DIAGNOSIS — R97.20 ELEVATED PROSTATE SPECIFIC ANTIGEN (PSA): ICD-10-CM

## 2024-03-18 DIAGNOSIS — G47.33 OSA (OBSTRUCTIVE SLEEP APNEA): ICD-10-CM

## 2024-03-18 LAB
EST. AVERAGE GLUCOSE BLD GHB EST-MCNC: 166 MG/DL
HBA1C MFR BLD: 7.4 %
PSA SERPL DL<=0.01 NG/ML-MCNC: 7.11 NG/ML (ref 0–4.5)

## 2024-03-18 PROCEDURE — G0103 PSA SCREENING: HCPCS | Mod: ZL | Performed by: STUDENT IN AN ORGANIZED HEALTH CARE EDUCATION/TRAINING PROGRAM

## 2024-03-18 PROCEDURE — G0463 HOSPITAL OUTPT CLINIC VISIT: HCPCS

## 2024-03-18 PROCEDURE — 84153 ASSAY OF PSA TOTAL: CPT | Mod: ZL | Performed by: STUDENT IN AN ORGANIZED HEALTH CARE EDUCATION/TRAINING PROGRAM

## 2024-03-18 PROCEDURE — 36415 COLL VENOUS BLD VENIPUNCTURE: CPT | Mod: ZL | Performed by: STUDENT IN AN ORGANIZED HEALTH CARE EDUCATION/TRAINING PROGRAM

## 2024-03-18 PROCEDURE — 83036 HEMOGLOBIN GLYCOSYLATED A1C: CPT | Mod: ZL | Performed by: STUDENT IN AN ORGANIZED HEALTH CARE EDUCATION/TRAINING PROGRAM

## 2024-03-18 PROCEDURE — 99213 OFFICE O/P EST LOW 20 MIN: CPT | Performed by: STUDENT IN AN ORGANIZED HEALTH CARE EDUCATION/TRAINING PROGRAM

## 2024-03-18 NOTE — PROGRESS NOTES
"  Assessment & Plan     Type 2 diabetes mellitus with hyperglycemia, without long-term current use of insulin (H)  Follows with Dr. Yarbrough Q 3-4 months.  Fasting -140.  I would expect his A1c to reflect this trend well  HA1c trend over the past year: 7.3>7.5>7.6>7.7>7.2    Goal for him should be about 7 for A1c  He is currently on metformin 1000 mg BID, glipizide 10 mg BID and Victoza 1.2 mg daily.  He reports good compliance with his medications  - Hemoglobin A1c    Benign essential hypertension  BP suboptimal today.  He is on lisinopril 40 mg, amlodipine 5 mg  Consider titrating CCB to 10 mg if persistently elevated  Continue home BP monitoring  Recommend DASH diet, continue exercising 30 min 5x a week    Screening for prostate cancer  Elevated in the past.  Had biopsy done which was negative.    He would like to repeat blood work since we are testing for HA1c  - PSA, screen; Future  - PSA, screen      25 minutes spent by me on the date of the encounter doing chart review, history and exam, documentation and further activities per the note      BMI  Estimated body mass index is 32.46 kg/m  as calculated from the following:    Height as of this encounter: 1.803 m (5' 11\").    Weight as of this encounter: 105.6 kg (232 lb 11.2 oz).   Weight management plan: Discussed healthy diet and exercise guidelines      No follow-ups on file.    Chantale Leon is a 68 year old, presenting for the following health issues:  Diabetes        3/18/2024     1:46 PM   Additional Questions   Roomed by Debbie Eastman   Accompanied by none         3/18/2024     1:46 PM   Patient Reported Additional Medications   Patient reports taking the following new medications none     HPI     AM glucose: 110-140 generally    Medications: Glipizide 10  metformin 1000 mg twice daily  Victoza 1.2- tolerating well.  Had been on victoza since 2014  Weight has been stable  Thursday woke up and back was killing him.  Had to crawl out of bed  Sore " "with bending.  Took Aleve and a few hours later could bend down.    Going to the gym 3 days a week.   Out- peeling logs for building project.  He tells me he is quite active physically  Denies any exertional symptoms of CP, SOB, palpitations    Diabetes Follow-up    How often are you checking your blood sugar? A few times a week  What time of day are you checking your blood sugars (select all that apply)?  Before and after meals  Have you had any blood sugars above 200?  No  Have you had any blood sugars below 70?  No  What symptoms do you notice when your blood sugar is low?  Shaky  What concerns do you have today about your diabetes? None   Do you have any of these symptoms? (Select all that apply)  No numbness or tingling in feet.  No redness, sores or blisters on feet.  No complaints of excessive thirst.  No reports of blurry vision.  No significant changes to weight.      BP Readings from Last 2 Encounters:   03/18/24 (!) 146/82   02/01/24 126/68     Hemoglobin A1C (%)   Date Value   11/20/2023 7.2 (H)   08/14/2023 7.7 (H)   04/15/2021 7.7 (H)   01/15/2021 6.8 (H)     LDL Cholesterol Calculated (mg/dL)   Date Value   08/14/2023 55   01/05/2023 72   01/15/2021 66   01/10/2020 79           Review of Systems  Constitutional, HEENT, cardiovascular, pulmonary, GI, , musculoskeletal, neuro, skin, endocrine and psych systems are negative, except as otherwise noted.      Objective    BP (!) 146/82 (BP Location: Left arm, Patient Position: Sitting, Cuff Size: Adult Large)   Pulse 72   Temp 97  F (36.1  C) (Tympanic)   Resp 16   Ht 1.803 m (5' 11\")   Wt 105.6 kg (232 lb 11.2 oz)   SpO2 95%   BMI 32.46 kg/m    Body mass index is 32.46 kg/m .  Physical Exam   GENERAL: alert and no distress  EYES: Eyes grossly normal to inspection, PERRL and conjunctivae and sclerae normal  HENT: ear canals and TM's normal, nose and mouth without ulcers or lesions  NECK: no adenopathy, no asymmetry, masses, or scars  RESP: lungs " clear to auscultation - no rales, rhonchi or wheezes  CV: regular rate and rhythm, normal S1 S2, no S3 or S4, no murmur, click or rub, no peripheral edema  ABDOMEN: soft, nontender, no hepatosplenomegaly, no masses and bowel sounds normal  MS: no gross musculoskeletal defects noted, no edema  SKIN: no suspicious lesions or rashes.  Suspicious lesion on middle of pinna of left ear.  AK on the vertex of scalp  NEURO: Normal strength and tone, mentation intact and speech normal  PSYCH: mentation appears normal, affect normal/bright          Signed Electronically by: Vandana Amador MD

## 2024-03-19 DIAGNOSIS — R97.20 ELEVATED PROSTATE SPECIFIC ANTIGEN (PSA): Primary | ICD-10-CM

## 2024-03-20 LAB
PSA FREE MFR SERPL: 15.29 %
PSA FREE SERPL-MCNC: 1 NG/ML
PSA SERPL DL<=0.01 NG/ML-MCNC: 6.54 NG/ML (ref 0–4.5)

## 2024-03-20 NOTE — RESULT ENCOUNTER NOTE
Okay.  I will check free/total and then if that is low then I'll order a dedicated MRI.  I just don't wanna to miss anything.  There might be a low grade cancer that was missed on biopsy.  I'm following a patient like that. I'll keep you posted.  :-) take care    -Vandana

## 2024-03-27 ENCOUNTER — TRANSFERRED RECORDS (OUTPATIENT)
Dept: HEALTH INFORMATION MANAGEMENT | Facility: CLINIC | Age: 69
End: 2024-03-27

## 2024-03-27 ENCOUNTER — HOSPITAL ENCOUNTER (OUTPATIENT)
Dept: MRI IMAGING | Facility: OTHER | Age: 69
Discharge: HOME | End: 2024-03-27
Attending: STUDENT IN AN ORGANIZED HEALTH CARE EDUCATION/TRAINING PROGRAM | Admitting: STUDENT IN AN ORGANIZED HEALTH CARE EDUCATION/TRAINING PROGRAM
Payer: COMMERCIAL

## 2024-03-27 DIAGNOSIS — R97.20 ELEVATED PSA: ICD-10-CM

## 2024-03-27 PROCEDURE — 255N000002 HC RX 255 OP 636: Mod: JZ | Performed by: STUDENT IN AN ORGANIZED HEALTH CARE EDUCATION/TRAINING PROGRAM

## 2024-03-27 PROCEDURE — 76377 3D RENDER W/INTRP POSTPROCES: CPT

## 2024-03-27 PROCEDURE — A9575 INJ GADOTERATE MEGLUMI 0.1ML: HCPCS | Mod: JZ | Performed by: STUDENT IN AN ORGANIZED HEALTH CARE EDUCATION/TRAINING PROGRAM

## 2024-03-27 RX ORDER — GADOTERATE MEGLUMINE 376.9 MG/ML
20 INJECTION INTRAVENOUS ONCE
Status: COMPLETED | OUTPATIENT
Start: 2024-03-27 | End: 2024-03-27

## 2024-03-27 RX ADMIN — GADOTERATE MEGLUMINE 20 ML: 376.9 INJECTION INTRAVENOUS at 22:01

## 2024-04-01 DIAGNOSIS — N41.1 CHRONIC PROSTATITIS: ICD-10-CM

## 2024-04-01 DIAGNOSIS — R97.20 ELEVATED PROSTATE SPECIFIC ANTIGEN (PSA): Primary | ICD-10-CM

## 2024-04-04 ENCOUNTER — OFFICE VISIT (OUTPATIENT)
Dept: FAMILY MEDICINE | Facility: OTHER | Age: 69
End: 2024-04-04
Attending: STUDENT IN AN ORGANIZED HEALTH CARE EDUCATION/TRAINING PROGRAM
Payer: COMMERCIAL

## 2024-04-04 VITALS
BODY MASS INDEX: 32.79 KG/M2 | WEIGHT: 235.1 LBS | HEART RATE: 73 BPM | DIASTOLIC BLOOD PRESSURE: 74 MMHG | SYSTOLIC BLOOD PRESSURE: 128 MMHG | TEMPERATURE: 97.8 F | OXYGEN SATURATION: 92 %

## 2024-04-04 DIAGNOSIS — N41.1 CHRONIC PROSTATITIS: Primary | ICD-10-CM

## 2024-04-04 LAB
ALBUMIN UR-MCNC: NEGATIVE MG/DL
APPEARANCE UR: CLEAR
BACTERIA #/AREA URNS HPF: ABNORMAL /HPF
BILIRUB UR QL STRIP: NEGATIVE
COLOR UR AUTO: ABNORMAL
GLUCOSE UR STRIP-MCNC: 50 MG/DL
HGB UR QL STRIP: ABNORMAL
KETONES UR STRIP-MCNC: NEGATIVE MG/DL
LEUKOCYTE ESTERASE UR QL STRIP: NEGATIVE
MUCOUS THREADS #/AREA URNS LPF: PRESENT /LPF
NITRATE UR QL: NEGATIVE
PH UR STRIP: 5.5 [PH] (ref 4.7–8)
RBC URINE: <1 /HPF
SP GR UR STRIP: 1.01 (ref 1–1.03)
SQUAMOUS EPITHELIAL: 0 /HPF
UROBILINOGEN UR STRIP-MCNC: NORMAL MG/DL
WBC URINE: 1 /HPF

## 2024-04-04 PROCEDURE — 81001 URINALYSIS AUTO W/SCOPE: CPT | Mod: ZL | Performed by: STUDENT IN AN ORGANIZED HEALTH CARE EDUCATION/TRAINING PROGRAM

## 2024-04-04 PROCEDURE — G0463 HOSPITAL OUTPT CLINIC VISIT: HCPCS

## 2024-04-04 PROCEDURE — 87086 URINE CULTURE/COLONY COUNT: CPT | Mod: ZL | Performed by: STUDENT IN AN ORGANIZED HEALTH CARE EDUCATION/TRAINING PROGRAM

## 2024-04-04 PROCEDURE — 99213 OFFICE O/P EST LOW 20 MIN: CPT | Performed by: STUDENT IN AN ORGANIZED HEALTH CARE EDUCATION/TRAINING PROGRAM

## 2024-04-04 ASSESSMENT — PAIN SCALES - GENERAL: PAINLEVEL: NO PAIN (0)

## 2024-04-04 NOTE — PROGRESS NOTES
Assessment & Plan     Chronic prostatitis  U/A unremarkable but with large blood  MRI showing chronic prostatitis  Urine culture shows <10,000 CFU of mixed urogenital chris  Will send to urology for further evaluation but will not treat with antibiotics at this time  Dirty U/A not obtained for GC/Chlamydia  Patient denies any urinary symptoms at this time  - UA Macroscopic with reflex to Microscopic and Culture; Future  - UA Macroscopic with reflex to Microscopic and Culture  - Urine Culture; Future  - Urine Culture      20 minutes spent by me on the date of the encounter doing chart review, history and exam, documentation and further activities per the note      No follow-ups on file.    Chantale Leon is a 68 year old, presenting for the following health issues:  Results        4/4/2024     3:30 PM   Additional Questions   Roomed by Roberto Link   Accompanied by Self         4/4/2024     3:30 PM   Patient Reported Additional Medications   Patient reports taking the following new medications none     History of Present Illness       Reason for visit:  Review MRI results    He eats 2-3 servings of fruits and vegetables daily.He consumes 0 sweetened beverage(s) daily.He exercises with enough effort to increase his heart rate 30 to 60 minutes per day.  He exercises with enough effort to increase his heart rate 3 or less days per week.   He is taking medications regularly.     Sunday Monday and Tuesday- Had blood in the urine intermittently- Associated with pain in his back. Didn't take anything for it, it wasn't that bad.  Blood is more visible in the morning.  Looks red to the naked eye.  Feels tired every now and then.  Some nights sleeps good and other nights he does not.    He denies any fevers, chills, fatigue, muscle aches or joint pain.      Concern - Results   Onset: 3/27/2024  Description: Discuss results from 3/27/2024 of the MR Prostate.         Review of Systems  Constitutional, HEENT,  cardiovascular, pulmonary, gi and gu systems are negative, except as otherwise noted.      Objective    /74 (BP Location: Right arm, Patient Position: Sitting, Cuff Size: Adult Large)   Pulse 73   Temp 97.8  F (36.6  C) (Tympanic)   Wt 106.6 kg (235 lb 1.6 oz)   SpO2 92%   BMI 32.79 kg/m    Body mass index is 32.79 kg/m .  Physical Exam   GENERAL: alert and no distress  EYES: Eyes grossly normal to inspection, PERRL and conjunctivae and sclerae normal  HENT: ear canals and TM's normal, nose and mouth without ulcers or lesions  NECK: no adenopathy, no asymmetry, masses, or scars  RESP: lungs clear to auscultation - no rales, rhonchi or wheezes  CV: regular rate and rhythm, normal S1 S2, no S3 or S4, no murmur, click or rub, no peripheral edema  ABDOMEN: soft, nontender, no hepatosplenomegaly, no masses and bowel sounds normal  MS: no gross musculoskeletal defects noted, no edema  SKIN: no suspicious lesions or rashes  NEURO: Normal strength and tone, mentation intact and speech normal  PSYCH: mentation appears normal, affect normal/bright  : deferred        Signed Electronically by: Vandana Amador MD

## 2024-04-06 LAB — BACTERIA UR CULT: NORMAL

## 2024-04-26 DIAGNOSIS — E11.65 TYPE 2 DIABETES MELLITUS WITH HYPERGLYCEMIA, WITHOUT LONG-TERM CURRENT USE OF INSULIN (H): ICD-10-CM

## 2024-04-26 DIAGNOSIS — I10 BENIGN ESSENTIAL HYPERTENSION: ICD-10-CM

## 2024-04-26 RX ORDER — AMLODIPINE BESYLATE 5 MG/1
5 TABLET ORAL DAILY
Qty: 90 TABLET | Refills: 0 | Status: SHIPPED | OUTPATIENT
Start: 2024-04-26 | End: 2024-08-08

## 2024-04-26 RX ORDER — LISINOPRIL 40 MG/1
TABLET ORAL
Qty: 90 TABLET | Refills: 0 | Status: SHIPPED | OUTPATIENT
Start: 2024-04-26 | End: 2024-08-08

## 2024-04-26 RX ORDER — GLIPIZIDE 10 MG/1
TABLET, FILM COATED, EXTENDED RELEASE ORAL
Qty: 180 TABLET | Refills: 0 | Status: SHIPPED | OUTPATIENT
Start: 2024-04-26 | End: 2024-08-08

## 2024-04-26 NOTE — TELEPHONE ENCOUNTER
Norvasc       Last Written Prescription Date:  5/08/2023  Last Fill Quantity: 90,   # refills: 3  Last Office Visit: 04/04/2024    Glipizide       Last Written Prescription Date:  1/30/2024  Last Fill Quantity: 180,   # refills: 0  Last Office Visit: 4/04/2024    Lisinopril       Last Written Prescription Date:  1/30/2024  Last Fill Quantity: 90,   # refills: 0    Metformin       Last Written Prescription Date:  1/30/2024  Last Fill Quantity: 180,   # refills: 0  Last Office Visit: 4/04/2024  Future Office visit:    Next 5 appointments (look out 90 days)      Jul 24, 2024  9:30 AM  (Arrive by 9:15 AM)  SHORT with Kelly Yarbrough MD  [facility] [place] ([facility] [place] ) [address]  [place] [address]  [phone]

## 2024-05-17 DIAGNOSIS — E11.65 TYPE 2 DIABETES MELLITUS WITH HYPERGLYCEMIA, WITHOUT LONG-TERM CURRENT USE OF INSULIN (H): ICD-10-CM

## 2024-05-17 NOTE — TELEPHONE ENCOUNTER
liraglutide (VICTOZA PEN) 18 MG/3ML solution 6 mL 3 1/10/2024   Last Office Visit: 04/04/2024     Future Office visit:    Next 5 appointments (look out 90 days)      Jul 24, 2024  9:30 AM  (Arrive by 9:15 AM)  SHORT with Kelly Yarbrough MD  [facility] [place] ([facility] [place] ) [address]  [place] [address]  [phone]             Routing refill request to provider for review/approval because:

## 2024-05-20 RX ORDER — LIRAGLUTIDE 6 MG/ML
INJECTION SUBCUTANEOUS
Qty: 6 ML | Refills: 2 | Status: SHIPPED | OUTPATIENT
Start: 2024-05-20 | End: 2024-08-27

## 2024-05-27 DIAGNOSIS — E11.65 TYPE 2 DIABETES MELLITUS WITH HYPERGLYCEMIA, WITHOUT LONG-TERM CURRENT USE OF INSULIN (H): ICD-10-CM

## 2024-05-28 NOTE — TELEPHONE ENCOUNTER
Pen Needles       Last Written Prescription Date:  02/2/2024  Last Fill Quantity: 100,   # refills: 0  Last Office Visit: 4/4/2024  Future Office visit:    Next 5 appointments (look out 90 days)      Jul 24, 2024  9:30 AM  (Arrive by 9:15 AM)  SHORT with Kelly Yarbrough MD  [facility] [place] ([facility] [place] ) [address]  [place] [address]  [phone]

## 2024-05-29 RX ORDER — PEN NEEDLE, DIABETIC 32GX 5/32"
NEEDLE, DISPOSABLE MISCELLANEOUS
Qty: 100 EACH | Refills: 2 | Status: SHIPPED | OUTPATIENT
Start: 2024-05-29

## 2024-06-20 ENCOUNTER — TRANSFERRED RECORDS (OUTPATIENT)
Dept: HEALTH INFORMATION MANAGEMENT | Facility: CLINIC | Age: 69
End: 2024-06-20
Payer: COMMERCIAL

## 2024-06-20 LAB — RETINOPATHY: NEGATIVE

## 2024-07-24 ENCOUNTER — LAB (OUTPATIENT)
Dept: LAB | Facility: OTHER | Age: 69
End: 2024-07-24
Attending: FAMILY MEDICINE
Payer: COMMERCIAL

## 2024-07-24 ENCOUNTER — OFFICE VISIT (OUTPATIENT)
Dept: FAMILY MEDICINE | Facility: OTHER | Age: 69
End: 2024-07-24
Attending: FAMILY MEDICINE
Payer: COMMERCIAL

## 2024-07-24 VITALS
SYSTOLIC BLOOD PRESSURE: 138 MMHG | HEART RATE: 70 BPM | OXYGEN SATURATION: 95 % | TEMPERATURE: 97.9 F | HEIGHT: 71 IN | WEIGHT: 229.13 LBS | RESPIRATION RATE: 16 BRPM | DIASTOLIC BLOOD PRESSURE: 80 MMHG | BODY MASS INDEX: 32.08 KG/M2

## 2024-07-24 DIAGNOSIS — M65.30 TRIGGER FINGER, ACQUIRED: ICD-10-CM

## 2024-07-24 DIAGNOSIS — M67.90 NODULE OF FLEXOR TENDON SHEATH: ICD-10-CM

## 2024-07-24 DIAGNOSIS — G47.33 OSA (OBSTRUCTIVE SLEEP APNEA): ICD-10-CM

## 2024-07-24 DIAGNOSIS — E78.5 HYPERLIPIDEMIA LDL GOAL <100: ICD-10-CM

## 2024-07-24 DIAGNOSIS — E11.65 TYPE 2 DIABETES MELLITUS WITH HYPERGLYCEMIA, WITHOUT LONG-TERM CURRENT USE OF INSULIN (H): Primary | ICD-10-CM

## 2024-07-24 DIAGNOSIS — R79.89 LOW VITAMIN B12 LEVEL: ICD-10-CM

## 2024-07-24 DIAGNOSIS — I10 BENIGN ESSENTIAL HYPERTENSION: ICD-10-CM

## 2024-07-24 DIAGNOSIS — E11.65 TYPE 2 DIABETES MELLITUS WITH HYPERGLYCEMIA, WITHOUT LONG-TERM CURRENT USE OF INSULIN (H): ICD-10-CM

## 2024-07-24 DIAGNOSIS — N41.1 CHRONIC PROSTATITIS: ICD-10-CM

## 2024-07-24 DIAGNOSIS — G25.81 RESTLESS LEGS SYNDROME: ICD-10-CM

## 2024-07-24 PROBLEM — E66.01 MORBID OBESITY (H): Status: RESOLVED | Noted: 2021-01-15 | Resolved: 2024-07-24

## 2024-07-24 LAB
ALBUMIN UR-MCNC: NEGATIVE MG/DL
APPEARANCE UR: CLEAR
BACTERIA #/AREA URNS HPF: ABNORMAL /HPF
BILIRUB UR QL STRIP: NEGATIVE
CAOX CRY #/AREA URNS HPF: ABNORMAL /HPF
COLOR UR AUTO: YELLOW
CREAT UR-MCNC: 205.4 MG/DL
EST. AVERAGE GLUCOSE BLD GHB EST-MCNC: 154 MG/DL
GLUCOSE UR STRIP-MCNC: 150 MG/DL
HBA1C MFR BLD: 7 %
HGB UR QL STRIP: NEGATIVE
KETONES UR STRIP-MCNC: NEGATIVE MG/DL
LEUKOCYTE ESTERASE UR QL STRIP: NEGATIVE
MICROALBUMIN UR-MCNC: <12 MG/L
MICROALBUMIN/CREAT UR: NORMAL MG/G{CREAT}
MUCOUS THREADS #/AREA URNS LPF: PRESENT /LPF
NITRATE UR QL: NEGATIVE
PH UR STRIP: 5.5 [PH] (ref 4.7–8)
RBC URINE: 1 /HPF
SP GR UR STRIP: 1.03 (ref 1–1.03)
SQUAMOUS EPITHELIAL: 0 /HPF
UROBILINOGEN UR STRIP-MCNC: NORMAL MG/DL
VIT B12 SERPL-MCNC: 578 PG/ML (ref 232–1245)
WBC URINE: 1 /HPF

## 2024-07-24 PROCEDURE — 82607 VITAMIN B-12: CPT | Mod: ZL

## 2024-07-24 PROCEDURE — 99214 OFFICE O/P EST MOD 30 MIN: CPT | Performed by: FAMILY MEDICINE

## 2024-07-24 PROCEDURE — 82043 UR ALBUMIN QUANTITATIVE: CPT | Mod: ZL

## 2024-07-24 PROCEDURE — G2211 COMPLEX E/M VISIT ADD ON: HCPCS | Performed by: FAMILY MEDICINE

## 2024-07-24 PROCEDURE — 81001 URINALYSIS AUTO W/SCOPE: CPT | Mod: ZL

## 2024-07-24 PROCEDURE — 83036 HEMOGLOBIN GLYCOSYLATED A1C: CPT | Mod: ZL

## 2024-07-24 PROCEDURE — G0463 HOSPITAL OUTPT CLINIC VISIT: HCPCS

## 2024-07-24 PROCEDURE — 36415 COLL VENOUS BLD VENIPUNCTURE: CPT | Mod: ZL

## 2024-07-24 PROCEDURE — 99207 PR FOOT EXAM NO CHARGE: CPT | Performed by: FAMILY MEDICINE

## 2024-07-24 ASSESSMENT — PAIN SCALES - GENERAL: PAINLEVEL: NO PAIN (0)

## 2024-07-24 NOTE — PROGRESS NOTES
The longitudinal plan of care for the diagnosis(es)/condition(s) as documented were addressed during this visit. Due to the added complexity in care, I will continue to support Edward in the subsequent management and with ongoing continuity of care.

## 2024-07-24 NOTE — PROGRESS NOTES
Assessment & Plan     Type 2 diabetes mellitus with hyperglycemia, without long-term current use of insulin (H)  Continue current lifestyle modifications including adequate water intake, healthy diet and exercise. Consult with urology in one month.   Follow-up 3 mos  - Hemoglobin A1c; Future  - Albumin Random Urine Quantitative with Creat Ratio; Future  - CA FOOT EXAM NO CHARGE    Benign essential hypertension  Blood pressure is well controlled. Will not make any changes to medication at this time.     Low vitamin B12 level  Discussed B12 and will obtain labs at next appt.   - Vitamin B12; Future    Nodule of flexor tendon sheath  Discussed referral for possible steroidal injection, but nodule is not affecting ADL, so would like to wait at this time.     Trigger finger, acquired  Has a nodule of the flexor tendon that has progressed over time. Noticed pain with palpation and does not limit activity. Offered referral for steroidal injection, but would like to wait at this time.     FUTURE LABS:       - Schedule non-fasting labs in 3 months  FUTURE APPOINTMENTS:       - Follow-up for annual visit in 3 months    No follow-ups on file.    Chantale Leon is a 68 year old, presenting for the following health issues:  Diabetes, Hypertension, and Lipids    Has been doing well overall. Did have a 2 week period of intense fatigue, mild runny nose and one day of chest tightness which resolved on its own. Noted hematuria for 24 hours which resolved on its own. No changes in urinary frequency, burning, pain or urgency. No fevers.  Does have appointment with urology within the next month.     Does have a lump on the wetzel aspect of the 5th finger that with direct pressure causes pain rated 2/10. Has not needed any pain medication. No known trauma.     Recent eye exam one month ago was normal. Does check his blood sugar 2-3 times a week. Endorses a healthy diet with fruits and vegetables. Exercise includes primarily outside  work projects and activities of daily living. Water intake is roughly 64oz. No new chest pain, shortness of breath, abdominal pain, nausea, vomiting or diarrhea. No peripheral neuropathy. Enjoys fishing and spending time with family.       7/24/2024     9:07 AM   Additional Questions   Roomed by Tammy Wesley   Accompanied by None         7/24/2024     9:07 AM   Patient Reported Additional Medications   Patient reports taking the following new medications None     HPI     Diabetes Follow-up    How often are you checking your blood sugar? A few times a week  What time of day are you checking your blood sugars (select all that apply)?  Before meals  Have you had any blood sugars above 200?  No  Have you had any blood sugars below 70?  No  What symptoms do you notice when your blood sugar is low?  Shaky  What concerns do you have today about your diabetes? None   Do you have any of these symptoms? (Select all that apply)  No numbness or tingling in feet.  No redness, sores or blisters on feet.  No complaints of excessive thirst.  No reports of blurry vision.  No significant changes to weight.          Hyperlipidemia Follow-Up    Are you regularly taking any medication or supplement to lower your cholesterol?   Yes- atorvastatin  Are you having muscle aches or other side effects that you think could be caused by your cholesterol lowering medication?  No    Hypertension Follow-up    Do you check your blood pressure regularly outside of the clinic? No   Are you following a low salt diet? Yes  Are your blood pressures ever more than 140 on the top number (systolic) OR more   than 90 on the bottom number (diastolic), for example 140/90? Unknown patient is not checking blood pressure outside of the clinic     BP Readings from Last 2 Encounters:   07/24/24 (!) 153/89   04/04/24 128/74     Hemoglobin A1C (%)   Date Value   03/18/2024 7.4 (H)   11/20/2023 7.2 (H)   04/15/2021 7.7 (H)   01/15/2021 6.8 (H)     LDL Cholesterol  "Calculated (mg/dL)   Date Value   08/14/2023 55   01/05/2023 72   01/15/2021 66   01/10/2020 79     How many servings of fruits and vegetables do you eat daily?  2-3  On average, how many sweetened beverages do you drink each day (Examples: soda, juice, sweet tea, etc.  Do NOT count diet or artificially sweetened beverages)?   1  How many days per week do you exercise enough to make your heart beat faster? 3 or less  How many minutes a day do you exercise enough to make your heart beat faster? 20 - 29  How many days per week do you miss taking your medication? 0    Review of Systems  Constitutional, HEENT, cardiovascular, pulmonary, gi and gu systems are negative, except as otherwise noted.      Objective    BP (!) 153/89 (BP Location: Right arm, Patient Position: Sitting, Cuff Size: Adult Large)   Pulse 70   Temp 97.9  F (36.6  C) (Tympanic)   Resp 16   Ht 1.803 m (5' 11\")   Wt 103.9 kg (229 lb 2 oz)   SpO2 95%   BMI 31.96 kg/m    Body mass index is 31.96 kg/m .  Physical Exam   GENERAL: alert and no distress  NECK: no adenopathy, no asymmetry, masses, or scars  RESP: lungs clear to auscultation - no rales, rhonchi or wheezes  CV: regular rate and rhythm, normal S1 S2, no S3 or S4, no murmur, click or rub, no peripheral edema  ABDOMEN: soft, nontender, no hepatosplenomegaly, no masses and bowel sounds normal  MS: no gross musculoskeletal defects noted, no edema  PSYCH: mentation appears normal, affect normal/bright  Diabetic foot exam: normal DP and PT pulses, normal sensory exam, normal monofilament exam, and onychomycosis      Results for orders placed or performed in visit on 07/24/24 (from the past 24 hour(s))   UA Macroscopic with reflex to Microscopic and Culture    Specimen: Urine, Midstream   Result Value Ref Range    Color Urine Yellow Colorless, Straw, Light Yellow, Yellow    Appearance Urine Clear Clear    Glucose Urine 150 (A) Negative mg/dL    Bilirubin Urine Negative Negative    Ketones Urine " Negative Negative mg/dL    Specific Gravity Urine 1.029 1.003 - 1.035    Blood Urine Negative Negative    pH Urine 5.5 4.7 - 8.0    Protein Albumin Urine Negative Negative mg/dL    Urobilinogen Urine Normal Normal, 2.0 mg/dL    Nitrite Urine Negative Negative    Leukocyte Esterase Urine Negative Negative    Bacteria Urine Few (A) None Seen /HPF    Mucus Urine Present (A) None Seen /LPF    Calcium Oxalate Crystals Urine Few (A) None Seen /HPF    RBC Urine 1 <=2 /HPF    WBC Urine 1 <=5 /HPF    Squamous Epithelials Urine 0 <=1 /HPF    Narrative    Microscopic not indicated  Urine Culture not indicated   Hemoglobin A1c   Result Value Ref Range    Estimated Average Glucose 154 mg/dL    Hemoglobin A1C 7.0 (H) <5.7 %   Albumin Random Urine Quantitative with Creat Ratio   Result Value Ref Range    Creatinine Urine mg/dL 205.4 mg/dL    Albumin Urine mg/L <12.0 mg/L    Albumin Urine mg/g Cr         Vibha MANCERA      Physician Attestation   I, Kelly Yarbrough MD, was present with the medical/JOSE ENRIQUE student who participated in the service and in the documentation of the note.  I have verified the history and personally performed the physical exam and medical decision making.  I agree with the assessment and plan of care as documented in the note.      Items personally reviewed: vitals, labs, and agree with the interpretation documented in the note.    Signed Electronically by: Kelly Yarbrough MD

## 2024-08-08 DIAGNOSIS — E11.65 TYPE 2 DIABETES MELLITUS WITH HYPERGLYCEMIA, WITHOUT LONG-TERM CURRENT USE OF INSULIN (H): ICD-10-CM

## 2024-08-08 DIAGNOSIS — E78.5 HYPERLIPIDEMIA LDL GOAL <100: ICD-10-CM

## 2024-08-08 DIAGNOSIS — I10 BENIGN ESSENTIAL HYPERTENSION: ICD-10-CM

## 2024-08-08 RX ORDER — ATORVASTATIN CALCIUM 40 MG/1
TABLET, FILM COATED ORAL
Qty: 90 TABLET | Refills: 3 | Status: SHIPPED | OUTPATIENT
Start: 2024-08-08

## 2024-08-08 RX ORDER — LISINOPRIL 40 MG/1
TABLET ORAL
Qty: 90 TABLET | Refills: 3 | Status: SHIPPED | OUTPATIENT
Start: 2024-08-08

## 2024-08-08 RX ORDER — AMLODIPINE BESYLATE 5 MG/1
5 TABLET ORAL DAILY
Qty: 90 TABLET | Refills: 3 | Status: SHIPPED | OUTPATIENT
Start: 2024-08-08

## 2024-08-08 RX ORDER — GLIPIZIDE 10 MG/1
TABLET, FILM COATED, EXTENDED RELEASE ORAL
Qty: 180 TABLET | Refills: 3 | Status: SHIPPED | OUTPATIENT
Start: 2024-08-08

## 2024-08-08 NOTE — TELEPHONE ENCOUNTER
Disp Refills Start End LUX   lisinopril (ZESTRIL) 40 MG tablet 90 tablet 0 4/26/2024 -- No      Disp Refills Start End LUX   atorvastatin (LIPITOR) 40 MG tablet 90 tablet 2 10/30/2023 -- No      Disp Refills Start End LUX   amLODIPine (NORVASC) 5 MG tablet 90 tablet 0 4/26/2024 -- No      Disp Refills Start End LUX   metFORMIN (GLUCOPHAGE) 1000 MG tablet 180 tablet 0 4/26/2024 -- No      Disp Refills Start End LUX   glipiZIDE (GLUCOTROL XL) 10 MG 24 hr tablet 180 tablet 0 4/26/2024 -- No     Last Office Visit: 07/24/2024  Future Office visit:    Next 5 appointments (look out 90 days)      Oct 25, 2024 9:30 AM  (Arrive by 9:15 AM)  Adult Preventative Visit with Kelly Yarbrough MD  [facility] [place] ([facility] [place] ) [address]  [place] [address]  [phone]             Routing refill request to provider for review/approval because:

## 2024-08-14 ENCOUNTER — MYC MEDICAL ADVICE (OUTPATIENT)
Dept: FAMILY MEDICINE | Facility: OTHER | Age: 69
End: 2024-08-14

## 2024-08-14 DIAGNOSIS — R97.20 ELEVATED PSA: Primary | ICD-10-CM

## 2024-08-14 NOTE — TELEPHONE ENCOUNTER
Pt called and per pt he will need a new PSA done prior to his upcoming appointment with Dr Menjivar at [facility] on 08/28/2024.     Pended PSA per pt request.  Please review and sign if appropriate.    Statement Selected

## 2024-08-25 DIAGNOSIS — E11.65 TYPE 2 DIABETES MELLITUS WITH HYPERGLYCEMIA, WITHOUT LONG-TERM CURRENT USE OF INSULIN (H): ICD-10-CM

## 2024-08-26 NOTE — TELEPHONE ENCOUNTER
Phylicia      Last Written Prescription Date:  5/20/24  Last Fill Quantity: 6mL,   # refills: 2  Last Office Visit: 7/24/24  Future Office visit:    Next 5 appointments (look out 90 days)      Oct 25, 2024 9:30 AM  (Arrive by 9:15 AM)  Adult Preventative Visit with Kelly Yarbrough MD  [facility] [place] ([facility] [place] ) [address]  [place] [address]  [phone]             Routing refill request to provider for review/approval because:         (1) Other Diagnosis

## 2024-08-27 RX ORDER — LIRAGLUTIDE 6 MG/ML
INJECTION SUBCUTANEOUS
Qty: 6 ML | Refills: 2 | Status: SHIPPED | OUTPATIENT
Start: 2024-08-27

## 2024-08-27 NOTE — TELEPHONE ENCOUNTER
GLP-1 Agonists Protocol Tyygxi4808/25/2024 09:18 AM   Protocol Details Has GFR on file in past 12 months and most recent value is normal

## 2024-12-16 DIAGNOSIS — E11.9 TYPE 2 DIABETES MELLITUS WITHOUT COMPLICATION, WITHOUT LONG-TERM CURRENT USE OF INSULIN (H): ICD-10-CM

## 2024-12-16 RX ORDER — BLOOD SUGAR DIAGNOSTIC
STRIP MISCELLANEOUS
Qty: 100 STRIP | Refills: 0 | Status: SHIPPED | OUTPATIENT
Start: 2024-12-16

## 2024-12-27 SDOH — HEALTH STABILITY: PHYSICAL HEALTH: ON AVERAGE, HOW MANY MINUTES DO YOU ENGAGE IN EXERCISE AT THIS LEVEL?: 60 MIN

## 2024-12-27 SDOH — HEALTH STABILITY: PHYSICAL HEALTH: ON AVERAGE, HOW MANY DAYS PER WEEK DO YOU ENGAGE IN MODERATE TO STRENUOUS EXERCISE (LIKE A BRISK WALK)?: 3 DAYS

## 2024-12-27 ASSESSMENT — SOCIAL DETERMINANTS OF HEALTH (SDOH): HOW OFTEN DO YOU GET TOGETHER WITH FRIENDS OR RELATIVES?: ONCE A WEEK

## 2024-12-30 ENCOUNTER — LAB (OUTPATIENT)
Dept: LAB | Facility: OTHER | Age: 69
End: 2024-12-30
Attending: FAMILY MEDICINE
Payer: COMMERCIAL

## 2024-12-30 ENCOUNTER — OFFICE VISIT (OUTPATIENT)
Dept: FAMILY MEDICINE | Facility: OTHER | Age: 69
End: 2024-12-30
Attending: FAMILY MEDICINE
Payer: COMMERCIAL

## 2024-12-30 ENCOUNTER — MEDICAL CORRESPONDENCE (OUTPATIENT)
Dept: HEALTH INFORMATION MANAGEMENT | Facility: OTHER | Age: 69
End: 2024-12-30
Payer: COMMERCIAL

## 2024-12-30 VITALS
WEIGHT: 228.38 LBS | RESPIRATION RATE: 16 BRPM | OXYGEN SATURATION: 95 % | SYSTOLIC BLOOD PRESSURE: 128 MMHG | HEIGHT: 71 IN | DIASTOLIC BLOOD PRESSURE: 65 MMHG | TEMPERATURE: 97.1 F | BODY MASS INDEX: 31.97 KG/M2

## 2024-12-30 DIAGNOSIS — M65.30 TRIGGER FINGER, ACQUIRED: ICD-10-CM

## 2024-12-30 DIAGNOSIS — E11.65 TYPE 2 DIABETES MELLITUS WITH HYPERGLYCEMIA, WITHOUT LONG-TERM CURRENT USE OF INSULIN (H): ICD-10-CM

## 2024-12-30 DIAGNOSIS — Z85.828 HISTORY OF BASAL CELL CARCINOMA: ICD-10-CM

## 2024-12-30 DIAGNOSIS — M75.32 CALCIFIC TENDONITIS OF LEFT SHOULDER: ICD-10-CM

## 2024-12-30 DIAGNOSIS — L81.9 PIGMENTED SKIN LESION OF SUSPECTED MALIGNANT NATURE: ICD-10-CM

## 2024-12-30 DIAGNOSIS — Z00.00 ENCOUNTER FOR ANNUAL WELLNESS EXAM IN MEDICARE PATIENT: Primary | ICD-10-CM

## 2024-12-30 DIAGNOSIS — M67.90 NODULE OF FLEXOR TENDON SHEATH: ICD-10-CM

## 2024-12-30 DIAGNOSIS — E78.5 HYPERLIPIDEMIA LDL GOAL <100: ICD-10-CM

## 2024-12-30 DIAGNOSIS — I10 BENIGN ESSENTIAL HYPERTENSION: ICD-10-CM

## 2024-12-30 LAB
ALBUMIN SERPL BCG-MCNC: 4 G/DL (ref 3.5–5.2)
ALP SERPL-CCNC: 104 U/L (ref 40–150)
ALT SERPL W P-5'-P-CCNC: 18 U/L (ref 0–70)
ANION GAP SERPL CALCULATED.3IONS-SCNC: 14 MMOL/L (ref 7–15)
AST SERPL W P-5'-P-CCNC: 15 U/L (ref 0–45)
BILIRUB SERPL-MCNC: 0.3 MG/DL
BUN SERPL-MCNC: 17.6 MG/DL (ref 8–23)
CALCIUM SERPL-MCNC: 9.5 MG/DL (ref 8.8–10.4)
CHLORIDE SERPL-SCNC: 103 MMOL/L (ref 98–107)
CHOLEST SERPL-MCNC: 118 MG/DL
CREAT SERPL-MCNC: 0.92 MG/DL (ref 0.67–1.17)
EGFRCR SERPLBLD CKD-EPI 2021: 90 ML/MIN/1.73M2
EST. AVERAGE GLUCOSE BLD GHB EST-MCNC: 163 MG/DL
FASTING STATUS PATIENT QL REPORTED: YES
FASTING STATUS PATIENT QL REPORTED: YES
GLUCOSE SERPL-MCNC: 132 MG/DL (ref 70–99)
HBA1C MFR BLD: 7.3 %
HCO3 SERPL-SCNC: 23 MMOL/L (ref 22–29)
HDLC SERPL-MCNC: 44 MG/DL
LDLC SERPL CALC-MCNC: 59 MG/DL
NONHDLC SERPL-MCNC: 74 MG/DL
POTASSIUM SERPL-SCNC: 3.9 MMOL/L (ref 3.4–5.3)
PROT SERPL-MCNC: 7.7 G/DL (ref 6.4–8.3)
SODIUM SERPL-SCNC: 140 MMOL/L (ref 135–145)
TRIGL SERPL-MCNC: 74 MG/DL

## 2024-12-30 PROCEDURE — G0463 HOSPITAL OUTPT CLINIC VISIT: HCPCS | Mod: 25

## 2024-12-30 PROCEDURE — 84075 ASSAY ALKALINE PHOSPHATASE: CPT | Mod: ZL

## 2024-12-30 PROCEDURE — 20610 DRAIN/INJ JOINT/BURSA W/O US: CPT | Performed by: FAMILY MEDICINE

## 2024-12-30 PROCEDURE — 250N000011 HC RX IP 250 OP 636: Mod: JZ | Performed by: FAMILY MEDICINE

## 2024-12-30 PROCEDURE — 83036 HEMOGLOBIN GLYCOSYLATED A1C: CPT | Mod: ZL

## 2024-12-30 PROCEDURE — 36415 COLL VENOUS BLD VENIPUNCTURE: CPT | Mod: ZL

## 2024-12-30 PROCEDURE — 99214 OFFICE O/P EST MOD 30 MIN: CPT | Mod: 25 | Performed by: FAMILY MEDICINE

## 2024-12-30 PROCEDURE — G0439 PPPS, SUBSEQ VISIT: HCPCS | Performed by: FAMILY MEDICINE

## 2024-12-30 PROCEDURE — 80061 LIPID PANEL: CPT | Mod: ZL

## 2024-12-30 RX ORDER — TRIAMCINOLONE ACETONIDE 40 MG/ML
40 INJECTION, SUSPENSION INTRA-ARTICULAR; INTRAMUSCULAR ONCE
Status: COMPLETED | OUTPATIENT
Start: 2024-12-30 | End: 2024-12-30

## 2024-12-30 RX ORDER — LIDOCAINE HYDROCHLORIDE 10 MG/ML
5 INJECTION, SOLUTION EPIDURAL; INFILTRATION; INTRACAUDAL; PERINEURAL ONCE
Status: COMPLETED | OUTPATIENT
Start: 2024-12-30 | End: 2024-12-30

## 2024-12-30 RX ADMIN — TRIAMCINOLONE ACETONIDE 40 MG: 40 INJECTION, SUSPENSION INTRA-ARTICULAR; INTRAMUSCULAR at 10:33

## 2024-12-30 RX ADMIN — LIDOCAINE HYDROCHLORIDE 5 ML: 10 INJECTION, SOLUTION EPIDURAL; INFILTRATION; INTRACAUDAL; PERINEURAL at 10:32

## 2024-12-30 ASSESSMENT — LIFESTYLE VARIABLES
HOW MANY STANDARD DRINKS CONTAINING ALCOHOL DO YOU HAVE ON A TYPICAL DAY: 1 OR 2
HOW OFTEN DO YOU HAVE A DRINK CONTAINING ALCOHOL: 2-3 TIMES A WEEK

## 2024-12-30 ASSESSMENT — PAIN SCALES - GENERAL: PAINLEVEL_OUTOF10: MILD PAIN (2)

## 2024-12-30 NOTE — PROGRESS NOTES
"Preventive Care Visit  [facility]  Kelly Yarbrough MD, Family Medicine  Dec 30, 2024      Assessment & Plan     Encounter for annual wellness exam in Medicare patient  Follow-up 1 year    Type 2 diabetes mellitus with hyperglycemia, without long-term current use of insulin (H)  Follow-up 3 mos, victoza will no longer be covered  - Hemoglobin A1c; Future  - semaglutide (OZEMPIC) 2 MG/3ML pen; Inject 0.25 mg subcutaneously every 7 days. For a month and then inject 0.5 mg subcutaneous every 7 days for a month    Hyperlipidemia LDL goal <100  Labs are stable and look good  - Comprehensive metabolic panel; Future  - Lipid Profile (Chol, Trig, HDL, LDL calc); Future    Benign essential hypertension  stable    Calcific tendonitis of left shoulder  Did very well, see procedure note  - Large Joint/Bursa injection and/or drainage (Shoulder, Knee)  - triamcinolone (KENALOG-40) injection 40 mg  - lidocaine (PF) (XYLOCAINE) 1 % injection 5 mL    History of basal cell carcinoma  With likely new lesion,   - Adult Dermatology  Referral; Future    Pigmented skin lesion of suspected malignant nature  Would like to be seen here  - Adult Dermatology  Referral; Future    Nodule of flexor tendon sheath  Referral to ortho  - Orthopedic  Referral; Future    Trigger finger, acquired  Ortho   - Orthopedic  Referral; Future    Patient has been advised of split billing requirements and indicates understanding: Yes    BMI  Estimated body mass index is 31.85 kg/m  as calculated from the following:    Height as of this encounter: 1.803 m (5' 11\").    Weight as of this encounter: 103.6 kg (228 lb 6 oz).   Weight management plan: Discussed healthy diet and exercise guidelines    Counseling  Appropriate preventive services were addressed with this patient via screening, questionnaire, or discussion as appropriate for fall prevention, nutrition, physical activity, Tobacco-use cessation, social " engagement, weight loss and cognition.  Checklist reviewing preventive services available has been given to the patient.  Reviewed patient's diet, addressing concerns and/or questions.   He is at risk for lack of exercise and has been provided with information to increase physical activity for the benefit of his well-being.   The patient was instructed to see the dentist every 6 months.   Discussed possible causes of fatigue. The patient was provided with written information regarding signs of hearing loss.     Patient was agreeable to this plan and had no further questions.  There are no Patient Instructions on file for this visit.    No follow-ups on file.    Chantale Leon is a 69 year old, presenting for the following:  Physical, Diabetes, Lipids, and Hypertension        12/30/2024     9:11 AM   Additional Questions   Roomed by Tammy Wesley   Accompanied by None         12/30/2024     9:11 AM   Patient Reported Additional Medications   Patient reports taking the following new medications None           HPI    Diabetes Follow-up    How often are you checking your blood sugar? A few times a week  What time of day are you checking your blood sugars (select all that apply)?  Before meals  Have you had any blood sugars above 200?  No  Have you had any blood sugars below 70?  No  What symptoms do you notice when your blood sugar is low?  Shaky  What concerns do you have today about your diabetes? None   Do you have any of these symptoms? (Select all that apply)  No numbness or tingling in feet.  No redness, sores or blisters on feet.  No complaints of excessive thirst.  No reports of blurry vision.  No significant changes to weight.      BP Readings from Last 2 Encounters:   12/30/24 128/65   07/24/24 138/80     Hemoglobin A1C (%)   Date Value   07/24/2024 7.0 (H)   03/18/2024 7.4 (H)   04/15/2021 7.7 (H)   01/15/2021 6.8 (H)     LDL Cholesterol Calculated (mg/dL)   Date Value   08/14/2023 55   01/05/2023 72    01/15/2021 66   01/10/2020 79         Hyperlipidemia Follow-Up    Are you regularly taking any medication or supplement to lower your cholesterol?   Yes- atorvastatin 40 MG  Are you having muscle aches or other side effects that you think could be caused by your cholesterol lowering medication?  Yes- the last few months patient reports he has been really stiff and sore     Hypertension Follow-up    Do you check your blood pressure regularly outside of the clinic? No   Are you following a low salt diet? Yes  Are your blood pressures ever more than 140 on the top number (systolic) OR more   than 90 on the bottom number (diastolic), for example 140/90? N/A      Health Care Directive  Patient has a Health Care Directive on file  Advance care planning document is on file and is current.      12/27/2024   General Health   How would you rate your overall physical health? Good   Feel stress (tense, anxious, or unable to sleep) Only a little   (!) STRESS CONCERN      12/27/2024   Nutrition   Diet: Low salt    Low fat/cholesterol    Diabetic       Multiple values from one day are sorted in reverse-chronological order         12/27/2024   Exercise   Days per week of moderate/strenous exercise 3 days   Average minutes spent exercising at this level 60 min         12/27/2024   Social Factors   Frequency of gathering with friends or relatives Once a week   Worry food won't last until get money to buy more No   Food not last or not have enough money for food? No   Do you have housing? (Housing is defined as stable permanent housing and does not include staying ouside in a car, in a tent, in an abandoned building, in an overnight shelter, or couch-surfing.) Yes   Are you worried about losing your housing? No   Lack of transportation? No   Unable to get utilities (heat,electricity)? No         12/27/2024   Fall Risk   Fallen 2 or more times in the past year? No   Trouble with walking or balance? No          12/27/2024   Activities  of Daily Living- Home Safety   Needs help with the following daily activites None of the above   Safety concerns in the home None of the above         12/27/2024   Dental   Dentist two times every year? (!) NO         12/27/2024   Hearing Screening   Hearing concerns? (!) IT'S HARD TO FOLLOW A CONVERSATION IN A NOISY RESTAURANT OR CROWDED ROOM.    (!) TROUBLE UNDERSTANDING SOFT OR WHISPERED SPEECH.       Multiple values from one day are sorted in reverse-chronological order         12/27/2024   Driving Risk Screening   Patient/family members have concerns about driving No         12/27/2024   General Alertness/Fatigue Screening   Have you been more tired than usual lately? (!) YES         12/27/2024   Urinary Incontinence Screening   Bothered by leaking urine in past 6 months No         12/27/2024   TB Screening   Were you born outside of the [place]? No         Today's PHQ-2 Score:       12/30/2024     9:09 AM   PHQ-2 ( 1999 [employer])   Q1: Little interest or pleasure in doing things 0   Q2: Feeling down, depressed or hopeless 0   PHQ-2 Score 0    Q1: Little interest or pleasure in doing things Not at all   Q2: Feeling down, depressed or hopeless Not at all   PHQ-2 Score 0       Patient-reported           12/27/2024   Substance Use   Alcohol more than 3/day or more than 7/wk No   Do you have a current opioid prescription? No   How severe/bad is pain from 1 to 10? 3/10   Do you use any other substances recreationally? No     Social History     Tobacco Use    Smoking status: Never     Passive exposure: Never    Smokeless tobacco: Never    Tobacco comments:     remote cigar history   Vaping Use    Vaping status: Never Used   Substance Use Topics    Alcohol use: Yes     Comment: 1/day    Drug use: No     Comment: remote THC           12/27/2024   AAA Screening   Family history of Abdominal Aortic Aneurysm (AAA)? No   Last PSA:   PSA   Date Value Ref Range Status   09/11/2019 5.33 (H) 0 - 4 ug/L Final     Comment:     Assay  Method:  Chemiluminescence using Siemens Vista analyzer     Prostate Specific Antigen Screen   Date Value Ref Range Status   2024 7.11 (H) 0.00 - 4.50 ng/mL Final     PSA Tumor Marker   Date Value Ref Range Status   2024 6.54 (H) 0.00 - 4.50 ng/mL Final   10/31/2022 3.80 0.00 - 4.00 ug/L Final     ASCVD Risk   The ASCVD Risk score (Aubrey WERNER, et al., 2019) failed to calculate for the following reasons:    The valid total cholesterol range is 130 to 320 mg/dL    Fracture Risk Assessment Tool  Link to Frax Calculator  Use the information below to complete the Frax calculator  : 1955  Sex: male  Weight (kg): 103.6 kg (actual weight)  Height (cm): 180.3 cm  Previous Fragility Fracture:  No  History of parent with fractured hip:  No  Current Smoking:  No  Patient has been on glucocorticoids for more than 3 months (5mg/day or more): No  Rheumatoid Arthritis on Problem List:  No  Secondary Osteoporosis on Problem List:  No  Consumes 3 or more units of alcohol per day: No  Femoral Neck BMD (g/cm2)            Reviewed and updated as needed this visit by Provider                  Past Medical History:   Diagnosis Date    Basal cell carcinoma     Calcific tendonitis of left shoulder 2020    DIABETES MELLITUS TYPE II-UNCOMPL 2006    Elevated PSA     HYPERLIPIDEMIA NEC/NOS 10/18/2006    HYPERTENSION NOS 2006    Nephrolithiasis 10/2022    was seen in [place], and next time [facility]    Obesity     JIAN (obstructive sleep apnea)     mild, chose not to get cpap or oral device    Shingles 2017    Tendon nodule     Trigger finger, acquired      Past Surgical History:   Procedure Laterality Date    BIOPSY PROSTATE TRANSRECTAL N/A 10/22/2019    Procedure: Transrectal Ultrasoun guided biopsy of prostate;  Surgeon: Yunior Llanes MD;  Location: [facility] OR    COLONOSCOPY  2014    due   4 polyps benign    COLONOSCOPY N/A 06/10/2021    due 2026  Procedure: surveillance  colonoscopy,  biopsy;  Surgeon: Lawson Storey MD;  Location: [facility] OR    wisdom teeth extraction       Lab work is in process  Labs reviewed in EPIC  BP Readings from Last 3 Encounters:   12/30/24 128/65   07/24/24 138/80   04/04/24 128/74    Wt Readings from Last 3 Encounters:   12/30/24 103.6 kg (228 lb 6 oz)   07/24/24 103.9 kg (229 lb 2 oz)   04/04/24 106.6 kg (235 lb 1.6 oz)                  Patient Active Problem List   Diagnosis    Hyperlipidemia LDL goal <100    BCC (basal cell carcinoma), face    Obesity due to excess calories, unspecified obesity severity    Other insomnia    Benign essential hypertension    Herpes zoster without complication    Trigger finger, acquired    Hypovitaminosis D    Elevated prostate specific antigen (PSA)    Type 2 diabetes mellitus with hyperglycemia, without long-term current use of insulin (H)    Plantar warts    Rotator cuff tendonitis, left    Special screening for malignant neoplasms, colon    Colon cancer screening    Tubular adenoma of colon    Restless legs syndrome    JIAN (obstructive sleep apnea)    Microscopic hematuria    Calculus of gallbladder without cholecystitis without obstruction    Seasonal allergic rhinitis due to other allergic trigger    Gross hematuria    Low vitamin B12 level    Nodule of flexor tendon sheath    Calcific tendonitis of left shoulder    History of basal cell carcinoma    Pigmented skin lesion of suspected malignant nature     Past Surgical History:   Procedure Laterality Date    BIOPSY PROSTATE TRANSRECTAL N/A 10/22/2019    Procedure: Transrectal Ultrasoun guided biopsy of prostate;  Surgeon: Yunior Llanes MD;  Location: [facility] OR    COLONOSCOPY  03/03/2014    due 2019  4 polyps benign    COLONOSCOPY N/A 06/10/2021    due 6/2026  Procedure: surveillance colonoscopy,  biopsy;  Surgeon: Lawson Storey MD;  Location: [facility] OR    wisdom teeth extraction         Social History     Tobacco Use    Smoking status: Never     Passive exposure: Never     Smokeless tobacco: Never    Tobacco comments:     remote cigar history   Substance Use Topics    Alcohol use: Yes     Comment: <1/day -- out 2-3x/wk and 2-3 drinks     Family History   Problem Relation Age of Onset    Other - See Comments Mother         infection    Cardiovascular Father         aortic valve surgery    Cancer Father         lung cancer    Gastrointestinal Disease Father         benign esophageal tumor removed    Hypertension Father     Diabetes Father 70    Cataracts Sister     Other - See Comments Maternal Grandfather         hunting accident    Other - See Comments Paternal Grandmother         old age    Parkinsonism Paternal Grandfather     Cerebrovascular Disease Paternal Grandfather          Current Outpatient Medications   Medication Sig Dispense Refill    acetylcysteine (N-ACETYL CYSTEINE) 500 MG CAPS capsule Take 1 capsule (500 mg) by mouth daily      amLODIPine (NORVASC) 5 MG tablet Take 1 tablet by mouth once daily 90 tablet 3    ASPIRIN 81 MG OR TABS 1 tab po QD (Once per day) 100 3    atorvastatin (LIPITOR) 40 MG tablet Take 1 tablet by mouth once daily 90 tablet 3    blood glucose (ONETOUCH VERIO IQ) test strip USE  STRIP TO CHECK GLUCOSE ONCE DAILY 100 strip 0    Blood Glucose Monitoring Suppl (ONETOUCH VERIO FLEX SYSTEM) w/Device KIT 1 each continuous 1 kit 0    Blood Pressure Monitor KIT 1 Application 2 times daily 1 kit 0    glipiZIDE (GLUCOTROL XL) 10 MG 24 hr tablet Take 1 tablet by mouth twice daily 180 tablet 3    insulin pen needle (BD PEN NEEDLE TYRONE 2ND GEN) 32G X 4 MM miscellaneous USE 1 VALARIE NEEDLE ONCE DAILY AS DIRECTED 100 each 2    liraglutide (VICTOZA) 18 MG/3ML solution INJECT 1.2 MG SUBCUTANEOUSLY DAILY 6 mL 2    lisinopril (ZESTRIL) 40 MG tablet Take 1 tablet by mouth once daily 90 tablet 3    metFORMIN (GLUCOPHAGE) 1000 MG tablet TAKE 1 TABLET BY MOUTH TWICE DAILY WITH MEALS 180 tablet 3    Multiple Vitamins-Minerals (MULTIVITAMIN PO) Take 1 tablet by mouth daily       Omega-3 Fatty Acids (OMEGA-3 FISH OIL PO) Take 1 g by mouth daily      OneTouch Delica Lancets 33G MISC 1 each daily 100 each 3    [START ON 1/2/2025] semaglutide (OZEMPIC) 2 MG/3ML pen Inject 0.25 mg subcutaneously every 7 days. For a month and then inject 0.5 mg subcutaneous every 7 days for a month 3 mL 1    sildenafil (REVATIO) 20 MG tablet Take 3-5 (60-100mg) tablets by mouth 1 hour prior to sexual activity 30 tablet 11    Turmeric 500 MG TABS Take 1 tablet by mouth 2 times daily      vitamin B complex with vitamin C (STRESS TAB) tablet Take 1 tablet by mouth daily      VITAMIN D, CHOLECALCIFEROL, PO Take 1,000 Units by mouth daily       Allergies   Allergen Reactions    Nkda [No Known Drug Allergy]      Recent Labs   Lab Test 12/30/24  0912 07/24/24  0910 03/18/24  1457 11/20/23  0819 08/14/23  0927 05/08/23  0901 01/05/23  0912 07/15/21  0819 06/02/21  0916 04/15/21  0911 01/15/21  0750   A1C 7.3* 7.0* 7.4*   < > 7.7*   < > 7.5*   < >  --  7.7* 6.8*   LDL 59  --   --   --  55  --  72   < >  --   --  66   HDL 44  --   --   --  40  --  47   < >  --   --  45   TRIG 74  --   --   --  121  --  106   < >  --   --  127   ALT 18  --   --   --  18  --  27   < >  --   --  36   CR 0.92  --   --   --  0.94  --  0.91   < > 0.89  --  0.93   GFRESTIMATED 90  --   --   --  89  --  >90   < > 89  --  86   GFRESTBLACK  --   --   --   --   --   --   --   --  >90  --  >90   POTASSIUM 3.9  --   --   --  4.4  --  4.4   < > 4.1  --  4.0   TSH  --   --   --   --   --   --  1.78  --   --  1.54  --     < > = values in this interval not displayed.      Current providers sharing in care for this patient include:  Patient Care Team:  Kelly Yarbrough MD as PCP - General (Family Practice)  Kelly Yarbrough MD as Assigned PCP  Karrie Coleman RD as Diabetes Educator (Diabetes Education)  Brie Garza LPN as TRACY aWshington MD as Assigned Surgical Provider    The following health maintenance items are reviewed in Epic and  "correct as of today:  Health Maintenance   Topic Date Due    ZOSTER IMMUNIZATION (1 of 2) Never done    RSV VACCINE (1 - Risk 60-74 years 1-dose series) Never done    BMP  08/14/2024    LIPID  08/14/2024    INFLUENZA VACCINE (1) 09/01/2024    COVID-19 Vaccine (4 - 2024-25 season) 09/01/2024    A1C  01/24/2025    EYE EXAM  06/20/2025    MICROALBUMIN  07/24/2025    DIABETIC FOOT EXAM  07/24/2025    MEDICARE ANNUAL WELLNESS VISIT  12/30/2025    FALL RISK ASSESSMENT  12/30/2025    DTAP/TDAP/TD IMMUNIZATION (5 - Td or Tdap) 03/09/2026    COLORECTAL CANCER SCREENING  06/10/2026    ADVANCE CARE PLANNING  08/17/2028    HEPATITIS C SCREENING  Completed    PHQ-2 (once per calendar year)  Completed    Pneumococcal Vaccine: 50+ Years  Completed    HPV IMMUNIZATION  Aged Out    MENINGITIS IMMUNIZATION  Aged Out    RSV MONOCLONAL ANTIBODY  Aged Out       Review of Systems  Constitutional, HEENT, cardiovascular, pulmonary, GI, , musculoskeletal, neuro, skin, endocrine and psych systems are negative, except as otherwise noted.     Objective    Exam  /65 (BP Location: Left arm, Patient Position: Sitting, Cuff Size: Adult Large)   Temp 97.1  F (36.2  C) (Tympanic)   Resp 16   Ht 1.803 m (5' 11\")   Wt 103.6 kg (228 lb 6 oz)   SpO2 95%   BMI 31.85 kg/m     Estimated body mass index is 31.85 kg/m  as calculated from the following:    Height as of this encounter: 1.803 m (5' 11\").    Weight as of this encounter: 103.6 kg (228 lb 6 oz).    Physical Exam  GENERAL: alert and no distress  EYES: Eyes grossly normal to inspection, PERRL and conjunctivae and sclerae normal  HENT: ear canals and TM's normal, nose and mouth without ulcers or lesions  NECK: no adenopathy, no asymmetry, masses, or scars  RESP: lungs clear to auscultation - no rales, rhonchi or wheezes  CV: regular rate and rhythm, normal S1 S2, no S3 or S4, no murmur, click or rub, no peripheral edema  ABDOMEN: soft, nontender, no hepatosplenomegaly, no masses and " bowel sounds normal  MS: no gross musculoskeletal defects noted, no edema  MS: decreased range of motion left shoulder, decreased flexion and extension ROM  SKIN: hypopigmentation -  left nares  NEURO: Normal strength and tone, mentation intact and speech normal  PSYCH: mentation appears normal, affect normal/bright         12/30/2024   Mini Cog   Clock Draw Score 2 Normal   3 Item Recall 1 object recalled   Mini Cog Total Score 3            Signed Electronically by: Kelly Yarbrough MD        PROCEDURE:  JOINT ASPIRATION/INJECTION.         After a discussion of risks, benefits and side effects of procedure, informed patient consent was obtained.       The left subacromial bursa was prepped and draped in the usual clean fashion (sterile not required for this procedure).       INJECTION:  Using 5 cc of 1% lidocaine mixed                           with 40 mg of kenalog, the left shoulder was successfully injected                           without complication.  Patient did experience some pain                          relief following injection.

## 2025-01-12 ENCOUNTER — MYC MEDICAL ADVICE (OUTPATIENT)
Dept: FAMILY MEDICINE | Facility: OTHER | Age: 70
End: 2025-01-12

## 2025-01-12 DIAGNOSIS — M25.512 LEFT SHOULDER PAIN: Primary | ICD-10-CM

## 2025-01-12 DIAGNOSIS — M75.32 CALCIFIC TENDONITIS OF LEFT SHOULDER: ICD-10-CM

## 2025-01-13 ENCOUNTER — OFFICE VISIT (OUTPATIENT)
Dept: DERMATOLOGY | Facility: OTHER | Age: 70
End: 2025-01-13
Attending: DERMATOLOGY
Payer: COMMERCIAL

## 2025-01-13 VITALS
DIASTOLIC BLOOD PRESSURE: 72 MMHG | OXYGEN SATURATION: 93 % | TEMPERATURE: 97.4 F | HEART RATE: 76 BPM | SYSTOLIC BLOOD PRESSURE: 138 MMHG

## 2025-01-13 DIAGNOSIS — L81.9 PIGMENTED SKIN LESION OF SUSPECTED MALIGNANT NATURE: ICD-10-CM

## 2025-01-13 DIAGNOSIS — D48.5 NEOPLASM OF UNCERTAIN BEHAVIOR OF SKIN: Primary | ICD-10-CM

## 2025-01-13 DIAGNOSIS — Z85.828 HISTORY OF BASAL CELL CARCINOMA: ICD-10-CM

## 2025-01-13 PROCEDURE — 11102 TANGNTL BX SKIN SINGLE LES: CPT | Performed by: DERMATOLOGY

## 2025-01-13 PROCEDURE — 88305 TISSUE EXAM BY PATHOLOGIST: CPT | Mod: 26 | Performed by: PATHOLOGY

## 2025-01-13 PROCEDURE — G0463 HOSPITAL OUTPT CLINIC VISIT: HCPCS | Mod: 25

## 2025-01-13 ASSESSMENT — PAIN SCALES - GENERAL: PAINLEVEL_OUTOF10: NO PAIN (0)

## 2025-01-13 NOTE — PROGRESS NOTES
S: Edward has a history of a skin cancer on his face a basal cell some years back.  Recently he developed a lesion on the left ala of concern.    O: Present on the left ala at the edge of the is a bluish lesion that so the central smooth papule within the border of bluish pigment.    A: Suspect a basal cell with pigmentation.    P: Site was anesthetized after photography then about one half of the lesion was shave removed for purposes of identification.  Specimen sent to [facility] dermatopathology.  Will call Edward with the report when we have available.  He will most likely need Mohs surgery.

## 2025-01-13 NOTE — LETTER
1/13/2025       RE: Edward Hannah  [address]   [place] [facility] [place] [address]     Dear Colleague,    Thank you for referring your patient, Edward Hannah, to the [facility]. Please see a copy of my visit note below.    S: Edward has a history of a skin cancer on his face a basal cell some years back.  Recently he developed a lesion on the left ala of concern.    O: Present on the left ala at the edge of the is a bluish lesion that so the central smooth papule within the border of bluish pigment.    A: Suspect a basal cell with pigmentation.    P: Site was anesthetized after photography then about one half of the lesion was shave removed for purposes of identification.  [facility]  Will call Edward with the report when we have available.  He will most likely need Mohs surgery.    Again, thank you for allowing me to participate in the care of your patient.      Sincerely,    TRACY Dominguez MD

## 2025-01-23 DIAGNOSIS — C44.311 BASAL CELL CARCINOMA (BCC) OF LATERAL SIDE WALL OF NOSE: Primary | ICD-10-CM

## 2025-01-24 ENCOUNTER — TELEPHONE (OUTPATIENT)
Dept: DERMATOLOGY | Facility: CLINIC | Age: 70
End: 2025-01-24
Payer: COMMERCIAL

## 2025-01-24 DIAGNOSIS — C44.311 BASAL CELL CARCINOMA OF NOSE: Primary | ICD-10-CM

## 2025-01-24 NOTE — TELEPHONE ENCOUNTER
TRACY Dominguez MD  1/23/2025  7:53 PM CST Back to Top      Lesion on Edward's nose was a basal cell with features indicating that Moh's is best therapy. Could be referred to Andrew or Dr Kelly depending on his wishes.  Recheck in one year.

## 2025-01-24 NOTE — TELEPHONE ENCOUNTER
Patient agrees to referral and it has been placed.  Please send notes, path and photos with referral.   Thanks

## 2025-01-27 ENCOUNTER — TELEPHONE (OUTPATIENT)
Dept: CARE COORDINATION | Facility: OTHER | Age: 70
End: 2025-01-27

## 2025-01-27 DIAGNOSIS — E11.65 TYPE 2 DIABETES MELLITUS WITH HYPERGLYCEMIA, WITHOUT LONG-TERM CURRENT USE OF INSULIN (H): Primary | ICD-10-CM

## 2025-01-27 NOTE — TELEPHONE ENCOUNTER
Patient has an appt on 2/3/25 for diabetic education.   Previous referral has .   New referral pended for approval.   Thank you.

## 2025-01-29 ENCOUNTER — HOSPITAL ENCOUNTER (OUTPATIENT)
Dept: MRI IMAGING | Facility: HOSPITAL | Age: 70
Discharge: HOME | End: 2025-01-29
Attending: FAMILY MEDICINE
Payer: COMMERCIAL

## 2025-01-29 DIAGNOSIS — M75.32 CALCIFIC TENDONITIS OF LEFT SHOULDER: ICD-10-CM

## 2025-01-29 PROCEDURE — 73221 MRI JOINT UPR EXTREM W/O DYE: CPT | Mod: LT

## 2025-02-03 ENCOUNTER — HOSPITAL ENCOUNTER (OUTPATIENT)
Dept: EDUCATION SERVICES | Facility: HOSPITAL | Age: 70
Discharge: HOME | End: 2025-02-03
Attending: DIETITIAN, REGISTERED | Admitting: DIETITIAN, REGISTERED
Payer: COMMERCIAL

## 2025-02-03 VITALS
RESPIRATION RATE: 16 BRPM | HEART RATE: 79 BPM | BODY MASS INDEX: 32.35 KG/M2 | WEIGHT: 231.1 LBS | HEIGHT: 71 IN | DIASTOLIC BLOOD PRESSURE: 88 MMHG | SYSTOLIC BLOOD PRESSURE: 158 MMHG | OXYGEN SATURATION: 93 %

## 2025-02-03 DIAGNOSIS — E11.65 TYPE 2 DIABETES MELLITUS WITH HYPERGLYCEMIA, WITHOUT LONG-TERM CURRENT USE OF INSULIN (H): Primary | ICD-10-CM

## 2025-02-03 PROCEDURE — G0108 DIAB MANAGE TRN  PER INDIV: HCPCS | Performed by: DIETITIAN, REGISTERED

## 2025-02-03 ASSESSMENT — PAIN SCALES - GENERAL: PAINLEVEL_OUTOF10: NO PAIN (0)

## 2025-02-03 NOTE — PROGRESS NOTES
Diabetes Self-Management Education & Support    Presents for: Individual review (Annual)    Type of Service: In Person Visit    Assessment  Recent A1c was 7.3% which is stable.  Pt reports he will transition to Ozempic once his supply of Victoza is gone r/t insurance coverage.  This may offer improved glucose control.  Discussion notes breakfast meals are high in carbohydrates - discussed.  Weight stable.  Eye exam and food exam are up to date.      Patient's most recent   Lab Results   Component Value Date    A1C 7.3 12/30/2024    A1C 7.7 04/15/2021     is not meeting goal of <7.0    Diabetes knowledge and skills assessment:   Patient is knowledgeable in diabetes management concepts related to: Being Active, Monitoring, Problem Solving, and Healthy Coping    Based on learning assessment above, most appropriate setting for further diabetes education would be: Individual setting.    Care Plan and Education Provided:  Reviewed alternate breakfast choices as breakfast meal is high in carbohydrates.  Encouraged protein at all meals.    Discussed transition from Victoza to Ozempic - dosing.     Patient verbalized understanding of diabetes self-management education concepts discussed, opportunities for ongoing education and support, and recommendations provided today.    Plan  Try to include protein at all meals.    Do some 2 hour after breakfast meal glucose monitoring.    Transition to Ozmepic 0.25 mg weekly once supply of Victoza is gone.  Take 0.25 mg dose x 4 weeks and if no side effects then increase to 0.50 mg weekly.    Call with any concerns regarding insurance coverage or side effects.    BP recheck at upcoming appointment for shoulder.      Follow-up:  Feb 4, 2026 - 9:30 am    See Care Plan for co-developed, patient-state behavior change goals.    Education Materials Provided:  No new materials today.     Subjective/Objective  Edward is an 69 year old year old, presenting for the following diabetes education  "related to: Presents for: Individual review (Annual)  Accompanied by: Self  Diabetes education in the past 24mo: Yes  Focus of Visit: Assistance w/ making life changes  Diabetes type: Type 2  Date of diagnosis: 2005  Disease course: Stable  How confident are you filling out medical forms by yourself:: Extremely  Diabetes management related comments/concerns: None  Transportation concerns: No  Difficulty affording diabetes medication?: No  Difficulty affording diabetes testing supplies?: No  Other concerns:: None  Cultural Influences/Ethnic Background:  Not  or     Diabetes Symptoms & Complications:  Diabetes Related Symptoms: Fatigue, Slow healing wounds  Weight trend: Stable  Symptom course: Stable  Disease course: Stable  Complications assessed today?: Yes  CVA: No  Heart disease: No  Nephropathy: No  Retinopathy: No    Patient Problem List and Family Medical History reviewed for relevant medical history, current medical status, and diabetes risk factors.    Vitals:  BP (!) 158/88   Pulse 79   Resp 16   Ht 1.803 m (5' 11\")   Wt 104.8 kg (231 lb 1.6 oz)   SpO2 93%   BMI 32.23 kg/m    Estimated body mass index is 32.23 kg/m  as calculated from the following:    Height as of this encounter: 1.803 m (5' 11\").    Weight as of this encounter: 104.8 kg (231 lb 1.6 oz).   Last 3 BP:   BP Readings from Last 3 Encounters:   02/03/25 (!) 158/88   01/13/25 138/72   12/30/24 128/65       History   Smoking Status    Never   Smokeless Tobacco    Never       Labs:  Lab Results   Component Value Date    A1C 7.3 12/30/2024    A1C 7.7 04/15/2021     Lab Results   Component Value Date     12/30/2024     01/28/2022     06/02/2021     Lab Results   Component Value Date    LDL 59 12/30/2024    LDL 66 01/15/2021     HDL Cholesterol   Date Value Ref Range Status   01/15/2021 45 >39 mg/dL Final     Direct Measure HDL   Date Value Ref Range Status   12/30/2024 44 >=40 mg/dL Final   ]  GFR Estimate " "  Date Value Ref Range Status   12/30/2024 90 >60 mL/min/1.73m2 Final     Comment:     eGFR calculated using 2021 CKD-EPI equation.   06/02/2021 89 >60 mL/min/[1.73_m2] Final     Comment:     Non  GFR Calc  Starting 12/18/2018, serum creatinine based estimated GFR (eGFR) will be   calculated using the Chronic Kidney Disease Epidemiology Collaboration   (CKD-EPI) equation.       GFR Estimate If Black   Date Value Ref Range Status   06/02/2021 >90 >60 mL/min/[1.73_m2] Final     Comment:      GFR Calc  Starting 12/18/2018, serum creatinine based estimated GFR (eGFR) will be   calculated using the Chronic Kidney Disease Epidemiology Collaboration   (CKD-EPI) equation.       Lab Results   Component Value Date    CR 0.92 12/30/2024    CR 0.89 06/02/2021     No results found for: \"MICROALBUMIN\"    Healthy Eating:  Healthy Eating Assessed Today: Yes  Cultural/Holiness diet restrictions?: No  Do you have any food allergies or intolerances?: No  Meal planning/habits: None, Low salt  Who cooks/prepares meals for you?: Self  Who purchases food in  your home?: Self  How many times a week on average do you eat food made away from home (restaurant/take-out)?: 1  Meals include: Breakfast, Dinner, Afternoon Snack  Breakfast: oatmeal (2 cups) with banana and berries, 2 toast with jam on one and peanut butter on the other, milk or water  Lunch: leftovers or salad or may just eat a snack like popcorn or carrots/celery/dip  Dinner: salad with protein and low fat dressing or meat, pototes, veggies  Snacks: HS-veggies/dip or fruit or occasional dark chocolate  Beverages: Water, Milk, Diet soda  Has patient met with a dietitian in the past?: Yes    Being Active:  Being Active Assessed Today: Yes  Exercise:: Yes (Gym or walks)  Days per week of moderate to strenuous exercise (like a brisk walk): 3  On average, minutes per day of exercise at this level: 60  How intense was your typical exercise? : Moderate " (like brisk walking)  Exercise Minutes per Week: 180  Barrier to exercise: None    Monitoring:  Monitoring Assessed Today: Yes  Did patient bring glucose meter to appointment? : Yes  Blood Glucose Meter: One Touch (Verio)  Times checking blood sugar at home (number): Other (see Comments)  Please elaborate:: Random testing - has 11 tests in the past two weeks.  Times checking blood sugar at home (per): Week  Fasting-141, 84, 111, 130, 110, 115, 131, 111  Post meal-321, 127, 267      Taking Medications:  Diabetes Medication(s)       Biguanides       metFORMIN (GLUCOPHAGE) 1000 MG tablet TAKE 1 TABLET BY MOUTH TWICE DAILY WITH MEALS       Sulfonylureas       glipiZIDE (GLUCOTROL XL) 10 MG 24 hr tablet Take 1 tablet by mouth twice daily       Incretin Mimetic Agents       liraglutide (VICTOZA) 18 MG/3ML solution INJECT 1.2 MG SUBCUTANEOUSLY DAILY          Taking Medication Assessed Today: Yes  Current Treatments: Diet, Non-insulin Injectables, Oral Medication (taken by mouth) (Metformin 1000 mg bid, Glucotrol XL 10 mg bid, Victoza 1.2 mg daily (note plans to transition to Ozempic r/t insurance coverage))  Problems taking diabetes medications regularly?: No  Diabetes medication side effects?: No    Problem Solving:  Problem Solving Assessed Today: Yes  Is the patient at risk for hypoglycemia?: Yes  Hypoglycemia Frequency: Rarely  Hypoglycemia Treatment: Candy, Other food  Patient carries a carbohydrate source: Yes  Hypoglycemia: Feeling shaky     Reducing Risks:  Reducing Risks Assessed Today: Yes  Diabetes Risks: Age over 45 years  CAD Risks: Diabetes Mellitus, Male sex  Has dilated eye exam at least once a year?: Yes  Sees dentist every 6 months?: No  Feet checked by healthcare provider in the last year?: Yes    Healthy Coping:  Healthy Coping Assessed Today: Yes  Emotional response to diabetes: Acceptance  Informal Support system:: None  Stage of change: MAINTENANCE (Working to maintain change, with risk of  relapse)    MARIZA Nichols, [facility]  Time Spent: 30 minutes  Encounter Type: Individual    Any diabetes medication dose changes were made via the [facility] Standing Orders under the patient's referring provider.

## 2025-02-03 NOTE — LETTER
2/3/2025      Edward Hannah  [address]   [place] [id number]        Abnormal VS:    Problem: I spoke to Billie Haney  face to face regarding the pt's elevated BP.   Plan: Provider recommended BP check nurse only appt  Pt declined appt. He has an appt here on Wednesday and BP will be checked again at that time.    Brie Garza LPN        Diabetes Self-Management Education & Support    Presents for: Individual review (Annual)    Type of Service: In Person Visit    Assessment  Recent A1c was 7.3% which is stable.  Pt reports he will transition to Ozempic once his supply of Victoza is gone r/t insurance coverage.  This may offer improved glucose control.  Discussion notes breakfast meals are high in carbohydrates - discussed.  Weight stable.  Eye exam and food exam are up to date.      Patient's most recent   Lab Results   Component Value Date    A1C 7.3 12/30/2024    A1C 7.7 04/15/2021     is not meeting goal of <7.0    Diabetes knowledge and skills assessment:   Patient is knowledgeable in diabetes management concepts related to: Being Active, Monitoring, Problem Solving, and Healthy Coping    Based on learning assessment above, most appropriate setting for further diabetes education would be: Individual setting.    Care Plan and Education Provided:  Reviewed alternate breakfast choices as breakfast meal is high in carbohydrates.  Encouraged protein at all meals.    Discussed transition from Victoza to Ozempic - dosing.     Patient verbalized understanding of diabetes self-management education concepts discussed, opportunities for ongoing education and support, and recommendations provided today.    Plan  Try to include protein at all meals.    Do some 2 hour after breakfast meal glucose monitoring.    Transition to Ozmepic 0.25 mg weekly once supply of Victoza is gone.  Take 0.25 mg dose x 4 weeks and if no side effects then increase to 0.50 mg weekly.    Call with any concerns regarding insurance coverage  "or side effects.    BP recheck at upcoming appointment for shoulder.      Follow-up:  Feb 4, 2026 - 9:30 am    See Care Plan for co-developed, patient-state behavior change goals.    Education Materials Provided:  No new materials today.     Subjective/Objective  Edward is an 69 year old year old, presenting for the following diabetes education related to: Presents for: Individual review (Annual)  Accompanied by: Self  Diabetes education in the past 24mo: Yes  Focus of Visit: Assistance w/ making life changes  Diabetes type: Type 2  Date of diagnosis: 2005  Disease course: Stable  How confident are you filling out medical forms by yourself:: Extremely  Diabetes management related comments/concerns: None  Transportation concerns: No  Difficulty affording diabetes medication?: No  Difficulty affording diabetes testing supplies?: No  Other concerns:: None  Cultural Influences/Ethnic Background:  Not  or     Diabetes Symptoms & Complications:  Diabetes Related Symptoms: Fatigue, Slow healing wounds  Weight trend: Stable  Symptom course: Stable  Disease course: Stable  Complications assessed today?: Yes  CVA: No  Heart disease: No  Nephropathy: No  Retinopathy: No    Patient Problem List and Family Medical History reviewed for relevant medical history, current medical status, and diabetes risk factors.    Vitals:  BP (!) 158/88   Pulse 79   Resp 16   Ht 1.803 m (5' 11\")   Wt 104.8 kg (231 lb 1.6 oz)   SpO2 93%   BMI 32.23 kg/m    Estimated body mass index is 32.23 kg/m  as calculated from the following:    Height as of this encounter: 1.803 m (5' 11\").    Weight as of this encounter: 104.8 kg (231 lb 1.6 oz).   Last 3 BP:   BP Readings from Last 3 Encounters:   02/03/25 (!) 158/88   01/13/25 138/72   12/30/24 128/65       History   Smoking Status     Never   Smokeless Tobacco     Never       Labs:  Lab Results   Component Value Date    A1C 7.3 12/30/2024    A1C 7.7 04/15/2021     Lab Results " "  Component Value Date     12/30/2024     01/28/2022     06/02/2021     Lab Results   Component Value Date    LDL 59 12/30/2024    LDL 66 01/15/2021     HDL Cholesterol   Date Value Ref Range Status   01/15/2021 45 >39 mg/dL Final     Direct Measure HDL   Date Value Ref Range Status   12/30/2024 44 >=40 mg/dL Final   ]  GFR Estimate   Date Value Ref Range Status   12/30/2024 90 >60 mL/min/1.73m2 Final     Comment:     eGFR calculated using 2021 CKD-EPI equation.   06/02/2021 89 >60 mL/min/[1.73_m2] Final     Comment:     Non  GFR Calc  Starting 12/18/2018, serum creatinine based estimated GFR (eGFR) will be   calculated using the Chronic Kidney Disease Epidemiology Collaboration   (CKD-EPI) equation.       GFR Estimate If Black   Date Value Ref Range Status   06/02/2021 >90 >60 mL/min/[1.73_m2] Final     Comment:      GFR Calc  Starting 12/18/2018, serum creatinine based estimated GFR (eGFR) will be   calculated using the Chronic Kidney Disease Epidemiology Collaboration   (CKD-EPI) equation.       Lab Results   Component Value Date    CR 0.92 12/30/2024    CR 0.89 06/02/2021     No results found for: \"MICROALBUMIN\"    Healthy Eating:  Healthy Eating Assessed Today: Yes  Cultural/Taoist diet restrictions?: No  Do you have any food allergies or intolerances?: No  Meal planning/habits: None, Low salt  Who cooks/prepares meals for you?: Self  Who purchases food in  your home?: Self  How many times a week on average do you eat food made away from home (restaurant/take-out)?: 1  Meals include: Breakfast, Dinner, Afternoon Snack  Breakfast: oatmeal (2 cups) with banana and berries, 2 toast with jam on one and peanut butter on the other, milk or water  Lunch: leftovers or salad or may just eat a snack like popcorn or carrots/celery/dip  Dinner: salad with protein and low fat dressing or meat, pototes, veggies  Snacks: HS-veggies/dip or fruit or occasional dark " chocolate  Beverages: Water, Milk, Diet soda  Has patient met with a dietitian in the past?: Yes    Being Active:  Being Active Assessed Today: Yes  Exercise:: Yes (Gym or walks)  Days per week of moderate to strenuous exercise (like a brisk walk): 3  On average, minutes per day of exercise at this level: 60  How intense was your typical exercise? : Moderate (like brisk walking)  Exercise Minutes per Week: 180  Barrier to exercise: None    Monitoring:  Monitoring Assessed Today: Yes  Did patient bring glucose meter to appointment? : Yes  Blood Glucose Meter: One Touch (Verio)  Times checking blood sugar at home (number): Other (see Comments)  Please elaborate:: Random testing - has 11 tests in the past two weeks.  Times checking blood sugar at home (per): Week  Fasting-141, 84, 111, 130, 110, 115, 131, 111  Post meal-321, 127, 267      Taking Medications:  Diabetes Medication(s)       Biguanides       metFORMIN (GLUCOPHAGE) 1000 MG tablet TAKE 1 TABLET BY MOUTH TWICE DAILY WITH MEALS       Sulfonylureas       glipiZIDE (GLUCOTROL XL) 10 MG 24 hr tablet Take 1 tablet by mouth twice daily       Incretin Mimetic Agents       liraglutide (VICTOZA) 18 MG/3ML solution INJECT 1.2 MG SUBCUTANEOUSLY DAILY          Taking Medication Assessed Today: Yes  Current Treatments: Diet, Non-insulin Injectables, Oral Medication (taken by mouth) (Metformin 1000 mg bid, Glucotrol XL 10 mg bid, Victoza 1.2 mg daily (note plans to transition to Ozempic r/t insurance coverage))  Problems taking diabetes medications regularly?: No  Diabetes medication side effects?: No    Problem Solving:  Problem Solving Assessed Today: Yes  Is the patient at risk for hypoglycemia?: Yes  Hypoglycemia Frequency: Rarely  Hypoglycemia Treatment: Candy, Other food  Patient carries a carbohydrate source: Yes  Hypoglycemia: Feeling shaky     Reducing Risks:  Reducing Risks Assessed Today: Yes  Diabetes Risks: Age over 45 years  CAD Risks: Diabetes Mellitus, Male  sex  Has dilated eye exam at least once a year?: Yes  Sees dentist every 6 months?: No  Feet checked by healthcare provider in the last year?: Yes    Healthy Coping:  Healthy Coping Assessed Today: Yes  Emotional response to diabetes: Acceptance  Informal Support system:: None  Stage of change: MAINTENANCE (Working to maintain change, with risk of relapse)    MARIZA Nichols, [facility]  Time Spent: 30 minutes  Encounter Type: Individual    Any diabetes medication dose changes were made via the [facility] Standing Orders under the patient's referring provider.       Sincerely,        Karrie Coleman RD    Electronically signed

## 2025-02-03 NOTE — PROGRESS NOTES
Abnormal VS:    Problem: I spoke to Billie Haney  face to face regarding the pt's elevated BP.   Plan: Provider recommended BP check nurse only appt  Pt declined appt. He has an appt here on Wednesday and BP will be checked again at that time.    Brei Garza LPN

## 2025-02-03 NOTE — PATIENT INSTRUCTIONS
-Try to make sure you have carbohydrates and protein at all meals.    -Your exercise is good.  Keep it up.  Goal is 150 minutes weekly.  -Keep testing as you have been.  Good times are fasting or 2 hours after a meal.  -Target glucose levels are fasting , 2 hours after a meal < 180.  -Recent A1c was 7.3%.  Goal is < 7.0%.    -Transition to Ozmepic once your supply of Victoza is gone.  You will take 0.25 mg weekly x 4 weeks and then if no side effects increase to 0.50 mg weekly.  -Call if not covered by your insurance or with side effects.    -Follow up annually or sooner if needed.   -MARIZA Nichols, [facility] [phone].

## 2025-02-05 ENCOUNTER — PREP FOR PROCEDURE (OUTPATIENT)
Dept: ORTHOPEDICS | Facility: OTHER | Age: 70
End: 2025-02-05

## 2025-02-05 ENCOUNTER — TELEPHONE (OUTPATIENT)
Dept: FAMILY MEDICINE | Facility: OTHER | Age: 70
End: 2025-02-05

## 2025-02-05 ENCOUNTER — OFFICE VISIT (OUTPATIENT)
Dept: ORTHOPEDICS | Facility: OTHER | Age: 70
End: 2025-02-05
Attending: ORTHOPAEDIC SURGERY
Payer: COMMERCIAL

## 2025-02-05 DIAGNOSIS — M75.32 CALCIFIC TENDONITIS OF LEFT SHOULDER: Primary | ICD-10-CM

## 2025-02-05 DIAGNOSIS — M75.32 CALCIFIC TENDONITIS OF LEFT SHOULDER: ICD-10-CM

## 2025-02-05 PROCEDURE — G0463 HOSPITAL OUTPT CLINIC VISIT: HCPCS

## 2025-02-05 PROCEDURE — 99203 OFFICE O/P NEW LOW 30 MIN: CPT | Performed by: ORTHOPAEDIC SURGERY

## 2025-02-05 NOTE — TELEPHONE ENCOUNTER
February 5, 2025    11:12 AM    Reason for Call: OVERBOOK    Patient is [place] 3[place]4/25 [place] [place]     The [place] is requesting an appointment for pre-op with Dr Yarbrough.    Preferred method for responding to this message: Telephone Call  What is your phone number? [phone]    If we cannot reach you directly, may we leave a detailed response at the number you provided? Yes    -Delfina Florian on 2/5/2025 at 11:13 AM

## 2025-02-09 NOTE — PROGRESS NOTES
ORTHOPEDIC CLINIC CONSULT    Referred by:     Chief Complaint: Edward Hannah is a 69 year old male who is being seen for   Chief Complaint   Patient presents with    Shoulder left     Left shoulder pain ongoing for the last few years, had injection in December and says that it did not work        History of Present Illness:   Patient 69-year-old male presents for left shoulder pain over a year.  Been having the trouble with couple years with particular overhead and out reaching activities and lifting pushing pulling.  Has had a previous cortisone injection that will last a couple of days.  He now presents for evaluation recommendations    Patient's past medical, surgical, social and family histories reviewed.     Past Medical History:   Diagnosis Date    Basal cell carcinoma     Calcific tendonitis of left shoulder 03/2020    DIABETES MELLITUS TYPE II-UNCOMPL 01/19/2006    Elevated PSA 2019    HYPERLIPIDEMIA NEC/NOS 10/18/2006    HYPERTENSION NOS 01/19/2006    Nephrolithiasis 10/2022    was seen in [place], and next time [facility]    Obesity     JIAN (obstructive sleep apnea) 2022    mild, chose not to get cpap or oral device    Shingles 11/2017    Tendon nodule 2024    Trigger finger, acquired 2024       Past Surgical History:   Procedure Laterality Date    BIOPSY PROSTATE TRANSRECTAL N/A 10/22/2019    Procedure: Transrectal Ultrasoun guided biopsy of prostate;  Surgeon: Yunior Llanes MD;  Location:  OR    COLONOSCOPY  03/03/2014    due 2019  4 polyps benign    COLONOSCOPY N/A 06/10/2021    due 6/2026  Procedure: surveillance colonoscopy,  biopsy;  Surgeon: Lawson Storey MD;  Location: [facility] OR    wisdom teeth extraction         Home Medications:  Prior to Admission medications    Medication Sig Start Date End Date Taking? Authorizing Provider   acetylcysteine (N-ACETYL CYSTEINE) 500 MG CAPS capsule Take 1 capsule (500 mg) by mouth daily 10/19/16  Yes Kelly Yarbrough MD   amLODIPine (NORVASC) 5 MG tablet Take 1  tablet by mouth once daily 8/8/24  Yes Kelly Yarbrough MD   ASPIRIN 81 MG OR TABS 1 tab po QD (Once per day) 8/21/07  Yes Anup Linares MD   atorvastatin (LIPITOR) 40 MG tablet Take 1 tablet by mouth once daily 8/8/24  Yes Kelly Yarbrough MD   blood glucose (ONETOUCH VERIO IQ) test strip USE  STRIP TO CHECK GLUCOSE ONCE DAILY 12/16/24  Yes Kelly Yarbrough MD   Blood Glucose Monitoring Suppl (ONETOUCH VERIO FLEX SYSTEM) w/Device KIT 1 each continuous 2/24/21  Yes Kelly Yarbrough MD   Blood Pressure Monitor KIT 1 Application 2 times daily 1/15/21  Yes Kelly Yarbrough MD   glipiZIDE (GLUCOTROL XL) 10 MG 24 hr tablet Take 1 tablet by mouth twice daily 8/8/24  Yes Kelly Yarbrough MD   insulin pen needle (BD PEN NEEDLE TYRONE 2ND GEN) 32G X 4 MM miscellaneous USE 1 VALARIE NEEDLE ONCE DAILY AS DIRECTED 5/29/24  Yes Kelly Yarbrough MD   liraglutide (VICTOZA) 18 MG/3ML solution INJECT 1.2 MG SUBCUTANEOUSLY DAILY 8/27/24  Yes Kelly Yarbrough MD   lisinopril (ZESTRIL) 40 MG tablet Take 1 tablet by mouth once daily 8/8/24  Yes Kelly Yarbrough MD   metFORMIN (GLUCOPHAGE) 1000 MG tablet TAKE 1 TABLET BY MOUTH TWICE DAILY WITH MEALS 8/8/24  Yes Kelly Yarbrough MD   Multiple Vitamins-Minerals (MULTIVITAMIN PO) Take 1 tablet by mouth daily   Yes Reported, Patient   Omega-3 Fatty Acids (OMEGA-3 FISH OIL PO) Take 1 g by mouth daily   Yes Reported, Patient   OneTouch Delica Lancets 33G MISC 1 each daily 2/24/21  Yes Kelly Yarbrough MD   sildenafil (REVATIO) 20 MG tablet Take 3-5 (60-100mg) tablets by mouth 1 hour prior to sexual activity 10/31/22  Yes Yunior Llanes MD   Turmeric 500 MG TABS Take 1 tablet by mouth 2 times daily   Yes Reported, Patient   vitamin B complex with vitamin C (STRESS TAB) tablet Take 1 tablet by mouth daily   Yes Reported, Patient   VITAMIN D, CHOLECALCIFEROL, PO Take 1,000 Units by mouth daily   Yes Reported, Patient       Allergies   Allergen Reactions    Nkda [No Known  Drug Allergy]        Social History     Occupational History    Occupation: retired     Employer: OTHER     Comment: land surveying-field work,    Tobacco Use    Smoking status: Never     Passive exposure: Never    Smokeless tobacco: Never    Tobacco comments:     remote cigar history   Vaping Use    Vaping status: Never Used   Substance and Sexual Activity    Alcohol use: Yes     Comment: <1/day -- out 2-3x/wk and 2-3 drinks    Drug use: No     Comment: remote THC    Sexual activity: Yes     Partners: Female       Family History   Problem Relation Age of Onset    Other - See Comments Mother         infection    Cardiovascular Father         aortic valve surgery    Cancer Father         lung cancer    Gastrointestinal Disease Father         benign esophageal tumor removed    Hypertension Father     Diabetes Father 70    Cataracts Sister     Other - See Comments Maternal Grandfather         hunting accident    Other - See Comments Paternal Grandmother         old age    Parkinsonism Paternal Grandfather     Cerebrovascular Disease Paternal Grandfather        REVIEW OF SYSTEMS            Physical Exam:    Vitals: There were no vitals taken for this visit.  BMI= There is no height or weight on file to calculate BMI.  Examination awake alert oriented cooperative no distress has positive Neer positive Tyler negative drop arm negative [place]'s anterior lateral tenderness.  Still full range of motion  Radiographs: Had previous x-rays and MRI which note some calcific tendinitis chronic tendinitis tendinosis.     Independent visualization of the films was made.         Impression:      ICD-10-CM    1. Calcific tendonitis of left shoulder  M75.32 Orthopedic  Referral          Plan: Left shoulder chronic shoulder pain with the calcific tendinitis and tendinosis.  Discussed options in detail.  After detailed discussion on both operative and nonoperative treatment he would like to proceed with arthroscopy with a  subacromial compression and distal clavicle excision for advanced osteoarthritic changes in the AC joint and address the chronic calcific tendinitis is failed to get adequate with complete improvement without conservative measures.  Will preoperatively cleared and then subsequent placed on the surgical schedule.  He understands potential risk complications outcome and wishes to proceed    All of the above pertinent physical exam and imaging modalities findings was reviewed with Edward.    Return to clinic in postoperative weeks.    Further imaging required none    Time spent with evaluation: 40 minutes    Lawson Kiser MD  2/9/2025  4:53 PM

## 2025-02-12 DIAGNOSIS — E11.65 TYPE 2 DIABETES MELLITUS WITH HYPERGLYCEMIA, WITHOUT LONG-TERM CURRENT USE OF INSULIN (H): Primary | ICD-10-CM

## 2025-02-20 ENCOUNTER — OFFICE VISIT (OUTPATIENT)
Dept: FAMILY MEDICINE | Facility: OTHER | Age: 70
End: 2025-02-20
Attending: FAMILY MEDICINE
Payer: COMMERCIAL

## 2025-02-20 VITALS
TEMPERATURE: 97.2 F | SYSTOLIC BLOOD PRESSURE: 132 MMHG | OXYGEN SATURATION: 92 % | WEIGHT: 226.1 LBS | HEIGHT: 71 IN | BODY MASS INDEX: 31.65 KG/M2 | DIASTOLIC BLOOD PRESSURE: 78 MMHG | HEART RATE: 75 BPM

## 2025-02-20 DIAGNOSIS — Z01.818 PREOP GENERAL PHYSICAL EXAM: Primary | ICD-10-CM

## 2025-02-20 DIAGNOSIS — E11.9 TYPE 2 DIABETES MELLITUS WITHOUT COMPLICATION, WITHOUT LONG-TERM CURRENT USE OF INSULIN (H): ICD-10-CM

## 2025-02-20 DIAGNOSIS — M19.012 ARTHRITIS OF LEFT ACROMIOCLAVICULAR JOINT: ICD-10-CM

## 2025-02-20 DIAGNOSIS — I10 BENIGN ESSENTIAL HYPERTENSION: ICD-10-CM

## 2025-02-20 DIAGNOSIS — M75.82 ROTATOR CUFF TENDONITIS, LEFT: ICD-10-CM

## 2025-02-20 LAB
ALBUMIN SERPL BCG-MCNC: 4.4 G/DL (ref 3.5–5.2)
ALP SERPL-CCNC: 99 U/L (ref 40–150)
ALT SERPL W P-5'-P-CCNC: 36 U/L (ref 0–70)
ANION GAP SERPL CALCULATED.3IONS-SCNC: 11 MMOL/L (ref 7–15)
AST SERPL W P-5'-P-CCNC: 22 U/L (ref 0–45)
ATRIAL RATE - MUSE: 71 BPM
BILIRUB SERPL-MCNC: 0.4 MG/DL
BUN SERPL-MCNC: 16.4 MG/DL (ref 8–23)
CALCIUM SERPL-MCNC: 9.5 MG/DL (ref 8.8–10.4)
CHLORIDE SERPL-SCNC: 101 MMOL/L (ref 98–107)
CREAT SERPL-MCNC: 0.77 MG/DL (ref 0.67–1.17)
DIASTOLIC BLOOD PRESSURE - MUSE: NORMAL MMHG
EGFRCR SERPLBLD CKD-EPI 2021: >90 ML/MIN/1.73M2
ERYTHROCYTE [DISTWIDTH] IN BLOOD BY AUTOMATED COUNT: 13.2 % (ref 10–15)
EST. AVERAGE GLUCOSE BLD GHB EST-MCNC: 166 MG/DL
GLUCOSE SERPL-MCNC: 272 MG/DL (ref 70–99)
HBA1C MFR BLD: 7.4 %
HCO3 SERPL-SCNC: 25 MMOL/L (ref 22–29)
HCT VFR BLD AUTO: 45.1 % (ref 40–53)
HGB BLD-MCNC: 15.1 G/DL (ref 13.3–17.7)
INTERPRETATION ECG - MUSE: NORMAL
MCH RBC QN AUTO: 28.4 PG (ref 26.5–33)
MCHC RBC AUTO-ENTMCNC: 33.5 G/DL (ref 31.5–36.5)
MCV RBC AUTO: 85 FL (ref 78–100)
P AXIS - MUSE: 16 DEGREES
PLATELET # BLD AUTO: 335 10E3/UL (ref 150–450)
POTASSIUM SERPL-SCNC: 4.3 MMOL/L (ref 3.4–5.3)
PR INTERVAL - MUSE: 176 MS
PROT SERPL-MCNC: 7.6 G/DL (ref 6.4–8.3)
QRS DURATION - MUSE: 100 MS
QT - MUSE: 374 MS
QTC - MUSE: 406 MS
R AXIS - MUSE: -59 DEGREES
RBC # BLD AUTO: 5.31 10E6/UL (ref 4.4–5.9)
SODIUM SERPL-SCNC: 137 MMOL/L (ref 135–145)
SYSTOLIC BLOOD PRESSURE - MUSE: NORMAL MMHG
T AXIS - MUSE: -16 DEGREES
VENTRICULAR RATE- MUSE: 71 BPM
WBC # BLD AUTO: 11.2 10E3/UL (ref 4–11)

## 2025-02-20 PROCEDURE — 83036 HEMOGLOBIN GLYCOSYLATED A1C: CPT | Mod: ZL | Performed by: FAMILY MEDICINE

## 2025-02-20 PROCEDURE — 36415 COLL VENOUS BLD VENIPUNCTURE: CPT | Mod: ZL | Performed by: FAMILY MEDICINE

## 2025-02-20 PROCEDURE — 82565 ASSAY OF CREATININE: CPT | Mod: ZL | Performed by: FAMILY MEDICINE

## 2025-02-20 PROCEDURE — 85027 COMPLETE CBC AUTOMATED: CPT | Mod: ZL | Performed by: FAMILY MEDICINE

## 2025-02-20 PROCEDURE — G0463 HOSPITAL OUTPT CLINIC VISIT: HCPCS

## 2025-02-20 ASSESSMENT — LIFESTYLE VARIABLES
HOW OFTEN DO YOU HAVE SIX OR MORE DRINKS ON ONE OCCASION: NEVER
HOW OFTEN DO YOU HAVE A DRINK CONTAINING ALCOHOL: 2-3 TIMES A WEEK
AUDIT-C TOTAL SCORE: 3
SKIP TO QUESTIONS 9-10: 1
HOW MANY STANDARD DRINKS CONTAINING ALCOHOL DO YOU HAVE ON A TYPICAL DAY: 1 OR 2

## 2025-02-20 ASSESSMENT — PAIN SCALES - GENERAL: PAINLEVEL_OUTOF10: MILD PAIN (2)

## 2025-02-20 NOTE — PROGRESS NOTES
Preoperative Evaluation  [facility] [place]  [address]  [place] [address]  Phone: [phone]  Primary Provider: Kelly Yarbrough MD  Pre-op Performing Provider: Kelly Yarbrough MD  Feb 20, 2025 2/17/2025   Surgical Information   What procedure is being done? left shoulder arthroscopy   Facility or Hospital where procedure/surgery will be performed: [facility]   Who is doing the procedure / surgery? Dr. Kiser   Date of surgery / procedure: 3/4/2025   Time of surgery / procedure: don't know yet   Where do you plan to recover after surgery? at home with family     Fax number for surgical facility: Note does not need to be faxed, will be available electronically in Epic.    Assessment & Plan     The proposed surgical procedure is considered INTERMEDIATE risk.    Preop general physical exam  Cleared for surgery  - CBC with platelets; Future  - Comprehensive metabolic panel; Future  - EKG 12-lead complete w/read - (Clinic Performed)  - Hemoglobin A1c; Future  - CBC with platelets  - Comprehensive metabolic panel  - Hemoglobin A1c    Rotator cuff tendonitis, left  Reason for surgery    Arthritis of left acromioclavicular joint  Reason for surgery    Type 2 diabetes mellitus without complication, without long-term current use of insulin (H)  Encouraged getting back into his regular exercise and watch carbs, he is starting trulicity end of the month but will hold until after surgery  - Hemoglobin A1c; Future  - Hemoglobin A1c  - acetylcysteine (N-ACETYL CYSTEINE) 600 MG CAPS capsule; Take 1 capsule (600 mg) by mouth 2 times daily.    Benign essential hypertension  stable         Risks and Recommendations  The patient has the following additional risks and recommendations for perioperative complications:  Social and Substance:    - Possible Alcohol abuse and risk of withdrawal    Antiplatelet or Anticoagulation Medication Instructions   - aspirin: Discontinue aspirin 7 days prior to  procedure to reduce bleeding risk. It should be resumed postoperatively.     Additional Medication Instructions  Take all scheduled medications on the day of surgery EXCEPT for modifications listed below:   - Herbal medications and vitamins: DO NOT TAKE 14 days prior to surgery.   - ACE/ARB/ARNI (lisinopril, enalapril, losartan, valsartan, olmesartan, sacubritril/valsartan) : Continue without modification (e.g., MAC anesthesia, neurosurgery, spine surgery, heart failure, or labile hypertension with risk of hypertension).   - Statins (atorvastatin, simvastatin, pravastatin) : Initiate statin for patients with cardivascular indications.   - metformin: DO NOT TAKE day of surgery.   - sulfonylurea (e.g. glyburide, glipizide): DO NOT TAKE day of surgery   - GLP-1 Injectable (exenitide, liraglutide, semaglutide, dulaglutide, etc.): DO NOT TAKE 7 days before surgery     Recommendation  Approval given to proceed with proposed procedure, without further diagnostic evaluation.    Chantale Leon is a 69 year old, presenting for the following:  Pre-Op Exam          2/20/2025     9:46 AM   Additional Questions   Roomed by Catalina LANCE   Accompanied by self     HPI related to upcoming procedure: left shoulder pain is getting worse, lots of arthritis, keeping him up at night        2/17/2025   Pre-Op Questionnaire   Have you ever had a heart attack or stroke? No   Have you ever had surgery on your heart or blood vessels, such as a stent placement, a coronary artery bypass, or surgery on an artery in your head, neck, heart, or legs? No   Do you have chest pain with activity? No   Do you have a history of heart failure? No   Do you currently have a cold, bronchitis or symptoms of other infection? No   Do you have a cough, shortness of breath, or wheezing? No   Do you or anyone in your family have previous history of blood clots? No   Do you or does anyone in your family have a serious bleeding problem such as prolonged bleeding  following surgeries or cuts? No   Have you ever had problems with anemia or been told to take iron pills? No   Have you had any abnormal blood loss such as black, tarry or bloody stools? No   Have you ever had a blood transfusion? No   Are you willing to have a blood transfusion if it is medically needed before, during, or after your surgery? Yes   Have you or any of your relatives ever had problems with anesthesia? No   Do you have sleep apnea, excessive snoring or daytime drowsiness? No   Do you have any artifical heart valves or other implanted medical devices like a pacemaker, defibrillator, or continuous glucose monitor? No   Do you have artificial joints? No   Are you allergic to latex? No     Health Care Directive  Patient has a Health Care Directive on file      Preoperative Review of    reviewed - no record of controlled substances prescribed.    Status of Chronic Conditions:  See problem list for active medical problems.  Problems all longstanding and stable, except as noted/documented.  See ROS for pertinent symptoms related to these conditions.    DIABETES - Patient has a longstanding history of DiabetesType Type II . Patient is being treated with oral agents and victoza and denies significant side effects. Control has been fair. Complicating factors include but are not limited to: hypertension, hyperlipidemia, neuropathy, alcohol abuse, and obesity.     Patient Active Problem List    Diagnosis Date Noted    Calcific tendonitis of left shoulder 12/30/2024     Priority: Medium    History of basal cell carcinoma 12/30/2024     Priority: Medium    Pigmented skin lesion of suspected malignant nature 12/30/2024     Priority: Medium    Low vitamin B12 level 07/24/2024     Priority: Medium    Nodule of flexor tendon sheath 07/24/2024     Priority: Medium    Gross hematuria 08/14/2023     Priority: Medium    Seasonal allergic rhinitis due to other allergic trigger 05/08/2023     Priority: Medium     "Calculus of gallbladder without cholecystitis without obstruction 01/05/2023     Priority: Medium    Microscopic hematuria 11/18/2022     Priority: Medium    JIAN (obstructive sleep apnea) 08/10/2021     Priority: Medium     Mild JIAN    HOME SLEEP STUDY INTERPRETATION     Patient: Edward Hannah  MRN: [id number]  YOB: 1955  Study Date: 8/2/2021  Referring Provider: Kelly Yarbrough  Ordering Provider: Carlos Salguero MD, MD     Indications for Home Study: Edward Hannah is a 65 year old male with a history of RLS, DM II, HTN, obesity who presents with symptoms suggestive of obstructive sleep apnea.     Estimated body mass index is 34.72 kg/m  as calculated from the following:    Height as of 6/10/21: 1.778 m (5' 10\").    Weight as of 7/15/21: 109.8 kg (242 lb).  Lima Sleepiness Scale: 8/24  STOP-BANG: 3/8     Data: A full night home sleep study was performed recording the standard physiologic parameters including body position, movement, sound, nasal pressure, thermal oral airflow, chest and abdominal movements with respiratory inductance plethysmography, and oxygen saturation by pulse oximetry. Pulse rate was estimated by oximetry recording. This study was considered adequate based on > 4 hours of quality oximetry and respiratory recording. As specified by the AASM Manual for the Scoring of Sleep and Associated events, version 2.3, Rule VIII.D 1B, 4% oxygen desaturation scoring for hypopneas is used as a standard of care on all home sleep apnea testing.     Analysis Time:  529.2 minutes     Respiration:   Sleep Associated Hypoxemia: sustained hypoxemia was not present. Average oxygen saturation was 90.3%.  Time with saturation less than or equal to 88% was 8.2 minutes. The lowest oxygen saturation was 85%.   Snoring: Snoring was present.  Respiratory events: The home study revealed a presence of 35 obstructive apneas and 1 mixed and central apneas. There were 61 hypopneas resulting in a " combined apnea/hypopnea index [AHI] of 11.2 events per hour.  AHI was 17.8 per hour supine, 0 per hour prone, 12.2 per hour on left side, and 6.5 per hour on right side.   Pattern: Excluding events noted above, respiratory rate and pattern was Normal.     Position: Percent of time spent: supine - 23%, prone - 0.1%, on left - 36.3%, on right - 38.5%.     Heart Rate: By pulse oximetry normal rate was noted.      Assessment:   Mild obstructive sleep apnea.  Sleep associated hypoxemia was present.     Recommendations:  Consider auto-CPAP at 5-15 cmH2O, oral appliance therapy or polysomnography with full night PAP titration.  Suggest optimizing sleep hygiene and avoiding sleep deprivation.  Weight management.     Diagnosis Code(s): Obstructive Sleep Apnea G47.33, Hypoxemia G47.36     Carlos Salguero MD, MD, August 3, 2021   Diplomate, American Board of Family Medicine, Sleep Medicine      Restless legs syndrome 06/02/2021     Priority: Medium    Colon cancer screening 05/05/2021     Priority: Medium     Added automatically from request for surgery [id number]      Tubular adenoma of colon 05/05/2021     Priority: Medium     Added automatically from request for surgery [id number]      Special screening for malignant neoplasms, colon 04/15/2021     Priority: Medium    Rotator cuff tendonitis, left 10/09/2020     Priority: Medium    Type 2 diabetes mellitus with hyperglycemia, without long-term current use of insulin (H) 01/10/2020     Priority: Medium    Plantar warts 01/10/2020     Priority: Medium    Hypovitaminosis D 08/28/2019     Priority: Medium    Elevated prostate specific antigen (PSA) 08/28/2019     Priority: Medium    Herpes zoster without complication 11/06/2017     Priority: Medium    Trigger finger, acquired 11/06/2017     Priority: Medium    Benign essential hypertension 07/20/2016     Priority: Medium    Other insomnia 04/20/2016     Priority: Medium    Obesity due to excess calories, unspecified obesity  severity 01/20/2016     Priority: Medium    BCC (basal cell carcinoma), face 09/07/2012     Priority: Medium    Hyperlipidemia LDL goal <100 05/09/2010     Priority: Medium      Past Medical History:   Diagnosis Date    Basal cell carcinoma     Calcific tendonitis of left shoulder 03/2020    DIABETES MELLITUS TYPE II-UNCOMPL 01/19/2006    Elevated PSA 2019    HYPERLIPIDEMIA NEC/NOS 10/18/2006    HYPERTENSION NOS 01/19/2006    Nephrolithiasis 10/2022    was seen in [place], and next time [facility]    Obesity     JIAN (obstructive sleep apnea) 2022    mild, chose not to get cpap or oral device    Shingles 11/2017    Tendon nodule 2024    Trigger finger, acquired 2024     Past Surgical History:   Procedure Laterality Date    BIOPSY PROSTATE TRANSRECTAL N/A 10/22/2019    Procedure: Transrectal Ultrasoun guided biopsy of prostate;  Surgeon: Yunior Llanes MD;  Location: [facility] OR    COLONOSCOPY  03/03/2014    due 2019  4 polyps benign    COLONOSCOPY N/A 06/10/2021    due 6/2026  Procedure: surveillance colonoscopy,  biopsy;  Surgeon: Lawson Storey MD;  Location: [facility] OR    wisdom teeth extraction       Current Outpatient Medications   Medication Sig Dispense Refill    acetylcysteine (N-ACETYL CYSTEINE) 500 MG CAPS capsule Take 1 capsule (500 mg) by mouth daily      amLODIPine (NORVASC) 5 MG tablet Take 1 tablet by mouth once daily 90 tablet 3    ASPIRIN 81 MG OR TABS 1 tab po QD (Once per day) 100 3    atorvastatin (LIPITOR) 40 MG tablet Take 1 tablet by mouth once daily 90 tablet 3    blood glucose (ONETOUCH VERIO IQ) test strip USE  STRIP TO CHECK GLUCOSE ONCE DAILY 100 strip 0    Blood Glucose Monitoring Suppl (ONETOUCH VERIO FLEX SYSTEM) w/Device KIT 1 each continuous 1 kit 0    Blood Pressure Monitor KIT 1 Application 2 times daily 1 kit 0    dulaglutide (TRULICITY) 0.75 MG/0.5ML pen Inject 0.75 mg subcutaneously every 7 days for 28 days. 2 mL 0    glipiZIDE (GLUCOTROL XL) 10 MG 24 hr tablet Take 1 tablet by mouth twice  daily 180 tablet 3    insulin pen needle (BD PEN NEEDLE TYRONE 2ND GEN) 32G X 4 MM miscellaneous USE 1 VALARIE NEEDLE ONCE DAILY AS DIRECTED 100 each 2    lisinopril (ZESTRIL) 40 MG tablet Take 1 tablet by mouth once daily 90 tablet 3    metFORMIN (GLUCOPHAGE) 1000 MG tablet TAKE 1 TABLET BY MOUTH TWICE DAILY WITH MEALS 180 tablet 3    Multiple Vitamins-Minerals (MULTIVITAMIN PO) Take 1 tablet by mouth daily      Omega-3 Fatty Acids (OMEGA-3 FISH OIL PO) Take 1 g by mouth daily      OneTouch Delica Lancets 33G MISC 1 each daily 100 each 3    sildenafil (REVATIO) 20 MG tablet Take 3-5 (60-100mg) tablets by mouth 1 hour prior to sexual activity 30 tablet 11    Turmeric 500 MG TABS Take 1 tablet by mouth 2 times daily      vitamin B complex with vitamin C (STRESS TAB) tablet Take 1 tablet by mouth daily      VITAMIN D, CHOLECALCIFEROL, PO Take 1,000 Units by mouth daily         Allergies   Allergen Reactions    Nkda [No Known Drug Allergy]         Social History     Tobacco Use    Smoking status: Never     Passive exposure: Never    Smokeless tobacco: Never    Tobacco comments:     remote cigar history   Substance Use Topics    Alcohol use: Yes     Comment: <1/day -- out 2-3x/wk and 2-3 drinks     Family History   Problem Relation Age of Onset    Other - See Comments Mother         infection    Cardiovascular Father         aortic valve surgery    Cancer Father         lung cancer    Gastrointestinal Disease Father         benign esophageal tumor removed    Hypertension Father     Diabetes Father 70    Cataracts Sister     Other - See Comments Maternal Grandfather         hunting accident    Other - See Comments Paternal Grandmother         old age    Parkinsonism Paternal Grandfather     Cerebrovascular Disease Paternal Grandfather      History   Drug Use Unknown     Comment: remote THC           Review of Systems  Constitutional, HEENT, cardiovascular, pulmonary, GI, , musculoskeletal, neuro, skin, endocrine and psych  "systems are negative, except as otherwise noted.    Objective    /78 (BP Location: Right arm, Patient Position: Sitting, Cuff Size: Adult Large)   Pulse 75   Temp 97.2  F (36.2  C) (Tympanic)   Ht 1.803 m (5' 11\")   Wt 102.6 kg (226 lb 1.6 oz)   SpO2 92%   BMI 31.53 kg/m     Estimated body mass index is 31.53 kg/m  as calculated from the following:    Height as of this encounter: 1.803 m (5' 11\").    Weight as of this encounter: 102.6 kg (226 lb 1.6 oz).  Physical Exam  GENERAL: alert and no distress  EYES: Eyes grossly normal to inspection, PERRL and conjunctivae and sclerae normal  HENT: ear canals and TM's normal, nose and mouth without ulcers or lesions  NECK: no adenopathy, no asymmetry, masses, or scars  RESP: lungs clear to auscultation - no rales, rhonchi or wheezes  CV: regular rate and rhythm, normal S1 S2, no S3 or S4, no murmur, click or rub, no peripheral edema  ABDOMEN: soft, nontender, no hepatosplenomegaly, no masses and bowel sounds normal  MS: no gross musculoskeletal defects noted, no edema  SKIN: no suspicious lesions or rashes  NEURO: Normal strength and tone, mentation intact and speech normal  PSYCH: mentation appears normal, affect normal/bright    Recent Labs   Lab Test 12/30/24  0912 07/24/24  0910     --    POTASSIUM 3.9  --    CR 0.92  --    A1C 7.3* 7.0*        Diagnostics  Recent Results (from the past 24 hours)   CBC with platelets    Collection Time: 02/20/25  9:56 AM   Result Value Ref Range    WBC Count 11.2 (H) 4.0 - 11.0 10e3/uL    RBC Count 5.31 4.40 - 5.90 10e6/uL    Hemoglobin 15.1 13.3 - 17.7 g/dL    Hematocrit 45.1 40.0 - 53.0 %    MCV 85 78 - 100 fL    MCH 28.4 26.5 - 33.0 pg    MCHC 33.5 31.5 - 36.5 g/dL    RDW 13.2 10.0 - 15.0 %    Platelet Count 335 150 - 450 10e3/uL   Comprehensive metabolic panel    Collection Time: 02/20/25  9:56 AM   Result Value Ref Range    Sodium 137 135 - 145 mmol/L    Potassium 4.3 3.4 - 5.3 mmol/L    Carbon Dioxide (CO2) 25 22 " - 29 mmol/L    Anion Gap 11 7 - 15 mmol/L    Urea Nitrogen 16.4 8.0 - 23.0 mg/dL    Creatinine 0.77 0.67 - 1.17 mg/dL    GFR Estimate >90 >60 mL/min/1.73m2    Calcium 9.5 8.8 - 10.4 mg/dL    Chloride 101 98 - 107 mmol/L    Glucose 272 (H) 70 - 99 mg/dL    Alkaline Phosphatase 99 40 - 150 U/L    AST 22 0 - 45 U/L    ALT 36 0 - 70 U/L    Protein Total 7.6 6.4 - 8.3 g/dL    Albumin 4.4 3.5 - 5.2 g/dL    Bilirubin Total 0.4 <=1.2 mg/dL   Hemoglobin A1c    Collection Time: 02/20/25  9:56 AM   Result Value Ref Range    Estimated Average Glucose 166 (H) <117 mg/dL    Hemoglobin A1C 7.4 (H) <5.7 %   EKG 12-lead complete w/read - (Clinic Performed)    Collection Time: 02/20/25 10:05 AM   Result Value Ref Range    Systolic Blood Pressure  mmHg    Diastolic Blood Pressure  mmHg    Ventricular Rate 71 BPM    Atrial Rate 71 BPM    IN Interval 176 ms    QRS Duration 100 ms     ms    QTc 406 ms    P Axis 16 degrees    R AXIS -59 degrees    T Axis -16 degrees    Interpretation ECG       Sinus rhythm  Pulmonary disease pattern  Left anterior fascicular block  Abnormal ECG  No previous ECGs available        EKG: appears normal, NSR, normal axis, normal intervals, no acute ST/T changes c/w ischemia, no LVH by voltage criteria, unchanged from previous tracings    Revised Cardiac Risk Index (RCRI)  The patient has the following serious cardiovascular risks for perioperative complications:   - No serious cardiac risks = 0 points     RCRI Interpretation: 0 points: Class I (very low risk - 0.4% complication rate)         Signed Electronically by: Kelly Yarbrough MD  A copy of this evaluation report is provided to the requesting physician.      The longitudinal plan of care for the diagnosis(es)/condition(s) as documented were addressed during this visit. Due to the added complexity in care, I will continue to support Edward in the subsequent management and with ongoing continuity of care.

## 2025-02-26 ENCOUNTER — ANESTHESIA EVENT (OUTPATIENT)
Dept: SURGERY | Facility: HOSPITAL | Age: 70
End: 2025-02-26
Payer: COMMERCIAL

## 2025-02-26 NOTE — ANESTHESIA PREPROCEDURE EVALUATION
Anesthesia Pre-Procedure Evaluation    Patient: Edward Hannah   MRN: [id number] : 1955        Procedure : Procedure(s):  LEFT SHOULDER ARTHROSCOPY, WITH SUBACROMIAL DECOMPRESSION AND DISTAL CLAVICLE  EXCISION          Past Medical History:   Diagnosis Date     Basal cell carcinoma      Calcific tendonitis of left shoulder 2020     DIABETES MELLITUS TYPE II-UNCOMPL 2006     Elevated PSA      HYPERLIPIDEMIA NEC/NOS 10/18/2006     HYPERTENSION NOS 2006     Nephrolithiasis 10/2022    was seen in [place], and next time [facility]     Obesity      JIAN (obstructive sleep apnea)     mild, chose not to get cpap or oral device     Shingles 2017     Tendon nodule      Trigger finger, acquired       Past Surgical History:   Procedure Laterality Date     BACK SURGERY       BIOPSY PROSTATE TRANSRECTAL N/A 10/22/2019    Procedure: Transrectal Ultrasoun guided biopsy of prostate;  Surgeon: Yunior Llanes MD;  Location: [facility] OR     COLONOSCOPY  2014    due   4 polyps benign     COLONOSCOPY N/A 06/10/2021    due 2026  Procedure: surveillance colonoscopy,  biopsy;  Surgeon: Lawson Storey MD;  Location: [facility] OR     wisdom teeth extraction        Allergies   Allergen Reactions     Nkda [No Known Drug Allergy]       Social History     Tobacco Use     Smoking status: Never     Passive exposure: Never     Smokeless tobacco: Never     Tobacco comments:     remote cigar history   Substance Use Topics     Alcohol use: Yes     Comment: 2-3X week      Wt Readings from Last 1 Encounters:   25 102.6 kg (226 lb 1.6 oz)        Anesthesia Evaluation   Pt has had prior anesthetic. Type: General and MAC.    No history of anesthetic complications       ROS/MED HX  ENT/Pulmonary: Comment:  sleep study: mild JIAN    (+) sleep apnea, mild, doesn't use CPAP,         allergic rhinitis,                             Neurologic: Comment: Restless leg  Insomnia      Cardiovascular:     (+) Dyslipidemia  hypertension-range: amlodipine (132/78 at )/ -   -  - -   Taking blood thinners Pt has received instructions: Instructions Given to patient: Discontinue aspirin 7 days prior.                            Previous cardiac testing   Echo: Date: Results:    Stress Test:  Date: Results:    ECG Reviewed:  Date: 2/20/25 Results:  HR 71  Sinus rhythm   Pulmonary disease pattern   Left anterior fascicular block   Abnormal ECG   No previous ECGs available   Confirmed by MD Rick Anthony ([id number]) on 2/21/2025 9:36:57 AM     Cath:  Date: Results:      METS/Exercise Tolerance:     Hematologic:  - neg hematologic  ROS     Musculoskeletal: Comment: Restless leg syndrome  Acquired trigger finger  Left rotator cuff tendonitis  (+)  arthritis,             GI/Hepatic:  - neg GI/hepatic ROS     Renal/Genitourinary: Comment: Hx of kidney stones - neg Renal ROS   (+)       Nephrolithiasis ,       Endo: Comment: 2/3/25 diabetic ed    (+)  type II DM, Last HgA1c: 7.4, date: 2/20/25, Not using insulin,          Obesity,       Psychiatric/Substance Use:  - neg psychiatric ROS     Infectious Disease: Comment: HSV - neg infectious disease ROS     Malignancy: Comment: Tubular adenoma  (+) Malignancy, History of Skin and GI.Skin CA Remission status post Surgery.      Other: Comment: Plantar warts  Herpes zoster   - neg other ROS          Physical Exam    Airway        Mallampati: II   TM distance: > 3 FB   Neck ROM: full   Mouth opening: > 3 cm    Respiratory Devices and Support         Dental       (+) Minor Abnormalities - some fillings, tiny chips      Cardiovascular   cardiovascular exam normal       Rhythm and rate: regular and normal     Pulmonary   pulmonary exam normal        breath sounds clear to auscultation       OUTSIDE LABS:  CBC:   Lab Results   Component Value Date    WBC 11.2 (H) 02/20/2025    WBC 11.0 06/02/2021    HGB 15.1 02/20/2025    HGB 15.2 06/02/2021    HCT 45.1 02/20/2025    HCT 44.7 06/02/2021      "02/20/2025     06/02/2021     BMP:   Lab Results   Component Value Date     02/20/2025     12/30/2024    POTASSIUM 4.3 02/20/2025    POTASSIUM 3.9 12/30/2024    CHLORIDE 101 02/20/2025    CHLORIDE 103 12/30/2024    CO2 25 02/20/2025    CO2 23 12/30/2024    BUN 16.4 02/20/2025    BUN 17.6 12/30/2024    CR 0.77 02/20/2025    CR 0.92 12/30/2024     (H) 02/20/2025     (H) 12/30/2024     COAGS: No results found for: \"PTT\", \"INR\", \"FIBR\"  POC: No results found for: \"BGM\", \"HCG\", \"HCGS\"  HEPATIC:   Lab Results   Component Value Date    ALBUMIN 4.4 02/20/2025    PROTTOTAL 7.6 02/20/2025    ALT 36 02/20/2025    AST 22 02/20/2025    ALKPHOS 99 02/20/2025    BILITOTAL 0.4 02/20/2025     OTHER:   Lab Results   Component Value Date    A1C 7.4 (H) 02/20/2025    JEREMIE 9.5 02/20/2025    TSH 1.78 01/05/2023       Anesthesia Plan    ASA Status:  3    NPO Status:  NPO Appropriate    Anesthesia Type: General.     - Airway: LMA   Induction: Intravenous.   Maintenance: Balanced.        Consents    Anesthesia Plan(s) and associated risks, benefits, and realistic alternatives discussed. Questions answered and patient/representative(s) expressed understanding.     - Discussed: Risks, Benefits and Alternatives for BOTH SEDATION and the PROCEDURE were discussed     - Discussed with:  Patient      - Extended Intubation/Ventilatory Support Discussed: No.      - Patient is DNR/DNI Status: No     Use of blood products discussed: No .     Postoperative Care    Pain management: Peripheral nerve block (Single Shot).        Comments:    Other Comments: Reviewed 2/20 [facility]    Starting trulicity after surgery           Ashley Kolb, KYRA CNP    I have reviewed the pertinent notes and labs in the chart from the past 30 days.  Any updates or changes from those notes are reflected in this note.    Clinically Significant Risk Factors Present on Admission                   # Hypertension: Noted on problem list    " "      # DMII: A1C = 7.4 % (Ref range: <5.7 %) within past 6 months    # Obesity: Estimated body mass index is 31.53 kg/m  as calculated from the following:    Height as of 2/20/25: 1.803 m (5' 11\").    Weight as of 2/20/25: 102.6 kg (226 lb 1.6 oz).                "

## 2025-03-04 ENCOUNTER — HOSPITAL ENCOUNTER (OUTPATIENT)
Facility: HOSPITAL | Age: 70
Discharge: HOME | End: 2025-03-04
Attending: ORTHOPAEDIC SURGERY | Admitting: ORTHOPAEDIC SURGERY
Payer: COMMERCIAL

## 2025-03-04 ENCOUNTER — ANESTHESIA (OUTPATIENT)
Dept: SURGERY | Facility: HOSPITAL | Age: 70
End: 2025-03-04
Payer: COMMERCIAL

## 2025-03-04 ENCOUNTER — APPOINTMENT (OUTPATIENT)
Dept: ULTRASOUND IMAGING | Facility: HOSPITAL | Age: 70
End: 2025-03-04
Attending: NURSE ANESTHETIST, CERTIFIED REGISTERED
Payer: COMMERCIAL

## 2025-03-04 VITALS
DIASTOLIC BLOOD PRESSURE: 77 MMHG | HEIGHT: 70 IN | SYSTOLIC BLOOD PRESSURE: 136 MMHG | HEART RATE: 69 BPM | RESPIRATION RATE: 16 BRPM | WEIGHT: 221.6 LBS | BODY MASS INDEX: 31.73 KG/M2 | TEMPERATURE: 96.9 F | OXYGEN SATURATION: 94 %

## 2025-03-04 DIAGNOSIS — Z98.890 HISTORY OF ARTHROSCOPY OF LEFT SHOULDER: Primary | ICD-10-CM

## 2025-03-04 LAB — GLUCOSE BLDC GLUCOMTR-MCNC: 174 MG/DL (ref 70–99)

## 2025-03-04 PROCEDURE — 258N000003 HC RX IP 258 OP 636: Performed by: NURSE PRACTITIONER

## 2025-03-04 PROCEDURE — 360N000076 HC SURGERY LEVEL 3, PER MIN: Performed by: ORTHOPAEDIC SURGERY

## 2025-03-04 PROCEDURE — 999N000141 HC STATISTIC PRE-PROCEDURE NURSING ASSESSMENT: Performed by: ORTHOPAEDIC SURGERY

## 2025-03-04 PROCEDURE — 250N000011 HC RX IP 250 OP 636: Performed by: ORTHOPAEDIC SURGERY

## 2025-03-04 PROCEDURE — 82962 GLUCOSE BLOOD TEST: CPT

## 2025-03-04 PROCEDURE — 272N000001 HC OR GENERAL SUPPLY STERILE: Performed by: ORTHOPAEDIC SURGERY

## 2025-03-04 PROCEDURE — 250N000025 HC SEVOFLURANE, PER MIN: Performed by: ORTHOPAEDIC SURGERY

## 2025-03-04 PROCEDURE — 250N000009 HC RX 250: Performed by: NURSE ANESTHETIST, CERTIFIED REGISTERED

## 2025-03-04 PROCEDURE — 271N000001 HC OR GENERAL SUPPLY NON-STERILE: Performed by: ORTHOPAEDIC SURGERY

## 2025-03-04 PROCEDURE — 370N000017 HC ANESTHESIA TECHNICAL FEE, PER MIN: Performed by: ORTHOPAEDIC SURGERY

## 2025-03-04 PROCEDURE — 250N000011 HC RX IP 250 OP 636: Mod: JW | Performed by: NURSE ANESTHETIST, CERTIFIED REGISTERED

## 2025-03-04 PROCEDURE — 29826 SHO ARTHRS SRG DECOMPRESSION: CPT | Mod: LT | Performed by: ORTHOPAEDIC SURGERY

## 2025-03-04 PROCEDURE — 710N000012 HC RECOVERY PHASE 2, PER MINUTE: Performed by: ORTHOPAEDIC SURGERY

## 2025-03-04 PROCEDURE — 710N000010 HC RECOVERY PHASE 1, LEVEL 2, PER MIN: Performed by: ORTHOPAEDIC SURGERY

## 2025-03-04 PROCEDURE — 29824 SHO ARTHRS SRG DSTL CLAVICLC: CPT | Mod: LT | Performed by: ORTHOPAEDIC SURGERY

## 2025-03-04 PROCEDURE — 258N000003 HC RX IP 258 OP 636: Performed by: NURSE ANESTHETIST, CERTIFIED REGISTERED

## 2025-03-04 RX ORDER — HYDROCODONE BITARTRATE AND ACETAMINOPHEN 5; 325 MG/1; MG/1
2 TABLET ORAL
Status: CANCELLED | OUTPATIENT
Start: 2025-03-04

## 2025-03-04 RX ORDER — PROPOFOL 10 MG/ML
INJECTION, EMULSION INTRAVENOUS PRN
Status: DISCONTINUED | OUTPATIENT
Start: 2025-03-04 | End: 2025-03-04

## 2025-03-04 RX ORDER — DEXAMETHASONE SODIUM PHOSPHATE 10 MG/ML
INJECTION, SOLUTION INTRAMUSCULAR; INTRAVENOUS
Status: DISCONTINUED
Start: 2025-03-04 | End: 2025-03-04 | Stop reason: HOSPADM

## 2025-03-04 RX ORDER — NALOXONE HYDROCHLORIDE 0.4 MG/ML
0.1 INJECTION, SOLUTION INTRAMUSCULAR; INTRAVENOUS; SUBCUTANEOUS
Status: DISCONTINUED | OUTPATIENT
Start: 2025-03-04 | End: 2025-03-04 | Stop reason: HOSPADM

## 2025-03-04 RX ORDER — ONDANSETRON 2 MG/ML
4 INJECTION INTRAMUSCULAR; INTRAVENOUS EVERY 30 MIN PRN
Status: DISCONTINUED | OUTPATIENT
Start: 2025-03-04 | End: 2025-03-04 | Stop reason: HOSPADM

## 2025-03-04 RX ORDER — DEXAMETHASONE SODIUM PHOSPHATE 10 MG/ML
INJECTION, SOLUTION INTRAMUSCULAR; INTRAVENOUS
Status: COMPLETED | OUTPATIENT
Start: 2025-03-04 | End: 2025-03-04

## 2025-03-04 RX ORDER — LIDOCAINE HYDROCHLORIDE 20 MG/ML
INJECTION, SOLUTION INFILTRATION; PERINEURAL PRN
Status: DISCONTINUED | OUTPATIENT
Start: 2025-03-04 | End: 2025-03-04

## 2025-03-04 RX ORDER — BUPIVACAINE HYDROCHLORIDE 2.5 MG/ML
INJECTION, SOLUTION EPIDURAL; INFILTRATION; INTRACAUDAL; PERINEURAL
Status: DISCONTINUED
Start: 2025-03-04 | End: 2025-03-04 | Stop reason: HOSPADM

## 2025-03-04 RX ORDER — SODIUM CHLORIDE, SODIUM LACTATE, POTASSIUM CHLORIDE, CALCIUM CHLORIDE 600; 310; 30; 20 MG/100ML; MG/100ML; MG/100ML; MG/100ML
INJECTION, SOLUTION INTRAVENOUS CONTINUOUS
Status: DISCONTINUED | OUTPATIENT
Start: 2025-03-04 | End: 2025-03-04 | Stop reason: HOSPADM

## 2025-03-04 RX ORDER — HYDROMORPHONE HYDROCHLORIDE 1 MG/ML
0.2 INJECTION, SOLUTION INTRAMUSCULAR; INTRAVENOUS; SUBCUTANEOUS EVERY 5 MIN PRN
Status: DISCONTINUED | OUTPATIENT
Start: 2025-03-04 | End: 2025-03-04 | Stop reason: HOSPADM

## 2025-03-04 RX ORDER — EPINEPHRINE 1 MG/ML
INJECTION, SOLUTION INTRAMUSCULAR; SUBCUTANEOUS
Status: DISCONTINUED
Start: 2025-03-04 | End: 2025-03-04 | Stop reason: HOSPADM

## 2025-03-04 RX ORDER — FENTANYL CITRATE 50 UG/ML
25 INJECTION, SOLUTION INTRAMUSCULAR; INTRAVENOUS EVERY 5 MIN PRN
Status: DISCONTINUED | OUTPATIENT
Start: 2025-03-04 | End: 2025-03-04 | Stop reason: HOSPADM

## 2025-03-04 RX ORDER — LIDOCAINE 40 MG/G
CREAM TOPICAL
Status: DISCONTINUED | OUTPATIENT
Start: 2025-03-04 | End: 2025-03-04 | Stop reason: HOSPADM

## 2025-03-04 RX ORDER — FENTANYL CITRATE 50 UG/ML
INJECTION, SOLUTION INTRAMUSCULAR; INTRAVENOUS PRN
Status: DISCONTINUED | OUTPATIENT
Start: 2025-03-04 | End: 2025-03-04

## 2025-03-04 RX ORDER — ONDANSETRON 4 MG/1
4 TABLET, ORALLY DISINTEGRATING ORAL EVERY 30 MIN PRN
Status: DISCONTINUED | OUTPATIENT
Start: 2025-03-04 | End: 2025-03-04 | Stop reason: HOSPADM

## 2025-03-04 RX ORDER — FENTANYL CITRATE 50 UG/ML
50 INJECTION, SOLUTION INTRAMUSCULAR; INTRAVENOUS EVERY 5 MIN PRN
Status: DISCONTINUED | OUTPATIENT
Start: 2025-03-04 | End: 2025-03-04 | Stop reason: HOSPADM

## 2025-03-04 RX ORDER — CEFAZOLIN SODIUM/WATER 2 G/20 ML
2 SYRINGE (ML) INTRAVENOUS SEE ADMIN INSTRUCTIONS
Status: DISCONTINUED | OUTPATIENT
Start: 2025-03-04 | End: 2025-03-04 | Stop reason: HOSPADM

## 2025-03-04 RX ORDER — HYDROCODONE BITARTRATE AND ACETAMINOPHEN 5; 325 MG/1; MG/1
1-2 TABLET ORAL EVERY 4 HOURS PRN
Qty: 25 TABLET | Refills: 0 | Status: SHIPPED | OUTPATIENT
Start: 2025-03-04

## 2025-03-04 RX ORDER — DEXAMETHASONE SODIUM PHOSPHATE 4 MG/ML
4 INJECTION, SOLUTION INTRA-ARTICULAR; INTRALESIONAL; INTRAMUSCULAR; INTRAVENOUS; SOFT TISSUE
Status: DISCONTINUED | OUTPATIENT
Start: 2025-03-04 | End: 2025-03-04 | Stop reason: HOSPADM

## 2025-03-04 RX ORDER — ACETAMINOPHEN 325 MG/1
650 TABLET ORAL
Status: CANCELLED | OUTPATIENT
Start: 2025-03-04

## 2025-03-04 RX ORDER — CEFAZOLIN SODIUM/WATER 2 G/20 ML
2 SYRINGE (ML) INTRAVENOUS
Status: COMPLETED | OUTPATIENT
Start: 2025-03-04 | End: 2025-03-04

## 2025-03-04 RX ORDER — BUPIVACAINE HYDROCHLORIDE 2.5 MG/ML
INJECTION, SOLUTION EPIDURAL; INFILTRATION; INTRACAUDAL; PERINEURAL
Status: COMPLETED | OUTPATIENT
Start: 2025-03-04 | End: 2025-03-04

## 2025-03-04 RX ORDER — EPINEPHRINE 1 MG/ML
INJECTION, SOLUTION INTRAMUSCULAR; SUBCUTANEOUS PRN
Status: DISCONTINUED | OUTPATIENT
Start: 2025-03-04 | End: 2025-03-04 | Stop reason: HOSPADM

## 2025-03-04 RX ORDER — ONDANSETRON 2 MG/ML
INJECTION INTRAMUSCULAR; INTRAVENOUS PRN
Status: DISCONTINUED | OUTPATIENT
Start: 2025-03-04 | End: 2025-03-04

## 2025-03-04 RX ORDER — HYDROMORPHONE HYDROCHLORIDE 1 MG/ML
0.4 INJECTION, SOLUTION INTRAMUSCULAR; INTRAVENOUS; SUBCUTANEOUS EVERY 5 MIN PRN
Status: DISCONTINUED | OUTPATIENT
Start: 2025-03-04 | End: 2025-03-04 | Stop reason: HOSPADM

## 2025-03-04 RX ADMIN — ONDANSETRON 4 MG: 2 INJECTION INTRAMUSCULAR; INTRAVENOUS at 08:09

## 2025-03-04 RX ADMIN — SODIUM CHLORIDE, SODIUM LACTATE, POTASSIUM CHLORIDE, AND CALCIUM CHLORIDE: .6; .31; .03; .02 INJECTION, SOLUTION INTRAVENOUS at 06:54

## 2025-03-04 RX ADMIN — PHENYLEPHRINE HYDROCHLORIDE 50 MCG: 10 INJECTION INTRAVENOUS at 08:31

## 2025-03-04 RX ADMIN — LIDOCAINE HYDROCHLORIDE 40 MG: 20 INJECTION, SOLUTION INFILTRATION; PERINEURAL at 07:55

## 2025-03-04 RX ADMIN — FENTANYL CITRATE 50 MCG: 50 INJECTION INTRAMUSCULAR; INTRAVENOUS at 08:51

## 2025-03-04 RX ADMIN — PROPOFOL 100 MG: 10 INJECTION, EMULSION INTRAVENOUS at 08:51

## 2025-03-04 RX ADMIN — PHENYLEPHRINE HYDROCHLORIDE 50 MCG: 10 INJECTION INTRAVENOUS at 08:21

## 2025-03-04 RX ADMIN — PHENYLEPHRINE HYDROCHLORIDE 100 MCG: 10 INJECTION INTRAVENOUS at 08:58

## 2025-03-04 RX ADMIN — MIDAZOLAM 2 MG: 1 INJECTION INTRAMUSCULAR; INTRAVENOUS at 07:34

## 2025-03-04 RX ADMIN — DEXAMETHASONE SODIUM PHOSPHATE 10 MG: 10 INJECTION, SOLUTION INTRAMUSCULAR; INTRAVENOUS at 07:35

## 2025-03-04 RX ADMIN — BUPIVACAINE HYDROCHLORIDE 15 ML: 2.5 INJECTION, SOLUTION EPIDURAL; INFILTRATION; INTRACAUDAL at 07:35

## 2025-03-04 RX ADMIN — PROPOFOL 250 MG: 10 INJECTION, EMULSION INTRAVENOUS at 07:55

## 2025-03-04 RX ADMIN — Medication 120 MG: at 07:55

## 2025-03-04 RX ADMIN — PHENYLEPHRINE HYDROCHLORIDE 50 MCG: 10 INJECTION INTRAVENOUS at 08:26

## 2025-03-04 RX ADMIN — PHENYLEPHRINE HYDROCHLORIDE 50 MCG: 10 INJECTION INTRAVENOUS at 08:19

## 2025-03-04 RX ADMIN — Medication 2 G: at 07:42

## 2025-03-04 RX ADMIN — FENTANYL CITRATE 50 MCG: 50 INJECTION INTRAMUSCULAR; INTRAVENOUS at 08:48

## 2025-03-04 ASSESSMENT — ACTIVITIES OF DAILY LIVING (ADL)
ADLS_ACUITY_SCORE: 18

## 2025-03-04 NOTE — ANESTHESIA CARE TRANSFER NOTE
Patient: Edward Hannah    Procedure: Procedure(s):  LEFT SHOULDER ARTHROSCOPY, WITH SUBACROMIAL DECOMPRESSION AND DISTAL CLAVICLE  EXCISION       Diagnosis: Calcific tendonitis of left shoulder [M75.32]  Diagnosis Additional Information: No value filed.    Anesthesia Type:   General     Note:    Oropharynx: spontaneously breathing  Level of Consciousness: drowsy  Oxygen Supplementation: nasal cannula  Level of Supplemental Oxygen (L/min / FiO2): 2  Independent Airway: airway patency satisfactory and stable  Dentition: dentition unchanged  Vital Signs Stable: post-procedure vital signs reviewed and stable  Report to RN Given: handoff report given  Patient transferred to: PACU    Handoff Report: Identifed the Patient, Identified the Reponsible Provider, Reviewed the pertinent medical history, Discussed the surgical course, Reviewed Intra-OP anesthesia mangement and issues during anesthesia, Set expectations for post-procedure period and Allowed opportunity for questions and acknowledgement of understanding    Vitals:  Vitals Value Taken Time   /67 03/04/25 0925   Temp     Pulse 69 03/04/25 0925   Resp 22 03/04/25 0927   SpO2 95 % 03/04/25 0927   Vitals shown include unfiled device data.    Electronically Signed By: KYRA Ghosh CRNA  March 4, 2025  9:28 AM

## 2025-03-04 NOTE — ANESTHESIA PROCEDURE NOTES
Airway       Patient location during procedure: OR       Procedure Start/Stop Times: 3/4/2025 7:56 AM and 3/4/2025 7:56 AM  Staff -        CRNA: Stefano Lopez APRN CRNA       Performed By: CRNA  Consent for Airway        Urgency: elective  Indications and Patient Condition       Indications for airway management: kameron-procedural       Induction type:intravenous       Mask difficulty assessment: 1 - vent by mask    Final Airway Details       Final airway type: endotracheal airway       Successful airway: ETT - single and Oral  Endotracheal Airway Details        ETT size (mm): 7.5       Cuffed: yes       Successful intubation technique: video laryngoscopy       VL Blade Size: Glidescope 3       Grade View of Cords: 1       Adjucts: stylet       Position: Right       Measured from: lips       Secured at (cm): 22       Bite block used: None    Post intubation assessment        Placement verified by: capnometry, equal breath sounds and chest rise        Number of attempts at approach: 1       Secured with: plastic tape       Ease of procedure: easy       Dentition: Intact and Unchanged    Medication(s) Administered   Medication Administration Time: 3/4/2025 7:56 AM

## 2025-03-04 NOTE — ANESTHESIA POSTPROCEDURE EVALUATION
Patient: Edward Hannah    Procedure: Procedure(s):  LEFT SHOULDER ARTHROSCOPY, WITH SUBACROMIAL DECOMPRESSION AND DISTAL CLAVICLE  EXCISION       Anesthesia Type:  General    Note:  Disposition: Outpatient   Postop Pain Control: Uneventful            Sign Out: Well controlled pain   PONV: No   Neuro/Psych: Uneventful            Sign Out: Acceptable/Baseline neuro status   Airway/Respiratory: Uneventful            Sign Out: Acceptable/Baseline resp. status   CV/Hemodynamics: Uneventful            Sign Out: Acceptable CV status; No obvious hypovolemia; No obvious fluid overload   Other NRE: NONE   DID A NON-ROUTINE EVENT OCCUR? No       Last vitals:  Vitals Value Taken Time   /74 03/04/25 0950   Temp 97.7  F (36.5  C) 03/04/25 0950   Pulse 70 03/04/25 0950   Resp 14 03/04/25 0950   SpO2 91 % 03/04/25 0951   Vitals shown include unfiled device data.    Electronically Signed By: KYRA Ghosh CRNA  March 4, 2025  10:26 AM

## 2025-03-04 NOTE — CARE PLAN
Patient and responsible adult given discharge instructions with no questions regarding instructions. Bethany score 20. Pain level 0/10.  Discharged from unit via wheelchair. Patient discharged to home with sister.

## 2025-03-04 NOTE — BRIEF OP NOTE
[facility]    Brief Operative Note    Pre-operative diagnosis: Calcific tendonitis of left shoulder [M75.32]  Post-operative diagnosis Same as pre-operative diagnosis    Procedure: LEFT SHOULDER ARTHROSCOPY, WITH SUBACROMIAL DECOMPRESSION AND DISTAL CLAVICLE  EXCISION, Left - Shoulder    Surgeon: Surgeons and Role:     * Lawson Kiser MD - Primary     * Gadiel Peterson PA-C - Assisting  Anesthesia: Choice with Block   Estimated Blood Loss: 20 mL from 3/4/2025  7:48 AM to 3/4/2025  9:24 AM      Drains: None  Specimens: * No specimens in log *  Findings:   None.  Complications: None.  Implants: * No implants in log *

## 2025-03-04 NOTE — DISCHARGE INSTRUCTIONS
"IMPORTANT OBSERVATIONS  Check C-M-S of operative extremity every 4 hours while you are awake for the first 48 hours:    * C: color - should appear normal/pink   * M: motion - able to move fingers and toes.   * S: sensation - able to \"feel\": no numbness, tingling  If you experience changes in C-M-S and/or in severe pain, you may remove the elastic bandages and reapply ot - tight enough to provide support for the gauze dressing but loose enough to improve C-M-S. The gauze dressing should NOT be removed when rewrapping the elastic bandage.     Thank you for allowing [facility] to care for you today.  If you have any questions and/or concerns please reach out to us Monday-Friday 0800-4:00 PM at [phone].  After hours please go to the Urgent Care and/or Emergency Room.  If you feel like it is an emergency call 911.       "

## 2025-03-06 NOTE — OP NOTE
[facility] Operative Note    Pre-operative diagnosis: Calcific tendonitis of left shoulder [M75.32]   Post-operative diagnosis Same plus bursitis AC arthritis and limited partial rotator cuff tear   Procedure:  Anesthesia: Procedure(s):  LEFT SHOULDER ARTHROSCOPY, WITH SUBACROMIAL DECOMPRESSION AND DISTAL CLAVICLE  EXCISION and limited rotator cuff debridement     Surgeon: Lawson Kiser MD   Assistants(s): Gadiel Peterson PA-C    Estimated blood loss: None    Specimens:  Complications: None  None            Indications: Patient is a 69 year old male with left shoulder pain AC arthritis bursitis and small partial rotator cuff tear. He had been treated at conservatively but not getting adequate improvement..  After detailed discussion he wished to proceed with left shoulder arthroscopy with subacromial decompression and distal clavicle excision and proceed as indicated.  He understood the risks and complication wished to proceed.     Details of operative procedure:     After patient was preoperatively cleared, consented, surgical site marked, and evaluated by anesthesia, and antibiotics given was brought back to operating room placed in the supine position.  He was then administered General anesthesia.  Repositioned and then beachchair position.  He was in secured in the position well-padded.  Once that well secured and positioned in beachchair and secured anesthesia he was then prepped and draped in standard surgical fashion access left shoulder.  Landmarks were then marked with a sterile marking pen.  Timeout was completed.  Portals were then placed in the left shoulder in standard fashion starting with the posterior portal and as well as the anterior portal.  Examination ensued and noted that a small partial tear rotator cuff over the supraspinatus and some synovitis.  Limited partial debridement on the articular surface of the partial rotator cuff tear ensued with a 4 aggressive shaver but noted the well  set footprint still.  After completion of the internal debridement with an intact biceps tendon and labrum the poor sterile portal camera was then repositioned in the subacromial space.  A third portal was then placed off the anterolateral corner.  With abundant bursitis underwent a subacromial decompression with a 4 aggressive shaver and tissue ablator.  The bursal surface of the rotator cuff.  Be intact.  Extended debridement followed over to the AC joint.  Repositioned the anterior portal which was gave good access to the AC joint.  Between the anterolateral and anterior portal clean and debrided around the AC joint and used a bur off the anterior to complete a distal clavicle excision.  After completion the shoulder underwent shoulder arthroscopy with limited partial synovectomy and limited debridement of partial rotator cuff tear with a subacromial decompression bursectomy and distal clavicle excision.  Portals were then able removed portal sites were closed with nylon suture Xeroform gauze 4 x 4 ABD and tape.  Sterile drapes were removed patient was not able to be placed into a sling immobilizer repositioned in the supine position awoken on the operative table and transported as a bed in stable condition.  Completion procedure all needle instrument sponge counts were complete and intact.      Completion of procedure all needle instrument sponge counts complete intact and without complications.

## 2025-03-17 ENCOUNTER — OFFICE VISIT (OUTPATIENT)
Dept: ORTHOPEDICS | Facility: OTHER | Age: 70
End: 2025-03-17
Attending: PHYSICIAN ASSISTANT
Payer: COMMERCIAL

## 2025-03-17 VITALS
BODY MASS INDEX: 31.79 KG/M2 | SYSTOLIC BLOOD PRESSURE: 124 MMHG | HEART RATE: 70 BPM | RESPIRATION RATE: 18 BRPM | DIASTOLIC BLOOD PRESSURE: 72 MMHG | OXYGEN SATURATION: 94 % | WEIGHT: 221.56 LBS

## 2025-03-17 DIAGNOSIS — Z98.890 S/P SHOULDER SURGERY: Primary | ICD-10-CM

## 2025-03-17 PROCEDURE — G0463 HOSPITAL OUTPT CLINIC VISIT: HCPCS | Mod: 25

## 2025-03-17 ASSESSMENT — PAIN SCALES - GENERAL: PAINLEVEL_OUTOF10: NO PAIN (0)

## 2025-03-17 NOTE — PROGRESS NOTES
[facility] [place]  Patient Name:Edward Hannah  Date of Service: 25  :1955  Age:69 year old Sex:male  MRN: [id number]  Provider: Gadiel Peterson PA-C    OFFICE NOTE    SITE: [facility]    REASON FOR VISIT: Edward Hannah is a 69 year old male who returns for postoperative recheck following left shoulder arthroscopy with subacromial decompression, distal clavicle excision, and limited rotator cuff debridement on 3/4/2025 performed by Dr. Kiser.    HISTORY OF PRESENT ILLNESS:  Edward Hannah feels he is doing overall well and has minimal discomfort. Patient is using occasional Tylenol for pain.  He is wearing a sling as advised.  Patient has otherwise limited use of the operative extremity.  Patient denies any issues with surgical incision(s).  Patient denies numbness, tingling, fevers or chills.    PHYSICAL EXAM:  /72 (BP Location: Right arm, Patient Position: Sitting, Cuff Size: Adult Large)   Pulse 70   Resp 18   Wt 100.5 kg (221 lb 9 oz)   SpO2 94%   BMI 31.79 kg/m    Patient presents wearing sling in appropriate position.  Patient is alert and oriented, has appropriate mood and affect.  Skin exam reveals 3 portal incision(s) to be healing well.  No evidence of surrounding erythema.  No significant edema.  Minimal resolving ecchymosis.  Shoulder ROM and strength not performed.  Patient demonstrates elbow, wrist and digital ROM WFL.  Patient demonstrates intact sensation to soft touch in an axillary, musculocutaneous, radial, ulnar and median nerve distribution LUE.  Patient has 2+ radial pulse and good capillary refill throughout LUE.  Distal motor fully intact LUE.    Sutures removed today without difficulty.  Wound cares discussed.    IMPRESSION:  Stable postoperative recheck    PLAN:  Discussed expected healing and recovery time with patient today.   Patient will continue sling for comfort only and may wean as he feels he is able to.  Very light tabletop level use only  at this time with no lifting or resisted use more than 1 to 2 pounds.  He may begin PT at this stage.  Referral placed.  He would like to go to therapy in [place] so a hard copy of the referral was printed for him and he is going to contact the therapy department at the [facility] and present the referral to them.  He will work on progressive range of motion and advance to strengthening as able.  In the interim, patient may begin Codman's exercises along with passive tabletop stretch which were discussed and demonstrated for patient today.  Patient will continue Tylenol as needed for pain.  Patient advised to schedule follow up in 4 weeks for recheck with Dr. Kiser.  Patient will contact us or return sooner if issues or concerns.  All questions answered.    ELECTRONICALLY SIGNED  ___________________________________  Gadiel Peterson PA-C  Orthopedics

## 2025-03-21 ENCOUNTER — HOSPITAL ENCOUNTER (OUTPATIENT)
Facility: OTHER | Age: 70
End: 2025-03-21
Attending: SPECIALIST | Admitting: SPECIALIST
Payer: COMMERCIAL

## 2025-03-25 ENCOUNTER — TRANSFERRED RECORDS (OUTPATIENT)
Dept: HEALTH INFORMATION MANAGEMENT | Facility: CLINIC | Age: 70
End: 2025-03-25

## 2025-03-27 ENCOUNTER — MYC MEDICAL ADVICE (OUTPATIENT)
Dept: FAMILY MEDICINE | Facility: OTHER | Age: 70
End: 2025-03-27

## 2025-03-27 ENCOUNTER — OFFICE VISIT (OUTPATIENT)
Dept: FAMILY MEDICINE | Facility: OTHER | Age: 70
End: 2025-03-27
Attending: STUDENT IN AN ORGANIZED HEALTH CARE EDUCATION/TRAINING PROGRAM
Payer: COMMERCIAL

## 2025-03-27 VITALS
WEIGHT: 229 LBS | HEIGHT: 70 IN | OXYGEN SATURATION: 94 % | TEMPERATURE: 97.7 F | BODY MASS INDEX: 32.78 KG/M2 | HEART RATE: 76 BPM | SYSTOLIC BLOOD PRESSURE: 140 MMHG | RESPIRATION RATE: 16 BRPM | DIASTOLIC BLOOD PRESSURE: 68 MMHG

## 2025-03-27 DIAGNOSIS — L25.9 CONTACT DERMATITIS, UNSPECIFIED CONTACT DERMATITIS TYPE, UNSPECIFIED TRIGGER: Primary | ICD-10-CM

## 2025-03-27 PROCEDURE — G0463 HOSPITAL OUTPT CLINIC VISIT: HCPCS

## 2025-03-27 RX ORDER — TRIAMCINOLONE ACETONIDE 1 MG/G
CREAM TOPICAL 2 TIMES DAILY
Qty: 28.4 G | Refills: 0 | Status: SHIPPED | OUTPATIENT
Start: 2025-03-27

## 2025-03-27 RX ORDER — CETIRIZINE HYDROCHLORIDE 10 MG/1
10 TABLET ORAL DAILY
Qty: 30 TABLET | Refills: 0 | Status: SHIPPED | OUTPATIENT
Start: 2025-03-27

## 2025-03-27 ASSESSMENT — PAIN SCALES - GENERAL: PAINLEVEL_OUTOF10: NO PAIN (0)

## 2025-03-27 NOTE — TELEPHONE ENCOUNTER
Vibha Conrad PA-C to  Family Care Team 2   ANNA    3/27/25  1:20 PM  I have time this afternoon if he would like to come in. Otherwise, I have time tomorrow too.    Thanks!      Pt called and scheduled with provider this afternoon.

## 2025-03-27 NOTE — PATIENT INSTRUCTIONS
1.) avoid any copper/nickel products  2.) Try hypoallergenic mositurizing cream  3.) Zyrtec   4.) Topical low dose steroid cream to use on spots that really itchy

## 2025-03-27 NOTE — PROGRESS NOTES
Assessment & Plan     Contact dermatitis, unspecified contact dermatitis type, unspecified trigger  Has new onset pruritic maculopapular rash that has been getting worse over the past 5 days. Noticed the rash began around the time of his surgery. No symptoms of anaphylaxis. Has not changed personal care products or laundry soap. Given history and exam likely etiology is contact dermatitis. Discussed trial of oral antihistamine along with hypoallergenic lotion. If rash does not improve, discussed trial of Kenalog. Recommended follow-up in one month.   - triamcinolone (KENALOG) 0.1 % external cream; Apply topically 2 times daily.  - cetirizine (ZYRTEC) 10 MG tablet; Take 1 tablet (10 mg) by mouth daily.                No follow-ups on file.    Chantale Leon is a 69 year old, presenting for the following health issues:  Rash  Has a rash that developed after going through shoulder surgery on 3/4/25. Mild irritation at site at the site of the incision without pruritus. Within the last 4-5 days has noticed increased rash along the back, abdomen, chest that is mildly pruritic. Has not put any creams on his chest or tried any benadryl. No known allergies. Has not changed personal care products. No new laundry soap. Denies any chest pain, shortness of breath, swelling of tongue or lips, abdominal pain, nausea, vomiting or diarrhea. No fever.       3/27/2025     3:16 PM   Additional Questions   Roomed by Lisa easley   Accompanied by Self         3/27/2025     3:16 PM   Patient Reported Additional Medications   Patient reports taking the following new medications None     HPI      Rash  Onset/Duration: Around 1 month  Description  Location: Left shoulder, back  Character: spotty  Itching: mild  Intensity:  mild  Progression of Symptoms:  same  Accompanying signs and symptoms:   Fever: No  Body aches or joint pain: No  Sore throat symptoms: No  Recent cold symptoms: No  History:           Previous episodes of similar  "rash: None  New exposures:  None  Recent travel: No  Exposure to similar rash: No  Precipitating or alleviating factors: none  Therapies tried and outcome: none        Review of Systems  Constitutional, HEENT, cardiovascular, pulmonary, GI, , musculoskeletal, neuro, skin, endocrine and psych systems are negative, except as otherwise noted.      Objective    BP (!) 140/68   Pulse 76   Temp 97.7  F (36.5  C) (Tympanic)   Resp 16   Ht 1.778 m (5' 10\")   Wt 103.9 kg (229 lb)   SpO2 94%   BMI 32.86 kg/m    Body mass index is 32.86 kg/m .  Physical Exam   GENERAL: alert and no distress  EYES: Eyes grossly normal to inspection  RESP: lungs clear to auscultation - no rales, rhonchi or wheezes  CV: regular rate and rhythm, normal S1 S2, no S3 or S4, no murmur, click or rub, no peripheral edema  ABDOMEN: soft, nontender no masses and bowel sounds normal  MS: no gross musculoskeletal defects noted, no edema  SKIN: diffuse mildly pruritic maculopapular rash that is present near incision site on left shoulder, chest, abdomen and lower back   PSYCH: mentation appears normal, affect normal/bright          30 minutes spent by me on the date of the encounter doing chart review, history and exam, documentation and further activities per the note.  Signed Electronically by: Vibha Conrad PA-C    "

## 2025-03-27 NOTE — TELEPHONE ENCOUNTER
Pt called and does also have some little bumps that are 3-4 inches apart and red in color.  No facial/tongue swelling, no SOB or difficulty breathing, and no fever.  No other symptoms besides the rash and itchy.   The rash is located on his left shoulder where he recently had shoulder surgery.  Pt stated that he has scratched the shoulder are making some of the bumps open and scabbed over.  He stated that the rash on is back and shoulder are similar in appearance.     Pt has not tried anything new for soaps and laundry supplies.  Pt has not tried any new foods.    Pt has not tried any OTC creams or ointments.    Pt is able to come in today-pt does live about 1 hour and 15 minutes away.    Pt is okay with seeing Vibha if needed.        Pt was encouraged with any new or worsening symptoms such as facial/tongue swelling or SOB to go to ED to get evaluated.  Pt verbalized agreement to plan.

## 2025-03-31 DIAGNOSIS — E11.65 TYPE 2 DIABETES MELLITUS WITH HYPERGLYCEMIA, WITHOUT LONG-TERM CURRENT USE OF INSULIN (H): ICD-10-CM

## 2025-04-01 RX ORDER — DULAGLUTIDE 0.75 MG/.5ML
INJECTION, SOLUTION SUBCUTANEOUS
Qty: 4 ML | Refills: 0 | Status: SHIPPED | OUTPATIENT
Start: 2025-04-01

## 2025-04-01 NOTE — TELEPHONE ENCOUNTER
Trulicity      Last Written Prescription Date:  2/12/25  Last Fill Quantity: 2mL,   # refills: 0  Last Office Visit: 2/20/25  Future Office visit:    Next 5 appointments (look out 90 days)      Apr 28, 2025 9:30 AM  (Arrive by 9:15 AM)  Provider Visit with Vibha Conrad PA-C  [facility] ([facility] [place] ) [address]  [place] [address]  [phone]     May 07, 2025 9:30 AM  (Arrive by 9:15 AM)  Provider Visit with Kelly Yarbrough MD  [facility] ([facility] [place] ) [address]  [place] [address]  [phone]             Routing refill request to provider for review/approval because:

## 2025-04-02 ENCOUNTER — TELEPHONE (OUTPATIENT)
Dept: FAMILY MEDICINE | Facility: OTHER | Age: 70
End: 2025-04-02

## 2025-04-02 NOTE — TELEPHONE ENCOUNTER
Received a PA request for TRULICITY 0.75 MG/0.5ML pen. Submitted via epa, waiting for a response.

## 2025-04-07 ENCOUNTER — TRANSFERRED RECORDS (OUTPATIENT)
Dept: HEALTH INFORMATION MANAGEMENT | Facility: CLINIC | Age: 70
End: 2025-04-07
Payer: COMMERCIAL

## 2025-04-27 DIAGNOSIS — E11.65 TYPE 2 DIABETES MELLITUS WITH HYPERGLYCEMIA, WITHOUT LONG-TERM CURRENT USE OF INSULIN (H): ICD-10-CM

## 2025-04-28 RX ORDER — DULAGLUTIDE 0.75 MG/.5ML
INJECTION, SOLUTION SUBCUTANEOUS
Qty: 4 ML | Refills: 2 | Status: SHIPPED | OUTPATIENT
Start: 2025-04-28

## 2025-04-28 NOTE — TELEPHONE ENCOUNTER
Trulicity      Last Written Prescription Date:  4/1/25  Last Fill Quantity: 4mL,   # refills: 0  Last Office Visit: 3/27/25  Future Office visit:    Next 5 appointments (look out 90 days)      May 08, 2025 9:45 AM  (Arrive by 9:30 AM)  Provider Visit with Kelly Yarbrough MD  [facility] ([facility] [place] ) [address]  [place] [address]  [phone]             Routing refill request to provider for review/approval because:

## 2025-05-08 ENCOUNTER — OFFICE VISIT (OUTPATIENT)
Dept: FAMILY MEDICINE | Facility: OTHER | Age: 70
End: 2025-05-08
Attending: FAMILY MEDICINE
Payer: COMMERCIAL

## 2025-05-08 ENCOUNTER — RESULTS FOLLOW-UP (OUTPATIENT)
Dept: FAMILY MEDICINE | Facility: OTHER | Age: 70
End: 2025-05-08

## 2025-05-08 ENCOUNTER — LAB (OUTPATIENT)
Dept: LAB | Facility: OTHER | Age: 70
End: 2025-05-08
Attending: FAMILY MEDICINE
Payer: COMMERCIAL

## 2025-05-08 VITALS
TEMPERATURE: 97.2 F | SYSTOLIC BLOOD PRESSURE: 146 MMHG | OXYGEN SATURATION: 93 % | BODY MASS INDEX: 31.77 KG/M2 | HEIGHT: 70 IN | DIASTOLIC BLOOD PRESSURE: 78 MMHG | WEIGHT: 221.9 LBS | HEART RATE: 65 BPM

## 2025-05-08 DIAGNOSIS — L25.9 CONTACT DERMATITIS, UNSPECIFIED CONTACT DERMATITIS TYPE, UNSPECIFIED TRIGGER: ICD-10-CM

## 2025-05-08 DIAGNOSIS — E78.5 HYPERLIPIDEMIA LDL GOAL <100: ICD-10-CM

## 2025-05-08 DIAGNOSIS — E11.65 TYPE 2 DIABETES MELLITUS WITH HYPERGLYCEMIA, WITHOUT LONG-TERM CURRENT USE OF INSULIN (H): ICD-10-CM

## 2025-05-08 DIAGNOSIS — E11.65 TYPE 2 DIABETES MELLITUS WITH HYPERGLYCEMIA, WITHOUT LONG-TERM CURRENT USE OF INSULIN (H): Primary | ICD-10-CM

## 2025-05-08 DIAGNOSIS — I10 BENIGN ESSENTIAL HYPERTENSION: ICD-10-CM

## 2025-05-08 LAB
EST. AVERAGE GLUCOSE BLD GHB EST-MCNC: 186 MG/DL
HBA1C MFR BLD: 8.1 %

## 2025-05-08 PROCEDURE — G0463 HOSPITAL OUTPT CLINIC VISIT: HCPCS

## 2025-05-08 PROCEDURE — 83036 HEMOGLOBIN GLYCOSYLATED A1C: CPT | Mod: ZL

## 2025-05-08 PROCEDURE — 36415 COLL VENOUS BLD VENIPUNCTURE: CPT | Mod: ZL

## 2025-05-08 RX ORDER — CETIRIZINE HYDROCHLORIDE 10 MG/1
10 TABLET ORAL DAILY
Qty: 30 TABLET | Refills: 0 | Status: SHIPPED | OUTPATIENT
Start: 2025-05-08

## 2025-05-08 RX ORDER — TRIAMCINOLONE ACETONIDE 1 MG/G
CREAM TOPICAL 2 TIMES DAILY
Qty: 28.4 G | Refills: 0 | Status: SHIPPED | OUTPATIENT
Start: 2025-05-08

## 2025-05-08 ASSESSMENT — PAIN SCALES - GENERAL: PAINLEVEL_OUTOF10: NO PAIN (0)

## 2025-05-08 NOTE — PROGRESS NOTES
Assessment & Plan     Type 2 diabetes mellitus with hyperglycemia, without long-term current use of insulin (H)  Sugar is higher, might be from steroid injections in the past few months, continue to watch diet and work on exercise as able with his shoulder  - Hemoglobin A1c; Future  - sitagliptin (JANUVIA) 25 MG tablet; Take 1 tablet (25 mg) by mouth daily.    Benign essential hypertension  Borderline, likely second to sugar being higher and shoulder pain    Hyperlipidemia LDL goal <100  stable    Contact dermatitis, unspecified contact dermatitis type, unspecified trigger  Recommend switching laundry detergent and restart triamcinolone  - triamcinolone (KENALOG) 0.1 % external cream; Apply topically 2 times daily.  - cetirizine (ZYRTEC) 10 MG tablet; Take 1 tablet (10 mg) by mouth daily.      Patient was agreeable to this plan and had no further questions.  Follow-up       Chantale Leon is a 69 year old, presenting for the following health issues:  Diabetes, Hypertension, and Lipids        5/8/2025     9:28 AM   Additional Questions   Roomed by Catalina LANCE   Accompanied by self     History of Present Illness       Diabetes:   He presents for follow up of diabetes.  He is checking home blood glucose a few times a month.   He checks blood glucose before meals.  Blood glucose is never over 200 and never under 70. He is aware of hypoglycemia symptoms including shakiness and weakness.    He has no concerns regarding his diabetes at this time.   He is not experiencing numbness or burning in feet, excessive thirst, blurry vision, weight changes or redness, sores or blisters on feet.           He eats 2-3 servings of fruits and vegetables daily.He consumes 0 sweetened beverage(s) daily.He exercises with enough effort to increase his heart rate 30 to 60 minutes per day.  He exercises with enough effort to increase his heart rate 4 days per week.   He is taking medications regularly.        Diabetes Follow-up    How  "often are you checking your blood sugar? A few times a month  What time of day are you checking your blood sugars (select all that apply)?  Before meals  Have you had any blood sugars above 200?  No  Have you had any blood sugars below 70?  No  What symptoms do you notice when your blood sugar is low?  Shaky and Weak  What concerns do you have today about your diabetes? None   Do you have any of these symptoms? (Select all that apply)  No numbness or tingling in feet.  No redness, sores or blisters on feet.  No complaints of excessive thirst.  No reports of blurry vision.  No significant changes to weight.          Hyperlipidemia Follow-Up    Are you regularly taking any medication or supplement to lower your cholesterol?   Yes- Lipitor 40 mg   Are you having muscle aches or other side effects that you think could be caused by your cholesterol lowering medication?  No    Hypertension Follow-up    Do you check your blood pressure regularly outside of the clinic? Yes   Are you following a low salt diet? Yes  Are your blood pressures ever more than 140 on the top number (systolic) OR more   than 90 on the bottom number (diastolic), for example 140/90? No    BP Readings from Last 2 Encounters:   05/08/25 (!) 146/78   04/11/25 (!) 150/80     Hemoglobin A1C (%)   Date Value   02/20/2025 7.4 (H)   12/30/2024 7.3 (H)   04/15/2021 7.7 (H)   01/15/2021 6.8 (H)     LDL Cholesterol Calculated (mg/dL)   Date Value   12/30/2024 59   08/14/2023 55   01/15/2021 66   01/10/2020 79         Review of Systems  Constitutional, neuro, ENT, endocrine, pulmonary, cardiac, gastrointestinal, genitourinary, musculoskeletal, integument and psychiatric systems are negative, except as otherwise noted.      Objective    BP (!) 146/78 (BP Location: Right arm, Patient Position: Sitting, Cuff Size: Adult Regular)   Pulse 65   Temp 97.2  F (36.2  C) (Tympanic)   Ht 1.778 m (5' 10\")   Wt 100.7 kg (221 lb 14.4 oz)   SpO2 93%   BMI 31.84 kg/m  "   Body mass index is 31.84 kg/m .  Physical Exam   GENERAL: alert and no distress  RESP: lungs clear to auscultation - no rales, rhonchi or wheezes  CV: regular rate and rhythm, normal S1 S2, no S3 or S4, no murmur, click or rub, no peripheral edema  ABDOMEN: soft, nontender, no hepatosplenomegaly, no masses and bowel sounds normal  MS: no gross musculoskeletal defects noted, no edema  PSYCH: mentation appears normal, affect normal/bright    Results for orders placed or performed in visit on 05/08/25   Hemoglobin A1c     Status: Abnormal   Result Value Ref Range    Estimated Average Glucose 186 (H) <117 mg/dL    Hemoglobin A1C 8.1 (H) <5.7 %           Signed Electronically by: Kelly Yarbrough MD    The longitudinal plan of care for the diagnosis(es)/condition(s) as documented were addressed during this visit. Due to the added complexity in care, I will continue to support Edward in the subsequent management and with ongoing continuity of care.    Answers submitted by the patient for this visit:  Diabetes Visit (Submitted on 5/7/2025)  Chief Complaint: Chronic problems general questions HPI Form  Frequency of checking blood sugars:: a few times a month  What time of day are you checking your blood sugars : before meals  Have you had any blood sugars above 200?: No  Have you had any blood sugars below 70?: No  Hypoglycemia symptoms:: shakiness, weakness  Diabetic concerns:: none  Paraesthesia present:: none of these symptoms  General Questionnaire (Submitted on 5/7/2025)  Chief Complaint: Chronic problems general questions HPI Form  How many servings of fruits and vegetables do you eat daily?: 2-3  On average, how many sweetened beverages do you drink each day (Examples: soda, juice, sweet tea, etc.  Do NOT count diet or artificially sweetened beverages)?: 0  How many minutes a day do you exercise enough to make your heart beat faster?: 30 to 60  How many days a week do you exercise enough to make your heart beat  faster?: 4  How many days per week do you miss taking your medication?: 0  Questionnaire about: Chronic problems general questions HPI Form (Submitted on 5/7/2025)  Chief Complaint: Chronic problems general questions HPI Form

## 2025-05-08 NOTE — PATIENT INSTRUCTIONS
John-inositol 500mg 2x/day for 1 week, then increase to 1000mg 2x/day for 1 week, then increase to 2000mg 2x/day  (ratio of john to 'd' inositol is 40:1) You can take in capsule form or powder (powder can be placed in water and easily taken)  Pure encapsulations on [employer]    ATTENDING PROVIDER ATTESTATION:     Flower Muse presented to the emergency department for evaluation of Dizziness (dizziness at PCP no LOC, pt has hx of syncope and dx vertigo)   and was initially evaluated by the Medical Resident. See Original ED Note for H&P and ED course above. I have reviewed and discussed the case, including pertinent history (medical, surgical, family and social) and exam findings with the Medical Resident assigned to Flower Micha. I have personally performed and/or participated in the history, exam, medical decision making, and procedures and agree with all pertinent clinical information and any additional changes or corrections are noted below in my assessment and plan. I have discussed this patient in detail with the resident, and provided the instruction and education,       I have reviewed my findings and recommendations with the assigned Medical Resident, Flower Muse and members of family present at the time of disposition. I have performed a history and physical examination of this patient and directly supervised the resident caring for this patient      History of Present Illness:    Presents to the ED for dizziness and syncope, beginning prior to arrival.  The complaint has been intermittent, severe in severity, and worsened by standing and position change. Reports positional/postural syncope. Reports every time she stands she gets light headed and passes out. Reports it only happens when standing. Says she gets chest pain with this as well. No fever, chills, abdominal pain, back pain, leg swelling or other complaints. No family hx of sudden cardiac death, brugada or family hx of syncope.          Review of Systems:   A complete review of systems was performed and pertinent positives and negatives are stated within HPI, all other systems reviewed and are negative.    --------------------------------------------- PAST HISTORY ---------------------------------------------  Past Medical History:  has a past medical history of Anxiety and depression, IBS (irritable bowel syndrome), and Migraines. Past Surgical History:  has a past surgical history that includes Cholecystectomy. Social History:  reports that she has never smoked. She has never used smokeless tobacco. She reports previous alcohol use. She reports previous drug use. Family History: family history includes No Known Problems in her mother. Unless otherwise noted, family history is non contributory    The patients home medications have been reviewed. Allergies: Pcn [penicillins]    EKG Interpretation  Interpreted by emergency department physician, Dr. Aj Balderrama     5/11/22  Time: 1642    Rhythm: normal sinus   Rate: normal  Axis: right  Conduction: right bundle branch block (incomplete)  ST Segments: no acute change  T Waves: no acute change    Clinical Impression: Sinus rhythm, incomplete right bundle branch block, QTc reassuring, no clear signs of Brugada  Comparison to Old EKG  Stable from prior          Physical Exam:  Constitutional/General: Alert and oriented x3  Head: Normocephalic and atraumatic  Eyes: PERRL, EOMI, sclera non icteric  ENT: Oropharynx clear, handling secretions  Neck: Supple, full ROM, no stridor, no meningeal signs  Respiratory: Lungs clear to auscultation bilaterally, no wheezes, rales, or rhonchi. Not in respiratory distress  Cardiovascular:  Regular rate. Regular rhythm. No murmurs, no gallops, no rubs. 2+ distal pulses. Equal extremity pulses. GI:  Abdomen Soft, Non tender, Non distended. No rebound, guarding, or rigidity. No pulsatile masses. Musculoskeletal: Moves all extremities x 4. Warm and well perfused,  no clubbing, no cyanosis, no edema. Palpable peripheral pulses  Integument: skin warm and dry. No rashes.    Neurologic: GCS 15, no focal deficits  Psychiatric: Normal Affect      I directly supervised any procedures performed by the resident and was present for the procedure including all critical portions of the procedure      The cardiac monitor revealed sinus rhythm with a heart rate in the 80s as interpreted by me. The cardiac monitor was ordered secondary to the patient's syncope and to monitor the patient for dysrhythmia. CPT [id number]      I, Dr. Dina Mac, am the primary provider of record    My Medical Decision Making:         Syncope, ? POTS  Very symptomatic and increasing in frequency  Medicine consulted for admission        1.  Syncope and collapse           Janee Gonzalez MD  05/11/22 [id number]

## 2025-05-21 ENCOUNTER — OFFICE VISIT (OUTPATIENT)
Dept: ORTHOPEDICS | Facility: OTHER | Age: 70
End: 2025-05-21
Attending: ORTHOPAEDIC SURGERY
Payer: COMMERCIAL

## 2025-05-21 VITALS — DIASTOLIC BLOOD PRESSURE: 78 MMHG | SYSTOLIC BLOOD PRESSURE: 142 MMHG | OXYGEN SATURATION: 94 % | HEART RATE: 76 BPM

## 2025-05-21 DIAGNOSIS — Z98.890 S/P SHOULDER SURGERY: Primary | ICD-10-CM

## 2025-05-21 PROCEDURE — G0463 HOSPITAL OUTPT CLINIC VISIT: HCPCS

## 2025-05-21 PROCEDURE — 99024 POSTOP FOLLOW-UP VISIT: CPT | Performed by: ORTHOPAEDIC SURGERY

## 2025-05-21 PROCEDURE — 3077F SYST BP >= 140 MM HG: CPT | Performed by: ORTHOPAEDIC SURGERY

## 2025-05-21 PROCEDURE — 3078F DIAST BP <80 MM HG: CPT | Performed by: ORTHOPAEDIC SURGERY

## 2025-05-21 PROCEDURE — 1126F AMNT PAIN NOTED NONE PRSNT: CPT | Performed by: ORTHOPAEDIC SURGERY

## 2025-05-21 ASSESSMENT — PAIN SCALES - GENERAL: PAINLEVEL_OUTOF10: NO PAIN (0)

## 2025-05-26 PROBLEM — Z98.890 S/P SHOULDER SURGERY: Status: ACTIVE | Noted: 2025-05-26

## 2025-05-27 NOTE — PROGRESS NOTES
ORTHOPEDIC CLINIC CONSULT    Referred by: Primary Care Providers:  Kelly Yarbrough MD, MD (General)    Chief Complaint: Edward Hannah is a 69 year old male who is being seen for   Chief Complaint   Patient presents with    RECHECK     Left shoulder scop 3/4/25       History of Present Illness:   Patient 69-year-old male presents back after left shoulder arthroscopy otherwise doing well date of surgery was 3/4/2025.  Feels like he is between 50 and 70% improved from preoperative status.  Otherwise no other complaints    Patient's past medical, surgical, social and family histories reviewed.     Past Medical History:   Diagnosis Date    Basal cell carcinoma     Calcific tendonitis of left shoulder 03/2020    DIABETES MELLITUS TYPE II-UNCOMPL 01/19/2006    Elevated PSA 2019    HYPERLIPIDEMIA NEC/NOS 10/18/2006    HYPERTENSION NOS 01/19/2006    Nephrolithiasis 10/2022    was seen in [place], and next time GI    Obesity     JIAN (obstructive sleep apnea) 2022    mild, chose not to get cpap or oral device    Shingles 11/2017    Tendon nodule 2024    Trigger finger, acquired 2024       Past Surgical History:   Procedure Laterality Date    ARTHROSCOPY SHOULDER, OPEN DECOMPRESSION, COMBINED Left 3/4/2025    Procedure: LEFT SHOULDER ARTHROSCOPY, WITH SUBACROMIAL DECOMPRESSION AND DISTAL CLAVICLE  EXCISION;  Surgeon: Lawson Kiser MD;  Location: [facility] OR    BACK SURGERY      BIOPSY PROSTATE TRANSRECTAL N/A 10/22/2019    Procedure: Transrectal Ultrasoun guided biopsy of prostate;  Surgeon: Yunior Llanes MD;  Location:  OR    COLONOSCOPY  03/03/2014    due 2019  4 polyps benign    COLONOSCOPY N/A 06/10/2021    due 6/2026  Procedure: surveillance colonoscopy,  biopsy;  Surgeon: Lawson Storey MD;  Location: [facility] OR    wisdom teeth extraction         Home Medications:  Prior to Admission medications    Medication Sig Start Date End Date Taking? Authorizing Provider   acetylcysteine (N-ACETYL CYSTEINE) 600 MG CAPS capsule  Take 1 capsule (600 mg) by mouth 2 times daily. 2/20/25  Yes Kelly Yarbrough MD   amLODIPine (NORVASC) 5 MG tablet Take 1 tablet by mouth once daily 8/8/24  Yes Kelly Yarbrough MD   ASPIRIN 81 MG OR TABS 1 tab po QD (Once per day) 8/21/07  Yes Anup Linares MD   atorvastatin (LIPITOR) 40 MG tablet Take 1 tablet by mouth once daily 8/8/24  Yes Kelly Yarbrough MD   blood glucose (ONETOUCH VERIO IQ) test strip USE  STRIP TO CHECK GLUCOSE ONCE DAILY 12/16/24  Yes Kelly Yarbrough MD   Blood Glucose Monitoring Suppl (ONETOUCH VERIO FLEX SYSTEM) w/Device KIT 1 each continuous 2/24/21  Yes Kelly Yarbrough MD   Blood Pressure Monitor KIT 1 Application 2 times daily 1/15/21  Yes Kelly Yarbrough MD   cetirizine (ZYRTEC) 10 MG tablet Take 1 tablet (10 mg) by mouth daily. 5/8/25  Yes Kelly Yarbrough MD   glipiZIDE (GLUCOTROL XL) 10 MG 24 hr tablet Take 1 tablet by mouth twice daily 8/8/24  Yes Kelly Yarbrough MD   insulin pen needle (BD PEN NEEDLE TYRONE 2ND GEN) 32G X 4 MM miscellaneous USE 1 VALARIE NEEDLE ONCE DAILY AS DIRECTED 5/29/24  Yes Kelly Yarbrough MD   lisinopril (ZESTRIL) 40 MG tablet Take 1 tablet by mouth once daily 8/8/24  Yes Kelly Yarbrough MD   metFORMIN (GLUCOPHAGE) 1000 MG tablet TAKE 1 TABLET BY MOUTH TWICE DAILY WITH MEALS 8/8/24  Yes Kelly Yarbrough MD   Multiple Vitamins-Minerals (MULTIVITAMIN PO) Take 1 tablet by mouth daily   Yes Reported, Patient   Omega-3 Fatty Acids (OMEGA-3 FISH OIL PO) Take 1 g by mouth daily   Yes Reported, Patient   OneTouch Delica Lancets 33G MISC 1 each daily 2/24/21  Yes Kelly Yarbrough MD   sitagliptin (JANUVIA) 25 MG tablet Take 1 tablet (25 mg) by mouth daily. 5/8/25  Yes Kelly Yarbrough MD   triamcinolone (KENALOG) 0.1 % external cream Apply topically 2 times daily. 5/8/25  Yes Kelly Yarbrough MD   Turmeric 500 MG TABS Take 1 tablet by mouth 2 times daily   Yes Reported, Patient   vitamin B complex with vitamin C (STRESS TAB)  tablet Take 1 tablet by mouth daily   Yes Reported, Patient   VITAMIN D, CHOLECALCIFEROL, PO Take 1,000 Units by mouth daily   Yes Reported, Patient   TRULICITY 0.75 MG/0.5ML pen INJECT 1 SYRINGE (0.75MG) SUBCUTANEOUSLY ONCE A WEEK.  Patient not taking: Reported on 5/21/2025 4/28/25   Kelly Yarbrough MD       Allergies   Allergen Reactions    Nkda [No Known Drug Allergy]        Social History     Occupational History    Occupation: retired     Employer: OTHER     Comment: land surveying-field work,    Tobacco Use    Smoking status: Never     Passive exposure: Never    Smokeless tobacco: Never   Vaping Use    Vaping status: Never Used   Substance and Sexual Activity    Alcohol use: Yes     Comment: 2-3X week    Drug use: Not Currently     Comment: remote [employer]C    Sexual activity: Yes     Partners: Female       Family History   Problem Relation Age of Onset    Other - See Comments Mother         infection    Cardiovascular Father         aortic valve surgery    Cancer Father         lung cancer    Gastrointestinal Disease Father         benign esophageal tumor removed    Hypertension Father     Diabetes Father 70    Cataracts Sister     Other - See Comments Maternal Grandfather         hunting accident    Other - See Comments Paternal Grandmother         old age    Parkinsonism Paternal Grandfather     Cerebrovascular Disease No family hx of        REVIEW OF SYSTEMS            Physical Exam:    Vitals: BP (!) 142/78   Pulse 76   SpO2 94%   BMI= There is no height or weight on file to calculate BMI.  On examination is awake alert and oriented cooperative no apparent distress.  Continues to be working with physical therapy.  Is negative Neer and Tyler today.  Is able to get 155 degrees forward flexion 150 abduction  Radiographs:      Independent visualization of the films was made.         Impression:      ICD-10-CM    1. S/P left shoulder arthroscopy with subacromial decompression, DCE and limited RC debridement   Z98.890           Plan: Left posterior shoulder arthroscopy for calcific tendinitis.  Continues work with physical therapy gaining strength and motion again he is still working 50 and 70% improved depending on activities.  Otherwise no other complaints he is continue with his physical therapy.  Will have him continue and follow-up over the next 6 to 8 weeks    All of the above pertinent physical exam and imaging modalities findings was reviewed with Edward.    Return to clinic in 6-8 weeks.    Further imaging required none    Time spent with evaluation:  minutes    Lawson Kiser MD  5/26/2025  7:34 PM

## 2025-07-09 ENCOUNTER — OFFICE VISIT (OUTPATIENT)
Dept: ORTHOPEDICS | Facility: OTHER | Age: 70
End: 2025-07-09
Attending: ORTHOPAEDIC SURGERY
Payer: COMMERCIAL

## 2025-07-09 VITALS
SYSTOLIC BLOOD PRESSURE: 141 MMHG | DIASTOLIC BLOOD PRESSURE: 79 MMHG | HEART RATE: 66 BPM | BODY MASS INDEX: 31.64 KG/M2 | HEIGHT: 70 IN | OXYGEN SATURATION: 96 % | TEMPERATURE: 97.4 F | WEIGHT: 221 LBS

## 2025-07-09 DIAGNOSIS — Z98.890 S/P SHOULDER SURGERY: Primary | ICD-10-CM

## 2025-07-09 PROCEDURE — G0463 HOSPITAL OUTPT CLINIC VISIT: HCPCS

## 2025-07-09 ASSESSMENT — PAIN SCALES - GENERAL: PAINLEVEL_OUTOF10: NO PAIN (0)

## 2025-07-10 NOTE — PROGRESS NOTES
ORTHOPEDIC CLINIC CONSULT    Referred by: Primary Care Providers:  Kelly Yarbrough MD, MD (General)    Chief Complaint:    Chief Complaint   Patient presents with    Musculoskeletal Problem     Left shoulder scope recheck       History of Present Illness: Edward Hannah is a 69 year old male who is being seen for Musculoskeletal Problem (Left shoulder scope recheck) follow-up status post left shoulder arthroscopy 4 months out date of surgery is 3/4/2025 he is doing quite well has a good forward flexion good abduction and good strength.  He is continue to make marked improvement in activities and comfort.  He has no other changes or complaints      Patient's past medical, surgical, social and family histories reviewed.     Past Medical History:   Diagnosis Date    Basal cell carcinoma     Calcific tendonitis of left shoulder 03/2020    DIABETES MELLITUS TYPE II-UNCOMPL 01/19/2006    Elevated PSA 2019    HYPERLIPIDEMIA NEC/NOS 10/18/2006    HYPERTENSION NOS 01/19/2006    Nephrolithiasis 10/2022    was seen in [place], and next time GI    Obesity     JIAN (obstructive sleep apnea) 2022    mild, chose not to get cpap or oral device    Shingles 11/2017    Tendon nodule 2024    Trigger finger, acquired 2024       Past Surgical History:   Procedure Laterality Date    ARTHROSCOPY SHOULDER, OPEN DECOMPRESSION, COMBINED Left 3/4/2025    Procedure: LEFT SHOULDER ARTHROSCOPY, WITH SUBACROMIAL DECOMPRESSION AND DISTAL CLAVICLE  EXCISION;  Surgeon: Lawson Kiser MD;  Location: [facility] OR    BACK SURGERY      BIOPSY PROSTATE TRANSRECTAL N/A 10/22/2019    Procedure: Transrectal Ultrasoun guided biopsy of prostate;  Surgeon: Yunior Llanes MD;  Location:  OR    COLONOSCOPY  03/03/2014    due 2019  4 polyps benign    COLONOSCOPY N/A 06/10/2021    due 6/2026  Procedure: surveillance colonoscopy,  biopsy;  Surgeon: Lawson Storey MD;  Location: [facility] OR    wisdom teeth extraction         Home Medications:  Prior to Admission  medications    Medication Sig Start Date End Date Taking? Authorizing Provider   acetylcysteine (N-ACETYL CYSTEINE) 600 MG CAPS capsule Take 1 capsule (600 mg) by mouth 2 times daily. 2/20/25  Yes Kelly Yarbrough MD   amLODIPine (NORVASC) 5 MG tablet Take 1 tablet by mouth once daily 8/8/24  Yes Kelly Yarbrough MD   ASPIRIN 81 MG OR TABS 1 tab po QD (Once per day) 8/21/07  Yes Anup Linares MD   atorvastatin (LIPITOR) 40 MG tablet Take 1 tablet by mouth once daily 8/8/24  Yes Kelly Yarbrough MD   blood glucose (ONETOUCH VERIO IQ) test strip USE  STRIP TO CHECK GLUCOSE ONCE DAILY 12/16/24  Yes Kelly Yarbrough MD   Blood Glucose Monitoring Suppl (ONETOUCH VERIO FLEX SYSTEM) w/Device KIT 1 each continuous 2/24/21  Yes Kelly Yarbrough MD   Blood Pressure Monitor KIT 1 Application 2 times daily 1/15/21  Yes Kelly Yarbrough MD   cetirizine (ZYRTEC) 10 MG tablet Take 1 tablet (10 mg) by mouth daily. 5/8/25  Yes Kelly Yarbrough MD   glipiZIDE (GLUCOTROL XL) 10 MG 24 hr tablet Take 1 tablet by mouth twice daily 8/8/24  Yes Kelly Yarbrough MD   lisinopril (ZESTRIL) 40 MG tablet Take 1 tablet by mouth once daily 8/8/24  Yes Kelly Yarbrough MD   metFORMIN (GLUCOPHAGE) 1000 MG tablet TAKE 1 TABLET BY MOUTH TWICE DAILY WITH MEALS 8/8/24  Yes Kelly Yarbrough MD   Multiple Vitamins-Minerals (MULTIVITAMIN PO) Take 1 tablet by mouth daily   Yes Reported, Patient   Omega-3 Fatty Acids (OMEGA-3 FISH OIL PO) Take 1 g by mouth daily   Yes Reported, Patient   OneTouch Delica Lancets 33G MISC 1 each daily 2/24/21  Yes Kelly Yarbrough MD   sitagliptin (JANUVIA) 25 MG tablet Take 1 tablet (25 mg) by mouth daily. 5/8/25  Yes Kelly Yarbrough MD   triamcinolone (KENALOG) 0.1 % external cream Apply topically 2 times daily. 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,    Tobacco Use    Smoking status: Never     Passive exposure: Never    Smokeless tobacco: Never   Vaping Use    Vaping status: Never Used   Substance and Sexual Activity    Alcohol use: Yes     Comment: 2-3X week    Drug use: Not Currently     Comment: remote [employer]C    Sexual activity: Yes     Partners: Female       Family History   Problem Relation Age of Onset    Other - See Comments Mother         infection    Cardiovascular Father         aortic valve surgery    Cancer Father         lung cancer    Gastrointestinal Disease Father         benign esophageal tumor removed    Hypertension Father     Diabetes Father 70    Cataracts Sister     Other - See Comments Maternal Grandfather         hunting accident    Other - See Comments Paternal Grandmother         old age    Parkinsonism Paternal Grandfather     Cerebrovascular Disease No family hx of        REVIEW OF SYSTEMS            Physical Exam:    Vitals: BP (!) 141/79 (BP Location: Left arm, Patient Position: Sitting, Cuff Size: Adult Regular)   Pulse 66   Temp 97.4  F (36.3  C) (Tympanic)   Ht 1.778 m (5' 10\")   Wt 100.2 kg (221 lb)   SpO2 96%   BMI 31.71 kg/m    BMI= Body mass index is 31.71 kg/m .  Examination left shoulder has near full range of motion compared to the gradual side within 5 degrees of the forward flexion abduction internal ex rotation.  Doing quite well and neuro vas intact  Radiographs: None     Independent visualization of the films was made.         Impression:      ICD-10-CM    1. S/P left shoulder arthroscopy with subacromial decompression, DCE and limited RC debridement  Z98.890           Plan: Doing quite " well at this point tenderness status post surgical invention debridement.  Range of motion is doing quite well and activity progression is doing well enough at this time to follow-up more in a as needed basis.  If he starts to have increasing issues or any limitations have him follow-up sooner and later in however at this time he can continue to work on his strengthening and improve advance activities as he tolerates    All of the above pertinent physical exam and imaging modalities findings was reviewed with Edward.    Return to clinic in as needed weeks.    Further imaging required none at this time    Time spent with evaluation:   minutes    Lawson Kiser MD  7/10/2025  5:47 PM

## 2025-08-04 DIAGNOSIS — E78.5 HYPERLIPIDEMIA LDL GOAL <100: ICD-10-CM

## 2025-08-05 RX ORDER — ATORVASTATIN CALCIUM 40 MG/1
40 TABLET, FILM COATED ORAL DAILY
Qty: 90 TABLET | Refills: 0 | Status: SHIPPED | OUTPATIENT
Start: 2025-08-05 | End: 2025-08-06

## 2025-08-06 DIAGNOSIS — E11.65 TYPE 2 DIABETES MELLITUS WITH HYPERGLYCEMIA, WITHOUT LONG-TERM CURRENT USE OF INSULIN (H): ICD-10-CM

## 2025-08-06 DIAGNOSIS — I10 BENIGN ESSENTIAL HYPERTENSION: ICD-10-CM

## 2025-08-06 DIAGNOSIS — E78.5 HYPERLIPIDEMIA LDL GOAL <100: ICD-10-CM

## 2025-08-06 RX ORDER — AMLODIPINE BESYLATE 5 MG/1
5 TABLET ORAL DAILY
Qty: 90 TABLET | Refills: 2 | Status: SHIPPED | OUTPATIENT
Start: 2025-08-06

## 2025-08-06 RX ORDER — SITAGLIPTIN 25 MG/1
25 TABLET, FILM COATED ORAL DAILY
Qty: 30 TABLET | Refills: 2 | Status: SHIPPED | OUTPATIENT
Start: 2025-08-06

## 2025-08-06 RX ORDER — ATORVASTATIN CALCIUM 40 MG/1
40 TABLET, FILM COATED ORAL DAILY
Qty: 90 TABLET | Refills: 0 | Status: SHIPPED | OUTPATIENT
Start: 2025-08-06

## 2025-08-06 RX ORDER — GLIPIZIDE 10 MG/1
10 TABLET, FILM COATED, EXTENDED RELEASE ORAL 2 TIMES DAILY
Qty: 180 TABLET | Refills: 0 | Status: SHIPPED | OUTPATIENT
Start: 2025-08-06

## 2025-08-06 RX ORDER — LISINOPRIL 40 MG/1
40 TABLET ORAL DAILY
Qty: 90 TABLET | Refills: 2 | Status: SHIPPED | OUTPATIENT
Start: 2025-08-06

## 2025-08-14 ENCOUNTER — LAB (OUTPATIENT)
Dept: LAB | Facility: OTHER | Age: 70
End: 2025-08-14
Payer: COMMERCIAL

## 2025-08-14 ENCOUNTER — OFFICE VISIT (OUTPATIENT)
Dept: FAMILY MEDICINE | Facility: OTHER | Age: 70
End: 2025-08-14
Attending: STUDENT IN AN ORGANIZED HEALTH CARE EDUCATION/TRAINING PROGRAM
Payer: COMMERCIAL

## 2025-08-14 VITALS
WEIGHT: 223.1 LBS | TEMPERATURE: 97.2 F | RESPIRATION RATE: 18 BRPM | HEART RATE: 64 BPM | OXYGEN SATURATION: 96 % | DIASTOLIC BLOOD PRESSURE: 72 MMHG | SYSTOLIC BLOOD PRESSURE: 130 MMHG | BODY MASS INDEX: 31.94 KG/M2 | HEIGHT: 70 IN

## 2025-08-14 DIAGNOSIS — E78.5 HYPERLIPIDEMIA LDL GOAL <100: ICD-10-CM

## 2025-08-14 DIAGNOSIS — I10 BENIGN ESSENTIAL HYPERTENSION: ICD-10-CM

## 2025-08-14 DIAGNOSIS — E11.65 TYPE 2 DIABETES MELLITUS WITH HYPERGLYCEMIA, WITHOUT LONG-TERM CURRENT USE OF INSULIN (H): Primary | ICD-10-CM

## 2025-08-14 DIAGNOSIS — E11.65 TYPE 2 DIABETES MELLITUS WITH HYPERGLYCEMIA, WITHOUT LONG-TERM CURRENT USE OF INSULIN (H): ICD-10-CM

## 2025-08-14 LAB
CREAT UR-MCNC: 131.9 MG/DL
EST. AVERAGE GLUCOSE BLD GHB EST-MCNC: 166 MG/DL
HBA1C MFR BLD: 7.4 %
MICROALBUMIN UR-MCNC: <12 MG/L
MICROALBUMIN/CREAT UR: NORMAL MG/G{CREAT}

## 2025-08-14 PROCEDURE — 83036 HEMOGLOBIN GLYCOSYLATED A1C: CPT | Mod: ZL

## 2025-08-14 PROCEDURE — 3051F HG A1C>EQUAL 7.0%<8.0%: CPT

## 2025-08-14 PROCEDURE — 82043 UR ALBUMIN QUANTITATIVE: CPT | Mod: ZL

## 2025-08-14 PROCEDURE — G0463 HOSPITAL OUTPT CLINIC VISIT: HCPCS

## 2025-08-14 PROCEDURE — 36415 COLL VENOUS BLD VENIPUNCTURE: CPT | Mod: ZL

## 2025-08-14 ASSESSMENT — PAIN SCALES - GENERAL: PAINLEVEL_OUTOF10: NO PAIN (0)

## (undated) DEVICE — IMM KIT SHOULDER TMAX MASK FACE 7210559

## (undated) DEVICE — BUR ARTHREX COOLCUT DISSECTOR 4.0MMX13CM AR-8400DS

## (undated) DEVICE — SLEEVE SCD EXPRESS KNEE LENGTH MED 9529

## (undated) DEVICE — PACK SHOULDER ARTHROSCOPY CUSTOM SOP32SAMBD

## (undated) DEVICE — COVER LT HANDLE 2/PK 5160-2FG

## (undated) DEVICE — SU ETHILON 3-0 PS-1 18" 1663H

## (undated) DEVICE — IMM SLING SHOULDER LG DELUXE 79-84007

## (undated) DEVICE — SUCTION TUBE YANKAUR K61

## (undated) DEVICE — SOLUTION IV IRRIGATION 0.9% NACL 3L R8206

## (undated) DEVICE — SOL WATER IRRIG 1000ML BOTTLE 2F7114

## (undated) DEVICE — GLOVE PROTEXIS POWDER FREE 8.0 ORTHOPEDIC 2D73ET80

## (undated) DEVICE — SOL NACL 0.9% IRRIG 1000ML BOTTLE 2F7124

## (undated) DEVICE — SOL WATER 1500ML

## (undated) DEVICE — IRRIGATION-H2O 1000ML

## (undated) DEVICE — FORCEP-COLON BIOPSY LARGE W/NEEDLE 240CM

## (undated) DEVICE — DRAPE TOWEL TIME OUT BEACON REMINDER STRL 811

## (undated) DEVICE — TRANSRECTAL NEEDLE GUIDE DISPOSABLE

## (undated) DEVICE — DRSG GAUZE 4X4" 3033

## (undated) DEVICE — TUBING SET ARTHREX DUALWAVE OUTFLOW AR-6430

## (undated) DEVICE — SUCTION MANIFOLD NEPTUNE 2 SYS 4 PORT 0702-020-000

## (undated) DEVICE — PACK BASIN SET UP SUTCNBSBBA

## (undated) DEVICE — NDL BX 18GAX25CM MC1825

## (undated) DEVICE — PAD CHUX UNDERPAD 30X36" P3036C

## (undated) DEVICE — BIN-ARTHROSCOPY CART BN05

## (undated) DEVICE — ARTHROSCOPIC CANNULA 7MMX7CM PURPLE  AR-6550

## (undated) DEVICE — BLANKET BAIR HUGGER LOWER BODY 42568

## (undated) DEVICE — BUR ARTHREX FLUSHCUT OVAL 5.0MMX13CM AR-8500FOE

## (undated) DEVICE — LUBRICATING JELLY 2.7GM T00137

## (undated) DEVICE — TAPE MEDIPORE 4"X2YD 2864

## (undated) DEVICE — CONNECTOR-ERBEFLO 2 PORT

## (undated) DEVICE — PAD PERI INDIV WRAP 11" 2022A

## (undated) DEVICE — TUBING-SUCTION 20FT

## (undated) DEVICE — TUBING ARTHROSCOPY PUMP ARTHREX AR-6410

## (undated) DEVICE — KIT SHOULDER POSITIONING BEACH CHAIR ARM HOLDER AR-1644

## (undated) DEVICE — LABEL STERILE PREPRINTED FOR OR FRRH01-2M

## (undated) DEVICE — STRAP STIRRUP W/SLIP 30187-030

## (undated) DEVICE — PREP CHLORAPREP 26ML TINTED HI-LITE ORANGE 930815

## (undated) RX ORDER — PROPOFOL 10 MG/ML
INJECTION, EMULSION INTRAVENOUS
Status: DISPENSED
Start: 2025-03-04

## (undated) RX ORDER — PROPOFOL 10 MG/ML
INJECTION, EMULSION INTRAVENOUS
Status: DISPENSED
Start: 2019-10-22

## (undated) RX ORDER — LIDOCAINE HYDROCHLORIDE 20 MG/ML
JELLY TOPICAL
Status: DISPENSED
Start: 2019-10-22

## (undated) RX ORDER — LIDOCAINE HYDROCHLORIDE 20 MG/ML
INJECTION, SOLUTION EPIDURAL; INFILTRATION; INTRACAUDAL; PERINEURAL
Status: DISPENSED
Start: 2019-10-22

## (undated) RX ORDER — SEVOFLURANE 250 ML/250ML
LIQUID RESPIRATORY (INHALATION)
Status: DISPENSED
Start: 2025-03-04

## (undated) RX ORDER — GENTAMICIN SULFATE 60 MG/50ML
INJECTION, SOLUTION INTRAVENOUS
Status: DISPENSED
Start: 2019-10-22

## (undated) RX ORDER — FENTANYL CITRATE-0.9 % NACL/PF 10 MCG/ML
PLASTIC BAG, INJECTION (ML) INTRAVENOUS
Status: DISPENSED
Start: 2025-03-04

## (undated) RX ORDER — FENTANYL CITRATE 50 UG/ML
INJECTION, SOLUTION INTRAMUSCULAR; INTRAVENOUS
Status: DISPENSED
Start: 2025-03-04

## (undated) RX ORDER — ONDANSETRON 2 MG/ML
INJECTION INTRAMUSCULAR; INTRAVENOUS
Status: DISPENSED
Start: 2025-03-04

## (undated) RX ORDER — DEXAMETHASONE SODIUM PHOSPHATE 10 MG/ML
INJECTION, SOLUTION INTRAMUSCULAR; INTRAVENOUS
Status: DISPENSED
Start: 2025-03-04